# Patient Record
Sex: FEMALE | Race: OTHER | NOT HISPANIC OR LATINO | URBAN - METROPOLITAN AREA
[De-identification: names, ages, dates, MRNs, and addresses within clinical notes are randomized per-mention and may not be internally consistent; named-entity substitution may affect disease eponyms.]

---

## 2017-08-16 ENCOUNTER — EMERGENCY (EMERGENCY)
Facility: HOSPITAL | Age: 47
LOS: 0 days | Discharge: HOME | End: 2017-08-16

## 2017-08-16 DIAGNOSIS — J45.909 UNSPECIFIED ASTHMA, UNCOMPLICATED: ICD-10-CM

## 2017-08-16 DIAGNOSIS — F19.94 OTHER PSYCHOACTIVE SUBSTANCE USE, UNSPECIFIED WITH PSYCHOACTIVE SUBSTANCE-INDUCED MOOD DISORDER: ICD-10-CM

## 2017-08-16 DIAGNOSIS — F41.0 PANIC DISORDER [EPISODIC PAROXYSMAL ANXIETY]: ICD-10-CM

## 2017-08-16 DIAGNOSIS — F19.20 OTHER PSYCHOACTIVE SUBSTANCE DEPENDENCE, UNCOMPLICATED: ICD-10-CM

## 2017-08-16 DIAGNOSIS — Z79.899 OTHER LONG TERM (CURRENT) DRUG THERAPY: ICD-10-CM

## 2017-08-16 DIAGNOSIS — M79.89 OTHER SPECIFIED SOFT TISSUE DISORDERS: ICD-10-CM

## 2017-08-16 DIAGNOSIS — Z88.8 ALLERGY STATUS TO OTHER DRUGS, MEDICAMENTS AND BIOLOGICAL SUBSTANCES: ICD-10-CM

## 2017-08-16 DIAGNOSIS — M25.572 PAIN IN LEFT ANKLE AND JOINTS OF LEFT FOOT: ICD-10-CM

## 2017-08-16 DIAGNOSIS — F33.2 MAJOR DEPRESSIVE DISORDER, RECURRENT SEVERE WITHOUT PSYCHOTIC FEATURES: ICD-10-CM

## 2017-08-17 ENCOUNTER — EMERGENCY (EMERGENCY)
Facility: HOSPITAL | Age: 47
LOS: 1 days | Discharge: HOME | End: 2017-08-17
Attending: EMERGENCY MEDICINE | Admitting: EMERGENCY MEDICINE
Payer: MEDICAID

## 2017-08-17 ENCOUNTER — OUTPATIENT (OUTPATIENT)
Dept: OUTPATIENT SERVICES | Facility: HOSPITAL | Age: 47
LOS: 1 days | Discharge: HOME | End: 2017-08-17

## 2017-08-17 VITALS
TEMPERATURE: 98 F | SYSTOLIC BLOOD PRESSURE: 112 MMHG | OXYGEN SATURATION: 98 % | DIASTOLIC BLOOD PRESSURE: 85 MMHG | RESPIRATION RATE: 18 BRPM | HEART RATE: 89 BPM

## 2017-08-17 PROBLEM — Z00.00 ENCOUNTER FOR PREVENTIVE HEALTH EXAMINATION: Status: ACTIVE | Noted: 2017-08-17

## 2017-08-17 PROCEDURE — 99284 EMERGENCY DEPT VISIT MOD MDM: CPT

## 2017-08-17 RX ORDER — ALPRAZOLAM 0.25 MG
0.5 TABLET ORAL ONCE
Qty: 0 | Refills: 0 | Status: DISCONTINUED | OUTPATIENT
Start: 2017-08-17 | End: 2017-08-17

## 2017-08-17 RX ORDER — ALPRAZOLAM 0.25 MG
1 TABLET ORAL
Qty: 9 | Refills: 0 | OUTPATIENT
Start: 2017-08-17 | End: 2017-08-20

## 2017-08-17 RX ADMIN — Medication 0.5 MILLIGRAM(S): at 15:40

## 2017-08-17 NOTE — ED PROVIDER NOTE - PHYSICAL EXAMINATION
left foot- mild swelling, mild diffuse tender  left lower leg no edema, erythema along strap lines of boot, small clear blister medial lower leg, no surrounding streaking, no dc  distal nvi

## 2017-08-17 NOTE — ED PROVIDER NOTE - OBJECTIVE STATEMENT
47 yo female states fractured  left foot in [place] 6wks ago, states needs ortho referral for surgery.  states doesn't know which bone was fractured.  no change in pain.    pt states walking boot causing irritation to skin.    pt also co THIAGO, requests xanax, no si hi delusion hallucinations.

## 2017-08-17 NOTE — ED PROVIDER NOTE - MEDICAL DECISION MAKING DETAILS
45 yo female w hx of anxiety, foot fx 6wk ago, needs ortho referral provided, instructed to loosen straps as causing irritation to skin, advised bacitracin, return to er if worse  anxiety, xanax x 1 in ed, script x 3d

## 2017-08-17 NOTE — ED PROVIDER NOTE - CARE PLAN
Principal Discharge DX:	Fracture of left foot, closed, initial encounter  Secondary Diagnosis:	Anxiety

## 2017-08-17 NOTE — ED ADULT NURSE NOTE - OBJECTIVE STATEMENT
received pt to intake room 6 for evaluation of orthopedic referral after foot fracture 6 weeks ago in [place]. pt c/o worsening anxiety throughout process. denies si/hi/ah/vh. calm, cooeprative. denies any additional complaints. pt presents awake a&ox4, denies dizziness or  ha. skin warm dry appropriate for race. respirations even unlabored. denies any additional complaints. medicated as ordered. mother at bedside. pending disposition.

## 2017-08-17 NOTE — ED ADULT TRIAGE NOTE - CHIEF COMPLAINT QUOTE
p/t with multiple complaints, c/o of multiple panic attacks for few days p/t ran out of clonopin  last taken 3 days ago, p/t recently broke her lt foot here for ortho eval c/o of pain

## 2017-08-18 DIAGNOSIS — F11.20 OPIOID DEPENDENCE, UNCOMPLICATED: ICD-10-CM

## 2017-08-18 RX ORDER — ALPRAZOLAM 0.25 MG
1 TABLET ORAL
Qty: 9 | Refills: 0 | OUTPATIENT
Start: 2017-08-18 | End: 2017-08-21

## 2017-08-18 NOTE — ED POST DISCHARGE NOTE - REASON FOR FOLLOW-UP
EKG Follow-up/Other Pt called because Xanax Rx was not submitted to [facility]. I spoke with registration and pt's insurance information incorrect. Updated information and was able to successfully Erx Xanax. Other

## 2017-08-24 ENCOUNTER — APPOINTMENT (OUTPATIENT)
Dept: PODIATRY | Facility: CLINIC | Age: 47
End: 2017-08-24

## 2017-08-24 ENCOUNTER — OUTPATIENT (OUTPATIENT)
Dept: OUTPATIENT SERVICES | Facility: HOSPITAL | Age: 47
LOS: 1 days | Discharge: HOME | End: 2017-08-24

## 2017-08-24 VITALS
DIASTOLIC BLOOD PRESSURE: 80 MMHG | SYSTOLIC BLOOD PRESSURE: 115 MMHG | HEIGHT: 64 IN | WEIGHT: 125 LBS | HEART RATE: 81 BPM | BODY MASS INDEX: 21.34 KG/M2

## 2017-08-24 DIAGNOSIS — F19.20 OTHER PSYCHOACTIVE SUBSTANCE DEPENDENCE, UNCOMPLICATED: ICD-10-CM

## 2017-08-24 DIAGNOSIS — S90.32XA CONTUSION OF LEFT FOOT, INITIAL ENCOUNTER: ICD-10-CM

## 2017-08-24 DIAGNOSIS — F33.2 MAJOR DEPRESSIVE DISORDER, RECURRENT SEVERE WITHOUT PSYCHOTIC FEATURES: ICD-10-CM

## 2017-08-24 DIAGNOSIS — F19.94 OTHER PSYCHOACTIVE SUBSTANCE USE, UNSPECIFIED WITH PSYCHOACTIVE SUBSTANCE-INDUCED MOOD DISORDER: ICD-10-CM

## 2017-08-24 DIAGNOSIS — M79.89 OTHER SPECIFIED SOFT TISSUE DISORDERS: ICD-10-CM

## 2017-08-25 DIAGNOSIS — M79.89 OTHER SPECIFIED SOFT TISSUE DISORDERS: ICD-10-CM

## 2017-08-25 DIAGNOSIS — S90.32XA CONTUSION OF LEFT FOOT, INITIAL ENCOUNTER: ICD-10-CM

## 2017-08-25 DIAGNOSIS — M79.672 PAIN IN LEFT FOOT: ICD-10-CM

## 2017-08-28 ENCOUNTER — OUTPATIENT (OUTPATIENT)
Dept: OUTPATIENT SERVICES | Facility: HOSPITAL | Age: 47
LOS: 1 days | Discharge: HOME | End: 2017-08-28

## 2017-08-28 ENCOUNTER — APPOINTMENT (OUTPATIENT)
Dept: PODIATRY | Facility: CLINIC | Age: 47
End: 2017-08-28

## 2017-08-28 VITALS
WEIGHT: 126 LBS | DIASTOLIC BLOOD PRESSURE: 79 MMHG | SYSTOLIC BLOOD PRESSURE: 119 MMHG | HEIGHT: 64 IN | BODY MASS INDEX: 21.51 KG/M2 | HEART RATE: 75 BPM

## 2017-08-28 DIAGNOSIS — F33.2 MAJOR DEPRESSIVE DISORDER, RECURRENT SEVERE WITHOUT PSYCHOTIC FEATURES: ICD-10-CM

## 2017-08-28 DIAGNOSIS — S93.325D DISLOCATION OF TARSOMETATARSAL JOINT OF LEFT FOOT, SUBSEQUENT ENCOUNTER: ICD-10-CM

## 2017-08-28 DIAGNOSIS — F19.20 OTHER PSYCHOACTIVE SUBSTANCE DEPENDENCE, UNCOMPLICATED: ICD-10-CM

## 2017-08-28 DIAGNOSIS — F19.94 OTHER PSYCHOACTIVE SUBSTANCE USE, UNSPECIFIED WITH PSYCHOACTIVE SUBSTANCE-INDUCED MOOD DISORDER: ICD-10-CM

## 2017-08-31 ENCOUNTER — OUTPATIENT (OUTPATIENT)
Dept: OUTPATIENT SERVICES | Facility: HOSPITAL | Age: 47
LOS: 1 days | Discharge: HOME | End: 2017-08-31

## 2017-08-31 DIAGNOSIS — F33.2 MAJOR DEPRESSIVE DISORDER, RECURRENT SEVERE WITHOUT PSYCHOTIC FEATURES: ICD-10-CM

## 2017-08-31 DIAGNOSIS — M79.672 PAIN IN LEFT FOOT: ICD-10-CM

## 2017-08-31 DIAGNOSIS — S93.325D DISLOCATION OF TARSOMETATARSAL JOINT OF LEFT FOOT, SUBSEQUENT ENCOUNTER: ICD-10-CM

## 2017-08-31 DIAGNOSIS — F19.20 OTHER PSYCHOACTIVE SUBSTANCE DEPENDENCE, UNCOMPLICATED: ICD-10-CM

## 2017-08-31 DIAGNOSIS — F19.94 OTHER PSYCHOACTIVE SUBSTANCE USE, UNSPECIFIED WITH PSYCHOACTIVE SUBSTANCE-INDUCED MOOD DISORDER: ICD-10-CM

## 2017-09-06 LAB
AMORPH CRY # UR COMP ASSIST: SIGNIFICANT CHANGE UP (ref 0–0)
APPEARANCE UR: SIGNIFICANT CHANGE UP
BILIRUB UR-MCNC: NEGATIVE — SIGNIFICANT CHANGE UP
BLOOD UR QL VISUAL: NEGATIVE — SIGNIFICANT CHANGE UP
CHOLEST SERPL-MCNC: 188 MG/DL — SIGNIFICANT CHANGE UP (ref 120–199)
COD CRY URNS QL: SIGNIFICANT CHANGE UP (ref 0–0)
COLOR SPEC: YELLOW — SIGNIFICANT CHANGE UP
GLUCOSE UR-MCNC: NEGATIVE — SIGNIFICANT CHANGE UP
HAV IGG+IGM SER QL: NONREACTIVE — SIGNIFICANT CHANGE UP
HBV SURFACE AB SER-ACNC: NONREACTIVE — SIGNIFICANT CHANGE UP
HBV SURFACE AG SER-ACNC: NEGATIVE — SIGNIFICANT CHANGE UP
HCG SERPL-ACNC: < 5 MIU/ML — SIGNIFICANT CHANGE UP
HDLC SERPL-MCNC: 94 MG/DL — HIGH (ref 45–65)
KETONES UR-MCNC: NEGATIVE — SIGNIFICANT CHANGE UP
LEUKOCYTE ESTERASE UR-ACNC: NEGATIVE — SIGNIFICANT CHANGE UP
LIPID PNL WITH DIRECT LDL SERPL: 85 MG/DL — SIGNIFICANT CHANGE UP
MUCOUS THREADS # UR AUTO: SIGNIFICANT CHANGE UP
NITRITE UR-MCNC: NEGATIVE — SIGNIFICANT CHANGE UP
NON-SQ EPI CELLS # UR AUTO: <1 — SIGNIFICANT CHANGE UP
PH UR: 7.5 — SIGNIFICANT CHANGE UP (ref 5–8)
PROT UR-MCNC: SIGNIFICANT CHANGE UP
SP GR SPEC: 1.01 — SIGNIFICANT CHANGE UP (ref 1–1.03)
SQUAMOUS # UR AUTO: SIGNIFICANT CHANGE UP
T PALLIDUM AB TITR SER: NEGATIVE — SIGNIFICANT CHANGE UP
TRIGL SERPL-MCNC: 63 MG/DL — SIGNIFICANT CHANGE UP (ref 10–149)
UROBILINOGEN FLD QL: NORMAL E.U. — SIGNIFICANT CHANGE UP (ref 0.2–1)

## 2017-09-07 LAB
HCV RNA SERPL NAA DL=5-ACNC: NOT DETECTED IU/ML — SIGNIFICANT CHANGE UP
HCV RNA SPEC NAA+PROBE-LOG IU: SIGNIFICANT CHANGE UP LOGIU/ML

## 2017-09-11 ENCOUNTER — OUTPATIENT (OUTPATIENT)
Dept: OUTPATIENT SERVICES | Facility: HOSPITAL | Age: 47
LOS: 1 days | Discharge: HOME | End: 2017-09-11

## 2017-09-11 ENCOUNTER — APPOINTMENT (OUTPATIENT)
Dept: PODIATRY | Facility: CLINIC | Age: 47
End: 2017-09-11

## 2017-09-11 ENCOUNTER — EMERGENCY (EMERGENCY)
Facility: HOSPITAL | Age: 47
LOS: 0 days | Discharge: HOME | End: 2017-09-11

## 2017-09-11 VITALS
BODY MASS INDEX: 21.34 KG/M2 | HEART RATE: 80 BPM | DIASTOLIC BLOOD PRESSURE: 70 MMHG | SYSTOLIC BLOOD PRESSURE: 115 MMHG | HEIGHT: 64 IN | WEIGHT: 125 LBS

## 2017-09-11 DIAGNOSIS — G89.29 PAIN IN LEFT FOOT: ICD-10-CM

## 2017-09-11 DIAGNOSIS — J45.909 UNSPECIFIED ASTHMA, UNCOMPLICATED: ICD-10-CM

## 2017-09-11 DIAGNOSIS — F41.9 ANXIETY DISORDER, UNSPECIFIED: ICD-10-CM

## 2017-09-11 DIAGNOSIS — Z79.899 OTHER LONG TERM (CURRENT) DRUG THERAPY: ICD-10-CM

## 2017-09-11 DIAGNOSIS — S93.326D: ICD-10-CM

## 2017-09-11 DIAGNOSIS — F19.20 OTHER PSYCHOACTIVE SUBSTANCE DEPENDENCE, UNCOMPLICATED: ICD-10-CM

## 2017-09-11 DIAGNOSIS — F33.2 MAJOR DEPRESSIVE DISORDER, RECURRENT SEVERE WITHOUT PSYCHOTIC FEATURES: ICD-10-CM

## 2017-09-11 DIAGNOSIS — Z76.0 ENCOUNTER FOR ISSUE OF REPEAT PRESCRIPTION: ICD-10-CM

## 2017-09-11 DIAGNOSIS — M79.672 PAIN IN LEFT FOOT: ICD-10-CM

## 2017-09-11 DIAGNOSIS — F19.94 OTHER PSYCHOACTIVE SUBSTANCE USE, UNSPECIFIED WITH PSYCHOACTIVE SUBSTANCE-INDUCED MOOD DISORDER: ICD-10-CM

## 2017-09-11 DIAGNOSIS — Z88.8 ALLERGY STATUS TO OTHER DRUGS, MEDICAMENTS AND BIOLOGICAL SUBSTANCES: ICD-10-CM

## 2017-10-03 ENCOUNTER — EMERGENCY (EMERGENCY)
Facility: HOSPITAL | Age: 47
LOS: 1 days | Discharge: HOME | End: 2017-10-03
Admitting: EMERGENCY MEDICINE
Payer: MEDICAID

## 2017-10-03 VITALS
SYSTOLIC BLOOD PRESSURE: 114 MMHG | TEMPERATURE: 97 F | OXYGEN SATURATION: 100 % | HEIGHT: 64 IN | DIASTOLIC BLOOD PRESSURE: 70 MMHG | WEIGHT: 128.09 LBS | RESPIRATION RATE: 18 BRPM | HEART RATE: 72 BPM

## 2017-10-03 PROCEDURE — 99285 EMERGENCY DEPT VISIT HI MDM: CPT

## 2017-10-03 NOTE — ED ADULT TRIAGE NOTE - CHIEF COMPLAINT QUOTE
pt reports she was at the dentist today and developed a panic attack, normally takes klonopin for anxiety, but does not have any on her and has not taken any today. pt denies si/hi, ah/vh, etoh or drug use. pt reports chest tightness. spoke with Carley in , pt to have EKG then be seen in . pt appears anxious in triage. PMHX: anxiety, depression, with prior hospitalizations

## 2017-10-03 NOTE — ED PROVIDER NOTE - OBJECTIVE STATEMENT
This is a 46 year old Female PM<HX substance abuse, Anxiety and Panic attacks came to the ED today for psych evaluation r/t panic attacks. Patient states she had a panic attack tody at the dentist office and came to the hospital for Klonopin 1mg This is a 46 year old Female PM<HX substance abuse, Anxiety and Panic attacks came to the ED today for psych evaluation r/t panic attacks. Patient states she had a panic attack tody at the dentist office and came to the hospital for Klonopin 1mg. Patient states she did not take her Klonopin today and had non on her and believed it was better to come to ED. Patient reported chest tightness in triage This is a 46 year old Female PM<HX substance abuse, Anxiety and Panic attacks came to the ED today for psych evaluation r/t panic attacks. Patient states she had a panic attack tody at the dentist office and came to the hospital for Klonopin 1mg. Patient states she did not take her Klonopin today and had none on her and believed it was better to come to ED. Patient reported chest tightness in triage. Patient is crying, guarded and anxious on arrival

## 2017-10-03 NOTE — ED ADULT NURSE REASSESSMENT NOTE - NS ED NURSE REASSESS COMMENT FT1
Patient cleared by TOMÁS Pritchett for discharge, discharge instructions given, pt verbalized understanding and left ER a&ox3.

## 2017-10-03 NOTE — ED PROVIDER NOTE - MEDICAL DECISION MAKING DETAILS
This is a 46 year old Female PM<HX substance abuse, Anxiety and Panic attacks came to the ED today for psych evaluation r/t panic attacks. Patient states she had a panic attack tody at the dentist office and came to the hospital for Klonopin 1mg. Patient states she did not take her Klonopin today and had none on her and believed it was better to come to ED. Patient reported chest tightness in triage. Patient is crying, guarded and anxious on arrival Medical evaluation performed. There is no clinical evidence of intoxication or any acute medical problem requiring immediate intervention.

## 2017-10-03 NOTE — ED PROVIDER NOTE - PROGRESS NOTE DETAILS
Spoke with Patient's Mother Sara Shi who reports patient is currently in a methadone program and has a history of substance abuse. Reports she has no other medical concerns or complaints safety concerns for herself or yogesh and feels " I don't think Yogesh would ever hurt herself, she does not believe in it"  Recommend [facility] During stay in ED patient observed clam cooperative and interactive with fellow patients. Patient has no other medical concerns or complaints outburst or seem a threat to staff.  Spoke with Liliam and is requesting Klonopin or something for anxiety" When patient was told she would not be given a Benzodiazapine for anxiety she stood up and states "I am out of here" Patient became agitated insisting to leave  States "Last time I came here I did not have a bitch of a nurse and gave me my pill" Patient was offered Benadryl PO and patient declined.  Utilized Reference # [id number] 9/20/2017 Clonazepam 0.5 mg 90 pills  for 30 days supply prescribed. Provider asked patient if she had any medication and she states "I do not have many left" " I only have a few left" Patient refused Benadryl when offered and ran out of  , cursing at staff refusing to sign or take d/c instructions and [facility] crisis center information.

## 2017-10-03 NOTE — ED PROVIDER NOTE - CARE PLAN
Principal Discharge DX:	Panic attack Principal Discharge DX:	Panic attack  Secondary Diagnosis:	Substance abuse  Secondary Diagnosis:	Benzodiazepine abuse

## 2017-11-06 ENCOUNTER — INPATIENT (INPATIENT)
Facility: HOSPITAL | Age: 47
LOS: 10 days | Discharge: HOME | End: 2017-11-17
Attending: PSYCHIATRY & NEUROLOGY | Admitting: PSYCHIATRY & NEUROLOGY
Payer: MEDICAID

## 2017-11-06 VITALS — RESPIRATION RATE: 17 BRPM | OXYGEN SATURATION: 99 % | HEIGHT: 64 IN | TEMPERATURE: 98 F | WEIGHT: 133.16 LBS

## 2017-11-06 DIAGNOSIS — F33.9 MAJOR DEPRESSIVE DISORDER, RECURRENT, UNSPECIFIED: ICD-10-CM

## 2017-11-06 RX ORDER — ESCITALOPRAM OXALATE 10 MG/1
5 TABLET, FILM COATED ORAL ONCE
Qty: 0 | Refills: 0 | Status: COMPLETED | OUTPATIENT
Start: 2017-11-06 | End: 2017-11-06

## 2017-11-06 RX ORDER — DIPHENHYDRAMINE HCL 50 MG
50 CAPSULE ORAL EVERY 6 HOURS
Qty: 0 | Refills: 0 | Status: DISCONTINUED | OUTPATIENT
Start: 2017-11-06 | End: 2017-11-17

## 2017-11-06 RX ORDER — HALOPERIDOL DECANOATE 100 MG/ML
5 INJECTION INTRAMUSCULAR EVERY 6 HOURS
Qty: 0 | Refills: 0 | Status: DISCONTINUED | OUTPATIENT
Start: 2017-11-06 | End: 2017-11-17

## 2017-11-06 RX ORDER — HYDROXYZINE HCL 10 MG
50 TABLET ORAL EVERY 6 HOURS
Qty: 0 | Refills: 0 | Status: DISCONTINUED | OUTPATIENT
Start: 2017-11-06 | End: 2017-11-17

## 2017-11-06 RX ORDER — ESCITALOPRAM OXALATE 10 MG/1
10 TABLET, FILM COATED ORAL DAILY
Qty: 0 | Refills: 0 | Status: DISCONTINUED | OUTPATIENT
Start: 2017-11-06 | End: 2017-11-17

## 2017-11-06 RX ORDER — ALBUTEROL 90 UG/1
2 AEROSOL, METERED ORAL EVERY 6 HOURS
Qty: 0 | Refills: 0 | Status: DISCONTINUED | OUTPATIENT
Start: 2017-11-06 | End: 2017-11-17

## 2017-11-06 RX ORDER — CLONAZEPAM 1 MG
0.5 TABLET ORAL ONCE
Qty: 0 | Refills: 0 | Status: DISCONTINUED | OUTPATIENT
Start: 2017-11-06 | End: 2017-11-06

## 2017-11-06 RX ORDER — CLONAZEPAM 1 MG
0.5 TABLET ORAL THREE TIMES A DAY
Qty: 0 | Refills: 0 | Status: DISCONTINUED | OUTPATIENT
Start: 2017-11-06 | End: 2017-11-10

## 2017-11-06 RX ADMIN — Medication 0.5 MILLIGRAM(S): at 21:41

## 2017-11-06 RX ADMIN — Medication 0.5 MILLIGRAM(S): at 13:36

## 2017-11-06 RX ADMIN — ESCITALOPRAM OXALATE 5 MILLIGRAM(S): 10 TABLET, FILM COATED ORAL at 13:36

## 2017-11-06 RX ADMIN — Medication 50 MILLIGRAM(S): at 15:59

## 2017-11-06 RX ADMIN — Medication 50 MILLIGRAM(S): at 21:43

## 2017-11-06 RX ADMIN — ALBUTEROL 2 PUFF(S): 90 AEROSOL, METERED ORAL at 23:03

## 2017-11-07 PROCEDURE — 99232 SBSQ HOSP IP/OBS MODERATE 35: CPT

## 2017-11-07 RX ORDER — ACETAMINOPHEN 500 MG
325 TABLET ORAL ONCE
Qty: 0 | Refills: 0 | Status: COMPLETED | OUTPATIENT
Start: 2017-11-07 | End: 2017-11-07

## 2017-11-07 RX ORDER — METHADONE HYDROCHLORIDE 40 MG/1
40 TABLET ORAL
Qty: 0 | Refills: 0 | Status: DISCONTINUED | OUTPATIENT
Start: 2017-11-07 | End: 2017-11-13

## 2017-11-07 RX ORDER — METHADONE HYDROCHLORIDE 40 MG/1
30 TABLET ORAL
Qty: 0 | Refills: 0 | Status: DISCONTINUED | OUTPATIENT
Start: 2017-11-07 | End: 2017-11-13

## 2017-11-07 RX ORDER — ACETAMINOPHEN 500 MG
650 TABLET ORAL EVERY 6 HOURS
Qty: 0 | Refills: 0 | Status: DISCONTINUED | OUTPATIENT
Start: 2017-11-07 | End: 2017-11-17

## 2017-11-07 RX ORDER — METHADONE HYDROCHLORIDE 40 MG/1
5 TABLET ORAL
Qty: 0 | Refills: 0 | Status: DISCONTINUED | OUTPATIENT
Start: 2017-11-07 | End: 2017-11-13

## 2017-11-07 RX ADMIN — Medication 325 MILLIGRAM(S): at 15:41

## 2017-11-07 RX ADMIN — ESCITALOPRAM OXALATE 10 MILLIGRAM(S): 10 TABLET, FILM COATED ORAL at 08:06

## 2017-11-07 RX ADMIN — Medication 650 MILLIGRAM(S): at 13:52

## 2017-11-07 RX ADMIN — Medication 650 MILLIGRAM(S): at 12:52

## 2017-11-07 RX ADMIN — Medication 50 MILLIGRAM(S): at 20:59

## 2017-11-07 RX ADMIN — Medication 650 MILLIGRAM(S): at 20:59

## 2017-11-07 RX ADMIN — Medication 0.5 MILLIGRAM(S): at 12:28

## 2017-11-07 RX ADMIN — Medication 325 MILLIGRAM(S): at 16:41

## 2017-11-07 RX ADMIN — Medication 50 MILLIGRAM(S): at 06:21

## 2017-11-07 RX ADMIN — Medication 0.5 MILLIGRAM(S): at 20:59

## 2017-11-07 RX ADMIN — Medication 0.5 MILLIGRAM(S): at 08:05

## 2017-11-07 NOTE — CHART NOTE - NSCHARTNOTEFT_GEN_A_CORE
Patient Admitted from: [facility] admitting diagnosis: Recurrent major depressive disorder    PAST MEDICAL & SURGICAL HISTORY:  Substance abuse  Anxiety  No significant past surgical history        Allergies    No Known Allergies    Intolerances        Social History:     FAMILY HISTORY:  No pertinent family history in first degree relatives      MEDICATIONS  (STANDING):  clonazePAM Tablet 0.5 milliGRAM(s) Oral three times a day  escitalopram 10 milliGRAM(s) Oral daily    MEDICATIONS  (PRN):  ALBUTerol    90 MICROgram(s) HFA Inhaler 2 Puff(s) Inhalation every 6 hours PRN Shortness of Breath and/or Wheezing  diphenhydrAMINE   Capsule 50 milliGRAM(s) Oral every 6 hours PRN Extrapyramidal prophylaxis/agitation  diphenhydrAMINE   Injectable 50 milliGRAM(s) IntraMuscular every 6 hours PRN Agitation  haloperidol     Tablet 5 milliGRAM(s) Oral every 6 hours PRN agitation  haloperidol    Injectable 5 milliGRAM(s) IntraMuscular every 6 hours PRN Agitation  hydrOXYzine hydrochloride 50 milliGRAM(s) Oral every 6 hours PRN anxiety/agitation  LORazepam   Injectable 2 milliGRAM(s) IntraMuscular every 6 hours PRN Agitation        CAPILLARY BLOOD GLUCOSE        I&O's Summary      PHYSICAL EXAM:  GENERAL: NAD, well-developed  HEAD:  Atraumatic, Normocephalic  EYES: EOMI, PERRLA, conjunctiva and sclera clear  NECK: Supple, No JVD  CHEST/LUNG: Clear to auscultation bilaterally; No wheeze  HEART: Regular rate and rhythm; No murmurs, rubs, or gallops  ABDOMEN: Soft, Nontender, Nondistended; Bowel sounds present  EXTREMITIES:  2+ Peripheral Pulses, No clubbing, cyanosis, or edema  PSYCH: AAOx3  NEUROLOGY: non-focal  SKIN: No rashes or lesions    Vital Signs Last 24 Hrs  T(C): 36.8 (06 Nov 2017 10:54), Max: 36.8 (06 Nov 2017 10:54)  T(F): 98.2 (06 Nov 2017 10:54), Max: 98.2 (06 Nov 2017 10:54)  HR: -- 72  BP: -- 93/63  BP(mean): --  RR: 17 (06 Nov 2017 10:54) (17 - 17)  SpO2: 99% (06 Nov 2017 10:54) (99% - 99%)    LABS:                    RADIOLOGY & ADDITIONAL TESTS:    Assessment and Plan: 46 F with a PMH of substance abuse and asthma and a psychiatric admission diagnosis of Recurrent major depressive disorder  1. Recurrent major depressive disorder: Continue treatment as per primary psychiatry team: Clonazepam, escitalopram standing; diphenhydramine, haloperidol, hydroxyzine PRN  2. Asthma: 90mcg albuterol inhaler ordered Q6hrs, PRN  3. Substance abuse: Continue 30mg methadone QD Patient Admitted from: [facility] admitting diagnosis: Recurrent major depressive disorder    PAST MEDICAL & SURGICAL HISTORY:  Substance abuse  Anxiety  No significant past surgical history        Allergies    No Known Allergies    Intolerances        Social History:     FAMILY HISTORY:  No pertinent family history in first degree relatives      MEDICATIONS  (STANDING):  clonazePAM Tablet 0.5 milliGRAM(s) Oral three times a day  escitalopram 10 milliGRAM(s) Oral daily    MEDICATIONS  (PRN):  ALBUTerol    90 MICROgram(s) HFA Inhaler 2 Puff(s) Inhalation every 6 hours PRN Shortness of Breath and/or Wheezing  diphenhydrAMINE   Capsule 50 milliGRAM(s) Oral every 6 hours PRN Extrapyramidal prophylaxis/agitation  diphenhydrAMINE   Injectable 50 milliGRAM(s) IntraMuscular every 6 hours PRN Agitation  haloperidol     Tablet 5 milliGRAM(s) Oral every 6 hours PRN agitation  haloperidol    Injectable 5 milliGRAM(s) IntraMuscular every 6 hours PRN Agitation  hydrOXYzine hydrochloride 50 milliGRAM(s) Oral every 6 hours PRN anxiety/agitation  LORazepam   Injectable 2 milliGRAM(s) IntraMuscular every 6 hours PRN Agitation        CAPILLARY BLOOD GLUCOSE        I&O's Summary      PHYSICAL EXAM:  GENERAL: NAD, well-developed  HEAD:  Atraumatic, Normocephalic  EYES: EOMI, PERRLA, conjunctiva and sclera clear  NECK: Supple, No JVD  CHEST/LUNG: Rhonchi heard at upper R and L lobes, cleared with cough   HEART: Regular rate and rhythm; No murmurs, rubs, or gallops  ABDOMEN: Soft, Nontender, Nondistended; Bowel sounds present  EXTREMITIES:  2+ Peripheral Pulses, No clubbing, cyanosis, or edema  PSYCH: AAOx3  NEUROLOGY: non-focal  SKIN: No rashes or lesions    Vital Signs Last 24 Hrs  T(C): 36.8 (06 Nov 2017 10:54), Max: 36.8 (06 Nov 2017 10:54)  T(F): 98.2 (06 Nov 2017 10:54), Max: 98.2 (06 Nov 2017 10:54)  HR: -- 72  BP: -- 93/63  BP(mean): --  RR: 17 (06 Nov 2017 10:54) (17 - 17)  SpO2: 99% (06 Nov 2017 10:54) (99% - 99%)    LABS:                    RADIOLOGY & ADDITIONAL TESTS:    Assessment and Plan: 46 F with a PMH of substance abuse and asthma and a psychiatric admission diagnosis of Recurrent major depressive disorder. She currently only admits to mild anxiety and mild SOB but denies any other medical complaints or concerns. Her screening was remarkable for rhonchi but was cleared with coughing.   1. Recurrent major depressive disorder: Continue treatment as per primary psychiatry team: Clonazepam, escitalopram standing; diphenhydramine, haloperidol, hydroxyzine PRN  2. Asthma/SOB: Patient was given a dose of her 90mcg albuterol inhaler   3. Substance abuse: Patient is to receive 75mg today 11/07/17

## 2017-11-08 PROCEDURE — 99232 SBSQ HOSP IP/OBS MODERATE 35: CPT

## 2017-11-08 RX ADMIN — METHADONE HYDROCHLORIDE 5 MILLIGRAM(S): 40 TABLET ORAL at 06:27

## 2017-11-08 RX ADMIN — Medication 650 MILLIGRAM(S): at 20:56

## 2017-11-08 RX ADMIN — METHADONE HYDROCHLORIDE 40 MILLIGRAM(S): 40 TABLET ORAL at 06:27

## 2017-11-08 RX ADMIN — ESCITALOPRAM OXALATE 10 MILLIGRAM(S): 10 TABLET, FILM COATED ORAL at 08:15

## 2017-11-08 RX ADMIN — Medication 0.5 MILLIGRAM(S): at 13:07

## 2017-11-08 RX ADMIN — Medication 50 MILLIGRAM(S): at 20:55

## 2017-11-08 RX ADMIN — Medication 650 MILLIGRAM(S): at 21:36

## 2017-11-08 RX ADMIN — Medication 650 MILLIGRAM(S): at 07:23

## 2017-11-08 RX ADMIN — METHADONE HYDROCHLORIDE 30 MILLIGRAM(S): 40 TABLET ORAL at 06:27

## 2017-11-08 RX ADMIN — Medication 0.5 MILLIGRAM(S): at 20:55

## 2017-11-08 RX ADMIN — Medication 0.5 MILLIGRAM(S): at 08:15

## 2017-11-08 RX ADMIN — Medication 650 MILLIGRAM(S): at 06:43

## 2017-11-09 PROCEDURE — 99232 SBSQ HOSP IP/OBS MODERATE 35: CPT

## 2017-11-09 RX ADMIN — Medication 650 MILLIGRAM(S): at 08:09

## 2017-11-09 RX ADMIN — Medication 0.5 MILLIGRAM(S): at 12:05

## 2017-11-09 RX ADMIN — Medication 0.5 MILLIGRAM(S): at 20:46

## 2017-11-09 RX ADMIN — Medication 50 MILLIGRAM(S): at 20:46

## 2017-11-09 RX ADMIN — METHADONE HYDROCHLORIDE 5 MILLIGRAM(S): 40 TABLET ORAL at 06:17

## 2017-11-09 RX ADMIN — METHADONE HYDROCHLORIDE 40 MILLIGRAM(S): 40 TABLET ORAL at 06:17

## 2017-11-09 RX ADMIN — Medication 650 MILLIGRAM(S): at 17:52

## 2017-11-09 RX ADMIN — Medication 0.5 MILLIGRAM(S): at 08:09

## 2017-11-09 RX ADMIN — ESCITALOPRAM OXALATE 10 MILLIGRAM(S): 10 TABLET, FILM COATED ORAL at 08:09

## 2017-11-09 RX ADMIN — METHADONE HYDROCHLORIDE 30 MILLIGRAM(S): 40 TABLET ORAL at 06:17

## 2017-11-10 PROCEDURE — 99232 SBSQ HOSP IP/OBS MODERATE 35: CPT

## 2017-11-10 RX ORDER — CLONAZEPAM 1 MG
0.5 TABLET ORAL THREE TIMES A DAY
Qty: 0 | Refills: 0 | Status: DISCONTINUED | OUTPATIENT
Start: 2017-11-10 | End: 2017-11-16

## 2017-11-10 RX ADMIN — METHADONE HYDROCHLORIDE 30 MILLIGRAM(S): 40 TABLET ORAL at 06:01

## 2017-11-10 RX ADMIN — Medication 50 MILLIGRAM(S): at 20:26

## 2017-11-10 RX ADMIN — METHADONE HYDROCHLORIDE 40 MILLIGRAM(S): 40 TABLET ORAL at 06:01

## 2017-11-10 RX ADMIN — Medication 0.5 MILLIGRAM(S): at 08:11

## 2017-11-10 RX ADMIN — Medication 0.5 MILLIGRAM(S): at 20:25

## 2017-11-10 RX ADMIN — Medication 50 MILLIGRAM(S): at 22:33

## 2017-11-10 RX ADMIN — Medication 650 MILLIGRAM(S): at 09:00

## 2017-11-10 RX ADMIN — Medication 650 MILLIGRAM(S): at 01:18

## 2017-11-10 RX ADMIN — METHADONE HYDROCHLORIDE 5 MILLIGRAM(S): 40 TABLET ORAL at 06:01

## 2017-11-10 RX ADMIN — ESCITALOPRAM OXALATE 10 MILLIGRAM(S): 10 TABLET, FILM COATED ORAL at 08:11

## 2017-11-10 RX ADMIN — Medication 650 MILLIGRAM(S): at 08:00

## 2017-11-10 RX ADMIN — Medication 650 MILLIGRAM(S): at 22:36

## 2017-11-10 RX ADMIN — Medication 0.5 MILLIGRAM(S): at 13:10

## 2017-11-11 PROCEDURE — 99232 SBSQ HOSP IP/OBS MODERATE 35: CPT

## 2017-11-11 RX ADMIN — METHADONE HYDROCHLORIDE 40 MILLIGRAM(S): 40 TABLET ORAL at 06:25

## 2017-11-11 RX ADMIN — Medication 650 MILLIGRAM(S): at 10:09

## 2017-11-11 RX ADMIN — ESCITALOPRAM OXALATE 10 MILLIGRAM(S): 10 TABLET, FILM COATED ORAL at 08:06

## 2017-11-11 RX ADMIN — Medication 0.5 MILLIGRAM(S): at 08:06

## 2017-11-11 RX ADMIN — Medication 0.5 MILLIGRAM(S): at 13:02

## 2017-11-11 RX ADMIN — METHADONE HYDROCHLORIDE 30 MILLIGRAM(S): 40 TABLET ORAL at 06:25

## 2017-11-11 RX ADMIN — Medication 50 MILLIGRAM(S): at 09:13

## 2017-11-11 RX ADMIN — METHADONE HYDROCHLORIDE 5 MILLIGRAM(S): 40 TABLET ORAL at 06:25

## 2017-11-11 RX ADMIN — Medication 0.5 MILLIGRAM(S): at 22:23

## 2017-11-11 RX ADMIN — Medication 650 MILLIGRAM(S): at 09:09

## 2017-11-12 RX ADMIN — METHADONE HYDROCHLORIDE 40 MILLIGRAM(S): 40 TABLET ORAL at 07:03

## 2017-11-12 RX ADMIN — Medication 0.5 MILLIGRAM(S): at 21:54

## 2017-11-12 RX ADMIN — METHADONE HYDROCHLORIDE 5 MILLIGRAM(S): 40 TABLET ORAL at 07:03

## 2017-11-12 RX ADMIN — ESCITALOPRAM OXALATE 10 MILLIGRAM(S): 10 TABLET, FILM COATED ORAL at 09:09

## 2017-11-12 RX ADMIN — Medication 0.5 MILLIGRAM(S): at 09:05

## 2017-11-12 RX ADMIN — Medication 0.5 MILLIGRAM(S): at 12:42

## 2017-11-12 RX ADMIN — METHADONE HYDROCHLORIDE 30 MILLIGRAM(S): 40 TABLET ORAL at 07:03

## 2017-11-13 PROCEDURE — 99232 SBSQ HOSP IP/OBS MODERATE 35: CPT

## 2017-11-13 RX ORDER — METHADONE HYDROCHLORIDE 40 MG/1
30 TABLET ORAL
Qty: 0 | Refills: 0 | Status: DISCONTINUED | OUTPATIENT
Start: 2017-11-13 | End: 2017-11-16

## 2017-11-13 RX ORDER — AER TRAVELER 0.5 G/1
1 SOLUTION RECTAL; TOPICAL
Qty: 0 | Refills: 0 | Status: DISCONTINUED | OUTPATIENT
Start: 2017-11-13 | End: 2017-11-17

## 2017-11-13 RX ORDER — METHADONE HYDROCHLORIDE 40 MG/1
40 TABLET ORAL
Qty: 0 | Refills: 0 | Status: DISCONTINUED | OUTPATIENT
Start: 2017-11-13 | End: 2017-11-16

## 2017-11-13 RX ORDER — METHADONE HYDROCHLORIDE 40 MG/1
5 TABLET ORAL
Qty: 0 | Refills: 0 | Status: DISCONTINUED | OUTPATIENT
Start: 2017-11-13 | End: 2017-11-16

## 2017-11-13 RX ADMIN — Medication 0.5 MILLIGRAM(S): at 21:03

## 2017-11-13 RX ADMIN — Medication 0.5 MILLIGRAM(S): at 12:25

## 2017-11-13 RX ADMIN — Medication 50 MILLIGRAM(S): at 21:04

## 2017-11-13 RX ADMIN — Medication 50 MILLIGRAM(S): at 01:42

## 2017-11-13 RX ADMIN — Medication 0.5 MILLIGRAM(S): at 08:04

## 2017-11-13 RX ADMIN — ESCITALOPRAM OXALATE 10 MILLIGRAM(S): 10 TABLET, FILM COATED ORAL at 08:04

## 2017-11-13 RX ADMIN — HALOPERIDOL DECANOATE 5 MILLIGRAM(S): 100 INJECTION INTRAMUSCULAR at 20:04

## 2017-11-13 RX ADMIN — METHADONE HYDROCHLORIDE 5 MILLIGRAM(S): 40 TABLET ORAL at 06:09

## 2017-11-13 RX ADMIN — METHADONE HYDROCHLORIDE 30 MILLIGRAM(S): 40 TABLET ORAL at 06:09

## 2017-11-13 RX ADMIN — METHADONE HYDROCHLORIDE 40 MILLIGRAM(S): 40 TABLET ORAL at 06:09

## 2017-11-14 PROCEDURE — 99232 SBSQ HOSP IP/OBS MODERATE 35: CPT

## 2017-11-14 RX ADMIN — Medication 0.5 MILLIGRAM(S): at 08:05

## 2017-11-14 RX ADMIN — Medication 50 MILLIGRAM(S): at 18:14

## 2017-11-14 RX ADMIN — Medication 0.5 MILLIGRAM(S): at 19:58

## 2017-11-14 RX ADMIN — METHADONE HYDROCHLORIDE 40 MILLIGRAM(S): 40 TABLET ORAL at 06:35

## 2017-11-14 RX ADMIN — METHADONE HYDROCHLORIDE 5 MILLIGRAM(S): 40 TABLET ORAL at 06:35

## 2017-11-14 RX ADMIN — ESCITALOPRAM OXALATE 10 MILLIGRAM(S): 10 TABLET, FILM COATED ORAL at 08:05

## 2017-11-14 RX ADMIN — Medication 50 MILLIGRAM(S): at 21:15

## 2017-11-14 RX ADMIN — METHADONE HYDROCHLORIDE 30 MILLIGRAM(S): 40 TABLET ORAL at 06:35

## 2017-11-14 RX ADMIN — Medication 0.5 MILLIGRAM(S): at 12:17

## 2017-11-15 PROCEDURE — 99232 SBSQ HOSP IP/OBS MODERATE 35: CPT

## 2017-11-15 RX ADMIN — METHADONE HYDROCHLORIDE 5 MILLIGRAM(S): 40 TABLET ORAL at 06:12

## 2017-11-15 RX ADMIN — Medication 50 MILLIGRAM(S): at 10:52

## 2017-11-15 RX ADMIN — HALOPERIDOL DECANOATE 5 MILLIGRAM(S): 100 INJECTION INTRAMUSCULAR at 11:25

## 2017-11-15 RX ADMIN — METHADONE HYDROCHLORIDE 40 MILLIGRAM(S): 40 TABLET ORAL at 06:12

## 2017-11-15 RX ADMIN — Medication 0.5 MILLIGRAM(S): at 21:00

## 2017-11-15 RX ADMIN — METHADONE HYDROCHLORIDE 30 MILLIGRAM(S): 40 TABLET ORAL at 06:12

## 2017-11-15 RX ADMIN — Medication 0.5 MILLIGRAM(S): at 12:00

## 2017-11-15 RX ADMIN — HALOPERIDOL DECANOATE 5 MILLIGRAM(S): 100 INJECTION INTRAMUSCULAR at 22:21

## 2017-11-15 RX ADMIN — ESCITALOPRAM OXALATE 10 MILLIGRAM(S): 10 TABLET, FILM COATED ORAL at 08:02

## 2017-11-15 RX ADMIN — Medication 50 MILLIGRAM(S): at 22:17

## 2017-11-15 RX ADMIN — Medication 0.5 MILLIGRAM(S): at 08:02

## 2017-11-16 PROCEDURE — 99232 SBSQ HOSP IP/OBS MODERATE 35: CPT

## 2017-11-16 RX ORDER — CLONAZEPAM 1 MG
0.5 TABLET ORAL THREE TIMES A DAY
Qty: 0 | Refills: 0 | Status: DISCONTINUED | OUTPATIENT
Start: 2017-11-16 | End: 2017-11-17

## 2017-11-16 RX ORDER — CLONAZEPAM 1 MG
1 TABLET ORAL
Qty: 21 | Refills: 0 | OUTPATIENT
Start: 2017-11-16 | End: 2017-11-23

## 2017-11-16 RX ORDER — HYDROXYZINE HCL 10 MG
1 TABLET ORAL
Qty: 40 | Refills: 0 | OUTPATIENT
Start: 2017-11-16 | End: 2017-11-26

## 2017-11-16 RX ORDER — ALBUTEROL 90 UG/1
2 AEROSOL, METERED ORAL
Qty: 1 | Refills: 0 | OUTPATIENT
Start: 2017-11-16 | End: 2017-12-16

## 2017-11-16 RX ORDER — ESCITALOPRAM OXALATE 10 MG/1
1 TABLET, FILM COATED ORAL
Qty: 30 | Refills: 0 | OUTPATIENT
Start: 2017-11-16 | End: 2017-12-16

## 2017-11-16 RX ADMIN — AER TRAVELER 1 APPLICATION(S): 0.5 SOLUTION RECTAL; TOPICAL at 20:52

## 2017-11-16 RX ADMIN — METHADONE HYDROCHLORIDE 5 MILLIGRAM(S): 40 TABLET ORAL at 06:22

## 2017-11-16 RX ADMIN — METHADONE HYDROCHLORIDE 30 MILLIGRAM(S): 40 TABLET ORAL at 06:22

## 2017-11-16 RX ADMIN — HALOPERIDOL DECANOATE 5 MILLIGRAM(S): 100 INJECTION INTRAMUSCULAR at 16:31

## 2017-11-16 RX ADMIN — Medication 0.5 MILLIGRAM(S): at 20:03

## 2017-11-16 RX ADMIN — Medication 0.5 MILLIGRAM(S): at 08:06

## 2017-11-16 RX ADMIN — ESCITALOPRAM OXALATE 10 MILLIGRAM(S): 10 TABLET, FILM COATED ORAL at 08:06

## 2017-11-16 RX ADMIN — Medication 50 MILLIGRAM(S): at 16:36

## 2017-11-16 RX ADMIN — METHADONE HYDROCHLORIDE 40 MILLIGRAM(S): 40 TABLET ORAL at 06:22

## 2017-11-16 RX ADMIN — Medication 0.5 MILLIGRAM(S): at 12:52

## 2017-11-17 VITALS — TEMPERATURE: 98 F

## 2017-11-17 PROCEDURE — 99238 HOSP IP/OBS DSCHRG MGMT 30/<: CPT

## 2017-11-17 RX ADMIN — Medication 50 MILLIGRAM(S): at 01:01

## 2017-11-17 RX ADMIN — ESCITALOPRAM OXALATE 10 MILLIGRAM(S): 10 TABLET, FILM COATED ORAL at 08:06

## 2017-11-17 RX ADMIN — Medication 0.5 MILLIGRAM(S): at 08:06

## 2017-11-17 RX ADMIN — Medication 50 MILLIGRAM(S): at 01:00

## 2017-11-17 RX ADMIN — HALOPERIDOL DECANOATE 5 MILLIGRAM(S): 100 INJECTION INTRAMUSCULAR at 01:01

## 2017-11-20 ENCOUNTER — OUTPATIENT (OUTPATIENT)
Dept: OUTPATIENT SERVICES | Facility: HOSPITAL | Age: 47
LOS: 1 days | Discharge: HOME | End: 2017-11-20

## 2017-11-21 DIAGNOSIS — F32.9 MAJOR DEPRESSIVE DISORDER, SINGLE EPISODE, UNSPECIFIED: ICD-10-CM

## 2017-11-22 LAB
ALBUMIN SERPL ELPH-MCNC: 4.3 G/DL — SIGNIFICANT CHANGE UP (ref 3.3–5)
ALP SERPL-CCNC: 50 U/L — SIGNIFICANT CHANGE UP (ref 40–120)
ALT FLD-CCNC: 21 U/L — SIGNIFICANT CHANGE UP (ref 4–33)
AMPHET UR-MCNC: POSITIVE — SIGNIFICANT CHANGE UP
APPEARANCE UR: SIGNIFICANT CHANGE UP
AST SERPL-CCNC: 52 U/L — HIGH (ref 4–32)
BACTERIA # UR AUTO: SIGNIFICANT CHANGE UP
BARBITURATES UR SCN-MCNC: NEGATIVE — SIGNIFICANT CHANGE UP
BASOPHILS # BLD AUTO: 0.03 K/UL — SIGNIFICANT CHANGE UP (ref 0–0.2)
BASOPHILS NFR BLD AUTO: 0.3 % — SIGNIFICANT CHANGE UP (ref 0–2)
BENZODIAZ UR-MCNC: POSITIVE — SIGNIFICANT CHANGE UP
BILIRUB SERPL-MCNC: 0.6 MG/DL — SIGNIFICANT CHANGE UP (ref 0.2–1.2)
BILIRUB UR-MCNC: NEGATIVE — SIGNIFICANT CHANGE UP
BLOOD UR QL VISUAL: HIGH
BUN SERPL-MCNC: 23 MG/DL — SIGNIFICANT CHANGE UP (ref 7–23)
CALCIUM SERPL-MCNC: 8.4 MG/DL — SIGNIFICANT CHANGE UP (ref 8.4–10.5)
CANNABINOIDS UR-MCNC: NEGATIVE — SIGNIFICANT CHANGE UP
CHLORIDE SERPL-SCNC: 99 MMOL/L — SIGNIFICANT CHANGE UP (ref 98–107)
CHOLEST SERPL-MCNC: 179 MG/DL — SIGNIFICANT CHANGE UP (ref 120–199)
CO2 SERPL-SCNC: 24 MMOL/L — SIGNIFICANT CHANGE UP (ref 22–31)
COCAINE METAB.OTHER UR-MCNC: POSITIVE — SIGNIFICANT CHANGE UP
COLOR SPEC: YELLOW — SIGNIFICANT CHANGE UP
CREAT SERPL-MCNC: 0.83 MG/DL — SIGNIFICANT CHANGE UP (ref 0.5–1.3)
EOSINOPHIL # BLD AUTO: 0 K/UL — SIGNIFICANT CHANGE UP (ref 0–0.5)
EOSINOPHIL NFR BLD AUTO: 0 % — SIGNIFICANT CHANGE UP (ref 0–6)
GLUCOSE SERPL-MCNC: 98 MG/DL — SIGNIFICANT CHANGE UP (ref 70–99)
GLUCOSE UR-MCNC: NEGATIVE — SIGNIFICANT CHANGE UP
HBA1C BLD-MCNC: 5.3 % — SIGNIFICANT CHANGE UP (ref 4–5.6)
HCT VFR BLD CALC: 34.4 % — LOW (ref 34.5–45)
HDLC SERPL-MCNC: 91 MG/DL — HIGH (ref 45–65)
HGB BLD-MCNC: 11.3 G/DL — LOW (ref 11.5–15.5)
IMM GRANULOCYTES # BLD AUTO: 0.04 # — SIGNIFICANT CHANGE UP
IMM GRANULOCYTES NFR BLD AUTO: 0.5 % — SIGNIFICANT CHANGE UP (ref 0–1.5)
KETONES UR-MCNC: SIGNIFICANT CHANGE UP
LEUKOCYTE ESTERASE UR-ACNC: HIGH
LIPID PNL WITH DIRECT LDL SERPL: 85 MG/DL — SIGNIFICANT CHANGE UP
LYMPHOCYTES # BLD AUTO: 1.54 K/UL — SIGNIFICANT CHANGE UP (ref 1–3.3)
LYMPHOCYTES # BLD AUTO: 17.7 % — SIGNIFICANT CHANGE UP (ref 13–44)
MCHC RBC-ENTMCNC: 28.8 PG — SIGNIFICANT CHANGE UP (ref 27–34)
MCHC RBC-ENTMCNC: 32.8 % — SIGNIFICANT CHANGE UP (ref 32–36)
MCV RBC AUTO: 87.5 FL — SIGNIFICANT CHANGE UP (ref 80–100)
METHADONE UR-MCNC: POSITIVE — SIGNIFICANT CHANGE UP
MONOCYTES # BLD AUTO: 0.55 K/UL — SIGNIFICANT CHANGE UP (ref 0–0.9)
MONOCYTES NFR BLD AUTO: 6.3 % — SIGNIFICANT CHANGE UP (ref 2–14)
NEUTROPHILS # BLD AUTO: 6.54 K/UL — SIGNIFICANT CHANGE UP (ref 1.8–7.4)
NEUTROPHILS NFR BLD AUTO: 75.2 % — SIGNIFICANT CHANGE UP (ref 43–77)
NITRITE UR-MCNC: NEGATIVE — SIGNIFICANT CHANGE UP
NRBC # FLD: 0 — SIGNIFICANT CHANGE UP
OPIATES UR-MCNC: POSITIVE — SIGNIFICANT CHANGE UP
OXYCODONE UR-MCNC: NEGATIVE — SIGNIFICANT CHANGE UP
PCP UR-MCNC: NEGATIVE — SIGNIFICANT CHANGE UP
PH UR: 6.5 — SIGNIFICANT CHANGE UP (ref 4.6–8)
PLATELET # BLD AUTO: 174 K/UL — SIGNIFICANT CHANGE UP (ref 150–400)
PMV BLD: 10.6 FL — SIGNIFICANT CHANGE UP (ref 7–13)
POTASSIUM SERPL-MCNC: 4 MMOL/L — SIGNIFICANT CHANGE UP (ref 3.5–5.3)
POTASSIUM SERPL-SCNC: 4 MMOL/L — SIGNIFICANT CHANGE UP (ref 3.5–5.3)
PROT SERPL-MCNC: 6.8 G/DL — SIGNIFICANT CHANGE UP (ref 6–8.3)
PROT UR-MCNC: 30 — HIGH
RBC # BLD: 3.93 M/UL — SIGNIFICANT CHANGE UP (ref 3.8–5.2)
RBC # FLD: 14.1 % — SIGNIFICANT CHANGE UP (ref 10.3–14.5)
RBC CASTS # UR COMP ASSIST: SIGNIFICANT CHANGE UP (ref 0–?)
SODIUM SERPL-SCNC: 137 MMOL/L — SIGNIFICANT CHANGE UP (ref 135–145)
SP GR SPEC: 1.02 — SIGNIFICANT CHANGE UP (ref 1–1.03)
SQUAMOUS # UR AUTO: SIGNIFICANT CHANGE UP
TRIGL SERPL-MCNC: 51 MG/DL — SIGNIFICANT CHANGE UP (ref 10–149)
UROBILINOGEN FLD QL: 1 E.U. — SIGNIFICANT CHANGE UP (ref 0.1–0.2)
WBC # BLD: 8.7 K/UL — SIGNIFICANT CHANGE UP (ref 3.8–10.5)
WBC # FLD AUTO: 8.7 K/UL — SIGNIFICANT CHANGE UP (ref 3.8–10.5)
WBC UR QL: SIGNIFICANT CHANGE UP (ref 0–?)

## 2017-11-27 ENCOUNTER — EMERGENCY (EMERGENCY)
Facility: HOSPITAL | Age: 47
LOS: 1 days | Discharge: HOME | End: 2017-11-27
Attending: EMERGENCY MEDICINE | Admitting: EMERGENCY MEDICINE
Payer: MEDICAID

## 2017-11-27 VITALS
TEMPERATURE: 99 F | RESPIRATION RATE: 18 BRPM | SYSTOLIC BLOOD PRESSURE: 103 MMHG | OXYGEN SATURATION: 100 % | DIASTOLIC BLOOD PRESSURE: 67 MMHG | HEART RATE: 80 BPM

## 2017-11-27 PROCEDURE — 99284 EMERGENCY DEPT VISIT MOD MDM: CPT

## 2017-11-27 RX ORDER — IBUPROFEN 200 MG
600 TABLET ORAL ONCE
Qty: 0 | Refills: 0 | Status: COMPLETED | OUTPATIENT
Start: 2017-11-27 | End: 2017-11-27

## 2017-11-27 RX ORDER — ALPRAZOLAM 0.25 MG
0.25 TABLET ORAL ONCE
Qty: 0 | Refills: 0 | Status: DISCONTINUED | OUTPATIENT
Start: 2017-11-27 | End: 2017-11-27

## 2017-11-27 RX ADMIN — Medication 600 MILLIGRAM(S): at 15:39

## 2017-11-27 RX ADMIN — Medication 0.25 MILLIGRAM(S): at 15:39
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
## 2017-11-28 ENCOUNTER — INPATIENT (INPATIENT)
Facility: HOSPITAL | Age: 47
LOS: 6 days | Discharge: HOME | End: 2017-12-05
Attending: STUDENT IN AN ORGANIZED HEALTH CARE EDUCATION/TRAINING PROGRAM | Admitting: PSYCHIATRY & NEUROLOGY
Payer: MEDICAID

## 2017-11-28 VITALS — WEIGHT: 136.03 LBS

## 2017-11-28 DIAGNOSIS — F33.2 MAJOR DEPRESSIVE DISORDER, RECURRENT SEVERE WITHOUT PSYCHOTIC FEATURES: ICD-10-CM

## 2017-11-28 LAB
AMPHET UR-MCNC: NEGATIVE — SIGNIFICANT CHANGE UP
APPEARANCE UR: CLEAR — SIGNIFICANT CHANGE UP
BARBITURATES UR SCN-MCNC: NEGATIVE — SIGNIFICANT CHANGE UP
BENZODIAZ UR-MCNC: POSITIVE — SIGNIFICANT CHANGE UP
BILIRUB UR-MCNC: NEGATIVE — SIGNIFICANT CHANGE UP
BLOOD UR QL VISUAL: HIGH
CANNABINOIDS UR-MCNC: NEGATIVE — SIGNIFICANT CHANGE UP
COCAINE METAB.OTHER UR-MCNC: NEGATIVE — SIGNIFICANT CHANGE UP
COLOR SPEC: YELLOW — SIGNIFICANT CHANGE UP
GLUCOSE UR-MCNC: NEGATIVE — SIGNIFICANT CHANGE UP
HCG UR-SCNC: NEGATIVE — SIGNIFICANT CHANGE UP
KETONES UR-MCNC: NEGATIVE — SIGNIFICANT CHANGE UP
LEUKOCYTE ESTERASE UR-ACNC: NEGATIVE — SIGNIFICANT CHANGE UP
METHADONE UR-MCNC: POSITIVE — SIGNIFICANT CHANGE UP
MUCOUS THREADS # UR AUTO: SIGNIFICANT CHANGE UP
NITRITE UR-MCNC: NEGATIVE — SIGNIFICANT CHANGE UP
OPIATES UR-MCNC: NEGATIVE — SIGNIFICANT CHANGE UP
OXYCODONE UR-MCNC: NEGATIVE — SIGNIFICANT CHANGE UP
PCP UR-MCNC: NEGATIVE — SIGNIFICANT CHANGE UP
PH UR: 6.5 — SIGNIFICANT CHANGE UP (ref 4.6–8)
PROT UR-MCNC: NEGATIVE — SIGNIFICANT CHANGE UP
RBC CASTS # UR COMP ASSIST: >50 — HIGH (ref 0–?)
SP GR SPEC: 1.02 — SIGNIFICANT CHANGE UP (ref 1–1.03)
SQUAMOUS # UR AUTO: SIGNIFICANT CHANGE UP
UROBILINOGEN FLD QL: NORMAL E.U. — SIGNIFICANT CHANGE UP (ref 0.1–0.2)
WBC UR QL: SIGNIFICANT CHANGE UP (ref 0–?)

## 2017-11-28 RX ORDER — METHADONE HYDROCHLORIDE 40 MG/1
35 TABLET ORAL DAILY
Qty: 0 | Refills: 0 | Status: DISCONTINUED | OUTPATIENT
Start: 2017-11-28 | End: 2017-11-28

## 2017-11-28 RX ORDER — CLONAZEPAM 1 MG
0.5 TABLET ORAL ONCE
Qty: 0 | Refills: 0 | Status: DISCONTINUED | OUTPATIENT
Start: 2017-11-28 | End: 2017-11-28

## 2017-11-28 RX ORDER — METHADONE HYDROCHLORIDE 40 MG/1
40 TABLET ORAL DAILY
Qty: 0 | Refills: 0 | Status: DISCONTINUED | OUTPATIENT
Start: 2017-11-28 | End: 2017-12-05

## 2017-11-28 RX ORDER — HALOPERIDOL DECANOATE 100 MG/ML
5 INJECTION INTRAMUSCULAR ONCE
Qty: 0 | Refills: 0 | Status: DISCONTINUED | OUTPATIENT
Start: 2017-11-28 | End: 2017-11-30

## 2017-11-28 RX ORDER — METHADONE HYDROCHLORIDE 40 MG/1
35 TABLET ORAL DAILY
Qty: 0 | Refills: 0 | Status: DISCONTINUED | OUTPATIENT
Start: 2017-11-28 | End: 2017-12-05

## 2017-11-28 RX ORDER — HALOPERIDOL DECANOATE 100 MG/ML
5 INJECTION INTRAMUSCULAR EVERY 4 HOURS
Qty: 0 | Refills: 0 | Status: DISCONTINUED | OUTPATIENT
Start: 2017-11-28 | End: 2017-11-30

## 2017-11-28 RX ORDER — CLONAZEPAM 1 MG
0.5 TABLET ORAL THREE TIMES A DAY
Qty: 0 | Refills: 0 | Status: DISCONTINUED | OUTPATIENT
Start: 2017-11-28 | End: 2017-11-30

## 2017-11-28 RX ORDER — DIPHENHYDRAMINE HCL 50 MG
50 CAPSULE ORAL ONCE
Qty: 0 | Refills: 0 | Status: DISCONTINUED | OUTPATIENT
Start: 2017-11-28 | End: 2017-12-05

## 2017-11-28 RX ORDER — HYDROXYZINE HCL 10 MG
50 TABLET ORAL EVERY 4 HOURS
Qty: 0 | Refills: 0 | Status: DISCONTINUED | OUTPATIENT
Start: 2017-11-28 | End: 2017-12-05

## 2017-11-28 RX ORDER — ACETAMINOPHEN 500 MG
650 TABLET ORAL EVERY 6 HOURS
Qty: 0 | Refills: 0 | Status: DISCONTINUED | OUTPATIENT
Start: 2017-11-28 | End: 2017-12-05

## 2017-11-28 RX ORDER — DIPHENHYDRAMINE HCL 50 MG
50 CAPSULE ORAL EVERY 4 HOURS
Qty: 0 | Refills: 0 | Status: DISCONTINUED | OUTPATIENT
Start: 2017-11-28 | End: 2017-11-30

## 2017-11-28 RX ADMIN — Medication 50 MILLIGRAM(S): at 20:00

## 2017-11-28 RX ADMIN — Medication 50 MILLIGRAM(S): at 16:09

## 2017-11-28 RX ADMIN — Medication 2 MILLIGRAM(S): at 20:00

## 2017-11-28 RX ADMIN — Medication 650 MILLIGRAM(S): at 19:00

## 2017-11-28 RX ADMIN — Medication 0.5 MILLIGRAM(S): at 20:04

## 2017-11-28 RX ADMIN — Medication 0.5 MILLIGRAM(S): at 14:34

## 2017-11-28 RX ADMIN — Medication 650 MILLIGRAM(S): at 16:09

## 2017-11-28 RX ADMIN — HALOPERIDOL DECANOATE 5 MILLIGRAM(S): 100 INJECTION INTRAMUSCULAR at 20:00

## 2017-11-29 LAB
ALBUMIN SERPL ELPH-MCNC: 3.6 G/DL — SIGNIFICANT CHANGE UP (ref 3.3–5)
ALP SERPL-CCNC: 48 U/L — SIGNIFICANT CHANGE UP (ref 40–120)
ALT FLD-CCNC: 20 U/L — SIGNIFICANT CHANGE UP (ref 4–33)
AST SERPL-CCNC: 26 U/L — SIGNIFICANT CHANGE UP (ref 4–32)
BASOPHILS # BLD AUTO: 0.03 K/UL — SIGNIFICANT CHANGE UP (ref 0–0.2)
BASOPHILS NFR BLD AUTO: 0.6 % — SIGNIFICANT CHANGE UP (ref 0–2)
BILIRUB SERPL-MCNC: 0.2 MG/DL — SIGNIFICANT CHANGE UP (ref 0.2–1.2)
BUN SERPL-MCNC: 12 MG/DL — SIGNIFICANT CHANGE UP (ref 7–23)
CALCIUM SERPL-MCNC: 8.2 MG/DL — LOW (ref 8.4–10.5)
CHLORIDE SERPL-SCNC: 107 MMOL/L — SIGNIFICANT CHANGE UP (ref 98–107)
CHOLEST SERPL-MCNC: 161 MG/DL — SIGNIFICANT CHANGE UP (ref 120–199)
CO2 SERPL-SCNC: 20 MMOL/L — LOW (ref 22–31)
CREAT SERPL-MCNC: 0.69 MG/DL — SIGNIFICANT CHANGE UP (ref 0.5–1.3)
EOSINOPHIL # BLD AUTO: 0.25 K/UL — SIGNIFICANT CHANGE UP (ref 0–0.5)
EOSINOPHIL NFR BLD AUTO: 5 % — SIGNIFICANT CHANGE UP (ref 0–6)
GLUCOSE SERPL-MCNC: 67 MG/DL — LOW (ref 70–99)
HBA1C BLD-MCNC: 5.3 % — SIGNIFICANT CHANGE UP (ref 4–5.6)
HCG SERPL-ACNC: < 5 MIU/ML — SIGNIFICANT CHANGE UP
HCT VFR BLD CALC: 37.1 % — SIGNIFICANT CHANGE UP (ref 34.5–45)
HDLC SERPL-MCNC: 59 MG/DL — SIGNIFICANT CHANGE UP (ref 45–65)
HGB BLD-MCNC: 11.7 G/DL — SIGNIFICANT CHANGE UP (ref 11.5–15.5)
IMM GRANULOCYTES # BLD AUTO: 0.01 # — SIGNIFICANT CHANGE UP
IMM GRANULOCYTES NFR BLD AUTO: 0.2 % — SIGNIFICANT CHANGE UP (ref 0–1.5)
LIPID PNL WITH DIRECT LDL SERPL: 67 MG/DL — SIGNIFICANT CHANGE UP
LYMPHOCYTES # BLD AUTO: 1.9 K/UL — SIGNIFICANT CHANGE UP (ref 1–3.3)
LYMPHOCYTES # BLD AUTO: 37.9 % — SIGNIFICANT CHANGE UP (ref 13–44)
MAGNESIUM SERPL-MCNC: 2.2 MG/DL — SIGNIFICANT CHANGE UP (ref 1.6–2.6)
MCHC RBC-ENTMCNC: 29.3 PG — SIGNIFICANT CHANGE UP (ref 27–34)
MCHC RBC-ENTMCNC: 31.5 % — LOW (ref 32–36)
MCV RBC AUTO: 93 FL — SIGNIFICANT CHANGE UP (ref 80–100)
MONOCYTES # BLD AUTO: 0.31 K/UL — SIGNIFICANT CHANGE UP (ref 0–0.9)
MONOCYTES NFR BLD AUTO: 6.2 % — SIGNIFICANT CHANGE UP (ref 2–14)
NEUTROPHILS # BLD AUTO: 2.51 K/UL — SIGNIFICANT CHANGE UP (ref 1.8–7.4)
NEUTROPHILS NFR BLD AUTO: 50.1 % — SIGNIFICANT CHANGE UP (ref 43–77)
NRBC # FLD: 0 — SIGNIFICANT CHANGE UP
PLATELET # BLD AUTO: 180 K/UL — SIGNIFICANT CHANGE UP (ref 150–400)
PMV BLD: 10.3 FL — SIGNIFICANT CHANGE UP (ref 7–13)
POTASSIUM SERPL-MCNC: 4.7 MMOL/L — SIGNIFICANT CHANGE UP (ref 3.5–5.3)
POTASSIUM SERPL-SCNC: 4.7 MMOL/L — SIGNIFICANT CHANGE UP (ref 3.5–5.3)
PROT SERPL-MCNC: 6 G/DL — SIGNIFICANT CHANGE UP (ref 6–8.3)
RBC # BLD: 3.99 M/UL — SIGNIFICANT CHANGE UP (ref 3.8–5.2)
RBC # FLD: 14.7 % — HIGH (ref 10.3–14.5)
SODIUM SERPL-SCNC: 143 MMOL/L — SIGNIFICANT CHANGE UP (ref 135–145)
TRIGL SERPL-MCNC: 207 MG/DL — HIGH (ref 10–149)
TSH SERPL-MCNC: 4.45 UIU/ML — HIGH (ref 0.27–4.2)
VALPROATE SERPL-MCNC: 7.1 UG/ML — LOW (ref 50–100)
WBC # BLD: 5.01 K/UL — SIGNIFICANT CHANGE UP (ref 3.8–10.5)
WBC # FLD AUTO: 5.01 K/UL — SIGNIFICANT CHANGE UP (ref 3.8–10.5)

## 2017-11-29 PROCEDURE — 99232 SBSQ HOSP IP/OBS MODERATE 35: CPT | Mod: GC

## 2017-11-29 RX ORDER — HYDROCORTISONE 1 %
1 OINTMENT (GRAM) TOPICAL
Qty: 0 | Refills: 0 | Status: DISCONTINUED | OUTPATIENT
Start: 2017-11-29 | End: 2017-12-05

## 2017-11-29 RX ADMIN — Medication 650 MILLIGRAM(S): at 07:00

## 2017-11-29 RX ADMIN — METHADONE HYDROCHLORIDE 40 MILLIGRAM(S): 40 TABLET ORAL at 08:40

## 2017-11-29 RX ADMIN — Medication 0.5 MILLIGRAM(S): at 08:40

## 2017-11-29 RX ADMIN — HALOPERIDOL DECANOATE 5 MILLIGRAM(S): 100 INJECTION INTRAMUSCULAR at 07:00

## 2017-11-29 RX ADMIN — Medication 50 MILLIGRAM(S): at 07:00

## 2017-11-29 RX ADMIN — Medication 0.5 MILLIGRAM(S): at 21:52

## 2017-11-29 RX ADMIN — Medication 650 MILLIGRAM(S): at 15:30

## 2017-11-29 RX ADMIN — METHADONE HYDROCHLORIDE 35 MILLIGRAM(S): 40 TABLET ORAL at 08:40

## 2017-11-29 RX ADMIN — Medication 0.5 MILLIGRAM(S): at 12:28

## 2017-11-29 RX ADMIN — Medication 650 MILLIGRAM(S): at 16:30

## 2017-11-30 PROCEDURE — 99232 SBSQ HOSP IP/OBS MODERATE 35: CPT | Mod: GC

## 2017-11-30 RX ORDER — CLONAZEPAM 1 MG
0.5 TABLET ORAL
Qty: 0 | Refills: 0 | Status: DISCONTINUED | OUTPATIENT
Start: 2017-12-01 | End: 2017-12-01

## 2017-11-30 RX ORDER — CLONAZEPAM 1 MG
0.5 TABLET ORAL
Qty: 0 | Refills: 0 | Status: DISCONTINUED | OUTPATIENT
Start: 2017-11-30 | End: 2017-11-30

## 2017-11-30 RX ORDER — DIPHENHYDRAMINE HCL 50 MG
50 CAPSULE ORAL EVERY 4 HOURS
Qty: 0 | Refills: 0 | Status: DISCONTINUED | OUTPATIENT
Start: 2017-11-30 | End: 2017-12-05

## 2017-11-30 RX ORDER — CLONAZEPAM 1 MG
0.5 TABLET ORAL AT BEDTIME
Qty: 0 | Refills: 0 | Status: DISCONTINUED | OUTPATIENT
Start: 2017-11-30 | End: 2017-12-01

## 2017-11-30 RX ORDER — CLONAZEPAM 1 MG
0.5 TABLET ORAL THREE TIMES A DAY
Qty: 0 | Refills: 0 | Status: DISCONTINUED | OUTPATIENT
Start: 2017-11-30 | End: 2017-11-30

## 2017-11-30 RX ADMIN — Medication 50 MILLIGRAM(S): at 20:57

## 2017-11-30 RX ADMIN — Medication 650 MILLIGRAM(S): at 17:34

## 2017-11-30 RX ADMIN — METHADONE HYDROCHLORIDE 40 MILLIGRAM(S): 40 TABLET ORAL at 08:40

## 2017-11-30 RX ADMIN — METHADONE HYDROCHLORIDE 35 MILLIGRAM(S): 40 TABLET ORAL at 08:40

## 2017-11-30 RX ADMIN — Medication 0.5 MILLIGRAM(S): at 21:04

## 2017-11-30 RX ADMIN — Medication 1 APPLICATION(S): at 11:23

## 2017-11-30 RX ADMIN — Medication 1 APPLICATION(S): at 21:04

## 2017-11-30 RX ADMIN — Medication 0.5 MILLIGRAM(S): at 13:01

## 2017-11-30 RX ADMIN — Medication 0.5 MILLIGRAM(S): at 07:57

## 2017-11-30 RX ADMIN — Medication 50 MILLIGRAM(S): at 15:30

## 2017-12-01 PROCEDURE — 99232 SBSQ HOSP IP/OBS MODERATE 35: CPT | Mod: GC

## 2017-12-01 RX ADMIN — METHADONE HYDROCHLORIDE 40 MILLIGRAM(S): 40 TABLET ORAL at 08:42

## 2017-12-01 RX ADMIN — Medication 650 MILLIGRAM(S): at 11:13

## 2017-12-01 RX ADMIN — Medication 650 MILLIGRAM(S): at 12:13

## 2017-12-01 RX ADMIN — Medication 50 MILLIGRAM(S): at 14:16

## 2017-12-01 RX ADMIN — Medication 1 APPLICATION(S): at 08:42

## 2017-12-01 RX ADMIN — Medication 0.5 MILLIGRAM(S): at 21:27

## 2017-12-01 RX ADMIN — Medication 50 MILLIGRAM(S): at 18:32

## 2017-12-01 RX ADMIN — METHADONE HYDROCHLORIDE 35 MILLIGRAM(S): 40 TABLET ORAL at 08:42

## 2017-12-01 RX ADMIN — Medication 1 APPLICATION(S): at 21:27

## 2017-12-01 RX ADMIN — Medication 50 MILLIGRAM(S): at 12:49

## 2017-12-01 RX ADMIN — Medication 650 MILLIGRAM(S): at 18:32

## 2017-12-01 RX ADMIN — Medication 0.5 MILLIGRAM(S): at 08:42

## 2017-12-02 PROCEDURE — 99232 SBSQ HOSP IP/OBS MODERATE 35: CPT

## 2017-12-02 RX ORDER — CLONAZEPAM 1 MG
0.5 TABLET ORAL AT BEDTIME
Qty: 0 | Refills: 0 | Status: DISCONTINUED | OUTPATIENT
Start: 2017-12-02 | End: 2017-12-04

## 2017-12-02 RX ADMIN — Medication 0.5 MILLIGRAM(S): at 22:13

## 2017-12-02 RX ADMIN — Medication 1 APPLICATION(S): at 08:57

## 2017-12-02 RX ADMIN — Medication 650 MILLIGRAM(S): at 08:00

## 2017-12-02 RX ADMIN — METHADONE HYDROCHLORIDE 40 MILLIGRAM(S): 40 TABLET ORAL at 09:01

## 2017-12-02 RX ADMIN — METHADONE HYDROCHLORIDE 35 MILLIGRAM(S): 40 TABLET ORAL at 09:01

## 2017-12-02 RX ADMIN — Medication 650 MILLIGRAM(S): at 22:37

## 2017-12-02 RX ADMIN — Medication 1 APPLICATION(S): at 22:13

## 2017-12-03 RX ADMIN — METHADONE HYDROCHLORIDE 40 MILLIGRAM(S): 40 TABLET ORAL at 08:20

## 2017-12-03 RX ADMIN — Medication 0.5 MILLIGRAM(S): at 21:43

## 2017-12-03 RX ADMIN — Medication 1 APPLICATION(S): at 08:20

## 2017-12-03 RX ADMIN — METHADONE HYDROCHLORIDE 35 MILLIGRAM(S): 40 TABLET ORAL at 08:20

## 2017-12-03 RX ADMIN — Medication 50 MILLIGRAM(S): at 12:18

## 2017-12-03 RX ADMIN — Medication 50 MILLIGRAM(S): at 20:00

## 2017-12-04 PROCEDURE — 99232 SBSQ HOSP IP/OBS MODERATE 35: CPT | Mod: GC

## 2017-12-04 RX ADMIN — Medication 1 APPLICATION(S): at 10:05

## 2017-12-04 RX ADMIN — Medication 50 MILLIGRAM(S): at 10:20

## 2017-12-04 RX ADMIN — METHADONE HYDROCHLORIDE 35 MILLIGRAM(S): 40 TABLET ORAL at 08:21

## 2017-12-04 RX ADMIN — Medication 50 MILLIGRAM(S): at 18:30

## 2017-12-04 RX ADMIN — METHADONE HYDROCHLORIDE 40 MILLIGRAM(S): 40 TABLET ORAL at 08:21

## 2017-12-04 RX ADMIN — Medication 1 APPLICATION(S): at 21:54

## 2017-12-04 RX ADMIN — Medication 50 MILLIGRAM(S): at 22:14

## 2017-12-05 VITALS — TEMPERATURE: 98 F | RESPIRATION RATE: 18 BRPM

## 2017-12-05 PROCEDURE — 99239 HOSP IP/OBS DSCHRG MGMT >30: CPT | Mod: GC

## 2017-12-05 RX ADMIN — Medication 50 MILLIGRAM(S): at 01:48

## 2017-12-05 RX ADMIN — METHADONE HYDROCHLORIDE 40 MILLIGRAM(S): 40 TABLET ORAL at 08:14

## 2017-12-05 RX ADMIN — METHADONE HYDROCHLORIDE 35 MILLIGRAM(S): 40 TABLET ORAL at 08:14

## 2017-12-06 ENCOUNTER — EMERGENCY (EMERGENCY)
Facility: HOSPITAL | Age: 47
LOS: 1 days | Discharge: HOME | End: 2017-12-06
Admitting: EMERGENCY MEDICINE
Payer: MEDICAID

## 2017-12-06 VITALS
HEART RATE: 69 BPM | OXYGEN SATURATION: 100 % | DIASTOLIC BLOOD PRESSURE: 90 MMHG | RESPIRATION RATE: 16 BRPM | SYSTOLIC BLOOD PRESSURE: 109 MMHG

## 2017-12-06 VITALS — RESPIRATION RATE: 20 BRPM | DIASTOLIC BLOOD PRESSURE: 60 MMHG | HEART RATE: 100 BPM | SYSTOLIC BLOOD PRESSURE: 96 MMHG

## 2017-12-06 DIAGNOSIS — F60.3 BORDERLINE PERSONALITY DISORDER: ICD-10-CM

## 2017-12-06 DIAGNOSIS — F19.20 OTHER PSYCHOACTIVE SUBSTANCE DEPENDENCE, UNCOMPLICATED: ICD-10-CM

## 2017-12-06 LAB
ALBUMIN SERPL ELPH-MCNC: 4.3 G/DL — SIGNIFICANT CHANGE UP (ref 3.3–5)
ALP SERPL-CCNC: 62 U/L — SIGNIFICANT CHANGE UP (ref 40–120)
ALT FLD-CCNC: 66 U/L — HIGH (ref 4–33)
AMPHET UR-MCNC: NEGATIVE — SIGNIFICANT CHANGE UP
APAP SERPL-MCNC: < 15 UG/ML — LOW (ref 15–25)
APPEARANCE UR: SIGNIFICANT CHANGE UP
AST SERPL-CCNC: 71 U/L — HIGH (ref 4–32)
BACTERIA # UR AUTO: HIGH
BARBITURATES MEASUREMENT: NEGATIVE — SIGNIFICANT CHANGE UP
BARBITURATES UR SCN-MCNC: NEGATIVE — SIGNIFICANT CHANGE UP
BASOPHILS # BLD AUTO: 0.02 K/UL — SIGNIFICANT CHANGE UP (ref 0–0.2)
BASOPHILS NFR BLD AUTO: 0.4 % — SIGNIFICANT CHANGE UP (ref 0–2)
BENZODIAZ SERPL-MCNC: POSITIVE — SIGNIFICANT CHANGE UP
BENZODIAZ UR-MCNC: POSITIVE — SIGNIFICANT CHANGE UP
BILIRUB SERPL-MCNC: 0.4 MG/DL — SIGNIFICANT CHANGE UP (ref 0.2–1.2)
BILIRUB UR-MCNC: NEGATIVE — SIGNIFICANT CHANGE UP
BLOOD UR QL VISUAL: HIGH
BUN SERPL-MCNC: 18 MG/DL — SIGNIFICANT CHANGE UP (ref 7–23)
CALCIUM SERPL-MCNC: 8.7 MG/DL — SIGNIFICANT CHANGE UP (ref 8.4–10.5)
CANNABINOIDS UR-MCNC: NEGATIVE — SIGNIFICANT CHANGE UP
CHLORIDE SERPL-SCNC: 105 MMOL/L — SIGNIFICANT CHANGE UP (ref 98–107)
CO2 SERPL-SCNC: 23 MMOL/L — SIGNIFICANT CHANGE UP (ref 22–31)
COCAINE METAB.OTHER UR-MCNC: POSITIVE — SIGNIFICANT CHANGE UP
COLOR SPEC: HIGH
CREAT SERPL-MCNC: 0.7 MG/DL — SIGNIFICANT CHANGE UP (ref 0.5–1.3)
EOSINOPHIL # BLD AUTO: 0.11 K/UL — SIGNIFICANT CHANGE UP (ref 0–0.5)
EOSINOPHIL NFR BLD AUTO: 2.1 % — SIGNIFICANT CHANGE UP (ref 0–6)
ETHANOL BLD-MCNC: < 10 MG/DL — SIGNIFICANT CHANGE UP
GLUCOSE SERPL-MCNC: 101 MG/DL — HIGH (ref 70–99)
GLUCOSE UR-MCNC: NEGATIVE — SIGNIFICANT CHANGE UP
HCG SERPL-ACNC: < 5 MIU/ML — SIGNIFICANT CHANGE UP
HCT VFR BLD CALC: 38.3 % — SIGNIFICANT CHANGE UP (ref 34.5–45)
HGB BLD-MCNC: 12.5 G/DL — SIGNIFICANT CHANGE UP (ref 11.5–15.5)
IMM GRANULOCYTES # BLD AUTO: 0.01 # — SIGNIFICANT CHANGE UP
IMM GRANULOCYTES NFR BLD AUTO: 0.2 % — SIGNIFICANT CHANGE UP (ref 0–1.5)
KETONES UR-MCNC: NEGATIVE — SIGNIFICANT CHANGE UP
LEUKOCYTE ESTERASE UR-ACNC: HIGH
LYMPHOCYTES # BLD AUTO: 1.94 K/UL — SIGNIFICANT CHANGE UP (ref 1–3.3)
LYMPHOCYTES # BLD AUTO: 36.7 % — SIGNIFICANT CHANGE UP (ref 13–44)
MCHC RBC-ENTMCNC: 30.2 PG — SIGNIFICANT CHANGE UP (ref 27–34)
MCHC RBC-ENTMCNC: 32.6 % — SIGNIFICANT CHANGE UP (ref 32–36)
MCV RBC AUTO: 92.5 FL — SIGNIFICANT CHANGE UP (ref 80–100)
METHADONE UR-MCNC: POSITIVE — SIGNIFICANT CHANGE UP
MONOCYTES # BLD AUTO: 0.4 K/UL — SIGNIFICANT CHANGE UP (ref 0–0.9)
MONOCYTES NFR BLD AUTO: 7.6 % — SIGNIFICANT CHANGE UP (ref 2–14)
MUCOUS THREADS # UR AUTO: SIGNIFICANT CHANGE UP
NEUTROPHILS # BLD AUTO: 2.81 K/UL — SIGNIFICANT CHANGE UP (ref 1.8–7.4)
NEUTROPHILS NFR BLD AUTO: 53 % — SIGNIFICANT CHANGE UP (ref 43–77)
NITRITE UR-MCNC: NEGATIVE — SIGNIFICANT CHANGE UP
NRBC # FLD: 0 — SIGNIFICANT CHANGE UP
OPIATES UR-MCNC: NEGATIVE — SIGNIFICANT CHANGE UP
OXYCODONE UR-MCNC: NEGATIVE — SIGNIFICANT CHANGE UP
PCP UR-MCNC: NEGATIVE — SIGNIFICANT CHANGE UP
PH UR: 6.5 — SIGNIFICANT CHANGE UP (ref 4.6–8)
PLATELET # BLD AUTO: 199 K/UL — SIGNIFICANT CHANGE UP (ref 150–400)
PMV BLD: 10.4 FL — SIGNIFICANT CHANGE UP (ref 7–13)
POTASSIUM SERPL-MCNC: 4.3 MMOL/L — SIGNIFICANT CHANGE UP (ref 3.5–5.3)
POTASSIUM SERPL-SCNC: 4.3 MMOL/L — SIGNIFICANT CHANGE UP (ref 3.5–5.3)
PROT SERPL-MCNC: 6.7 G/DL — SIGNIFICANT CHANGE UP (ref 6–8.3)
PROT UR-MCNC: 300 MG/DL — HIGH
RBC # BLD: 4.14 M/UL — SIGNIFICANT CHANGE UP (ref 3.8–5.2)
RBC # FLD: 14.7 % — HIGH (ref 10.3–14.5)
RBC CASTS # UR COMP ASSIST: >50 — HIGH (ref 0–?)
SALICYLATES SERPL-MCNC: < 5 MG/DL — LOW (ref 15–30)
SODIUM SERPL-SCNC: 143 MMOL/L — SIGNIFICANT CHANGE UP (ref 135–145)
SP GR SPEC: 1.03 — SIGNIFICANT CHANGE UP (ref 1–1.04)
TSH SERPL-MCNC: 1.99 UIU/ML — SIGNIFICANT CHANGE UP (ref 0.27–4.2)
UROBILINOGEN FLD QL: 1 MG/DL — SIGNIFICANT CHANGE UP
WBC # BLD: 5.29 K/UL — SIGNIFICANT CHANGE UP (ref 3.8–10.5)
WBC # FLD AUTO: 5.29 K/UL — SIGNIFICANT CHANGE UP (ref 3.8–10.5)

## 2017-12-06 PROCEDURE — 90792 PSYCH DIAG EVAL W/MED SRVCS: CPT | Mod: GC

## 2017-12-06 PROCEDURE — 99284 EMERGENCY DEPT VISIT MOD MDM: CPT

## 2017-12-06 RX ORDER — DIPHENHYDRAMINE HCL 50 MG
50 CAPSULE ORAL ONCE
Qty: 0 | Refills: 0 | Status: COMPLETED | OUTPATIENT
Start: 2017-12-06 | End: 2017-12-06

## 2017-12-06 RX ORDER — HALOPERIDOL DECANOATE 100 MG/ML
5 INJECTION INTRAMUSCULAR ONCE
Qty: 0 | Refills: 0 | Status: COMPLETED | OUTPATIENT
Start: 2017-12-06 | End: 2017-12-06

## 2017-12-06 RX ADMIN — HALOPERIDOL DECANOATE 5 MILLIGRAM(S): 100 INJECTION INTRAMUSCULAR at 14:56

## 2017-12-06 RX ADMIN — Medication 50 MILLIGRAM(S): at 14:56

## 2017-12-06 NOTE — ED PROVIDER NOTE - CHPI ED SYMPTOMS NEG
no homicidal/no weight loss/no suicidal/no change in level of consciousness/no confusion/no disorientation/no hallucinations/no paranoia/no weakness

## 2017-12-06 NOTE — ED BEHAVIORAL HEALTH ASSESSMENT NOTE - SUMMARY
Patient is a 46 year old with documented PPhx of polysubstance abuse and borderline PD, 9 prior psychiatric hospitalizations (d/barbie yesterday from  where she was hospitalized for substance induced mariaelena), currently engaged in outpatient care with Dr Mcclendon at [facility]; no known history of SA; history of SIB of punching self when stressed; substance abuse on methadone with recent use of crystal meth and cocaine prior to recent inpt admission. Patient presents brought in by EMS after she presented to [facility] today unscheduled and demanding Methadone and Klonopin. In the ED pt initially continued to demand substances and engage in threatening behaviors as a means of obtaining substances from staff. Patient required restraints and IM medications and was calm and cooperative thereafter. She exhibits a linear and goal directed thought process and consistently requests substances. She denies consistent disturbance of mood. She denies all depressed, manic, and psychotic symptoms. She denies engaging in physical aggression towards people or property. She denies SI/HI/I/P, urges for SIB, aggressive I/I/P, and all other imminent safety concerns. She requests to obtain her substances and be discharged home. Pt's presentation appears to be in the context of drug seeking behaviors, suspected substance intoxication, w/d from methadone, and borderline personality traits. She declines referral to inpt rehab. At this time pt does not meet criteria for acute illness that could be modified in an inpatient psychiatric setting. Patient is stable for d/c home. Patient is a 47 year old with documented PPhx of polysubstance abuse and borderline PD, 9 prior psychiatric hospitalizations (d/barbie yesterday from  where she was hospitalized for substance induced mariaelena), currently engaged in outpatient care with Dr Mcclendon at [facility]; no known history of SA; history of SIB of punching self when stressed; substance abuse on methadone with recent use of crystal meth and cocaine prior to recent inpt admission. Patient presents brought in by EMS after she presented to [facility] today unscheduled and demanding Methadone and Klonopin. In the ED pt initially continued to demand substances and engage in threatening behaviors as a means of obtaining substances from staff. Patient required restraints and IM medications and was calm and cooperative thereafter. She exhibits a linear and goal directed thought process and consistently requests substances. She denies consistent disturbance of mood. She denies all depressed, manic, and psychotic symptoms. She denies engaging in physical aggression towards people or property. She denies SI/HI/I/P, urges for SIB, aggressive I/I/P, and all other imminent safety concerns. She requests to obtain her substances and be discharged home. Pt's presentation appears to be in the context of drug seeking behaviors, suspected substance intoxication, w/d from methadone, and borderline personality traits. She declines referral to inpt rehab. At this time pt does not meet criteria for acute illness that could be modified in an inpatient psychiatric setting. Patient is stable for d/c home. Patient is a 47 year old with documented PPhx of polysubstance abuse and borderline PD, 9 prior psychiatric hospitalizations (d/barbie yesterday from  where she was hospitalized for substance induced mariaelena), currently engaged in outpatient care with Dr Mcclendon at [facility]; no known history of SA; history of SIB of punching self when stressed; substance abuse on methadone with recent use of crystal meth and cocaine prior to recent inpt admission. Patient presents brought in by EMS after she presented to [facility] today unscheduled and demanding Methadone and Klonopin. In the ED pt initially continued to demand substances and engage in threatening behaviors as a means of obtaining substances from staff. Patient required restraints and IM medications and was calm and cooperative thereafter. She exhibits a linear and goal directed thought process and consistently requests substances. She denies consistent disturbance of mood. She denies all depressed, manic, and psychotic symptoms. She denies engaging in physical aggression towards people or property. She denies SI/HI/I/P, urges for SIB, aggressive I/I/P, and all other imminent safety concerns. She requests to obtain her substances and be discharged home. Pt's presentation appears to be in the context of drug seeking behaviors, substance intoxication (Utox positive for cocaine and benzos), w/d from methadone, and borderline personality traits. She declines referral to inpt rehab. At this time pt does not meet criteria for acute illness that could be modified in an inpatient psychiatric setting. Patient is stable for d/c home.

## 2017-12-06 NOTE — ED BEHAVIORAL HEALTH ASSESSMENT NOTE - DETAILS
hospitalized on 2N for substance induced mariaelena, d/barbie yesterday on no medications. Patient was agitated in [facility] clinic today demanding methadone and Klonopin see BH note pt does not wish to discuss abd pain, pt states it is from methadone w/d VPA- sedation Patient stated mother with anxiety, father with depression, cousin with bipolar disorder, another family member with completed suicide. Per CVM - Father alcoholic, has anxiety on xanax currently, brother  2 years ago mysteriously, was on opiates. see above Dr Mcclendon notified

## 2017-12-06 NOTE — ED BEHAVIORAL HEALTH ASSESSMENT NOTE - SUICIDE PROTECTIVE FACTORS
Positive therapeutic relationships/Identifies reasons for living/Responsibility to family and others

## 2017-12-06 NOTE — ED PROVIDER NOTE - MEDICAL DECISION MAKING DETAILS
47y Female hx of anxiety, substance abuse evaluated at her methadone clinic and because agitated and threatening staff once confronted about her drug use.   Pt is agitated at present, asking for methadone and ativan, no visible injuries one exam, alert and oriented, articulate speech, Head NCAT, no spinal tend, ambulating w/ tenderness-  UA contaminated and + leuks (pt initially refused to give urine and provided a ua sample which contained feces per RN)-  Pt w/o UTI symptoms and denies prior hx of UTI- No indication for abx.  Medically cleared for psych disposition-

## 2017-12-06 NOTE — ED BEHAVIORAL HEALTH ASSESSMENT NOTE - HPI (INCLUDE ILLNESS QUALITY, SEVERITY, DURATION, TIMING, CONTEXT, MODIFYING FACTORS, ASSOCIATED SIGNS AND SYMPTOMS)
Patient is a 46 year old, single, unemployed woman, domiciled with mother ([phone]) following move from [place] where she lived with her father; 9 prior psychiatric hospitalizations (d/barbie yesterday from  where she was hospitalized for substance induced mariaelena), currently engaged in outpatient care with Dr Mcclendon at [facility]; no known history of SA; history of SIB of punching self when stressed; substance abuse on methadone with recent use of crystal meth and cocaine prior to recent inpt admission. Patient presents brought in by EMS after she presented to [facility] today unscheduled and demanding Methadone and Klonopin.    Per chart review and collateral from in psychiatrist, pt was discharged yesterday on no medications after she was hospitalized for substance induced mariaelena. Patient reconstituted off medications. Klonopin was tapered and discontinued in the inpatient setting. Patient states she was supposed to go to the methadone clinic yesterday after discharge but it ws closed. She states that afterwards she went to a male friend's home and spent the night there. She denies use of drugs yesterday and is guarded and vague when asked if her friend used substances. She states that this AM she woke up and went to the [facility] methadone clinic for her regular dose and to obtain a new Klonopin prescription. Patient states that she was subsequently transferred to the ED "for no reason". She cannot identify any behaviors that warranted transfer. She denies engaging him assaultive behaviors. She denies SI/HI/I/P. She states that last night she slept from 8pm-7am and feels well rested. She denies consistently depressed, irritable, or elevated mood. She denies all psychotic symptoms. She requests her regular Methadone dose in the ER and would like to go home.    see  note for collateral from pt's inpatient and outpatient psychiatrist. Patient is a 46 year old, single, unemployed woman, domiciled with mother ([phone]) following move from [place] where she lived with her father; with documented PPhx of polysubstance abuse and borderline PD, 9 prior psychiatric hospitalizations (d/barbie yesterday from  where she was hospitalized for substance induced mariaelena), currently engaged in outpatient care with Dr Mcclendon at [facility]; no known history of SA; history of SIB of punching self when stressed; substance abuse on methadone with recent use of crystal meth and cocaine prior to recent inpt admission. Patient presents brought in by EMS after she presented to [facility] today unscheduled and demanding Methadone and Klonopin.    Per chart review and collateral from inpt psychiatrist, pt was discharged yesterday on no medications after she was hospitalized for substance induced mariaelena. Patient reconstituted off medications. Klonopin was tapered and discontinued in the inpatient setting. Patient states she was supposed to go to the methadone clinic yesterday after discharge but it ws closed. She states that afterwards she went to a male friend's home and spent the night there. She denies use of drugs yesterday and is guarded and vague when asked if her friend used substances. She states that this AM she woke up and went to the [facility] methadone clinic for her regular dose and to obtain a new Klonopin prescription. Patient states that she was subsequently transferred to the ED "for no reason". She cannot identify any behaviors that warranted transfer. She denies engaging him assaultive behaviors. She denies SI/HI/I/P. She states that last night she slept from 8pm-7am and feels well rested. She denies consistently depressed, irritable, or elevated mood. She denies all psychotic symptoms. She requests her regular Methadone dose in the ER and would like to go home.    see  note for collateral from pt's inpatient and outpatient psychiatrist. Patient is a 47 year old, single, unemployed woman, domiciled with mother ([phone]) following move from [place] where she lived with her father; with documented PPhx of polysubstance abuse and borderline PD, 9 prior psychiatric hospitalizations (d/barbie yesterday from  where she was hospitalized for substance induced mariaelena), currently engaged in outpatient care with Dr Mcclendon at [facility]; no known history of SA; history of SIB of punching self when stressed; substance abuse on methadone with recent use of crystal meth and cocaine prior to recent inpt admission. Patient presents brought in by EMS after she presented to [facility] today unscheduled and demanding Methadone and Klonopin.    Per chart review and collateral from inpt psychiatrist, pt was discharged yesterday on no medications after she was hospitalized for substance induced mariaelena. Patient reconstituted off medications. Klonopin was tapered and discontinued in the inpatient setting. Patient states she was supposed to go to the methadone clinic yesterday after discharge but it ws closed. She states that afterwards she went to a male friend's home and spent the night there. She denies use of drugs yesterday and is guarded and vague when asked if her friend used substances. She states that this AM she woke up and went to the [facility] methadone clinic for her regular dose and to obtain a new Klonopin prescription. Patient states that she was subsequently transferred to the ED "for no reason". She cannot identify any behaviors that warranted transfer. She denies engaging him assaultive behaviors. She denies SI/HI/I/P. She states that last night she slept from 8pm-7am and feels well rested. She denies consistently depressed, irritable, or elevated mood. She denies all psychotic symptoms. She requests her regular Methadone dose in the ER and would like to go home.    see  note for collateral from pt's inpatient and outpatient psychiatrist.

## 2017-12-06 NOTE — ED BEHAVIORAL HEALTH ASSESSMENT NOTE - RISK ASSESSMENT
Chronic risk factors include recent inpatient hospitalization, hx of noncompliance with treatment recommendations, impulsive behaviors, and extensive long term hx of substance abuse. Protective factors include no hx of suicide attempts, no reported aggression directed towards people, stable living situation, future orientation, in outpatient treatment, and able to engage in safety planning. At this time pt is at low imminent risk of harm but at elevated chronic risk. Attempt made to mitigate risk by offering referrals for acute substance abuse treatment but patient declines.

## 2017-12-06 NOTE — ED BEHAVIORAL HEALTH NOTE - BEHAVIORAL HEALTH NOTE
spoke with Dr. Mcclendon at [facility] [phone] or [phone]. Patient was released from [facility] inpatient yesterday and presented to the methadone clinic asking for her dose and klonopin. She was supposed to go to rehab from the inpatient unit but refused. He reports that she appeared 'manic' and believes she used methamphetamine and cocaine. She became agitated in the clinic and EMS was called, EMS gave the patient versed. While in the  ED, she bit staff and was spitting, required IM meds and 4-point restraints.  Spoke with Dr. Ugarte who discharged patient yesterday, he does not support readmission as he feels her presentation is due to substance abuse and personality pathology and inpatient treatment is not likely to modify this. Patient is in need of drug treatment but is refusing, her mood symptoms resolve in the hospital due to sobriety. Patient is pending a drug screen and psychiatric evaluation. Will monitor.

## 2017-12-06 NOTE — ED BEHAVIORAL HEALTH ASSESSMENT NOTE - PRIMARY DX
Polysubstance (including opioids) dependence with physiological dependence Borderline personality disorder

## 2017-12-06 NOTE — ED ADULT NURSE REASSESSMENT NOTE - NS ED NURSE REASSESS COMMENT FT1
16:25-Patient noted very agitated, yelling, cursing, spitting at staff members, and hitting the fishbowl glass, medication given as ordered, pt placed in 4 point leather restraints for safety, one to one observation maintained, blood work and EKG done, pt taken out of restraints at 14:10 once noted to be calm, needs met, pt currently awaiting further MD evaluation, will continue to monitor pt.

## 2017-12-06 NOTE — ED BEHAVIORAL HEALTH ASSESSMENT NOTE - OTHER
outpatient psychiatrist Dr. Mcclendon and inpatient psychiatrist Dr. Ugarte initially uncooperative, later cooperative initially impaired, improved after IM medication and pt was calm and cooperative chronically limited

## 2017-12-06 NOTE — ED BEHAVIORAL HEALTH ASSESSMENT NOTE - DESCRIPTION
Initially agitated, demanding Methadone and Klonopin, intrusively demanding staff and not redirectable. Patient required 4 point restraints and received Haldol 5mg and Benadryl 50mg IM, after which pt was calm and cooperative. Patient is currently unemployed, living with her mother, has a male friend who she is sexually active with. Noncaregiver Per CVM - fractured left foot on June 22, 2017. Hep C in past s/p Interferon several years ago Initially agitated, demanding Methadone and Klonopin, intrusively demanding staff, threatening, and not redirectable. Patient required 4 point restraints and received Haldol 5mg and Benadryl 50mg IM, after which pt was calm and cooperative. Initially agitated, demanding Methadone and Klonopin, intrusively demanding staff, threatening, and not redirectable. Patient required 4 point restraints and received Haldol 5mg and Benadryl 50mg IM, after which pt was calm and cooperative.  `

## 2017-12-06 NOTE — ED BEHAVIORAL HEALTH ASSESSMENT NOTE - CASE SUMMARY
47 year old, single, unemployed woman, domiciled with mother ([phone]) following move from [place] where she lived with her father; with documented PPhx of polysubstance abuse and borderline PD, 9 prior psychiatric hospitalizations (d/barbie yesterday from  where she was hospitalized for substance induced mariaelena), currently engaged in outpatient care with Dr Mcclendon at [facility] [place]; no known history of SA; history of SIB of punching self when stressed; substance abuse on methadone with recent use of crystal meth and cocaine prior to recent inpt admission. Patient presents brought in by EMS after she presented to [facility] today unscheduled and demanding Methadone and Klonopin.    Patient seen s/p medication with haldol and benadryl. Utox was positive for cocaine, methadone and benzos - she was sent from [facility] for appearing intoxicated and agitated. Once calm, patient continued to deny substance abuse, even when confronted with +tox screen. Denied SI/HI/I/P as well as mariaelena and psychosis. Refusing detox. Per inpatient psychiatrist, Dr. Ugarte, patient would not benefit from readmission at this time. She will be discharged to follow up with [facility] tomorrow.

## 2017-12-06 NOTE — ED BEHAVIORAL HEALTH ASSESSMENT NOTE - OTHER PAST PSYCHIATRIC HISTORY (INCLUDE DETAILS REGARDING ONSET, COURSE OF ILLNESS, INPATIENT/OUTPATIENT TREATMENT)
Per CVM  unsuccessful treatment, no suicidal behaviors, has been on methadone in various clinics as she moved around in [place] and [place]. Patient with nonadherence to mediations. Some self injurious behavior by way of punching self under stress and anxiety, no cutting, no scars, burns etc. Has been on lithium, Seroquel which "did not work". States VPA made her sedated. Pt reported having a learning disability and anxiety in childhood. She reported at age 18 she did not talk for 2 weeks and her school mandated her to see a psychiatrist and therapist. Currently a patient at [facility] with Dr. Mcclendon

## 2017-12-06 NOTE — ED BEHAVIORAL HEALTH ASSESSMENT NOTE - REASON FOR REFERRAL
presented unscheduled to outpatient psych provider demanding Klonopin. Send to ED for agitation and appearing intoxicated.

## 2017-12-06 NOTE — ED BEHAVIORAL HEALTH ASSESSMENT NOTE - DESCRIPTION (FIRST USE, LAST USE, QUANTITY, FREQUENCY, DURATION)
see below Patient was abusing Klonopin prior to inpt hospitalization. patient recently smoked crystal meth for the first time in addition to using cocaine with her friend. Per CVM - heroin beginning age 32 has not used in 2 years, on MMTP in [place] and is relocating to address medical need.  Has used cocaine in her thirties and had a one time relapse one year ago. In her twenties she drank on weekends socailly and reports will drink on occasion. Client reports MJ use in her teens and early twenties which she used several times a week "as much as I could".

## 2017-12-06 NOTE — ED ADULT TRIAGE NOTE - CHIEF COMPLAINT QUOTE
pt coming from Methadone program for very agitation, screaming arrive with [employer] cuff.  SI, verse 10mg IM was given by EMS symptoms not release.

## 2018-07-11 ENCOUNTER — OUTPATIENT (OUTPATIENT)
Dept: OUTPATIENT SERVICES | Facility: HOSPITAL | Age: 48
LOS: 1 days | Discharge: HOME | End: 2018-07-11

## 2018-07-11 DIAGNOSIS — F11.99 OPIOID USE, UNSPECIFIED WITH UNSPECIFIED OPIOID-INDUCED DISORDER: ICD-10-CM

## 2018-07-12 DIAGNOSIS — F11.20 OPIOID DEPENDENCE, UNCOMPLICATED: ICD-10-CM

## 2018-09-20 NOTE — ED ADULT TRIAGE NOTE - PAIN: PRESENCE, MLM
NEUROLOGY FOLLOW-UP    DATE OF SERVICE: 9/20/2018    Chief complaint:   Chief Complaint   Patient presents with   • Balance       Vitals:  Visit Vitals  /72 (BP Location: [facility], Patient Position: Sitting, Cuff Size: Regular)   Pulse 70   Ht 5' 4\" (1.626 m)   Wt 94.8 kg   SpO2 98%   BMI 35.87 kg/m²       HISTORY OF PRESENT ILLNESS     HPI  She is here for neurology follow-up. She was last seen in October 2017 and is scheduled to see me in October 2018. Her headaches and migraines are well controlled. However for last few weeks she has been having recurrent episodes of vertigo. She is averaging at least 6 episodes of vertigo each day each lasting for 10 minutes. She is having some visual issues and the depth perception and photophobia. Her migraines are well controlled on Topamax 25 mg at bedtime. She increased the dose to 25 mg twice a day without benefit. She denies any diplopia, dysarthria, dysphagia, chest pain, shortness of breath, or bowel set bladder symptoms.      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     Priority: Low   • Closed displaced fracture of middle phalanx of right ring finger with routine healing 06/14/2017     Priority: Low   • Acute cholecystitis with chronic cholecystitis 06/29/2016     Priority: Low   • Benign hypertension 10/15/2014     Priority: Low   • Confusion 10/12/2014     Priority: Low   • Migraine without aura, with intractable migraine, so stated, without mention of status migrainosus 07/17/2014     Priority: Low   • Benign paroxysmal positional vertigo 07/17/2014     Priority: Low   • Symptomatic menopausal or female climacteric states 04/10/2013     Priority: Low   • Irritability 04/10/2013     Priority: Low   • Sprain of lumbar region 12/14/2012     Priority: Low   • Sacroiliac joint dysfunction 12/14/2012     Priority: Low   • Myalgia and myositis,  unspecified 12/14/2012     Priority: Low       PAST MEDICAL, FAMILY AND SOCIAL HISTORY     Medications:  Current Outpatient Prescriptions   Medication   • buPROPion (WELLBUTRIN XL) 150 MG 24 hr tablet   • metoPROLOL tartrate (LOPRESSOR) 25 MG tablet   • cyclobenzaprine (FLEXERIL) 5 MG tablet   • levothyroxine (SYNTHROID, LEVOTHROID) 100 MCG tablet   • omeprazole (PRILOSEC) 40 MG capsule   • topiramate (TOPAMAX) 25 MG tablet   • TROKENDI XR 50 MG extended-release capsule   • topiramate (TOPAMAX) 25 MG tablet     No current facility-administered medications for this visit.        Allergies:  ALLERGIES:   Allergen Reactions   • Penicillins HIVES       Past Medical  History/Surgeries:  Past Medical History:   Diagnosis Date   • Abnormal PAP Smear     LGSIL   • Bilateral knee pain    • Colon polyp 10/31/2017    Dr. Benítez, Normal   • Diverticulosis of colon    • Essential (primary) hypertension    • Fracture 05/2017    RIGHT RING FINGER   • GERD (gastroesophageal reflux disease)    • Hypothyroidism    • Migraine with vertigo    • Nausea after anesthesia    • Ocular migraine    • Restless leg syndrome    • Stiffness of finger joint 11/2017    S/P ORIF right ring finger   • Vertigo     Pt. reports episodes of vertigo, most frequently in winter and spring.  Takes her by surprise with little warning-sometimes some visual aura   • Weight gain        Past Surgical History:   Procedure Laterality Date   • Arthroscopic rotator cuff Bilateral 12/2007   • Colonoscopy  10/31/2017    dr benítez   • Colposcopy bx cervix endocerv curr      Colposcopy   • Ercp  07/03/2016    WITH GENERAL ANESTHESIA   • Fracture surgery Right 2017    ORIF of right ring finger   • Hysteroscopy endometrial ablation  4/16/2012    TVT and HTA in OR   • Removal gallbladder  6/25/2016   • Removal of tonsils,14+ y/o     • Surg rx missed abortn,1st tri  8/2009    D&C       Family History:  Family History   Problem Relation Age of Onset   • Cancer Mother          Cervical   • High blood pressure Mother    • Diabetes Father    • Obesity Father    • Cancer, Colon Paternal Grandfather        Social History:  Social History   Substance Use Topics   • Smoking status: Never Smoker   • Smokeless tobacco: Never Used   • Alcohol use 0.0 oz/week      Comment: rarely       REVIEW OF SYSTEMS     Review of Systems  Review of systems    Constitutional:  Patient denies fever, chills, tiredness or malaise.    Eyes:  Denies change in visual acuity, pain, burning or itching.    Immunologic:  Denies hives, seasonal allergies.    HENT:  Denies sinus problems, ear infections, nasal congestion or sore throat.    Respiratory:  Denies cough, shortness of breath.    Cardiovascular:  Denies chest pain, shortness of breath or edema.    GI:  Denies abdominal pain, nausea, vomiting, bloody stools or diarrhea.    :  Denies urine retention, painful urination, urinary frequency, blood in urine or nocturia.    Musculoskeletal:  Denies back pain, neck pain, joint pain or leg swelling.    Integument:  Denies rash, itching.    Neurologic:  Complains of headaches and vertigo.    Endocrine:  Denies polyuria, polydipsia or temperature intolerance.    Lymphatic:  Denies swollen glands, weight loss.    All other systems reviewed and negative      PHYSICAL EXAM     Physical Exam  General appearance: Well-nourished and healthy-appearing adult.  HHENT: Normocephalic and atraumatic.  Neck: Supple, no JVD or bruit.  Heart: S1 and S2 heard.  Lungs: Clear to auscultation.    NEUROLOGICAL EXAM:  Mental Status: AAOx3. Speech was fluent and language was intact. The patient's fund of knowledge was normal. Higher integrated functions were normal. Recent and remote memory were intact. Affect and mood were normal. Attention span and concentration were normal.  Cranial Nerves:  Pupils were round, equal and reactive to light. Extra occular movements were intact. Fundi were normal. Optic disc margins were normal. Visual fields  were full on confrontation. Face was symmetric. Facial sensations were intact. High frequency hearing was normal. Tongue was midline. Palate moved symmetrically. Shoulder shrug was normal on both sides. Sternocleidomastoid strength was normal both sides. Other cranial nerves were intact.  Motor:  There was no drift. Muscle bulk, tone and strength was normal in all 4 extremities involving the proximal and distal muscle groups.  Deep tendon reflexes were symmetric. Plantars were down-going.  Sensory:  Pinprick, temperature, vibration and position sensations were intact.  Coordination: Finger to nose and heel to shin were normal. Rapid alternating movements were normal. Romberg was negative.  Gait: Station and gait were normal.       ASSESSMENT/PLAN     1. Basilar migraine.   2. Vertigo.    Pathophysiology, workup, and treatment options were discussed in detail with the patient. She was counseled about various triggers. She is on Topamax 25 mg twice a day. After discussing these options we'll switch her Topamax to Trokendi XR 50 mg daily. Prescription and voucher her co-pay was provided. Trokendi would be a better medication than immediate release Topamax and I hope that should bring her symptoms under control. Other options including Depakote, calcium channel Blockers, Beta Blockers, and Tricyclics Were Discussed. Depakote is relatively contraindicated because of her age, gender and weight. She was advised to maintain headache diary. I appreciate this opportunity evaluate her and please feel free to contact me if you have any further questions.    Thank you,    Shaik Liam M.D.   complains of pain/discomfort

## 2018-10-24 ENCOUNTER — OUTPATIENT (OUTPATIENT)
Dept: OUTPATIENT SERVICES | Facility: HOSPITAL | Age: 48
LOS: 1 days | Discharge: HOME | End: 2018-10-24

## 2018-10-25 DIAGNOSIS — F11.20 OPIOID DEPENDENCE, UNCOMPLICATED: ICD-10-CM

## 2018-10-30 ENCOUNTER — EMERGENCY (EMERGENCY)
Facility: HOSPITAL | Age: 48
LOS: 1 days | Discharge: HOME | End: 2018-10-30
Attending: EMERGENCY MEDICINE | Admitting: EMERGENCY MEDICINE
Payer: MEDICAID

## 2018-10-30 ENCOUNTER — OUTPATIENT (OUTPATIENT)
Dept: OUTPATIENT SERVICES | Facility: HOSPITAL | Age: 48
LOS: 1 days | Discharge: STILL ADMITTED OR ADMINISTRATIVE | End: 2018-10-30

## 2018-10-30 VITALS
OXYGEN SATURATION: 100 % | DIASTOLIC BLOOD PRESSURE: 79 MMHG | HEART RATE: 81 BPM | SYSTOLIC BLOOD PRESSURE: 131 MMHG | RESPIRATION RATE: 18 BRPM | TEMPERATURE: 98 F

## 2018-10-30 PROCEDURE — 99282 EMERGENCY DEPT VISIT SF MDM: CPT

## 2018-10-30 NOTE — ED PROVIDER NOTE - MEDICAL DECISION MAKING DETAILS
- anxiety, no acute SI/HI/hallucinations  - recommend Crisis Center, security called to escort patient over, patient agreeable with plan

## 2018-10-30 NOTE — ED PROVIDER NOTE - OBJECTIVE STATEMENT
47F h/o anxiety on Xanax and substance abuse on methadone presents with c/o anxiety.  Pt lives in [place], came up to [facility] because of father's passing away, and is out of her Xanax.  Endorses feeling very anxious.  Denies fever/chills, cp, sob, n/v/d, SI/HI, hallucinations.

## 2018-10-30 NOTE — ED ADULT TRIAGE NOTE - NS ED TRIAGE AVPU SCALE
Alert-The patient is alert, awake and responds to voice. The patient is oriented to time, place, and person. The triage nurse is able to obtain subjective information. Non family member

## 2018-10-31 DIAGNOSIS — F41.9 ANXIETY DISORDER, UNSPECIFIED: ICD-10-CM

## 2018-11-08 ENCOUNTER — EMERGENCY (EMERGENCY)
Facility: HOSPITAL | Age: 48
LOS: 1 days | Discharge: HOME | End: 2018-11-08
Attending: EMERGENCY MEDICINE | Admitting: EMERGENCY MEDICINE
Payer: MEDICAID

## 2018-11-08 VITALS
OXYGEN SATURATION: 99 % | RESPIRATION RATE: 17 BRPM | DIASTOLIC BLOOD PRESSURE: 75 MMHG | HEART RATE: 62 BPM | TEMPERATURE: 98 F | SYSTOLIC BLOOD PRESSURE: 106 MMHG

## 2018-11-08 PROCEDURE — 99283 EMERGENCY DEPT VISIT LOW MDM: CPT

## 2018-11-08 RX ORDER — ALPRAZOLAM 0.25 MG
2 TABLET ORAL ONCE
Qty: 0 | Refills: 0 | Status: DISCONTINUED | OUTPATIENT
Start: 2018-11-08 | End: 2018-11-08

## 2018-11-08 RX ORDER — METHADONE HYDROCHLORIDE 40 MG/1
90 TABLET ORAL ONCE
Qty: 0 | Refills: 0 | Status: DISCONTINUED | OUTPATIENT
Start: 2018-11-08 | End: 2018-11-08

## 2018-11-08 RX ADMIN — METHADONE HYDROCHLORIDE 90 MILLIGRAM(S): 40 TABLET ORAL at 14:44

## 2018-11-08 RX ADMIN — Medication 2 MILLIGRAM(S): at 14:12

## 2018-11-08 NOTE — ED PROVIDER NOTE - PHYSICAL EXAMINATION
pt eloped HDS, NAD, MMM, eyes clear, lungs CTAB, heart sounds normal, abd soft, NT, ND, no CVAT, LEs without edema, wwp, skin normal temperature and color, neuro: alert and oriented, no focal deficits

## 2018-11-08 NOTE — ED PROVIDER NOTE - ATTENDING CONTRIBUTION TO CARE
I, Jennifer Cabot, MD, have performed a history and physical exam of the patient and discussed their management with the ACP.  I reviewed the PA's note and agree with the documented findings and plan of care. My medical decision making and observations are found above.    Patient eloped prior to evaluation. I, Jennifer Cabot, MD, have performed a history and physical exam of the patient and discussed their management with the ACP.  I reviewed the PA's note and agree with the documented findings and plan of care. My medical decision making and observations are found above.    Cabot: 47F with PMH of substance abuse and chronic pain on methadone 90mg and xanax 2mg daily in [place] has come in for her dose of methadone. Normal exam.  Will give methadone and xanax dose.

## 2018-11-08 NOTE — ED PROVIDER NOTE - NSFOLLOWUPINSTRUCTIONS_ED_ALL_ED_FT
You have been given your daily dose of methadone, 90mg by mouth, as well as your xanax, 2mg by mouth.  Please follow up with your doctor as needed.

## 2018-11-08 NOTE — ED PROVIDER NOTE - MEDICAL DECISION MAKING DETAILS
pt eloped Cabot: 47F with PMH of substance abuse and chronic pain on methadone 90mg and xanax 2mg daily in [place] has come in for her dose of methadone. Normal exam.  Will give methadone and xanax dose.

## 2018-11-08 NOTE — ED PROVIDER NOTE - OBJECTIVE STATEMENT
pt eloped prior to ED provider evaluation Cabot: 47F with PMH of substance abuse and chronic pain on methadone 90mg and xanax 2mg daily in [place] has come in for her dose of methadone.  She is visiting [place] and has gone to [facility] as a guest.  They confirmed the 90mg dose, last given yesterday.  They told her that she would need to come into the ED for her further doses.

## 2018-11-08 NOTE — ED ADULT TRIAGE NOTE - CHIEF COMPLAINT QUOTE
pt states she missed her dose today and was redirected to ED for daily dose. states she takes methadone for daily pain control.

## 2018-11-09 ENCOUNTER — EMERGENCY (EMERGENCY)
Facility: HOSPITAL | Age: 48
LOS: 1 days | Discharge: HOME | End: 2018-11-09
Attending: EMERGENCY MEDICINE | Admitting: EMERGENCY MEDICINE
Payer: MEDICAID

## 2018-11-09 VITALS
OXYGEN SATURATION: 100 % | DIASTOLIC BLOOD PRESSURE: 71 MMHG | TEMPERATURE: 99 F | SYSTOLIC BLOOD PRESSURE: 102 MMHG | RESPIRATION RATE: 16 BRPM | HEART RATE: 79 BPM

## 2018-11-09 PROCEDURE — 99281 EMR DPT VST MAYX REQ PHY/QHP: CPT

## 2018-11-09 RX ORDER — METHADONE HYDROCHLORIDE 40 MG/1
90 TABLET ORAL ONCE
Qty: 0 | Refills: 0 | Status: DISCONTINUED | OUTPATIENT
Start: 2018-11-09 | End: 2018-11-09

## 2018-11-09 RX ADMIN — METHADONE HYDROCHLORIDE 90 MILLIGRAM(S): 40 TABLET ORAL at 12:16

## 2018-11-09 NOTE — SBIRT NOTE. - NSSBIRTFULLSCREEN_GEN_A_ED_FT
SBIRT consult requested by provider. Contacted [facility] and spoke with Sintia Bray, . Pt was receiving medication on a guest pass which . Pt is not able to return to [facility] due to her behavior and past history with them. Sintia stated that pt's counselor in [place] has been waiting for pt's call. Contacted pt's counselor, Adrianne, [phone] ext. [phone], that stated pt needs to find a new methadone clinic and they will need consent to release pt's information.  Their doctor is not available to approve another clinic/guest pass until Monday.   Contacted several methadone clinics and received the following information (pt left before list could be given to her):  [facility] [phone] [address] [place] [address], [address], Monday 7:30am first come, first serve ,Need proof of address, insurance, and proof of residency ; [facility] [phone] [address] [place] [address]  Monday 7am-12pm walk in- first come, first serve, Need proof of address, insurance, and proof of residency ; [facility] [phone]  [address] [place] [address] Monday - Thursday 8am-10am (closed this Monday) Needs Proof of ID, insurance card, proof of address, social and/or birth certificate (if available), only accepts [place] [email or web address], [facility], [facility], [facility], and [email or web address] first

## 2018-11-09 NOTE — ED PROVIDER NOTE - MEDICAL DECISION MAKING DETAILS
46 y/o F w/ history of methadone maintenance normally out of [place]. No active signs of withdrawal, but pt requesting methadone. Pt is seeing health  to discussing enrolling pt in continued methadone maintenance

## 2018-11-09 NOTE — ED PROVIDER NOTE - PROGRESS NOTE DETAILS
Pt wishes to be discharged. Ynes from [facility] has been trying to find a methadone center for the pt to receive her medications but has been un successful. The pt will try other places or will have to return here tomorrow for her medications. Pt had left prior to getting list.  Had endorsed to pt she needs to all her program in [facility] to get agreement with program at [facility] declining reentry into their program as pt has been non adherent to recommendations and follow-up- had a temporary agreement for 2weeks but that program has .

## 2018-11-09 NOTE — ED ADULT TRIAGE NOTE - CHIEF COMPLAINT QUOTE
pt here for methadone. pt was here yesterday for same thing. pt sts will start going to methadone clinic on monday

## 2018-11-10 ENCOUNTER — EMERGENCY (EMERGENCY)
Facility: HOSPITAL | Age: 48
LOS: 1 days | Discharge: HOME | End: 2018-11-10
Admitting: EMERGENCY MEDICINE
Payer: MEDICAID

## 2018-11-10 VITALS
DIASTOLIC BLOOD PRESSURE: 56 MMHG | RESPIRATION RATE: 18 BRPM | HEART RATE: 85 BPM | OXYGEN SATURATION: 99 % | SYSTOLIC BLOOD PRESSURE: 115 MMHG | TEMPERATURE: 98 F

## 2018-11-10 PROCEDURE — 99283 EMERGENCY DEPT VISIT LOW MDM: CPT

## 2018-11-10 RX ORDER — METHADONE HYDROCHLORIDE 40 MG/1
90 TABLET ORAL ONCE
Qty: 0 | Refills: 0 | Status: DISCONTINUED | OUTPATIENT
Start: 2018-11-10 | End: 2018-11-10

## 2018-11-10 RX ADMIN — METHADONE HYDROCHLORIDE 90 MILLIGRAM(S): 40 TABLET ORAL at 10:34

## 2018-11-10 NOTE — ED PROVIDER NOTE - CHPI ED SYMPTOMS NEG
no vomiting/no decreased eating/drinking/no pain/no chills/no nausea/no numbness/no tingling/no weakness/no fever/no dizziness

## 2018-11-10 NOTE — ED ADULT TRIAGE NOTE - CHIEF COMPLAINT QUOTE
Patient is here and has been seen by ER for Methadone 90mg dose. Pt here to get her medication today. She is visiting and has been verified last time for dosage approval (yesterday)

## 2018-11-10 NOTE — ED PROVIDER NOTE - MEDICAL DECISION MAKING DETAILS
46 y/o F, denies PMHx presents to ED for Methadone dosage which is currently 90 mg each day. Pt has not established care in [place] , but is arranged to participate at [facility] clinic on Monday. 46 y/o F, denies PMHx presents to ED for Methadone dosage which is currently 90 mg each day. Pt has not established care in [place] , but is arranged to participate at [facility] clinic on Monday. Pts dose and care was verified and documented by Dr. iSegel at previous visit. Seen by SBIRT team to arrange care. Again, was reenforced to patient that plan is she can get doses at [facility] up until sunday 11/11 however then needs to follow at clinic starting MONDAY 11/12. pt reports she agrees with this plan and has arranged to get to [facility] on Monday. No s/sx of withdrawral or intoxication. Will give 1 dose and d/c with rtc tomorrow and f/u in methadone clinic monday

## 2018-11-10 NOTE — ED PROVIDER NOTE - OBJECTIVE STATEMENT
46 y/o F, denies PMHx presents to ED for Methadone dosage.  Pt has been seen in ER at [facility] for this same reason. Pt recently moved from [place] and will join Methadone clinic at [facility] on Monday at 12.  Pt denies any complaint at this time. 48 y/o F, denies PMHx aside from substance abuse presents to ED for Methadone dosage.  Pt has been seen in ER at [facility] for this same reason for the past 2 days.  Pt recently moved from [place] and will join Methadone clinic at [facility] on Monday 11/12. Pt reports she has been advised to return to ER for doses until care is established.   Pt denies any complaint at this time. Denies fever chills cp sob weakness numbness tingling tremor vomiting diarrhea

## 2018-11-11 ENCOUNTER — EMERGENCY (EMERGENCY)
Facility: HOSPITAL | Age: 48
LOS: 1 days | Discharge: HOME | End: 2018-11-11
Attending: EMERGENCY MEDICINE | Admitting: EMERGENCY MEDICINE
Payer: MEDICAID

## 2018-11-11 VITALS
SYSTOLIC BLOOD PRESSURE: 147 MMHG | TEMPERATURE: 98 F | RESPIRATION RATE: 16 BRPM | DIASTOLIC BLOOD PRESSURE: 92 MMHG | OXYGEN SATURATION: 100 % | HEART RATE: 76 BPM

## 2018-11-11 PROCEDURE — 99283 EMERGENCY DEPT VISIT LOW MDM: CPT

## 2018-11-11 RX ORDER — METHADONE HYDROCHLORIDE 40 MG/1
90 TABLET ORAL ONCE
Qty: 0 | Refills: 0 | Status: DISCONTINUED | OUTPATIENT
Start: 2018-11-11 | End: 2018-11-11

## 2018-11-11 RX ADMIN — METHADONE HYDROCHLORIDE 90 MILLIGRAM(S): 40 TABLET ORAL at 11:12

## 2018-11-11 NOTE — ED PROVIDER NOTE - CHPI ED SYMPTOMS NEG
no vomiting/no dizziness/no fever/no pain/no nausea/no chills/no decreased eating/drinking/no numbness/no tingling/no weakness

## 2018-11-11 NOTE — ED PROVIDER NOTE - OBJECTIVE STATEMENT
47 y/o F returns to ED to continue Methadone medication that has been given over the course of a week.  No symptoms. 47 y/o F Hx Methadone Dependence, Asthma on Albuterol MDI prn who returns to ED to receive daily Methadone dose. Pt has been seen in ER at [facility] for this same reason for the past 3 days.  Pt recently moved from [place] and will join Methadone clinic at [facility] on Monday 11/12. Pt reports she has been advised to return to ER for doses until care is established.   Pt denies any complaint at this time. Denies fever chills cp sob weakness numbness or other symptoms at this time.

## 2018-11-11 NOTE — ED PROVIDER NOTE - MEDICAL DECISION MAKING DETAILS
47 y/o F returning to ED for Methadone medication.  No symptoms . Plan to DC home after obtaining medication. 47 y/o F returning to ED for Methadone medication.  No symptoms . Mild wheeze on exam, pt decline tx. Plan - outpt Methadone follow up tomorrow as scheduled. Bronchodilators prn. Warning signs for return or ED visit given.

## 2018-11-12 ENCOUNTER — EMERGENCY (EMERGENCY)
Facility: HOSPITAL | Age: 48
LOS: 1 days | Discharge: HOME | End: 2018-11-12
Attending: EMERGENCY MEDICINE | Admitting: EMERGENCY MEDICINE
Payer: MEDICAID

## 2018-11-12 VITALS
TEMPERATURE: 98 F | DIASTOLIC BLOOD PRESSURE: 67 MMHG | OXYGEN SATURATION: 99 % | HEART RATE: 68 BPM | RESPIRATION RATE: 18 BRPM | SYSTOLIC BLOOD PRESSURE: 104 MMHG

## 2018-11-12 PROCEDURE — 99283 EMERGENCY DEPT VISIT LOW MDM: CPT

## 2018-11-12 RX ORDER — METHADONE HYDROCHLORIDE 40 MG/1
90 TABLET ORAL ONCE
Qty: 0 | Refills: 0 | Status: DISCONTINUED | OUTPATIENT
Start: 2018-11-12 | End: 2018-11-12

## 2018-11-12 RX ORDER — ALPRAZOLAM 0.25 MG
2 TABLET ORAL ONCE
Qty: 0 | Refills: 0 | Status: DISCONTINUED | OUTPATIENT
Start: 2018-11-12 | End: 2018-11-12

## 2018-11-12 RX ADMIN — Medication 2 MILLIGRAM(S): at 11:05

## 2018-11-12 RX ADMIN — METHADONE HYDROCHLORIDE 90 MILLIGRAM(S): 40 TABLET ORAL at 11:05

## 2018-11-12 NOTE — ED PROVIDER NOTE - OBJECTIVE STATEMENT
48 yof with hx of methadone use, 90mg daily. Pt is from [place] and unable to get follow up with [place] until today but unaware of time of closure so missed appt and came to ED.   Also c/o feeling anxious and heart racing. Hxof anxiety and states she is upset that she had to come back to ED.

## 2018-11-12 NOTE — ED PROVIDER NOTE - NSFOLLOWUPINSTRUCTIONS_ED_ALL_ED_FT
Follow up with [facility] meth[facility]one clinic as planned on Monday. if you need additional resources return to ER or call the following numbers:    [facility] [phone] [address] [place] [address], [address], Monday 7:30am first come, first serve ,Need proof of address, insurance, and proof of residency ; [facility] [phone] [address] [place] [address]  Monday 7am-12pm walk in- first come, first serve, Need proof of address, insurance, and proof of residency ; [facility] [phone]  [address] [place] [address] Monday - Thursday 8am-10am (closed this Monday)

## 2018-11-12 NOTE — ED PROVIDER NOTE - PROGRESS NOTE DETAILS
awaiting meds, asking for xanax as well as methadone, and appears anxious. SBIRT spoke with patient and explained that pt will only receive 40mg if she comes back and only if in withdrawal. Pt must set up outpatient follow up.

## 2018-11-12 NOTE — SBIRT NOTE. - NSSBIRTFULLSCREEN_GEN_A_ED_FT
Met with pt to discuss plan for tomorrow. Pt requested inpatient detox- called [facility] and [facility]- both programs explained that pt does not meet criteria for inpatient detox on prescribed Methadone and needs to do it at an outpatient clinic. Pt given instructions to go to [facility] [place] [address]; [phone]. Pt aware that she needs to go between 7am-11am. Pt also given bus directions.

## 2018-11-13 ENCOUNTER — EMERGENCY (EMERGENCY)
Facility: HOSPITAL | Age: 48
LOS: 1 days | Discharge: HOME | End: 2018-11-13
Attending: EMERGENCY MEDICINE | Admitting: EMERGENCY MEDICINE
Payer: MEDICAID

## 2018-11-13 VITALS
SYSTOLIC BLOOD PRESSURE: 114 MMHG | HEART RATE: 83 BPM | OXYGEN SATURATION: 97 % | TEMPERATURE: 98 F | DIASTOLIC BLOOD PRESSURE: 76 MMHG | RESPIRATION RATE: 18 BRPM

## 2018-11-13 PROCEDURE — 99283 EMERGENCY DEPT VISIT LOW MDM: CPT

## 2018-11-13 RX ORDER — METHADONE HYDROCHLORIDE 40 MG/1
40 TABLET ORAL ONCE
Qty: 0 | Refills: 0 | Status: DISCONTINUED | OUTPATIENT
Start: 2018-11-13 | End: 2018-11-13

## 2018-11-13 RX ADMIN — METHADONE HYDROCHLORIDE 40 MILLIGRAM(S): 40 TABLET ORAL at 12:15

## 2018-11-13 NOTE — SBIRT NOTE. - NSSBIRTSERVICES_GEN_A_ED_FT
Provided SBIRT services:  Referral to Treatment Performed. Screening results were reviewed with the patient and patient was provided information about healthy guidelines and potential negative consequences associated with level of risk. Motivation and readiness to reduce or stop use was discussed and goals and activities to make changes were suggested/offered.    Referral for complete assessment and level of care determination at a certified treatment facility was completed by contacting the treatment facility via phone, [facility] recommended as her best option as per her specific needs, insurance, availability, and location.

## 2018-11-13 NOTE — ED PROVIDER NOTE - PROGRESS NOTE DETAILS
49 y/o F pt on Methadone maintenance (90mg daily), living in [place], where she receives daily maintenance, but now is recently in [place] and unable to establish care for maintenance. Pt presents to ED multiple days in a row requesting her methadone dose. Today, again, presents for Methadone dosage. No signs of moderate to severe withdrawal. Pt is behaviorally in control. Case discussed with pt's Methadone counselor Adrianne as well as Patient Health Counselor Ynes Wolf, who yesterday gave pt resources to establish care in Methadone clinics. As pt is not currently in acute withdrawal, will give low dosage of Methadone to prevent or reduce withdrawal sx.  Discussed this with pt, Ynes Wolf ([facility] health ), Adrianne and ED phamacist (Tony Ru[facility]).  Pt agreed between myself, her, Adrianne, and Elijah that tomorrow if she presents to the ED again, and is not in acute withdrawal, she will not receive further Methadone. Pt aware of the plan and agrees to the plan.  She has follow up information for other methadone clinics that she can see in person tomorrow. Dr. Alexander: pt received methadone without difficulty; will discharge and pt has instructions for outpatient follow up

## 2018-11-13 NOTE — ED PROVIDER NOTE - MEDICAL DECISION MAKING DETAILS
49 y/o F pt on Methadone maintenance (90mg daily), living in [place], where she receives daily maintenance, but now is recently in [place] and unable to establish care for maintenance. Pt presents to ED multiple days in a row. Today, again, presents for Methadone dosage. No signs of moderate to severe withdrawal. Pt is behaviorally in control. Case discussed with pt's Methadone counselor Adrianne as well as Patient Health Counselor Ynes Wolf, who yesterday gave pt resources to establish care in Methadone clinics. As pt is not currently in withdrawal, will give low dosage of Methadone to prevent or reduce withdrawal from sx. Pt agreed between myself, her, Adrianne, and Yens that tomorrow if she presents to the ED again, and is not in withdrawal, she with no receive further Methadone. Pt aware of the plan and agrees to the plan 49 y/o F pt on Methadone maintenance (90mg daily), living in [place], where she receives daily maintenance, but now is recently in [place] and unable to establish care for maintenance. Pt presents to ED multiple days in a row requesting her methadone dose. Today, again, presents for Methadone dosage. No signs of moderate to severe withdrawal. Pt is behaviorally in control. Case discussed with pt's Methadone counselor Adrianne as well as Patient Health Counselor Ynes Wolf, who yesterday gave pt resources to establish care in Methadone clinics. As pt is not currently in acute withdrawal, will give low dosage of Methadone to prevent or reduce withdrawal sx.  Discussed this with pt, Ynes Wolf ([facility] health ), Adrianne and ED phamacist (Tony Ru[facility]).  Pt agreed between myself, her, Adrianne, and Elijah that tomorrow if she presents to the ED again, and is not in acute withdrawal, she will not receive further Methadone. Pt aware of the plan and agrees to the plan.  She has follow up information for other methadone clinics that she can see in person tomorrow.

## 2018-11-13 NOTE — SBIRT NOTE. - NSSBIRTFULLSCREEN_GEN_A_ED_FT
Meeting with patient attempted however Full Screen Not Performed due to:    Patient left campus, health  called the cell phone given to registration, patient reported leaving "for a cup of coffee" and said she would return for the health  to provide resources for outpatient MAT; health  waited 15 minutes for patient to return to no avail; Potential treatment providers researched by Ynes Wolf MS, [facility] this morning (in addition to previously furnished to patient on 11/12, 11/11, 11/10, 11/9, 11/8, including a scheduled appointment to [facility] for 11/13 at 7:30am): [facility], [phone]; [facility], [phone]; [facility], [phone]; [facility], [phone].

## 2018-11-13 NOTE — ED PROVIDER NOTE - OBJECTIVE STATEMENT
49 y/o F pt with PMHx of Anxiety and Substance Abuse, arrives to the ED for a Methadone dosage today. Pt reports that she has been everyday for the last 5 days for Methadone dosages, which she has received; As per Patient Health , Ynes Wolf, pt was told yesterday that "this will be your last dosage." Pt notes calling multiple facilities (including [facility]), and states that "they would not see me because I was here yesterday." Pt notes that she has a Methadone counselor located in [place] where she receives 90mg daily; pt states that she only came to [place] to care for her father (recently  2 weeks ago). Denies fever, chills, N/V or any other complaints. Daily meds: Xanax. NKDA. 47 y/o F pt with PMHx of Anxiety and Substance Abuse, arrives to the ED for a Methadone dosage today. Pt reports that she has been to the ED everyday for the last 5 days for Methadone dosages, which she has received; As per [facility] , Ynes Wolf, pt was told yesterday that yesterday's dose of methadone would be her last from [facility] ED if not in acute withdrawal. Pt notes calling multiple facilities (including [facility]), and states that "they would not see me because I was here yesterday." Pt notes that she has a Methadone counselor located in [place] where she receives 90mg daily; pt states that she only came to [place] to care for her father (recently  2 weeks ago). Denies fever, chills, N/V or any other complaints. Daily meds: Xanax. NKDA.  Spoke to pt's methadone clinic counselor (Adrianne) who confirmed that she has also attempted to get pt into new methadone program in [place] without success.

## 2018-11-13 NOTE — ED PROVIDER NOTE - PHYSICAL EXAMINATION
mildly anxieous but no tremors. no phyasiculations. phisculations mildly anxious but no tremors. no fasciculations

## 2018-11-14 NOTE — ED ADULT TRIAGE NOTE - NS_BH TRG QUESTION8_ED_ALL_ED
The patient presents to the office today with the chief complaint of insomnia HPI The patient remains on medications for hypertension. he is tolerating the medications well. The patient's renal function rapidly deteriorates if an ACE or ARB  The renal function now is nearly normal.  The patient has complaints of insomnia. He can fall asleep for approximately 30 minutes then he wakes up and cannot fall asleep. Review of Systems Respiratory: Negative for shortness of breath. Cardiovascular: Negative for chest pain and leg swelling. Psychiatric/Behavioral: The patient has insomnia. Allergies Allergen Reactions  Ace Inhibitors Other (comments) Renal Failure  Arb-Angiotensin Receptor Antagonist Other (comments) Renal Failure  Levofloxacin Nausea Only Severe nausea and dizziness  Sulfa (Sulfonamide Antibiotics) Unknown (comments) Nausea, aching all over Current Outpatient Medications Medication Sig Dispense Refill  montelukast (SINGULAIR) 10 mg tablet Take 10 mg by mouth daily.  DULoxetine (CYMBALTA) 60 mg capsule Take 60 mg by mouth daily. Take one capsule by mouth daily along with 30 mg capsule daily for a total daily dose 90 mg.    
 meclizine (ANTIVERT) 25 mg tablet Take 25 mg by mouth three (3) times daily as needed.  calcium citrate 200 mg (950 mg) tablet Take 200 mg by mouth daily.  calcium carbonate (TUMS PO) Take 2 Tabs by mouth daily.  traZODone (DESYREL) 50 mg tablet Take 1 Tab by mouth nightly. 30 Tab 3  
 HYDROcodone-acetaminophen (NORCO) 7.5-325 mg per tablet Take one tablet by mouth every four hours as needed for pain. Max five tablets daily 150 Tab 0  
 testosterone cypionate (DEPOTESTOTERONE CYPIONATE) 200 mg/mL injection 0.5 mL by IntraMUSCular route every thirty (30) days.  Max Daily Amount: 100 mg. 1 Vial 0  
 DULoxetine (CYMBALTA) 30 mg capsule take 1 capsule by mouth once daily WITH 60 MG CAPSULE 30 Cap 0  
  tamsulosin (FLOMAX) 0.4 mg capsule take 1 capsule by mouth once daily 90 Cap 0  
 amLODIPine (NORVASC) 5 mg tablet take 1 tablet by mouth once daily  0  
 dutasteride (AVODART) 0.5 mg capsule Take 1 Cap by mouth daily. 90 Cap 3  
 SUMAtriptan (IMITREX) 100 mg tablet TAKE 1 TABLET BY MOUTH AT ONSET OF HEADACHE, MAY REPEAT 2 HOURS LATER. (MAX 2 PILLS IN 24 HOURS) 12 Tab 1  promethazine (PHENERGAN) 25 mg tablet take 1 tablet by mouth every 6 hours if needed for nausea 30 Tab 0  
 topiramate (TOPAMAX) 50 mg tablet take 1 tablet by mouth twice a day take PREVENT MIGRANE 60 Tab 5  
 fluticasone (FLONASE) 50 mcg/actuation nasal spray 1-2 Sprays by Both Nostrils route daily. 1  
 pantoprazole (PROTONIX) 40 mg tablet take 1 tablet by mouth once daily  0  
 guanFACINE IR (TENEX) 1 mg IR tablet take 1 tablet by mouth once daily 30 Tab 2  
 labetalol (NORMODYNE) 300 mg tablet take 1 tablet by mouth twice a day 60 Tab 2  
 ELIQUIS 5 mg tablet take 1 tablet by mouth twice a day 60 Tab 1  cyanocobalamin (VITAMIN B-12) 1,000 mcg tablet Take 1,000 mcg by mouth daily.  cholecalciferol, vitamin D3, (VITAMIN D3) 2,000 unit tab Take 1 Tab by mouth daily.  multivitamin (ONE A DAY) tablet Take 1 Tab by mouth daily.  vitamin E (AQUA GEMS) 400 unit capsule Take 400 Units by mouth daily.  rOPINIRole (REQUIP) 0.5 mg tablet take 1 tablet by mouth twice a day 60 Tab 0 Past Medical History:  
Diagnosis Date  Abdominal pain, unspecified site  Acute reaction to stress  Allergic rhinitis due to pollen  Arrhythmia   
 afib  Arthritis  Back injury  BPH (benign prostatic hypertrophy)  Calculus of ureter  Cancer [facility])   
 history of Leukemia, in remission  Carotid duplex 06/17/2015 Mild < 50% bilateral ICA stenosis.  Dizziness and giddiness  Esophageal reflux  Essential hypertension  GERD (gastroesophageal reflux disease)  Headache(784.0)  History of echocardiogram 2015 EF 55-60%. No WMA. Mild-mod LVH. Gr 1 DDfx. No evidence of intracardiac shunt. Mild AI.    
 Hx: UTI (urinary tract infection)  Hypertension  Hypogonadism in male  Kidney stones  Migraine  Neck injury  Polycythemia  Polycythemia, secondary  Sciatica  Unspecified retinal detachment  Unspecified sleep apnea   
 resolved since gastric bypass  Vision decreased  Weight loss Past Surgical History:  
Procedure Laterality Date  [address] UN[address]ED   Hernia  HX CATARACT REMOVAL Left  HX CERVICAL FUSION  2016 Cervical Spine Fusion  HX CHOLECYSTECTOMY  HX GASTRIC BYPASS  2006  HX KNEE ARTHROSCOPY    
 both knees (right knee twice, left once) [facility]  
 lumbar laminectomy  HX OTHER SURGICAL Two back surgeries  HX RETINAL DETACHMENT REPAIR Left Social History Socioeconomic History  Marital status:  Spouse name: Not on file  Number of children: Not on file  Years of education: Not on file  Highest education level: Not on file Social Needs  Financial resource strain: Not on file  Food insecurity - worry: Not on file  Food insecurity - inability: Not on file  Transportation needs - medical: Not on file  Transportation needs - non-medical: Not on file Occupational History  Not on file Tobacco Use  Smoking status: Former Smoker Last attempt to quit: 1981 Years since quittin.7  Smokeless tobacco: Never Used Substance and Sexual Activity  Alcohol use: Yes Comment: occasionally  Drug use: No  
 Sexual activity: Yes  
  Partners: Female Other Topics Concern  Not on file Social History Narrative Patient lives with his wife in [place], he had 3 children by unfortunately lost 2 of them. He enjoys taking care of his yard and plays with the grandchildren. Patient does have an advanced directive on file Visit Vitals /80 (BP 1 Location: Left arm, BP Patient Position: Sitting) Pulse 64 Temp 97.3 °F (36.3 °C) Ht 6' 6\" (1.981 m) Wt 234 lb (106.1 kg) SpO2 97% BMI 27.04 kg/m² Physical Exam 
 
BMI:  [facility] Outpatient Visit on 10/29/2018 Component Date Value Ref Range Status  WBC 10/29/2018 10.3  4.6 - 13.2 K/uL Final  
 RBC 10/29/2018 4.38* 4.70 - 5.50 M/uL Final  
 HGB 10/29/2018 11.3* 13.0 - 16.0 g/dL Final  
 HCT 10/29/2018 34.8* 36.0 - 48.0 % Final  
 MCV 10/29/2018 79.5  74.0 - 97.0 FL Final  
 MCH 10/29/2018 25.8  24.0 - 34.0 PG Final  
 MCHC 10/29/2018 32.5  31.0 - 37.0 g/dL Final  
 RDW 10/29/2018 17.0* 11.6 - 14.5 % Final  
 PLATELET 81/92/0855 493  135 - 420 K/uL Final  
 MPV 10/29/2018 12.1* 9.2 - 11.8 FL Final  
 NEUTROPHILS 10/29/2018 40* 42 - 75 % Final  
 BAND NEUTROPHILS 10/29/2018 2  0 - 5 % Final  
 LYMPHOCYTES 10/29/2018 54* 20 - 51 % Final  
 MONOCYTES 10/29/2018 3  2 - 9 % Final  
 EOSINOPHILS 10/29/2018 1  0 - 5 % Final  
 BASOPHILS 10/29/2018 0  0 - 3 % Final  
 ABS. NEUTROPHILS 10/29/2018 4.3  1.8 - 8.0 K/UL Final  
 ABS. LYMPHOCYTES 10/29/2018 5.6* 0.8 - 3.5 K/UL Final  
 ABS. MONOCYTES 10/29/2018 0.3  0 - 1.0 K/UL Final  
 ABS. EOSINOPHILS 10/29/2018 0.1  0.0 - 0.4 K/UL Final  
 ABS. BASOPHILS 10/29/2018 0.0  0.0 - 0.06 K/UL Final  
 DF 10/29/2018 MANUAL    Final  
 PLATELET COMMENTS 43/26/5508 ADEQUATE PLATELETS    Final  
 RBC COMMENTS 10/29/2018     Final  
                 Value:ANISOCYTOSIS 
1+  RBC COMMENTS 10/29/2018     Final  
                 Value:OVALOCYTES 2+  RBC COMMENTS 10/29/2018     Final  
                 Value:STOMATOCYTES 1+  Sodium 10/29/2018 143  136 - 145 mmol/L Final  
 Potassium 10/29/2018 4.4  3.5 - 5.5 mmol/L Final  
 Chloride 10/29/2018 113* 100 - 108 mmol/L Final  
 CO2 10/29/2018 23  21 - 32 mmol/L Final  
  Anion gap 10/29/2018 7  3.0 - 18 mmol/L Final  
 Glucose 10/29/2018 101* 74 - 99 mg/dL Final  
 BUN 10/29/2018 13  7.0 - 18 MG/DL Final  
 Creatinine 10/29/2018 1.05  0.6 - 1.3 MG/DL Final  
 BUN/Creatinine ratio 10/29/2018 12  12 - 20   Final  
 GFR est AA 10/29/2018 >60  >60 ml/min/1.73m2 Final  
 GFR est non-AA 10/29/2018 >60  >60 ml/min/1.73m2 Final  
 Comment: (NOTE) Estimated GFR is calculated using the Modification of Diet in Renal  
Disease (MDRD) Study equation, reported for both  [facility]) and non- Americans (GFRNA), and normalized to 1.73m2  
body surface area. The physician must decide which value applies to  
the patient. The MDRD study equation should only be used in  
individuals age 25 or older. It has not been validated for the  
following: pregnant women, patients with serious comorbid conditions,  
or on certain medications, or persons with extremes of body size,  
muscle mass, or nutritional status.  Calcium 10/29/2018 8.3* 8.5 - 10.1 MG/DL Final  
 Phosphorus 10/29/2018 3.4  2.5 - 4.9 MG/DL Final  
 Albumin 10/29/2018 3.6  3.4 - 5.0 g/dL Final  
 Color 10/29/2018 YELLOW    Final  
 Appearance 10/29/2018 CLEAR    Final  
 Specific gravity 10/29/2018 1.014  1.005 - 1.030   Final  
 pH (UA) 10/29/2018 6.5  5.0 - 8.0   Final  
 Protein 10/29/2018 NEGATIVE   NEG mg/dL Final  
 Glucose 10/29/2018 NEGATIVE   NEG mg/dL Final  
 Ketone 10/29/2018 NEGATIVE   NEG mg/dL Final  
 Bilirubin 10/29/2018 NEGATIVE   NEG   Final  
 Blood 10/29/2018 TRACE* NEG   Final  
 Urobilinogen 10/29/2018 1.0  0.2 - 1.0 EU/dL Final  
 Nitrites 10/29/2018 NEGATIVE   NEG   Final  
 Leukocyte Esterase 10/29/2018 TRACE* NEG   Final  
 Uric acid 10/29/2018 6.7  2.6 - 7.2 MG/DL Final  
 Comment: The drugs N-acetylcysteine (NAC) and 
Metamiszole have been found to cause falsely 
low results in this chemical assay. Please 
be sure to submit blood samples obtained BEFORE administration of either of these 
drugs to assure correct results.  Microalbumin,urine random 10/29/2018 1.49  0 - 3.0 MG/DL Final  
 Creatinine, urine 10/29/2018 68.00  30 - 125 mg/dL Final  
 Microalbumin/Creat ratio (mg/g cre* 10/29/2018 22  0 - 30 mg/g Final  
 Calcium 10/29/2018 8.6  8.5 - 10.1 MG/DL Final  
 PTH, Intact 10/29/2018 78.6  18.4 - 88.0 pg/mL Final  
 Protein, urine random 10/29/2018 12* <11.9 mg/dL Final  
 Creatinine, urine 10/29/2018 70.20  30 - 125 mg/dL Final  
 Vitamin D 25-Hydroxy 10/29/2018 31.9  30 - 100 ng/mL Final  
 Comment: (NOTE) Deficiency               <20 ng/mL Insufficiency          20-30 ng/mL Sufficient             ng/mL Possible toxicity       >100 ng/mL The Method used is [facility] currently standardized to a Center of Disease Control and Prevention (CDC) certified reference  
method. Samples containing fluorescein dye can produce falsely  
elevated values when tested with the [employer] Centaur Vitamin D Assay. It is recommended that results in the toxic range, >100 ng/mL, be  
retested 72 hours post fluorescein exposure.  WBC 10/29/2018 0 to 3  0 - 4 /hpf Final  
 RBC 10/29/2018 0 to 3  0 - 5 /hpf Final  
 Epithelial cells 10/29/2018 NEGATIVE   0 - 5 /lpf Final  
 Bacteria 10/29/2018 NEGATIVE   NEG /hpf Final  
Office Visit on 10/25/2018 Component Date Value Ref Range Status  Color (UA POC) 10/25/2018 Yellow   Final  
 Clarity (UA POC) 10/25/2018 Clear   Final  
 Glucose (UA POC) 10/25/2018 Negative  Negative Final  
 Bilirubin (UA POC) 10/25/2018 Negative  Negative Final  
 Ketones (UA POC) 10/25/2018 Negative  Negative Final  
 Specific gravity (UA POC) 10/25/2018 1.025  1.001 - 1.035 Final  
 Blood (UA POC) 10/25/2018 1+  Negative Final  
 pH (UA POC) 10/25/2018 5.5  4.6 - 8.0 Final  
 Protein (UA POC) 10/25/2018 Negative  Negative Final  
 Urobilinogen (UA POC) 10/25/2018 1 mg/dL  0.2 - 1 Final  
  Nitrites (UA POC) 10/25/2018 Negative  Negative Final  
 Leukocyte esterase (UA POC) 10/25/2018 Trace  Negative Final  
Hospital Outpatient Visit on 08/28/2018 Component Date Value Ref Range Status  Ammonia 08/28/2018 31  11 - 32 UMOL/L Final  
 WBC 08/28/2018 11.5  4.6 - 13.2 K/uL Final  
 RBC 08/28/2018 3.97* 4.70 - 5.50 M/uL Final  
 HGB 08/28/2018 10.7* 13.0 - 16.0 g/dL Final  
 HCT 08/28/2018 32.6* 36.0 - 48.0 % Final  
 MCV 08/28/2018 82.1  74.0 - 97.0 FL Final  
 MCH 08/28/2018 27.0  24.0 - 34.0 PG Final  
 MCHC 08/28/2018 32.8  31.0 - 37.0 g/dL Final  
 RDW 08/28/2018 18.3* 11.6 - 14.5 % Final  
 PLATELET 21/47/5335 699  135 - 420 K/uL Final  
 MPV 08/28/2018 11.4  9.2 - 11.8 FL Final  
 NEUTROPHILS 08/28/2018 52  42 - 75 % Final  
 LYMPHOCYTES 08/28/2018 44  20 - 51 % Final  
 MONOCYTES 08/28/2018 3  2 - 9 % Final  
 EOSINOPHILS 08/28/2018 1  0 - 5 % Final  
 BASOPHILS 08/28/2018 0  0 - 3 % Final  
 ABS. NEUTROPHILS 08/28/2018 6.0  1.8 - 8.0 K/UL Final  
 ABS. LYMPHOCYTES 08/28/2018 5.1* 0.8 - 3.5 K/UL Final  
 ABS. MONOCYTES 08/28/2018 0.3  0 - 1.0 K/UL Final  
 ABS. EOSINOPHILS 08/28/2018 0.1  0.0 - 0.4 K/UL Final  
 ABS. BASOPHILS 08/28/2018 0.0  0.0 - 0.1 K/UL Final  
 PLATELET COMMENTS 97/64/3589 CLUMPED PLATELETS    Final  
 Comment: 1+ LARGE PLATELETS 1+ ADEQUATE PLATELETS  RBC COMMENTS 08/28/2018     Final  
                 Value:ANISOCYTOSIS 
1+  RBC COMMENTS 08/28/2018     Final  
                 Value:POIKILOCYTOSIS 
1+  RBC COMMENTS 08/28/2018     Final  
                 Value:STOMATOCYTES 1+  WBC COMMENTS 08/28/2018 REACTIVE LYMPHS    Final  
 1+  DF 08/28/2018 MANUAL    Final  
 Sodium 08/28/2018 143  136 - 145 mmol/L Final  
 Potassium 08/28/2018 4.5  3.5 - 5.5 mmol/L Final  
 Chloride 08/28/2018 111* 100 - 108 mmol/L Final  
 CO2 08/28/2018 23  21 - 32 mmol/L Final  
 Anion gap 08/28/2018 9  3.0 - 18 mmol/L Final  
  Glucose 08/28/2018 92  74 - 99 mg/dL Final  
 BUN 08/28/2018 21* 7.0 - 18 MG/DL Final  
 Creatinine 08/28/2018 1.25  0.6 - 1.3 MG/DL Final  
 BUN/Creatinine ratio 08/28/2018 17  12 - 20   Final  
 GFR est AA 08/28/2018 >60  >60 ml/min/1.73m2 Final  
 GFR est non-AA 08/28/2018 57* >60 ml/min/1.73m2 Final  
 Comment: (NOTE) Estimated GFR is calculated using the Modification of Diet in Renal  
Disease (MDRD) Study equation, reported for both  [facility]) and non- Americans (GFRNA), and normalized to 1.73m2  
body surface area. The physician must decide which value applies to  
the patient. The MDRD study equation should only be used in  
individuals age 25 or older. It has not been validated for the  
following: pregnant women, patients with serious comorbid conditions,  
or on certain medications, or persons with extremes of body size,  
muscle mass, or nutritional status.  Calcium 08/28/2018 7.9* 8.5 - 10.1 MG/DL Final  
 Bilirubin, total 08/28/2018 0.5  0.2 - 1.0 MG/DL Final  
 ALT (SGPT) 08/28/2018 16  16 - 61 U/L Final  
 AST (SGOT) 08/28/2018 13* 15 - 37 U/L Final  
 Alk. phosphatase 08/28/2018 70  45 - 117 U/L Final  
 Protein, total 08/28/2018 6.9  6.4 - 8.2 g/dL Final  
 Albumin 08/28/2018 3.4  3.4 - 5.0 g/dL Final  
 Globulin 08/28/2018 3.5  2.0 - 4.0 g/dL Final  
 A-G Ratio 08/28/2018 1.0  0.8 - 1.7   Final  
Admission on 08/19/2018, Discharged on 08/23/2018 No results displayed because visit has over 200 results. .No results found for any visits on 11/12/18. Assessment / Plan ICD-10-CM ICD-9-CM 1. Essential hypertension I10 401.9 2. Chronic myeloproliferative disease ([facility] [address] D47.1 238.79   
3. Hx of renal failure Z87.448 V13.09   
4. Primary insomnia F51.01 307.42 Add Trazodone 
he was advised to continue his maintenance medications - remain off ACE Inhibitors and ARBs Follow-up Disposition: Return in about 2 months (around 1/12/2019). I asked Tesfaye Guajardo if he has any questions and I answered the questions. Tesfaye Guajardo states that he understands the treatment plan and agrees with the treatment plan No

## 2019-01-01 ENCOUNTER — OUTPATIENT (OUTPATIENT)
Dept: OUTPATIENT SERVICES | Facility: HOSPITAL | Age: 49
LOS: 1 days | End: 2019-01-01
Payer: MEDICAID

## 2019-01-01 PROCEDURE — G9001: CPT

## 2019-01-14 ENCOUNTER — INPATIENT (INPATIENT)
Facility: HOSPITAL | Age: 49
LOS: 15 days | Discharge: HOME | DRG: 897 | End: 2019-01-30
Attending: PSYCHIATRY & NEUROLOGY | Admitting: PSYCHIATRY & NEUROLOGY
Payer: MEDICAID

## 2019-01-14 DIAGNOSIS — F32.2 MAJOR DEPRESSIVE DISORDER, SINGLE EPISODE, SEVERE WITHOUT PSYCHOTIC FEATURES: ICD-10-CM

## 2019-01-14 DIAGNOSIS — F33.2 MAJOR DEPRESSIVE DISORDER, RECURRENT SEVERE WITHOUT PSYCHOTIC FEATURES: ICD-10-CM

## 2019-01-14 PROCEDURE — 99222 1ST HOSP IP/OBS MODERATE 55: CPT

## 2019-01-14 PROCEDURE — 12345: CPT | Mod: NC

## 2019-01-14 RX ORDER — ACETAMINOPHEN 500 MG
650 TABLET ORAL EVERY 6 HOURS
Qty: 0 | Refills: 0 | Status: DISCONTINUED | OUTPATIENT
Start: 2019-01-14 | End: 2019-01-30

## 2019-01-14 RX ORDER — METHADONE HYDROCHLORIDE 40 MG/1
60 TABLET ORAL
Qty: 0 | Refills: 0 | Status: DISCONTINUED | OUTPATIENT
Start: 2019-01-15 | End: 2019-01-22

## 2019-01-14 RX ORDER — HALOPERIDOL DECANOATE 100 MG/ML
2 INJECTION INTRAMUSCULAR EVERY 6 HOURS
Qty: 0 | Refills: 0 | Status: DISCONTINUED | OUTPATIENT
Start: 2019-01-14 | End: 2019-01-30

## 2019-01-14 RX ORDER — CLONAZEPAM 1 MG
0.5 TABLET ORAL ONCE
Qty: 0 | Refills: 0 | Status: DISCONTINUED | OUTPATIENT
Start: 2019-01-14 | End: 2019-01-14

## 2019-01-14 RX ORDER — CLONAZEPAM 1 MG
0.5 TABLET ORAL
Qty: 0 | Refills: 0 | Status: DISCONTINUED | OUTPATIENT
Start: 2019-01-15 | End: 2019-01-15

## 2019-01-14 RX ORDER — MAGNESIUM HYDROXIDE 400 MG/1
30 TABLET, CHEWABLE ORAL DAILY
Qty: 0 | Refills: 0 | Status: DISCONTINUED | OUTPATIENT
Start: 2019-01-14 | End: 2019-01-30

## 2019-01-14 RX ORDER — DIPHENHYDRAMINE HCL 50 MG
25 CAPSULE ORAL ONCE
Qty: 0 | Refills: 0 | Status: COMPLETED | OUTPATIENT
Start: 2019-01-14 | End: 2019-01-14

## 2019-01-14 RX ORDER — BUPROPION HYDROCHLORIDE 150 MG/1
75 TABLET, EXTENDED RELEASE ORAL DAILY
Qty: 0 | Refills: 0 | Status: DISCONTINUED | OUTPATIENT
Start: 2019-01-15 | End: 2019-01-20

## 2019-01-14 RX ADMIN — Medication 25 MILLIGRAM(S): at 22:47

## 2019-01-14 RX ADMIN — Medication 0.5 MILLIGRAM(S): at 16:16
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
## 2019-01-14 NOTE — BEHAVIORAL HEALTH ASSESSMENT NOTE - NSBHADMITCOUNSEL_PSY_A_CORE
risks and benefits of treatment options/instructions for management, treatment and follow up/diagnostic results/impressions and/or recommended studies/importance of adherence to chosen treatment

## 2019-01-15 LAB
ALBUMIN SERPL ELPH-MCNC: 3.3 G/DL — SIGNIFICANT CHANGE UP (ref 3.3–5)
ALP SERPL-CCNC: 38 U/L — SIGNIFICANT CHANGE UP (ref 30–120)
ALT FLD-CCNC: 15 U/L DA — SIGNIFICANT CHANGE UP (ref 10–60)
ANION GAP SERPL CALC-SCNC: 7 MMOL/L — SIGNIFICANT CHANGE UP (ref 5–17)
APPEARANCE UR: CLEAR — SIGNIFICANT CHANGE UP
AST SERPL-CCNC: 16 U/L — SIGNIFICANT CHANGE UP (ref 10–40)
BILIRUB SERPL-MCNC: 0.5 MG/DL — SIGNIFICANT CHANGE UP (ref 0.2–1.2)
BILIRUB UR-MCNC: NEGATIVE — SIGNIFICANT CHANGE UP
BUN SERPL-MCNC: 10 MG/DL — SIGNIFICANT CHANGE UP (ref 7–23)
CALCIUM SERPL-MCNC: 8.7 MG/DL — SIGNIFICANT CHANGE UP (ref 8.4–10.5)
CHLORIDE SERPL-SCNC: 105 MMOL/L — SIGNIFICANT CHANGE UP (ref 96–108)
CHOLEST SERPL-MCNC: 181 MG/DL — SIGNIFICANT CHANGE UP (ref 10–199)
CO2 SERPL-SCNC: 26 MMOL/L — SIGNIFICANT CHANGE UP (ref 22–31)
COLOR SPEC: YELLOW — SIGNIFICANT CHANGE UP
CREAT SERPL-MCNC: 0.72 MG/DL — SIGNIFICANT CHANGE UP (ref 0.5–1.3)
DIFF PNL FLD: NEGATIVE — SIGNIFICANT CHANGE UP
GLUCOSE SERPL-MCNC: 93 MG/DL — SIGNIFICANT CHANGE UP (ref 70–99)
GLUCOSE UR QL: NEGATIVE MG/DL — SIGNIFICANT CHANGE UP
HAV IGM SER-ACNC: SIGNIFICANT CHANGE UP
HBA1C BLD-MCNC: 5.2 % — SIGNIFICANT CHANGE UP (ref 4–5.6)
HBV CORE IGM SER-ACNC: SIGNIFICANT CHANGE UP
HBV SURFACE AG SER-ACNC: SIGNIFICANT CHANGE UP
HCG UR QL: NEGATIVE — SIGNIFICANT CHANGE UP
HCT VFR BLD CALC: 39.3 % — SIGNIFICANT CHANGE UP (ref 34.5–45)
HCV AB S/CO SERPL IA: 5.37 S/CO — SIGNIFICANT CHANGE UP
HCV AB SERPL-IMP: REACTIVE
HDLC SERPL-MCNC: 88 MG/DL — SIGNIFICANT CHANGE UP
HGB BLD-MCNC: 12.7 G/DL — SIGNIFICANT CHANGE UP (ref 11.5–15.5)
KETONES UR-MCNC: NEGATIVE — SIGNIFICANT CHANGE UP
LEUKOCYTE ESTERASE UR-ACNC: NEGATIVE — SIGNIFICANT CHANGE UP
LIPID PNL WITH DIRECT LDL SERPL: 76 MG/DL — SIGNIFICANT CHANGE UP
MCHC RBC-ENTMCNC: 29.8 PG — SIGNIFICANT CHANGE UP (ref 27–34)
MCHC RBC-ENTMCNC: 32.3 GM/DL — SIGNIFICANT CHANGE UP (ref 32–36)
MCV RBC AUTO: 92.3 FL — SIGNIFICANT CHANGE UP (ref 80–100)
NITRITE UR-MCNC: NEGATIVE — SIGNIFICANT CHANGE UP
NRBC # BLD: 0 /100 WBCS — SIGNIFICANT CHANGE UP (ref 0–0)
PH UR: 7 — SIGNIFICANT CHANGE UP (ref 5–8)
PLATELET # BLD AUTO: 147 K/UL — LOW (ref 150–400)
POTASSIUM SERPL-MCNC: 4.2 MMOL/L — SIGNIFICANT CHANGE UP (ref 3.5–5.3)
POTASSIUM SERPL-SCNC: 4.2 MMOL/L — SIGNIFICANT CHANGE UP (ref 3.5–5.3)
PROT SERPL-MCNC: 6.5 G/DL — SIGNIFICANT CHANGE UP (ref 6–8.3)
PROT UR-MCNC: NEGATIVE MG/DL — SIGNIFICANT CHANGE UP
RBC # BLD: 4.26 M/UL — SIGNIFICANT CHANGE UP (ref 3.8–5.2)
RBC # FLD: 13.6 % — SIGNIFICANT CHANGE UP (ref 10.3–14.5)
SODIUM SERPL-SCNC: 138 MMOL/L — SIGNIFICANT CHANGE UP (ref 135–145)
SP GR SPEC: 1 — LOW (ref 1.01–1.02)
T PALLIDUM AB TITR SER: NEGATIVE — SIGNIFICANT CHANGE UP
TOTAL CHOLESTEROL/HDL RATIO MEASUREMENT: 2.1 RATIO — LOW (ref 3.3–7.1)
TRIGL SERPL-MCNC: 83 MG/DL — SIGNIFICANT CHANGE UP (ref 10–149)
TSH SERPL-MCNC: 3 UIU/ML — SIGNIFICANT CHANGE UP (ref 0.27–4.2)
UROBILINOGEN FLD QL: NEGATIVE MG/DL — SIGNIFICANT CHANGE UP
WBC # BLD: 4.61 K/UL — SIGNIFICANT CHANGE UP (ref 3.8–10.5)
WBC # FLD AUTO: 4.61 K/UL — SIGNIFICANT CHANGE UP (ref 3.8–10.5)

## 2019-01-15 PROCEDURE — 12345: CPT | Mod: NC

## 2019-01-15 PROCEDURE — 99231 SBSQ HOSP IP/OBS SF/LOW 25: CPT

## 2019-01-15 RX ORDER — DIPHENHYDRAMINE HCL 50 MG
25 CAPSULE ORAL AT BEDTIME
Qty: 0 | Refills: 0 | Status: DISCONTINUED | OUTPATIENT
Start: 2019-01-15 | End: 2019-01-19

## 2019-01-15 RX ORDER — NICOTINE POLACRILEX 2 MG
1 GUM BUCCAL
Qty: 0 | Refills: 0 | Status: DISCONTINUED | OUTPATIENT
Start: 2019-01-15 | End: 2019-01-30

## 2019-01-15 RX ORDER — ALBUTEROL 90 UG/1
2 AEROSOL, METERED ORAL EVERY 6 HOURS
Qty: 0 | Refills: 0 | Status: DISCONTINUED | OUTPATIENT
Start: 2019-01-15 | End: 2019-01-30

## 2019-01-15 RX ORDER — HALOPERIDOL DECANOATE 100 MG/ML
5 INJECTION INTRAMUSCULAR EVERY 6 HOURS
Qty: 0 | Refills: 0 | Status: DISCONTINUED | OUTPATIENT
Start: 2019-01-15 | End: 2019-01-30

## 2019-01-15 RX ORDER — CLONAZEPAM 1 MG
0.5 TABLET ORAL THREE TIMES A DAY
Qty: 0 | Refills: 0 | Status: DISCONTINUED | OUTPATIENT
Start: 2019-01-15 | End: 2019-01-22

## 2019-01-15 RX ORDER — DIPHENHYDRAMINE HCL 50 MG
25 CAPSULE ORAL EVERY 6 HOURS
Qty: 0 | Refills: 0 | Status: DISCONTINUED | OUTPATIENT
Start: 2019-01-15 | End: 2019-01-30

## 2019-01-15 RX ADMIN — BUPROPION HYDROCHLORIDE 75 MILLIGRAM(S): 150 TABLET, EXTENDED RELEASE ORAL at 08:47

## 2019-01-15 RX ADMIN — Medication 0.5 MILLIGRAM(S): at 08:46

## 2019-01-15 RX ADMIN — Medication 0.5 MILLIGRAM(S): at 20:57

## 2019-01-15 RX ADMIN — Medication 0.5 MILLIGRAM(S): at 12:02

## 2019-01-15 RX ADMIN — METHADONE HYDROCHLORIDE 60 MILLIGRAM(S): 40 TABLET ORAL at 06:43

## 2019-01-15 RX ADMIN — HALOPERIDOL DECANOATE 2 MILLIGRAM(S): 100 INJECTION INTRAMUSCULAR at 16:42

## 2019-01-15 RX ADMIN — Medication 25 MILLIGRAM(S): at 20:57

## 2019-01-15 NOTE — CHART NOTE - NSCHARTNOTEFT_GEN_A_CORE
Notified about +Hep C reactive lab test.  Other Hep C test still pending.  LFTs wnl.  No acute treatment needed at this moment.  Defer treatment plan to primary team.

## 2019-01-16 LAB
HCV RNA FLD QL NAA+PROBE: SIGNIFICANT CHANGE UP
HCV RNA SPEC QL PROBE+SIG AMP: SIGNIFICANT CHANGE UP
HIV 1+2 AB+HIV1 P24 AG SERPL QL IA: SIGNIFICANT CHANGE UP

## 2019-01-16 PROCEDURE — 99231 SBSQ HOSP IP/OBS SF/LOW 25: CPT

## 2019-01-16 RX ADMIN — Medication 0.5 MILLIGRAM(S): at 13:07

## 2019-01-16 RX ADMIN — BUPROPION HYDROCHLORIDE 75 MILLIGRAM(S): 150 TABLET, EXTENDED RELEASE ORAL at 08:29

## 2019-01-16 RX ADMIN — METHADONE HYDROCHLORIDE 60 MILLIGRAM(S): 40 TABLET ORAL at 06:15

## 2019-01-16 RX ADMIN — Medication 25 MILLIGRAM(S): at 20:23

## 2019-01-16 RX ADMIN — HALOPERIDOL DECANOATE 2 MILLIGRAM(S): 100 INJECTION INTRAMUSCULAR at 21:27

## 2019-01-16 RX ADMIN — Medication 0.5 MILLIGRAM(S): at 20:23

## 2019-01-16 RX ADMIN — Medication 0.5 MILLIGRAM(S): at 08:29

## 2019-01-17 PROCEDURE — 99231 SBSQ HOSP IP/OBS SF/LOW 25: CPT

## 2019-01-17 RX ADMIN — Medication 0.5 MILLIGRAM(S): at 12:22

## 2019-01-17 RX ADMIN — Medication 25 MILLIGRAM(S): at 20:34

## 2019-01-17 RX ADMIN — Medication 0.5 MILLIGRAM(S): at 20:34

## 2019-01-17 RX ADMIN — Medication 0.5 MILLIGRAM(S): at 08:20

## 2019-01-17 RX ADMIN — HALOPERIDOL DECANOATE 2 MILLIGRAM(S): 100 INJECTION INTRAMUSCULAR at 16:40

## 2019-01-17 RX ADMIN — BUPROPION HYDROCHLORIDE 75 MILLIGRAM(S): 150 TABLET, EXTENDED RELEASE ORAL at 08:20

## 2019-01-17 RX ADMIN — METHADONE HYDROCHLORIDE 60 MILLIGRAM(S): 40 TABLET ORAL at 06:11

## 2019-01-18 PROCEDURE — 99231 SBSQ HOSP IP/OBS SF/LOW 25: CPT

## 2019-01-18 RX ORDER — LAMOTRIGINE 25 MG/1
25 TABLET, ORALLY DISINTEGRATING ORAL DAILY
Qty: 0 | Refills: 0 | Status: DISCONTINUED | OUTPATIENT
Start: 2019-01-19 | End: 2019-01-23

## 2019-01-18 RX ORDER — LAMOTRIGINE 25 MG/1
25 TABLET, ORALLY DISINTEGRATING ORAL ONCE
Qty: 0 | Refills: 0 | Status: COMPLETED | OUTPATIENT
Start: 2019-01-18 | End: 2019-01-18

## 2019-01-18 RX ADMIN — METHADONE HYDROCHLORIDE 60 MILLIGRAM(S): 40 TABLET ORAL at 06:22

## 2019-01-18 RX ADMIN — ALBUTEROL 2 PUFF(S): 90 AEROSOL, METERED ORAL at 21:54

## 2019-01-18 RX ADMIN — Medication 25 MILLIGRAM(S): at 20:28

## 2019-01-18 RX ADMIN — BUPROPION HYDROCHLORIDE 75 MILLIGRAM(S): 150 TABLET, EXTENDED RELEASE ORAL at 08:27

## 2019-01-18 RX ADMIN — Medication 0.5 MILLIGRAM(S): at 20:28

## 2019-01-18 RX ADMIN — Medication 0.5 MILLIGRAM(S): at 08:27

## 2019-01-18 RX ADMIN — LAMOTRIGINE 25 MILLIGRAM(S): 25 TABLET, ORALLY DISINTEGRATING ORAL at 17:59

## 2019-01-18 RX ADMIN — Medication 0.5 MILLIGRAM(S): at 12:21

## 2019-01-19 DIAGNOSIS — F19.94 OTHER PSYCHOACTIVE SUBSTANCE USE, UNSPECIFIED WITH PSYCHOACTIVE SUBSTANCE-INDUCED MOOD DISORDER: ICD-10-CM

## 2019-01-19 DIAGNOSIS — F11.20 OPIOID DEPENDENCE, UNCOMPLICATED: ICD-10-CM

## 2019-01-19 DIAGNOSIS — F15.10 OTHER STIMULANT ABUSE, UNCOMPLICATED: ICD-10-CM

## 2019-01-19 DIAGNOSIS — F14.10 COCAINE ABUSE, UNCOMPLICATED: ICD-10-CM

## 2019-01-19 PROCEDURE — 99233 SBSQ HOSP IP/OBS HIGH 50: CPT

## 2019-01-19 RX ORDER — DIPHENHYDRAMINE HCL 50 MG
50 CAPSULE ORAL AT BEDTIME
Qty: 0 | Refills: 0 | Status: DISCONTINUED | OUTPATIENT
Start: 2019-01-19 | End: 2019-01-30

## 2019-01-19 RX ORDER — HYDROXYZINE HCL 10 MG
50 TABLET ORAL EVERY 6 HOURS
Qty: 0 | Refills: 0 | Status: DISCONTINUED | OUTPATIENT
Start: 2019-01-19 | End: 2019-01-30

## 2019-01-19 RX ADMIN — Medication 50 MILLIGRAM(S): at 13:08

## 2019-01-19 RX ADMIN — Medication 0.5 MILLIGRAM(S): at 12:04

## 2019-01-19 RX ADMIN — Medication 0.5 MILLIGRAM(S): at 08:44

## 2019-01-19 RX ADMIN — Medication 0.5 MILLIGRAM(S): at 19:47

## 2019-01-19 RX ADMIN — Medication 50 MILLIGRAM(S): at 19:10

## 2019-01-19 RX ADMIN — BUPROPION HYDROCHLORIDE 75 MILLIGRAM(S): 150 TABLET, EXTENDED RELEASE ORAL at 08:44

## 2019-01-19 RX ADMIN — Medication 50 MILLIGRAM(S): at 19:47

## 2019-01-19 RX ADMIN — LAMOTRIGINE 25 MILLIGRAM(S): 25 TABLET, ORALLY DISINTEGRATING ORAL at 08:44

## 2019-01-19 RX ADMIN — METHADONE HYDROCHLORIDE 60 MILLIGRAM(S): 40 TABLET ORAL at 06:16

## 2019-01-19 NOTE — PROGRESS NOTE BEHAVIORAL HEALTH - OTHER
"ok" slight lability but not tearful or elated at times repetitive maintains with effort limited somewhat "wired"

## 2019-01-20 PROCEDURE — 99233 SBSQ HOSP IP/OBS HIGH 50: CPT

## 2019-01-20 RX ORDER — BUPROPION HYDROCHLORIDE 150 MG/1
150 TABLET, EXTENDED RELEASE ORAL DAILY
Qty: 0 | Refills: 0 | Status: DISCONTINUED | OUTPATIENT
Start: 2019-01-20 | End: 2019-01-25

## 2019-01-20 RX ADMIN — Medication 0.5 MILLIGRAM(S): at 08:42

## 2019-01-20 RX ADMIN — BUPROPION HYDROCHLORIDE 75 MILLIGRAM(S): 150 TABLET, EXTENDED RELEASE ORAL at 08:42

## 2019-01-20 RX ADMIN — Medication 50 MILLIGRAM(S): at 20:04

## 2019-01-20 RX ADMIN — LAMOTRIGINE 25 MILLIGRAM(S): 25 TABLET, ORALLY DISINTEGRATING ORAL at 08:42

## 2019-01-20 RX ADMIN — METHADONE HYDROCHLORIDE 60 MILLIGRAM(S): 40 TABLET ORAL at 06:20

## 2019-01-20 RX ADMIN — Medication 0.5 MILLIGRAM(S): at 20:02

## 2019-01-20 RX ADMIN — Medication 50 MILLIGRAM(S): at 11:21

## 2019-01-20 RX ADMIN — Medication 0.5 MILLIGRAM(S): at 12:09

## 2019-01-20 RX ADMIN — Medication 50 MILLIGRAM(S): at 18:03

## 2019-01-20 NOTE — PROGRESS NOTE BEHAVIORAL HEALTH - OTHER
somewhat "wired" "ok" slight lability but not tearful or elated at times repetitive maintains with effort limited

## 2019-01-21 PROCEDURE — 99232 SBSQ HOSP IP/OBS MODERATE 35: CPT

## 2019-01-21 RX ORDER — CLONAZEPAM 1 MG
0.5 TABLET ORAL THREE TIMES A DAY
Qty: 0 | Refills: 0 | Status: DISCONTINUED | OUTPATIENT
Start: 2019-01-23 | End: 2019-01-27

## 2019-01-21 RX ORDER — METHADONE HYDROCHLORIDE 40 MG/1
60 TABLET ORAL
Qty: 0 | Refills: 0 | Status: DISCONTINUED | OUTPATIENT
Start: 2019-01-23 | End: 2019-01-29

## 2019-01-21 RX ADMIN — Medication 0.5 MILLIGRAM(S): at 12:12

## 2019-01-21 RX ADMIN — LAMOTRIGINE 25 MILLIGRAM(S): 25 TABLET, ORALLY DISINTEGRATING ORAL at 08:05

## 2019-01-21 RX ADMIN — Medication 650 MILLIGRAM(S): at 13:00

## 2019-01-21 RX ADMIN — Medication 50 MILLIGRAM(S): at 10:39

## 2019-01-21 RX ADMIN — Medication 0.5 MILLIGRAM(S): at 20:33

## 2019-01-21 RX ADMIN — BUPROPION HYDROCHLORIDE 150 MILLIGRAM(S): 150 TABLET, EXTENDED RELEASE ORAL at 08:05

## 2019-01-21 RX ADMIN — Medication 50 MILLIGRAM(S): at 17:54

## 2019-01-21 RX ADMIN — METHADONE HYDROCHLORIDE 60 MILLIGRAM(S): 40 TABLET ORAL at 06:00

## 2019-01-21 RX ADMIN — Medication 50 MILLIGRAM(S): at 20:33

## 2019-01-21 RX ADMIN — Medication 0.5 MILLIGRAM(S): at 08:05

## 2019-01-21 RX ADMIN — Medication 650 MILLIGRAM(S): at 12:12

## 2019-01-21 NOTE — PROGRESS NOTE BEHAVIORAL HEALTH - OTHER
somewhat "wired" "fine!" slight lability but not tearful or elated at times repetitive maintains with effort limited

## 2019-01-22 PROCEDURE — 99231 SBSQ HOSP IP/OBS SF/LOW 25: CPT

## 2019-01-22 RX ADMIN — Medication 0.5 MILLIGRAM(S): at 13:27

## 2019-01-22 RX ADMIN — Medication 50 MILLIGRAM(S): at 20:15

## 2019-01-22 RX ADMIN — Medication 0.5 MILLIGRAM(S): at 08:24

## 2019-01-22 RX ADMIN — Medication 0.5 MILLIGRAM(S): at 20:15

## 2019-01-22 RX ADMIN — LAMOTRIGINE 25 MILLIGRAM(S): 25 TABLET, ORALLY DISINTEGRATING ORAL at 08:24

## 2019-01-22 RX ADMIN — Medication 650 MILLIGRAM(S): at 06:58

## 2019-01-22 RX ADMIN — BUPROPION HYDROCHLORIDE 150 MILLIGRAM(S): 150 TABLET, EXTENDED RELEASE ORAL at 08:24

## 2019-01-22 RX ADMIN — Medication 650 MILLIGRAM(S): at 20:15

## 2019-01-22 RX ADMIN — Medication 50 MILLIGRAM(S): at 17:49

## 2019-01-22 RX ADMIN — METHADONE HYDROCHLORIDE 60 MILLIGRAM(S): 40 TABLET ORAL at 05:45

## 2019-01-23 DIAGNOSIS — Z71.89 OTHER SPECIFIED COUNSELING: ICD-10-CM

## 2019-01-23 PROCEDURE — 99231 SBSQ HOSP IP/OBS SF/LOW 25: CPT

## 2019-01-23 RX ORDER — LAMOTRIGINE 25 MG/1
50 TABLET, ORALLY DISINTEGRATING ORAL DAILY
Qty: 0 | Refills: 0 | Status: DISCONTINUED | OUTPATIENT
Start: 2019-01-24 | End: 2019-01-30

## 2019-01-23 RX ADMIN — LAMOTRIGINE 25 MILLIGRAM(S): 25 TABLET, ORALLY DISINTEGRATING ORAL at 08:25

## 2019-01-23 RX ADMIN — Medication 50 MILLIGRAM(S): at 10:39

## 2019-01-23 RX ADMIN — Medication 50 MILLIGRAM(S): at 20:06

## 2019-01-23 RX ADMIN — Medication 0.5 MILLIGRAM(S): at 08:25

## 2019-01-23 RX ADMIN — Medication 50 MILLIGRAM(S): at 04:06

## 2019-01-23 RX ADMIN — BUPROPION HYDROCHLORIDE 150 MILLIGRAM(S): 150 TABLET, EXTENDED RELEASE ORAL at 08:25

## 2019-01-23 RX ADMIN — Medication 0.5 MILLIGRAM(S): at 12:07

## 2019-01-23 RX ADMIN — Medication 50 MILLIGRAM(S): at 22:36

## 2019-01-23 RX ADMIN — METHADONE HYDROCHLORIDE 60 MILLIGRAM(S): 40 TABLET ORAL at 06:01

## 2019-01-23 RX ADMIN — Medication 0.5 MILLIGRAM(S): at 20:06

## 2019-01-23 NOTE — PROGRESS NOTE BEHAVIORAL HEALTH - OTHER
"fine!" slight lability but not tearful or elated at times repetitive maintains with effort somewhat "wired" limited

## 2019-01-24 PROCEDURE — 99231 SBSQ HOSP IP/OBS SF/LOW 25: CPT

## 2019-01-24 RX ADMIN — LAMOTRIGINE 50 MILLIGRAM(S): 25 TABLET, ORALLY DISINTEGRATING ORAL at 08:37

## 2019-01-24 RX ADMIN — Medication 50 MILLIGRAM(S): at 10:01

## 2019-01-24 RX ADMIN — Medication 0.5 MILLIGRAM(S): at 12:17

## 2019-01-24 RX ADMIN — Medication 50 MILLIGRAM(S): at 20:14

## 2019-01-24 RX ADMIN — Medication 0.5 MILLIGRAM(S): at 20:13

## 2019-01-24 RX ADMIN — METHADONE HYDROCHLORIDE 60 MILLIGRAM(S): 40 TABLET ORAL at 06:02

## 2019-01-24 RX ADMIN — Medication 650 MILLIGRAM(S): at 09:13

## 2019-01-24 RX ADMIN — Medication 0.5 MILLIGRAM(S): at 08:37

## 2019-01-24 RX ADMIN — BUPROPION HYDROCHLORIDE 150 MILLIGRAM(S): 150 TABLET, EXTENDED RELEASE ORAL at 08:37

## 2019-01-24 RX ADMIN — Medication 650 MILLIGRAM(S): at 09:58

## 2019-01-24 RX ADMIN — Medication 50 MILLIGRAM(S): at 20:29

## 2019-01-25 PROCEDURE — 99231 SBSQ HOSP IP/OBS SF/LOW 25: CPT

## 2019-01-25 RX ORDER — BUPROPION HYDROCHLORIDE 150 MG/1
100 TABLET, EXTENDED RELEASE ORAL DAILY
Qty: 0 | Refills: 0 | Status: DISCONTINUED | OUTPATIENT
Start: 2019-01-26 | End: 2019-01-30

## 2019-01-25 RX ADMIN — Medication 0.5 MILLIGRAM(S): at 08:55

## 2019-01-25 RX ADMIN — LAMOTRIGINE 50 MILLIGRAM(S): 25 TABLET, ORALLY DISINTEGRATING ORAL at 08:55

## 2019-01-25 RX ADMIN — Medication 0.5 MILLIGRAM(S): at 20:12

## 2019-01-25 RX ADMIN — Medication 0.5 MILLIGRAM(S): at 12:58

## 2019-01-25 RX ADMIN — Medication 50 MILLIGRAM(S): at 10:33

## 2019-01-25 RX ADMIN — BUPROPION HYDROCHLORIDE 150 MILLIGRAM(S): 150 TABLET, EXTENDED RELEASE ORAL at 08:55

## 2019-01-25 RX ADMIN — METHADONE HYDROCHLORIDE 60 MILLIGRAM(S): 40 TABLET ORAL at 05:46

## 2019-01-25 RX ADMIN — Medication 50 MILLIGRAM(S): at 20:13

## 2019-01-25 RX ADMIN — Medication 50 MILLIGRAM(S): at 22:02

## 2019-01-26 RX ADMIN — METHADONE HYDROCHLORIDE 60 MILLIGRAM(S): 40 TABLET ORAL at 06:47

## 2019-01-26 RX ADMIN — Medication 50 MILLIGRAM(S): at 21:19

## 2019-01-26 RX ADMIN — Medication 0.5 MILLIGRAM(S): at 12:18

## 2019-01-26 RX ADMIN — Medication 50 MILLIGRAM(S): at 13:08

## 2019-01-26 RX ADMIN — LAMOTRIGINE 50 MILLIGRAM(S): 25 TABLET, ORALLY DISINTEGRATING ORAL at 08:13

## 2019-01-26 RX ADMIN — Medication 0.5 MILLIGRAM(S): at 20:08

## 2019-01-26 RX ADMIN — Medication 0.5 MILLIGRAM(S): at 08:13

## 2019-01-26 RX ADMIN — BUPROPION HYDROCHLORIDE 100 MILLIGRAM(S): 150 TABLET, EXTENDED RELEASE ORAL at 08:13

## 2019-01-27 PROCEDURE — 99233 SBSQ HOSP IP/OBS HIGH 50: CPT

## 2019-01-27 RX ORDER — CLONAZEPAM 1 MG
1 TABLET ORAL
Qty: 0 | Refills: 0 | Status: DISCONTINUED | OUTPATIENT
Start: 2019-01-27 | End: 2019-01-30

## 2019-01-27 RX ORDER — CLONAZEPAM 1 MG
0.5 TABLET ORAL
Qty: 0 | Refills: 0 | Status: DISCONTINUED | OUTPATIENT
Start: 2019-01-27 | End: 2019-01-30

## 2019-01-27 RX ADMIN — METHADONE HYDROCHLORIDE 60 MILLIGRAM(S): 40 TABLET ORAL at 06:12

## 2019-01-27 RX ADMIN — Medication 0.5 MILLIGRAM(S): at 17:52

## 2019-01-27 RX ADMIN — Medication 50 MILLIGRAM(S): at 00:20

## 2019-01-27 RX ADMIN — Medication 1 MILLIGRAM(S): at 12:39

## 2019-01-27 RX ADMIN — LAMOTRIGINE 50 MILLIGRAM(S): 25 TABLET, ORALLY DISINTEGRATING ORAL at 08:14

## 2019-01-27 RX ADMIN — Medication 50 MILLIGRAM(S): at 20:16

## 2019-01-27 RX ADMIN — BUPROPION HYDROCHLORIDE 100 MILLIGRAM(S): 150 TABLET, EXTENDED RELEASE ORAL at 08:14

## 2019-01-27 RX ADMIN — Medication 0.5 MILLIGRAM(S): at 08:14

## 2019-01-27 RX ADMIN — Medication 50 MILLIGRAM(S): at 22:52

## 2019-01-27 NOTE — PROGRESS NOTE BEHAVIORAL HEALTH - SECONDARY DX2
Opioid dependence on maintenance agonist therapy, no symptoms

## 2019-01-27 NOTE — PROGRESS NOTE BEHAVIORAL HEALTH - SECONDARY DX3
Methamphetamine abuse, episodic

## 2019-01-27 NOTE — PROGRESS NOTE BEHAVIORAL HEALTH - SECONDARY DX4
Cocaine abuse

## 2019-01-27 NOTE — PROGRESS NOTE BEHAVIORAL HEALTH - OTHER
less "wired" as compared to admission "ok" slight lability but not tearful or elated at times repetitive maintains with effort low end of fair - improving

## 2019-01-28 PROCEDURE — 99231 SBSQ HOSP IP/OBS SF/LOW 25: CPT

## 2019-01-28 RX ADMIN — Medication 50 MILLIGRAM(S): at 08:48

## 2019-01-28 RX ADMIN — LAMOTRIGINE 50 MILLIGRAM(S): 25 TABLET, ORALLY DISINTEGRATING ORAL at 08:46

## 2019-01-28 RX ADMIN — Medication 50 MILLIGRAM(S): at 15:35

## 2019-01-28 RX ADMIN — BUPROPION HYDROCHLORIDE 100 MILLIGRAM(S): 150 TABLET, EXTENDED RELEASE ORAL at 08:46

## 2019-01-28 RX ADMIN — Medication 1 MILLIGRAM(S): at 12:15

## 2019-01-28 RX ADMIN — Medication 50 MILLIGRAM(S): at 20:26

## 2019-01-28 RX ADMIN — Medication 0.5 MILLIGRAM(S): at 17:09

## 2019-01-28 RX ADMIN — Medication 0.5 MILLIGRAM(S): at 06:08

## 2019-01-28 RX ADMIN — Medication 50 MILLIGRAM(S): at 21:56

## 2019-01-28 RX ADMIN — METHADONE HYDROCHLORIDE 60 MILLIGRAM(S): 40 TABLET ORAL at 06:08

## 2019-01-29 PROCEDURE — 99231 SBSQ HOSP IP/OBS SF/LOW 25: CPT

## 2019-01-29 RX ORDER — METHADONE HYDROCHLORIDE 40 MG/1
60 TABLET ORAL
Qty: 0 | Refills: 0 | Status: DISCONTINUED | OUTPATIENT
Start: 2019-01-30 | End: 2019-01-30

## 2019-01-29 RX ORDER — HALOPERIDOL DECANOATE 100 MG/ML
5 INJECTION INTRAMUSCULAR ONCE
Qty: 0 | Refills: 0 | Status: COMPLETED | OUTPATIENT
Start: 2019-01-29 | End: 2019-01-29

## 2019-01-29 RX ORDER — DIPHENHYDRAMINE HCL 50 MG
25 CAPSULE ORAL ONCE
Qty: 0 | Refills: 0 | Status: COMPLETED | OUTPATIENT
Start: 2019-01-29 | End: 2019-01-29

## 2019-01-29 RX ADMIN — Medication 50 MILLIGRAM(S): at 20:39

## 2019-01-29 RX ADMIN — Medication 1 MILLIGRAM(S): at 12:11

## 2019-01-29 RX ADMIN — LAMOTRIGINE 50 MILLIGRAM(S): 25 TABLET, ORALLY DISINTEGRATING ORAL at 08:38

## 2019-01-29 RX ADMIN — HALOPERIDOL DECANOATE 5 MILLIGRAM(S): 100 INJECTION INTRAMUSCULAR at 17:57

## 2019-01-29 RX ADMIN — Medication 25 MILLIGRAM(S): at 17:58

## 2019-01-29 RX ADMIN — Medication 0.5 MILLIGRAM(S): at 17:39

## 2019-01-29 RX ADMIN — METHADONE HYDROCHLORIDE 60 MILLIGRAM(S): 40 TABLET ORAL at 05:50

## 2019-01-29 RX ADMIN — BUPROPION HYDROCHLORIDE 100 MILLIGRAM(S): 150 TABLET, EXTENDED RELEASE ORAL at 08:38

## 2019-01-29 RX ADMIN — Medication 0.5 MILLIGRAM(S): at 05:50

## 2019-01-29 NOTE — CHART NOTE - NSCHARTNOTEFT_GEN_A_CORE
Psych NP Note-      Pateint was at nursing station, demanding Rx,  and screaming at staff.  Patient threw a full pitcher of water  and threw water cups all over the floor.  Patient screaming and threatening staff.  Reddy Hyatt called.  Patient was given Haldol 5mg and Benadryl 25mg IM.  Patient was cooperative with IM injection, and is somewhat calmer at this time.  Josephine Gabriel NPP

## 2019-01-30 ENCOUNTER — TRANSCRIPTION ENCOUNTER (OUTPATIENT)
Age: 49
End: 2019-01-30

## 2019-01-30 PROCEDURE — 80074 ACUTE HEPATITIS PANEL: CPT

## 2019-01-30 PROCEDURE — 80061 LIPID PANEL: CPT

## 2019-01-30 PROCEDURE — 36415 COLL VENOUS BLD VENIPUNCTURE: CPT

## 2019-01-30 PROCEDURE — 87389 HIV-1 AG W/HIV-1&-2 AB AG IA: CPT

## 2019-01-30 PROCEDURE — 80053 COMPREHEN METABOLIC PANEL: CPT

## 2019-01-30 PROCEDURE — 87521 HEPATITIS C PROBE&RVRS TRNSC: CPT

## 2019-01-30 PROCEDURE — 85027 COMPLETE CBC AUTOMATED: CPT

## 2019-01-30 PROCEDURE — 83036 HEMOGLOBIN GLYCOSYLATED A1C: CPT

## 2019-01-30 PROCEDURE — 94640 AIRWAY INHALATION TREATMENT: CPT

## 2019-01-30 PROCEDURE — 84443 ASSAY THYROID STIM HORMONE: CPT

## 2019-01-30 PROCEDURE — 86780 TREPONEMA PALLIDUM: CPT

## 2019-01-30 PROCEDURE — 81025 URINE PREGNANCY TEST: CPT

## 2019-01-30 PROCEDURE — 81003 URINALYSIS AUTO W/O SCOPE: CPT

## 2019-01-30 PROCEDURE — 99239 HOSP IP/OBS DSCHRG MGMT >30: CPT

## 2019-01-30 RX ORDER — LAMOTRIGINE 25 MG/1
2 TABLET, ORALLY DISINTEGRATING ORAL
Qty: 60 | Refills: 0
Start: 2019-01-30 | End: 2019-02-28

## 2019-01-30 RX ORDER — CLONAZEPAM 1 MG
1 TABLET ORAL
Qty: 10 | Refills: 0
Start: 2019-01-30 | End: 2019-02-08

## 2019-01-30 RX ORDER — METHADONE HYDROCHLORIDE 40 MG/1
6 TABLET ORAL
Qty: 0 | Refills: 0 | DISCHARGE
Start: 2019-01-30

## 2019-01-30 RX ORDER — BUPROPION HYDROCHLORIDE 150 MG/1
1 TABLET, EXTENDED RELEASE ORAL
Qty: 30 | Refills: 0
Start: 2019-01-30 | End: 2019-02-28

## 2019-01-30 RX ORDER — CLONAZEPAM 1 MG
1 TABLET ORAL
Qty: 20 | Refills: 0
Start: 2019-01-30 | End: 2019-02-08

## 2019-01-30 RX ORDER — METHADONE HYDROCHLORIDE 40 MG/1
75 TABLET ORAL
Qty: 0 | Refills: 0 | COMMUNITY

## 2019-01-30 RX ORDER — ALBUTEROL 90 UG/1
2 AEROSOL, METERED ORAL
Qty: 1 | Refills: 0
Start: 2019-01-30 | End: 2019-02-28

## 2019-01-30 RX ADMIN — LAMOTRIGINE 50 MILLIGRAM(S): 25 TABLET, ORALLY DISINTEGRATING ORAL at 09:00

## 2019-01-30 RX ADMIN — BUPROPION HYDROCHLORIDE 100 MILLIGRAM(S): 150 TABLET, EXTENDED RELEASE ORAL at 09:00

## 2019-01-30 RX ADMIN — METHADONE HYDROCHLORIDE 60 MILLIGRAM(S): 40 TABLET ORAL at 05:47

## 2019-01-30 RX ADMIN — Medication 1 MILLIGRAM(S): at 12:53

## 2019-01-30 RX ADMIN — Medication 0.5 MILLIGRAM(S): at 05:46

## 2019-01-30 NOTE — PROGRESS NOTE BEHAVIORAL HEALTH - GAIT / STATION
Normal gait / station

## 2019-01-30 NOTE — DISCHARGE NOTE ADULT - MEDICATION SUMMARY - MEDICATIONS TO CHANGE
I will SWITCH the dose or number of times a day I take the medications listed below when I get home from the hospital:    albuterol 90 mcg/inh inhalation aerosol  -- 2 puff(s) inhaled every 6 hours, As needed, Shortness of Breath and/or Wheezing    KlonoPIN 0.5 mg oral tablet  -- 1 tab(s) by mouth 2 times a day MDD:1 mg  -- Caution federal law prohibits the transfer of this drug to any person other  than the person for whom it was prescribed.  Do not drink alcoholic beverages when taking this medication.  Do not take this drug if you are pregnant.  It is very important that you take or use this exactly as directed.  Do not skip doses or discontinue unless directed by your doctor.  May cause drowsiness.  Alcohol may intensify this effect.  Use care when operating dangerous machinery.  Obtain medical advice before taking any non-prescription drugs as some may affect the action of this medication.  This drug may impair the ability to drive or operate machinery.  Use care until you become familiar with its effects.    KlonoPIN 1 mg oral tablet  -- 1 tab(s) by mouth once a day at 12 PM MDD:1 mg  -- Caution federal law prohibits the transfer of this drug to any person other  than the person for whom it was prescribed.  Do not drink alcoholic beverages when taking this medication.  Do not take this drug if you are pregnant.  It is very important that you take or use this exactly as directed.  Do not skip doses or discontinue unless directed by your doctor.  May cause drowsiness.  Alcohol may intensify this effect.  Use care when operating dangerous machinery.  Obtain medical advice before taking any non-prescription drugs as some may affect the action of this medication.  This drug may impair the ability to drive or operate machinery.  Use care until you become familiar with its effects.    Wellbutrin  mg/12 hours oral tablet, extended release  -- 1 tab(s) by mouth once a day   -- Check with your doctor before becoming pregnant.  Do not drink alcoholic beverages when taking this medication.  It is very important that you take or use this exactly as directed.  Do not skip doses or discontinue unless directed by your doctor.  Obtain medical advice before taking any non-prescription drugs as some may affect the action of this medication.  Swallow whole.  Do not crush.  This drug may impair the ability to drive or operate machinery.  Use care until you become familiar with its effects.    LaMICtal 25 mg oral tablet  -- 2 tab(s) by mouth once a day   -- Avoid prolonged or excessive exposure to direct and/or artificial sunlight while taking this medication.  It is very important that you take or use this exactly as directed.  Do not skip doses or discontinue unless directed by your doctor.  May cause drowsiness.  Alcohol may intensify this effect.  Use care when operating dangerous machinery.

## 2019-01-30 NOTE — PROGRESS NOTE BEHAVIORAL HEALTH - AXIS III
Asthma

## 2019-01-30 NOTE — DISCHARGE NOTE ADULT - CARE PLAN
Principal Discharge DX:	Substance induced mood disorder  Goal:	Abstinance  Assessment and plan of treatment:	Stable on current meds, to continue with current meds and maintain sobreity  Secondary Diagnosis:	Current severe episode of major depressive disorder without psychotic features without prior episode  Goal:	Remission of depression  Assessment and plan of treatment:	Stable with no SI now, good sleep/appetite. To continue with meds and f/U for stability

## 2019-01-30 NOTE — DISCHARGE NOTE ADULT - CARE PROVIDER_API CALL
[facility],   [address] [place] [address]; [place]; [address]  Phone: [phone]  Fax: (   )    -

## 2019-01-30 NOTE — DISCHARGE NOTE ADULT - PROVIDER TOKENS
FREE:[LAST:[[facility]],PHONE:[phone]],FAX:[phone]   [phone]    -],ADDRESS:[[address] [place] [address]; [id number]]]

## 2019-01-30 NOTE — PROGRESS NOTE BEHAVIORAL HEALTH - PERCEPTIONS
No abnormalities

## 2019-01-30 NOTE — PROGRESS NOTE BEHAVIORAL HEALTH - NSBHFUPTYPE_PSY_A_CORE
Inpatient

## 2019-01-30 NOTE — PROGRESS NOTE BEHAVIORAL HEALTH - NSBHFUPINTERVALHXFT_PSY_A_CORE
Patient is tolerating the Lamictal taper and denies any side effects; education about monitoring for any dermatoligical changes/rash and was educated about the rare side effect of Jm-Johnathon's. Patient benzo seeking and asked for higher Klonopin dose; redirected. Offered PRN Atarax for anxiety which she is willing to try. Fragmented sleep with 2x awakenings; trying higher dose of the Benadryl for sleep. denies she ever misused any benzodiazepines and minimizes any medication use/misuse. No complaints otherwise. Denies any benzo/substance withdrawal symptoms.
Same clinical presentation - asked when the lamictal can be increased and was told after 1 week as per guidelines. Patient disappointed as she wants faster titration so she can be discharged. Several other options offered for her and all declined due to reported side effects such as Seroquel resulting in "my eyes rolling back," Lithium in "leg swelling", depakote in muscle twitching. Tolerating the Wellbutrin increase to theraputic dose and denies cigarette cragings.
patient reports that she feels like "she is flying" at times but feels molre stable since admission. She reports improved mood and reduction in cigarette cravings with the Wellbutrin. She is tolerating the Lamictal taper and denies any side effects; Less benzo seeking today. Sleeping better on the Benadryl 50mg PO qhs.
Patient seen, evaluated today AM and discussed in team today AM. She remains fixated on her benzos, she burnt all bridges, for out-patient appointment. Continues to reinforce whether SW was able to find any psychiatrist for her meds. Not S/H at this time. To F/U with [facility] for her Methadone.
Patient seen, evaluated today AM and discussed in team. No acute issues, wanted to know about her Lamictal, when can be increased, informed that at least a week as per guidelines and Wellbutrin XL was increased to 150 mg over the weekend along with Klonopin as ordered. Prefers to stay indoors, somewhat clumsy as per her room with stuffs scattered in her bed. Has passive SI, compliant with meds
Patient seen, evaluated today AM and discussed in team. She endorsed that she is little hyper, so Wellbutrin was decreased to Wellbutrin  mg /day starting from tomorrow. She feels concerned with her insurance as it will lapse on 1/31 and need to renew it or else she wont be able to stay home without benzos. Not S/H, still needs res-assurance for stability.
Patient was seen today AM, chart reviewed and discussed in team. As per nursing she was seen advising patients about issues which seem non-existent and patient believing her was asking about those issues. she was advised to stay away from advising patient about any insurance.  To continue current meds for stability, she seems relaxed, calmer and was seen sleeping. No meds changes were made for now.
Patient was seen today AM, chart reviewed and discussed in team. No acute issues reported,. endorsed feels anxious inside and thus wants to have Klonopin or Ativan, explained that she has to take one not two as both are of the same meds class, and as she takes more she will need more in future due to dependence and was told that we cannot increase any more dosages of Klonopin. Fair to good sleep/appetite. Continue with current meds as ordered
Patient was seen today AM, chart reviewed and discussed in team. She was in her bed, she is complaint with her meds, mood is in better control. Overall OK, has meds seeking behavior especially the Benzos, advised that no more Benzos increase for now and to continue with Klonopin 0.5 mg TID. Lamictal 25 mg PO added fro further mood control. Discussed option of meds s/e and benefits.
Patient was seen today AM, chart reviewed and discussed in team. She was in her bed, she is complaint with her meds, mood is in better control. Overall seems OK, anxious at times, not hyperactive or talkative as reported by nursing, subjectively endorses feeling anxious and endorsing to increase the Klonopin and also wanted to have Adderall, as she has ADHD. Patient was advised that she does not need stimulants, as she is not in school. To continue current meds for stability.
Patient was seen today AM, no acute overnight issues after evening incident yesterday. Mood is OK, eager to leave the hospital, awaiting for response from [facility], as per previous conversation, patient to f/u at [facility] and later seek transfer to a Methadone Clinic of choice. Not S/h at this time. She received 30 days supply of meds before discharge, except the Klonopin which was given for 10 days.
Patient seen, evaluated today AM and discussed in team. She was OK with Lamictal 50 mg received today, and now concerned when will be the next dosage increase of Lamictal. Discussed the option of next Lamictal dosage increase and then to be discharged soon after the next dosage increase. She accepted the idea to go home soon. Prefers to remain indoors, most of the time, and avoid groups.
Interval events: nursing staff caught Patient pocketing her clonazepam in her pants pocket - patient said she wanted to hold it and take it together with the HS dose of the Klonopin 0.5mg as the lower dose is "not helping." Patient reported this incident to Writer on her own this morning, apologized, said it will not happened again and said that she has "cold sweats" and cravings and said she used more benzos outpatient and the 0.5mg PO tid dose is not holding her. Risks/benefits/alternatives discussed and she asked for a 0.5mg increase to her afternoon dose; she did not ask for more.
Patient seen, evaluated today AM and discussed in team. No acute issues, wanted to know about her Lamictal, when can be increased, informed that at least a week as per guidelines and Wellbutrin XL was increased to 150 mg over the weekend along with Klonopin as ordered. Patient is seen in the unit, limited group participation, mostly concerned of her meds dosages and enquires of increased dosage date. Lamictal to be increased to 50 mg/day on 01/24/2019. She is compliant with her meds, seems to be doing OK at this time, with no meds induced s/e.
Patient seen, evaluated today AM and discussed in team today AM. She is fixated on her Benzos, trying hard to have increased dosage of Benzos. later today she filled the paper work for ongoing Insurance issues which will lapse in January 31st. Over the weekend her Benzos were increased in the afternoon dosages as she was caught sneaking a dose of Klonopin. To continue with current dosages of Lamictal/Benzos/Wellbutrin

## 2019-01-30 NOTE — PROGRESS NOTE BEHAVIORAL HEALTH - NS ED BHA AXIS I SECONDARY1 CODE FT
Statement Selected
F32.2
Statement Selected
F32.2
Statement Selected

## 2019-01-30 NOTE — PROGRESS NOTE BEHAVIORAL HEALTH - NSBHPTASSESSDT_PSY_A_CORE
15-Terrence-2019 11:53
16-Jan-2019 15:48
17-Jan-2019 14:29
18-Jan-2019
19-Jan-2019
20-Jan-2019
21-Jan-2019
22-Jan-2019 14:09
25-Jan-2019
29-Jan-2019 15:07
30-Jan-2019 14:03
24-Jan-2019 14:44
27-Jan-2019
23-Jan-2019 13:54
28-Jan-2019 14:42

## 2019-01-30 NOTE — PROGRESS NOTE BEHAVIORAL HEALTH - AFFECT QUALITY
Other
Euthymic
Other
Euthymic
Euthymic
Other
Euthymic
Other

## 2019-01-30 NOTE — PROGRESS NOTE BEHAVIORAL HEALTH - NSBHADMITDANGERSELF_PSY_A_CORE
unable to care for self
suicidal ideation with plan and means
unable to care for self
suicidal behavior
suicidal ideation with plan and means
suicidal ideation with plan and means

## 2019-01-30 NOTE — PROGRESS NOTE BEHAVIORAL HEALTH - NSBHADMITIPREASON_PSY_A_CORE
Danger to self; mental illness expected to respond to inpatient care
other reason
Danger to self; mental illness expected to respond to inpatient care
SI/Danger to self; mental illness expected to respond to inpatient care
other reason

## 2019-01-30 NOTE — PROGRESS NOTE BEHAVIORAL HEALTH - NSBHFUPMEDSE_PSY_A_CORE
None known

## 2019-01-30 NOTE — PROGRESS NOTE BEHAVIORAL HEALTH - THOUGHT CONTENT
Unremarkable

## 2019-01-30 NOTE — PROGRESS NOTE BEHAVIORAL HEALTH - RISK ASSESSMENT
High-Drug abuse, recent losses with SIB and hx of prior SI/SA
Low
High-Drug abuse, recent losses with SIB and hx of prior SI/SA
Low
Low
High-Drug abuse, recent losses with SIB and hx of prior SI/SA

## 2019-01-30 NOTE — PROGRESS NOTE BEHAVIORAL HEALTH - RELATEDNESS
Fair/Other
Other/Fair
Fair/Other
Fair
Other/Fair
Fair/Other
Other/Fair
Fair
Other/Fair
Fair/Other

## 2019-01-30 NOTE — PROGRESS NOTE BEHAVIORAL HEALTH - SECONDARY DX1
Substance induced mood disorder
Current severe episode of major depressive disorder without psychotic features without prior episode
Substance induced mood disorder
Current severe episode of major depressive disorder without psychotic features without prior episode
Substance induced mood disorder

## 2019-01-30 NOTE — DISCHARGE NOTE ADULT - MEDICATION SUMMARY - MEDICATIONS TO TAKE
I will START or STAY ON the medications listed below when I get home from the hospital:    methadone 10 mg oral tablet  -- 6 tab(s) by mouth   -- Indication: For Opioid dependence on maintenance agonist therapy, no symptoms    KlonoPIN 0.5 mg oral tablet  -- 1 tab(s) by mouth 2 times a day MDD:1 mg  -- Caution federal law prohibits the transfer of this drug to any person other  than the person for whom it was prescribed.  Do not drink alcoholic beverages when taking this medication.  Do not take this drug if you are pregnant.  It is very important that you take or use this exactly as directed.  Do not skip doses or discontinue unless directed by your doctor.  May cause drowsiness.  Alcohol may intensify this effect.  Use care when operating dangerous machinery.  Obtain medical advice before taking any non-prescription drugs as some may affect the action of this medication.  This drug may impair the ability to drive or operate machinery.  Use care until you become familiar with its effects.    -- Indication: For Anxiety    KlonoPIN 1 mg oral tablet  -- 1 tab(s) by mouth once a day at 12 PM MDD:1 mg  -- Caution federal law prohibits the transfer of this drug to any person other  than the person for whom it was prescribed.  Do not drink alcoholic beverages when taking this medication.  Do not take this drug if you are pregnant.  It is very important that you take or use this exactly as directed.  Do not skip doses or discontinue unless directed by your doctor.  May cause drowsiness.  Alcohol may intensify this effect.  Use care when operating dangerous machinery.  Obtain medical advice before taking any non-prescription drugs as some may affect the action of this medication.  This drug may impair the ability to drive or operate machinery.  Use care until you become familiar with its effects.    -- Indication: For Anxiety    LaMICtal 25 mg oral tablet  -- 2 tab(s) by mouth once a day   -- Avoid prolonged or excessive exposure to direct and/or artificial sunlight while taking this medication.  It is very important that you take or use this exactly as directed.  Do not skip doses or discontinue unless directed by your doctor.  May cause drowsiness.  Alcohol may intensify this effect.  Use care when operating dangerous machinery.    -- Indication: For Mood    albuterol 90 mcg/inh inhalation aerosol  -- 2 puff(s) inhaled every 6 hours, As needed, Shortness of Breath and/or Wheezing  -- Indication: For SOB/COPD    Wellbutrin  mg/12 hours oral tablet, extended release  -- 1 tab(s) by mouth once a day   -- Check with your doctor before becoming pregnant.  Do not drink alcoholic beverages when taking this medication.  It is very important that you take or use this exactly as directed.  Do not skip doses or discontinue unless directed by your doctor.  Obtain medical advice before taking any non-prescription drugs as some may affect the action of this medication.  Swallow whole.  Do not crush.  This drug may impair the ability to drive or operate machinery.  Use care until you become familiar with its effects.    -- Indication: For Mood

## 2019-01-30 NOTE — PROGRESS NOTE BEHAVIORAL HEALTH - PRN MEDS
PO Atarax x 2 doses
Benadryl, inhaler
PO Atarax x 1 dose
PO Atarax x 1 dose
Haldol 5 mg with Benadryl 50 mg IM
PO Atarax x 1 dose
Benadryl, inhaler

## 2019-01-30 NOTE — PROGRESS NOTE BEHAVIORAL HEALTH - NSBHFUPINTERVALCCFT_PSY_A_CORE
" Am I am leaving tomorrow or later, later mentioned that can she leave at 7 AM "
" I feel myself calmer now "
" I'm anxious and need Klonopin/Ativan "
" I'm not ready to leave "
" I'm not ready to leave "
" Patient feels hyper today "
" Patient was eager to leave early "
" when is my Lamictal be increased "
" Patient received Lamictal 50 mg today "
" Lamictal 50 mg daily on 01/24/2019 "
" I am leaving Tuesday/Wednesday "

## 2019-01-30 NOTE — PROGRESS NOTE BEHAVIORAL HEALTH - NS ED BHA MED ROS RESPIRATORY
No complaints
Yes
No complaints
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
## 2019-01-30 NOTE — DISCHARGE NOTE ADULT - HOSPITAL COURSE
Patient was admitted involuntarily from [facility], as she expressed SI and wanted to start on her meds for stability. Since her admission she was focussed on benzos, was started her on Klonopin 0.5 mg TID and continued in this way for 7-10 days, later she was seen holding Klonopin to have it in the afternoon, as she seems more anxious during that time, so her Klonopin was increased to Klonopin 1 mg orally in the afternoon. She was also started on Wellbutrin 75 mg with titration to Wellbutrin  mg with eventual titration of Wellbutrin Xl 150 mg, she was on Wellbutrin Xl 150 mg for few days, but it seemed that she was more talkative, and thus Wellbutrin was decreased to Wellbutrin  mg daily, and Lamictal 25 mg was added for effective Mood control, later Lamictal was further increased to Lamictal 50 mg daily. Lamictal could be increased further, but was advised to have it increased as out-patient. She remained on the above combination till her discharge and seems to progress well with no overt issues in the unit. She was seldom seen in groups as she preferred rti stay in her room during group time. She did say that she is OK and is not S/H at the time of discharge.    Medically has COPD and was on PRN albuterol inhaler. She received 30 days supply of meds before discharge except the Klonopin which was given for 7-10 days at the maximum.  Her labs are WNL.. CBC/Chemistry/TSH/Lipid Profile/Syphilis Test/HbA1-C/U-A etc. Patient was admitted involuntarily from [facility], as she expressed SI and wanted to start on her meds for stability. Since her admission she was focussed on benzos, was started her on Klonopin 0.5 mg TID and continued in this way for 7-10 days, later she was seen holding Klonopin to have it in the afternoon, as she seems more anxious during that time, so her Klonopin was increased to Klonopin 1 mg orally in the afternoon. She was also started on Wellbutrin 75 mg with titration to Wellbutrin  mg with eventual titration of Wellbutrin Xl 150 mg, she was on Wellbutrin Xl 150 mg for few days, but it seemed that she was more talkative, and thus Wellbutrin was decreased to Wellbutrin  mg daily, and Lamictal 25 mg was added for effective Mood control, later Lamictal was further increased to Lamictal 50 mg daily. Lamictal could be increased further, but was advised to have it increased as out-patient. She remained on the above combination till her discharge and seems to progress well with no overt issues in the unit. She was seldom seen in groups as she preferred to stay in her room during group time. She did say that she is OK and is not S/H at the time of discharge.    Medically has COPD and was on PRN albuterol inhaler. She received 30 days supply of meds before discharge except the Klonopin which was given for 7-10 days at the maximum.  Her labs are WNL.. CBC/Chemistry/TSH/Lipid Profile/Syphilis Test/HbA1-C/U-A etc.

## 2019-01-30 NOTE — PROGRESS NOTE BEHAVIORAL HEALTH - PRIMARY DX
Current severe episode of major depressive disorder without psychotic features without prior episode
Substance induced mood disorder
Current severe episode of major depressive disorder without psychotic features without prior episode
Substance induced mood disorder
Current severe episode of major depressive disorder without psychotic features without prior episode

## 2019-01-30 NOTE — DISCHARGE NOTE ADULT - MEDICATION SUMMARY - MEDICATIONS TO STOP TAKING
I will STOP taking the medications listed below when I get home from the hospital:    methadone  -- 75 milligram(s) by mouth once a day

## 2019-01-30 NOTE — DISCHARGE NOTE ADULT - PATIENT PORTAL LINK FT
You can access the [facility] Patient Portal, offered by [facility], by registering with the following website: [email or web address]

## 2019-01-30 NOTE — DISCHARGE NOTE ADULT - PLAN OF CARE
Abstinance Stable on current meds, to continue with current meds and maintain sobreity Remission of depression Stable with no SI now, good sleep/appetite. To continue with meds and f/U for stability

## 2019-01-30 NOTE — PROGRESS NOTE BEHAVIORAL HEALTH - NSBHADMITIPOBSFT_PSY_A_CORE
As Per Routine
Routine Observation
As Per routine
As per routine Observation
As per routine
As per routine
Routine Observation
As Per routine
As Per Routine
Routine Observation

## 2019-01-30 NOTE — PROGRESS NOTE BEHAVIORAL HEALTH - NS ED BHA REVIEW OF ED CHART AVAILABLE IMAGING REVIEWED
None available
Yes
Yes
None available
Yes

## 2019-01-30 NOTE — PROGRESS NOTE BEHAVIORAL HEALTH - NSBHLEGALSTATUS_PSY_A_CORE
9.27 (2PC)

## 2019-01-30 NOTE — PROGRESS NOTE BEHAVIORAL HEALTH - NSBHADMITMEDEDUDETAILS_A_CORE FT
see incident above - increase Klonopin0.5mg PO tid to 0.5mg PO in am, 1mg PO in afternoon and 0.5mg in evening
Discussed meds s/e and benefits/risks
Discussed risks/benefits
Discusses Risks/Benefits
Discussed risks/benefits
Discussed risks/benefits
Discussed risks/benefits/aletrnatives
Discussed s/e and benefits
Discussed risks /benefits
continue current regimen
increase Wellbutrin 75mg PO qd; change to Wellbutrin XL 150mg PO qd
increase benadryl 25mg PO qhs ot 40mg PO qhs prn for sleep, Atarax 50mg PO prn q6hrs for anxiety, continue to taper up lamictal
Discussed Risks/Benefits
Discussed benefits/Risks/Alternatives
Discussed risks/benefits/alternatives

## 2020-01-14 NOTE — ED BEHAVIORAL HEALTH ASSESSMENT NOTE - NS ED BHA MED ROS ENT MOUTH
[facility] pharmacy called stating that the bustrans patch was sent without a CHARLA # and needs to be resent No complaints

## 2021-02-19 ENCOUNTER — EMERGENCY (EMERGENCY)
Facility: HOSPITAL | Age: 51
LOS: 1 days | Discharge: HOME | End: 2021-02-19
Attending: EMERGENCY MEDICINE
Payer: MEDICAID

## 2021-02-19 VITALS
SYSTOLIC BLOOD PRESSURE: 102 MMHG | HEART RATE: 74 BPM | HEIGHT: 64 IN | OXYGEN SATURATION: 98 % | DIASTOLIC BLOOD PRESSURE: 74 MMHG | RESPIRATION RATE: 18 BRPM | WEIGHT: 130.07 LBS | TEMPERATURE: 98 F

## 2021-02-19 PROCEDURE — 99283 EMERGENCY DEPT VISIT LOW MDM: CPT

## 2021-02-19 PROCEDURE — 99284 EMERGENCY DEPT VISIT MOD MDM: CPT

## 2021-02-19 RX ORDER — ARIPIPRAZOLE 15 MG/1
5 TABLET ORAL
Qty: 35 | Refills: 0
Start: 2021-02-19 | End: 2021-02-25

## 2021-02-19 RX ORDER — ALPRAZOLAM 0.25 MG
1 TABLET ORAL
Qty: 14 | Refills: 0
Start: 2021-02-19 | End: 2021-02-25

## 2021-02-19 RX ORDER — ESCITALOPRAM OXALATE 10 MG/1
1 TABLET, FILM COATED ORAL
Qty: 7 | Refills: 0
Start: 2021-02-19 | End: 2021-02-25

## 2021-02-19 RX ORDER — GABAPENTIN 400 MG/1
1 CAPSULE ORAL
Qty: 7 | Refills: 0
Start: 2021-02-19 | End: 2021-02-25

## 2021-02-19 RX ORDER — ARIPIPRAZOLE 15 MG/1
1 TABLET ORAL
Qty: 7 | Refills: 0
Start: 2021-02-19 | End: 2021-02-25

## 2021-02-19 NOTE — ED PROVIDER NOTE - PATIENT PORTAL LINK FT
You can access the FollowMyHealth Patient Portal offered by [facility] by registering at the following website: [email or web address]. By joining [facility]’s FollowMyHealth portal, you will also be able to view your health information using other applications (apps) compatible with our system.

## 2021-02-19 NOTE — ED PROVIDER NOTE - OBJECTIVE STATEMENT
51 y/o F w/ PMH of bipolar, depression, prior substance abuse, self harm presenting w/ request for medication refill. Green room 29. Pt reports recent hospitalization with [facility] followed up by stay at inpatient psych facility and rehab. Pt reports has been doing well since discharge, but has not been able to see a therapist or psychiatrist. She was prescribed medications, but reports 51 y/o F w/ PMH of bipolar, depression, prior substance abuse, self harm presenting w/ request for medication refill. Green room 29. Pt reports recent hospitalization with [facility] followed up by stay at inpatient psych facility and rehab. Pt reports has been doing well since discharge, but has not been able to see a therapist or psychiatrist. She was prescribed medications, but reports she is running out of them. Requesting prescriptions be refilled at this time. Also requesting information for outpatient psychiatry. Denies SI, HI, AH, VH. Denies fevers, chills, headache, dizziness, blurred vision, chest pain, cough, shortness of breath, abdominal pain, n/v/d/c, urinary symptoms, MSK pain, rash.

## 2021-02-19 NOTE — ED PROVIDER NOTE - PHYSICAL EXAMINATION
Gen: NAD, AOx3, able to make needs known, non-toxic  Head: NCAT  HEENT: EOMI, oral mucosa moist, normal conjunctiva  Lung: CTAB, no respiratory distress, no wheezes/rhonchi/rales B/L, speaking in full sentences  CV: RRR, no murmurs  Abd: soft, NTND, no guarding  MSK: no visible deformities  Neuro: Appears non focal  Skin: Warm, well perfused  Psych: mildly anxious, cooperative w/ exam. No flight of ideas. No tangential speech

## 2021-02-19 NOTE — ED PROVIDER NOTE - CLINICAL SUMMARY MEDICAL DECISION MAKING FREE TEXT BOX
49 y/o F w/ PMH of bipolar, depression, prior substance abuse, self harm presenting w/ request for medication refill. Pt anxious appearing, but overall in no acute distress. Does not appear to be in decompensated psychiatric state at this time. No SI, HI, AH, VH. Will send prescription for medications to pharmacy and provide pt w/ outpatient psych f/u information. Pt agreeable w/ plan. Will DC w/o additional ED work up.

## 2021-02-19 NOTE — ED PROVIDER NOTE - CARE PLAN
Principal Discharge DX:	Medication refill   Principal Discharge DX:	Medication refill  Secondary Diagnosis:	Bipolar disorder

## 2021-02-19 NOTE — ED ADULT NURSE NOTE - OBJECTIVE STATEMENT
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 This is a cooperative but somewhat loud and childlike pt, states "I need a refill on my Lexapro, xanax and Abilify. I was in [facility] from Dec 5th to New Years and they only gave me a two week supply. They didn't give me an appointment they told me I could go back to them but I don't want to. They told me I could come here and you have a [facility] program"; states she has Bipolar one "for a long time but [facility] was the first time I have been hospitalized in a long time I tried to cut my wrist then but I am not suicidal now"; states she lives with her mother and is only moderately depressed at present "I am still getting over my father's death he  a year ago and I was taking care of him"; continue to monitor.

## 2021-02-19 NOTE — ED PROVIDER NOTE - ATTENDING CONTRIBUTION TO CARE
Pt here for med refill, has outpatient follow up but unable to get sooner appointment.  Has anxiety, no SI, HI, AH, VH, contracts for safety.  Appears well, slightly anxious but appropriate, neuro intact.

## 2021-02-19 NOTE — ED ADULT TRIAGE NOTE - BP NONINVASIVE DIASTOLIC (MM HG)
74
Additional Notes: She will D/C Benzaclin and add Adapalene qhs as tolerated.  WE DISCUSSED IF SHE FLARES MAY CONSIDER STAYING ON THE ADAPALENE AT BED AND ADD CLEOCIN LOTION IN THE AM
Detail Level: Simple

## 2021-02-19 NOTE — ED PROVIDER NOTE - NSFOLLOWUPINSTRUCTIONS_ED_ALL_ED_FT
You were seen in the ED for medication refills.    Your prescriptions were sent to your pharmacy. Please pick them up and take as directed.    You were provided with information for the [facility]. Please call to make an appointment.     Return to the Emergency Dept for any new or worsening symptoms.    Please follow up with your doctor next week.

## 2021-02-19 NOTE — ED PROVIDER NOTE - NSFOLLOWUPCLINICS_GEN_ALL_ED_FT
[facility] General Internal Medicine  General Internal Medicine  [address] [place] [address]  Phone: [phone]  Fax:   Follow Up Time:     [facility] Psych Dept of Substance Abuse  Psychiatry Substance Abuse  [address] [place] [address]  Phone: [phone]  Fax:   Follow Up Time:     [facility] Psychiatry  Psychiatry  [address] [place] [address]  Phone: [phone]  Fax:   Follow Up Time:

## 2021-06-01 ENCOUNTER — OUTPATIENT (OUTPATIENT)
Dept: OUTPATIENT SERVICES | Facility: HOSPITAL | Age: 51
LOS: 1 days | End: 2021-06-01
Payer: MEDICAID

## 2021-06-15 ENCOUNTER — EMERGENCY (EMERGENCY)
Facility: HOSPITAL | Age: 51
LOS: 1 days | Discharge: PSYCHIATRIC OR SUBSTANCE USE FACILITY | End: 2021-06-15
Attending: EMERGENCY MEDICINE
Payer: MEDICAID

## 2021-06-15 VITALS
SYSTOLIC BLOOD PRESSURE: 113 MMHG | TEMPERATURE: 98 F | OXYGEN SATURATION: 94 % | HEART RATE: 76 BPM | HEIGHT: 64 IN | DIASTOLIC BLOOD PRESSURE: 67 MMHG | WEIGHT: 125 LBS | RESPIRATION RATE: 16 BRPM

## 2021-06-15 DIAGNOSIS — F41.9 ANXIETY DISORDER, UNSPECIFIED: ICD-10-CM

## 2021-06-15 DIAGNOSIS — F32.9 MAJOR DEPRESSIVE DISORDER, SINGLE EPISODE, UNSPECIFIED: ICD-10-CM

## 2021-06-15 DIAGNOSIS — F19.921 OTHER PSYCHOACTIVE SUBSTANCE USE, UNSPECIFIED WITH INTOXICATION WITH DELIRIUM: ICD-10-CM

## 2021-06-15 LAB
ALBUMIN SERPL ELPH-MCNC: 3.7 G/DL — SIGNIFICANT CHANGE UP (ref 3.3–5)
ALP SERPL-CCNC: 59 U/L — SIGNIFICANT CHANGE UP (ref 40–120)
ALT FLD-CCNC: 11 U/L — SIGNIFICANT CHANGE UP (ref 10–45)
AMPHET UR-MCNC: NEGATIVE — SIGNIFICANT CHANGE UP
ANION GAP SERPL CALC-SCNC: 10 MMOL/L — SIGNIFICANT CHANGE UP (ref 5–17)
APAP SERPL-MCNC: <15 UG/ML — SIGNIFICANT CHANGE UP (ref 10–30)
AST SERPL-CCNC: 16 U/L — SIGNIFICANT CHANGE UP (ref 10–40)
BARBITURATES UR SCN-MCNC: NEGATIVE — SIGNIFICANT CHANGE UP
BASOPHILS # BLD AUTO: 0.02 K/UL — SIGNIFICANT CHANGE UP (ref 0–0.2)
BASOPHILS NFR BLD AUTO: 0.5 % — SIGNIFICANT CHANGE UP (ref 0–2)
BENZODIAZ UR-MCNC: POSITIVE
BILIRUB SERPL-MCNC: 0.4 MG/DL — SIGNIFICANT CHANGE UP (ref 0.2–1.2)
BUN SERPL-MCNC: 13 MG/DL — SIGNIFICANT CHANGE UP (ref 7–23)
CALCIUM SERPL-MCNC: 9.3 MG/DL — SIGNIFICANT CHANGE UP (ref 8.4–10.5)
CHLORIDE SERPL-SCNC: 106 MMOL/L — SIGNIFICANT CHANGE UP (ref 96–108)
CO2 SERPL-SCNC: 25 MMOL/L — SIGNIFICANT CHANGE UP (ref 22–31)
COCAINE METAB.OTHER UR-MCNC: POSITIVE
CREAT SERPL-MCNC: 0.66 MG/DL — SIGNIFICANT CHANGE UP (ref 0.5–1.3)
EOSINOPHIL # BLD AUTO: 0.12 K/UL — SIGNIFICANT CHANGE UP (ref 0–0.5)
EOSINOPHIL NFR BLD AUTO: 2.8 % — SIGNIFICANT CHANGE UP (ref 0–6)
ETHANOL SERPL-MCNC: SIGNIFICANT CHANGE UP MG/DL (ref 0–10)
GLUCOSE SERPL-MCNC: 93 MG/DL — SIGNIFICANT CHANGE UP (ref 70–99)
HCT VFR BLD CALC: 35.8 % — SIGNIFICANT CHANGE UP (ref 34.5–45)
HGB BLD-MCNC: 11.4 G/DL — LOW (ref 11.5–15.5)
LYMPHOCYTES # BLD AUTO: 1.79 K/UL — SIGNIFICANT CHANGE UP (ref 1–3.3)
LYMPHOCYTES # BLD AUTO: 41.5 % — SIGNIFICANT CHANGE UP (ref 13–44)
MCHC RBC-ENTMCNC: 29.1 PG — SIGNIFICANT CHANGE UP (ref 27–34)
MCHC RBC-ENTMCNC: 31.8 GM/DL — LOW (ref 32–36)
MCV RBC AUTO: 91.3 FL — SIGNIFICANT CHANGE UP (ref 80–100)
METHADONE UR-MCNC: POSITIVE
MONOCYTES # BLD AUTO: 0.26 K/UL — SIGNIFICANT CHANGE UP (ref 0–0.9)
MONOCYTES NFR BLD AUTO: 6 % — SIGNIFICANT CHANGE UP (ref 2–14)
NEUTROPHILS # BLD AUTO: 2.12 K/UL — SIGNIFICANT CHANGE UP (ref 1.8–7.4)
NEUTROPHILS NFR BLD AUTO: 49.2 % — SIGNIFICANT CHANGE UP (ref 43–77)
NRBC # BLD: 0 /100 WBCS — SIGNIFICANT CHANGE UP (ref 0–0)
OPIATES UR-MCNC: NEGATIVE — SIGNIFICANT CHANGE UP
OXYCODONE UR-MCNC: NEGATIVE — SIGNIFICANT CHANGE UP
PCP SPEC-MCNC: SIGNIFICANT CHANGE UP
PCP UR-MCNC: NEGATIVE — SIGNIFICANT CHANGE UP
PLATELET # BLD AUTO: 169 K/UL — SIGNIFICANT CHANGE UP (ref 150–400)
POTASSIUM SERPL-MCNC: 4 MMOL/L — SIGNIFICANT CHANGE UP (ref 3.5–5.3)
POTASSIUM SERPL-SCNC: 4 MMOL/L — SIGNIFICANT CHANGE UP (ref 3.5–5.3)
PROT SERPL-MCNC: 6.3 G/DL — SIGNIFICANT CHANGE UP (ref 6–8.3)
RBC # BLD: 3.92 M/UL — SIGNIFICANT CHANGE UP (ref 3.8–5.2)
RBC # FLD: 13.5 % — SIGNIFICANT CHANGE UP (ref 10.3–14.5)
SALICYLATES SERPL-MCNC: <2 MG/DL — LOW (ref 15–30)
SARS-COV-2 RNA SPEC QL NAA+PROBE: SIGNIFICANT CHANGE UP
SODIUM SERPL-SCNC: 141 MMOL/L — SIGNIFICANT CHANGE UP (ref 135–145)
THC UR QL: NEGATIVE — SIGNIFICANT CHANGE UP
TSH SERPL-MCNC: 2.3 UIU/ML — SIGNIFICANT CHANGE UP (ref 0.27–4.2)
WBC # BLD: 4.31 K/UL — SIGNIFICANT CHANGE UP (ref 3.8–10.5)
WBC # FLD AUTO: 4.31 K/UL — SIGNIFICANT CHANGE UP (ref 3.8–10.5)

## 2021-06-15 PROCEDURE — 99284 EMERGENCY DEPT VISIT MOD MDM: CPT

## 2021-06-15 PROCEDURE — 93010 ELECTROCARDIOGRAM REPORT: CPT

## 2021-06-15 RX ORDER — ALPRAZOLAM 0.25 MG
1 TABLET ORAL ONCE
Refills: 0 | Status: DISCONTINUED | OUTPATIENT
Start: 2021-06-15 | End: 2021-06-15

## 2021-06-15 NOTE — ED BEHAVIORAL HEALTH ASSESSMENT NOTE - CURRENT MEDICATION
Home Medications:  methadone 10 mg oral tablet: 6 tab(s) orally  (30 Jan 2019 13:27) methadone, klonopin

## 2021-06-15 NOTE — ED PROVIDER NOTE - OBJECTIVE STATEMENT
50yr F substance use, anxiety depression on methadone 65mg and clonazepam w SI. reports feeling stressed and had thoughts of hurting herself. no specific plan. reports hx of self cutting. no HI. reports living with mother. unemployed. active smoker. denies inf sx. denies toxic ingestions. reports having prescription for methadone and has consistent neg UDS.

## 2021-06-15 NOTE — ED BEHAVIORAL HEALTH ASSESSMENT NOTE - REASON
Patient was seen in ED on 12/28/20 - 12/29/20 for chest pain and shortness of breath. EKG normal sinus rhythm with a ventricular rate of 90 bpm.  OH interval within normal limits. QRS is narrow. No QT or QTc prolongation. Normal axis. No ST or T wave abnormalities. Overall, unremarkable EKG. No STEMI.     RADIOLOGY:  XR CHEST (2 VW)   Final Result     Normal chest x-rays. Result Value Ref Range     Rapid Influenza A Ag Negative Negative     Rapid Influenza B Ag Negative Negative     SARS-CoV-2, PCR Not Detected  Not Detected Final 12/29/2020 12:31 AM [facility]   N  Clinical Impression      1. Chest pain, unspecified type      Phoned Parent for ED follow up/COVID precautions. Message left on voice mail requesting return call. Contact information provided. pt lethargic, AMS, need re eval for suicidality, pending collateral
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
## 2021-06-15 NOTE — ED ADULT NURSE NOTE - NSIMPLEMENTINTERV_GEN_ALL_ED
Implemented All Fall Risk Interventions:  [place] to call system. Call bell, personal items and telephone within reach. Instruct patient to call for assistance. Room bathroom lighting operational. Non-slip footwear when patient is off stretcher. Physically safe environment: no spills, clutter or unnecessary equipment. Stretcher in lowest position, wheels locked, appropriate side rails in place. Provide visual cue, wrist band, yellow gown, etc. Monitor gait and stability. Monitor for mental status changes and reorient to person, place, and time. Review medications for side effects contributing to fall risk. Reinforce activity limits and safety measures with patient and family.

## 2021-06-15 NOTE — ED PROVIDER NOTE - SHIFT CHANGE DETAILS
***ATTENDING ADDENDUM (Dr. Charanjit Henderson): I have received handoff from [facility]. Awaiting completion of psychiatric consultation. 50F with history of self-mutilation behavior, substance use disorder (takes methadone and benzodiazepines) and depression, with suicidal ideation. Some sedation noted in ED. ED diagnostics up to this time acknowledged, reviewed and noted. Likely admission - psychiatry v. medicine - pending evaluation and serial reassessments. Will continue to observe and monitor closely.

## 2021-06-15 NOTE — ED BEHAVIORAL HEALTH ASSESSMENT NOTE - ADDITIONAL DETAILS ALL
Patient is ambivalent about her SI, patient was confused; poor historian; multiple Suicidal attempts and gestures; last one being today - was self aborted

## 2021-06-15 NOTE — ED BEHAVIORAL HEALTH ASSESSMENT NOTE - RISK ASSESSMENT
High Acute Suicide Risk Patient is in overall high risk of suicide and cannot be discharged; patient will be admitted medically for altered mental status and will be f/u with psychiatry Patient is in overall high risk of suicide attempt, pt had recent attempt, multiple attempts in the past, psych admissions,hx depression, anxiety, noncompliant with meds

## 2021-06-15 NOTE — ED PROVIDER NOTE - PROGRESS NOTE DETAILS
Benjamín Dotson, PGY 3: psych is notified. Sign out follow-up: Pt awake, alert, cooperative. Telepsych called for reassessment. LAVERNE Patient is medically clear and pending voluntary psychiatric admission. Currently no psych beds available in system. No acute overnight events. LAVERNE Patient requesting for Methadone 60 mg.   Verified by JOSE Ho. ATTG: : patient endorsed to me by Dr. Saldivar, awaiting transfer to psychiatry inpatient - voluntary admission.

## 2021-06-15 NOTE — ED BEHAVIORAL HEALTH ASSESSMENT NOTE - DESCRIPTION
calm, cooperative, lethargic, confused at times n/a Domiciled; umeployed; ; no children calm, cooperative, lethargic, confused at times    This report was requested by: Marquita Coleman | Reference #: [id number]    You have not added a LIZZY number. Keeping your LIZZY number(s) up to date on the My LIZZY # page will enable the separation of your prescriptions from others in the search results.    Others' Prescriptions  Patient Name: Liliam Shanks Date: 1970  Address: [address] [place] [address] [place], [place] [address]Deo: Female  Rx Written	Rx Dispensed	Drug	Quantity	Days Supply	Prescriber Name	Prescriber Lizzy #	Payment Method	Dispenser  01/13/2021	01/14/2021	clonazepam 1 mg tablet	60	30	Saw Scruggs MD	[id number]	Medicaid	[facility]    Patient Name: Liliam Shanks Date: 1970  Address: [address] [place], [place] [address]Whm: Female  Rx Written	Rx Dispensed	Drug	Quantity	Days Supply	Prescriber Name	Prescriber Lizzy #	Payment Method	Dispenser  06/10/2021	06/10/2021	clonazepam 1 mg tablet	60	30	Saw Scruggs MD	[id number]	[facility] #[id number]  05/05/2021	05/05/2021	clonazepam 1 mg tablet	60	30	Saw Scruggs MD	[id number]	[facility] #[id number]  03/22/2021	04/05/2021	clonazepam 1 mg tablet	60	30	Saw Scruggs MD	[id number]	[facility] #[id number]  02/19/2021	02/19/2021	alprazolam 2 mg tablet	14	7	[facility]	[id number]	[facility] #[id number]  02/10/2021	02/10/2021	clonazepam 1 mg tablet	60	30	Saw Scruggs MD	[id number]	[facility] #[id number]  01/08/2021	01/11/2021	clonazepam 1 mg tablet	14	7	Chanelle Ayon	[id number]	[facility]  12/02/2020	12/03/2020	alprazolam 2 mg tablet	60	30	Dahlia Tejada	[id number]	[facility] #[id number]  11/05/2020	11/05/2020	alprazolam 2 mg tablet	60	30	Saw Scruggs MD	[id number]	Insurance	[facility] #[id number]  09/22/2020	09/22/2020	alprazolam 2 mg tablet	60	30	Saw Scruggs MD	[id number]	Insurance	[facility] #[id number]  09/14/2020	09/15/2020	alprazolam 1 mg tablet	12	4	Olivia Gee DO	[id number]	Insurance	[facility] #[id number]
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
## 2021-06-15 NOTE — ED PROVIDER NOTE - CLINICAL SUMMARY MEDICAL DECISION MAKING FREE TEXT BOX
50yr F w substance use and psych hx p/w SI. exam notable for slightly slow motor function but orientable and responsive otherwise neuro intact.  plan for medical clearance and psych eval

## 2021-06-15 NOTE — ED BEHAVIORAL HEALTH ASSESSMENT NOTE - CASE SUMMARY
51 y/o Female; domiciled with mother in [place]; ; no children; unemployed. PPH of anxiety and bipolar disorder, Multiple prior Psychiatric admissions, last psychiatric admission in 2019 [facility] for depression after pt lost her father. History of polysubstance abuse. No pertinent past medical history. Patient was brought into the ED for evaluation of Suicidal Ideation, plan to jump in front of train.   On interview patient is lethargic and confused. Patient has speech latency and difficulty maintaining conversation because patient keeps dosing off. Patient states she has hx of anxiety, depression. Patient states that she has had multiple psychiatric admissions; last visit with a psychiatrist was March 2020 for 4 months, patient does not recall name of doctor; no current psychiatrist. Admits to multiple suicidal ideations and attempts(jumping in front of a train, cutting, OD) which were; last suicidal gesture today morning patient tried to jump in front of the train by was stopped by bystander. Pt states she has been feeling depressed, and had SI the past few weeks. Patient was ambivalent and difficult to asses for current SI; denies access to firearms. Denies psychosis, mariaelena. pending collateral. pt needs re eval by telepsych team. pt will most likely need psych admission

## 2021-06-15 NOTE — ED ADULT NURSE NOTE - OBJECTIVE STATEMENT
50 yr old female BIB self with SI; pt with slurred speech, eyes closing throughout interview, states she lives in [place] with her mother, has not slept in 3 days, today had SI to jump in front of train, is on a methadone maintenance program at [facility] and this morning took 65mg of methadone and also Klonopin 2mg that she says is prescribed; has been depressed 2/2 multiple losses including her father and her brother, also he BF has mental illness; also states that she has not been able to find a psychiatrist or get care for six months and this is a major contributing factor to her decline; pt was compliant with protocols and wanding done, belongings and valuables taken, CO initiated; continue to monitor.

## 2021-06-15 NOTE — ED PROVIDER NOTE - PHYSICAL EXAMINATION
appears in no distress, however, has slow gait and intermittently dozes off. pupils 3-4mm reactive  clear lungs  S1-2  no abd distention  nl DTR  no peripheral edema

## 2021-06-15 NOTE — ED ADULT TRIAGE NOTE - CHIEF COMPLAINT QUOTE
suicidal ideation x3 weeks. Pt has a plan, "to jump in front of the train." Denies hallucinations or recent suicide attempt. suicidal ideation x3 weeks. Pt has a plan, "to jump in front of the train." Denies hallucinations or recent suicide attempt. Pt brought directly to [facility].

## 2021-06-15 NOTE — ED BEHAVIORAL HEALTH ASSESSMENT NOTE - PAST PSYCHOTROPIC MEDICATION
Lexapro 20mg PO, Alprazolam 2mg PO, Abilify 30mg, Clonazepam 1mg BID, Lamictal 25mg, Wellbutrin 10mg and Methadone 5mg.

## 2021-06-15 NOTE — ED ADULT NURSE NOTE - CHIEF COMPLAINT QUOTE
suicidal ideation x3 weeks. Pt has a plan, "to jump in front of the train." Denies hallucinations or recent suicide attempt. Pt brought directly to [facility].

## 2021-06-16 ENCOUNTER — INPATIENT (INPATIENT)
Facility: HOSPITAL | Age: 51
LOS: 12 days | Discharge: HOME | End: 2021-06-29
Attending: PSYCHIATRY & NEUROLOGY | Admitting: PSYCHIATRY & NEUROLOGY
Payer: MEDICAID

## 2021-06-16 VITALS
TEMPERATURE: 98 F | SYSTOLIC BLOOD PRESSURE: 110 MMHG | HEART RATE: 68 BPM | OXYGEN SATURATION: 100 % | RESPIRATION RATE: 17 BRPM | DIASTOLIC BLOOD PRESSURE: 71 MMHG

## 2021-06-16 VITALS — TEMPERATURE: 98 F | HEIGHT: 63 IN

## 2021-06-16 DIAGNOSIS — F33.2 MAJOR DEPRESSIVE DISORDER, RECURRENT SEVERE WITHOUT PSYCHOTIC FEATURES: ICD-10-CM

## 2021-06-16 PROCEDURE — 80053 COMPREHEN METABOLIC PANEL: CPT

## 2021-06-16 PROCEDURE — 99222 1ST HOSP IP/OBS MODERATE 55: CPT

## 2021-06-16 PROCEDURE — U0005: CPT

## 2021-06-16 PROCEDURE — U0003: CPT

## 2021-06-16 PROCEDURE — 84443 ASSAY THYROID STIM HORMONE: CPT

## 2021-06-16 PROCEDURE — 80307 DRUG TEST PRSMV CHEM ANLYZR: CPT

## 2021-06-16 PROCEDURE — 99285 EMERGENCY DEPT VISIT HI MDM: CPT

## 2021-06-16 PROCEDURE — 93005 ELECTROCARDIOGRAM TRACING: CPT

## 2021-06-16 PROCEDURE — 85025 COMPLETE CBC W/AUTO DIFF WBC: CPT

## 2021-06-16 RX ORDER — HALOPERIDOL DECANOATE 100 MG/ML
5 INJECTION INTRAMUSCULAR EVERY 6 HOURS
Refills: 0 | Status: DISCONTINUED | OUTPATIENT
Start: 2021-06-16 | End: 2021-06-21

## 2021-06-16 RX ORDER — METHADONE HYDROCHLORIDE 40 MG/1
60 TABLET ORAL ONCE
Refills: 0 | Status: DISCONTINUED | OUTPATIENT
Start: 2021-06-16 | End: 2021-06-16

## 2021-06-16 RX ORDER — DIPHENHYDRAMINE HCL 50 MG
50 CAPSULE ORAL EVERY 6 HOURS
Refills: 0 | Status: DISCONTINUED | OUTPATIENT
Start: 2021-06-16 | End: 2021-06-29

## 2021-06-16 RX ORDER — METHADONE HYDROCHLORIDE 40 MG/1
20 TABLET ORAL DAILY
Refills: 0 | Status: DISCONTINUED | OUTPATIENT
Start: 2021-06-16 | End: 2021-06-21

## 2021-06-16 RX ORDER — CLONAZEPAM 1 MG
1 TABLET ORAL THREE TIMES A DAY
Refills: 0 | Status: DISCONTINUED | OUTPATIENT
Start: 2021-06-16 | End: 2021-06-21

## 2021-06-16 RX ORDER — NICOTINE POLACRILEX 2 MG
1 GUM BUCCAL DAILY
Refills: 0 | Status: DISCONTINUED | OUTPATIENT
Start: 2021-06-16 | End: 2021-06-29

## 2021-06-16 RX ORDER — CLONAZEPAM 1 MG
1 TABLET ORAL ONCE
Refills: 0 | Status: DISCONTINUED | OUTPATIENT
Start: 2021-06-16 | End: 2021-06-16

## 2021-06-16 RX ORDER — HALOPERIDOL DECANOATE 100 MG/ML
5 INJECTION INTRAMUSCULAR EVERY 6 HOURS
Refills: 0 | Status: DISCONTINUED | OUTPATIENT
Start: 2021-06-16 | End: 2021-06-16

## 2021-06-16 RX ORDER — METHADONE HYDROCHLORIDE 40 MG/1
40 TABLET ORAL DAILY
Refills: 0 | Status: DISCONTINUED | OUTPATIENT
Start: 2021-06-16 | End: 2021-06-21

## 2021-06-16 RX ORDER — NICOTINE POLACRILEX 2 MG
2 GUM BUCCAL
Refills: 0 | Status: DISCONTINUED | OUTPATIENT
Start: 2021-06-16 | End: 2021-06-29

## 2021-06-16 RX ADMIN — Medication 2 MILLIGRAM(S): at 18:00

## 2021-06-16 RX ADMIN — METHADONE HYDROCHLORIDE 60 MILLIGRAM(S): 40 TABLET ORAL at 09:08

## 2021-06-16 RX ADMIN — Medication 50 MILLIGRAM(S): at 22:44

## 2021-06-16 RX ADMIN — Medication 1 MILLIGRAM(S): at 09:05

## 2021-06-16 RX ADMIN — Medication 1 MILLIGRAM(S): at 20:41

## 2021-06-16 RX ADMIN — Medication 0.5 MILLIGRAM(S): at 11:56

## 2021-06-16 RX ADMIN — Medication 1 MILLIGRAM(S): at 14:10

## 2021-06-16 NOTE — BH INPATIENT PSYCHIATRY ASSESSMENT NOTE - MODIFICATIONS
Patient reports she has been depressed, confirms she cam eto the hospital for medications, reports having had suicidal ideation to jump in front of a train though denies at present. She reports having started using heroin when she was 31 though emphasizes that she is currently on methadone maintenance for chronic pain. She acknowledges history of bipolar disorder. She requests being given alprazolam 2mg TID instead of BID though accepts BID when writer indicates that is what was in the [facility] , acknowledging that is correct. Discussed potential changes in psychotropic medications.

## 2021-06-16 NOTE — ED ADULT NURSE REASSESSMENT NOTE - NS ED NURSE REASSESS COMMENT FT1
Earlier pt placed on end tidal 2/2 to sedation, VSS; pt now awake at intervals, ate and drank, still sleeping at intervals but less sedated, CO in place for safety; continue to monitor.
as per Telepsych psychiatric reassessment, pt is to remain in ED for reassessment by day team. 1:1 CO for s/i maintained.
pt. was interviewed in a private room via Telepsych device w/ 1:1 staff in the room.
pt. to be reassess via Telepsych as per hands off communication since she was sleepy and was unable to participate in the interview earlier during the day. 1:1 CO for s/i maintained.

## 2021-06-16 NOTE — BH INPATIENT PSYCHIATRY ASSESSMENT NOTE - NSBHASSESSSUMMFT_PSY_ALL_CORE
Patient reported came to the hospital because "I want an antidepressant that wouldn't make me gain weight". Stated she would like to be transferred to low 3 because her close friend was admitted on Low 3. Patient currently denies SI/ HI, intent and plan. Denies perceptual disturbances such as AH, VH, TH.

## 2021-06-16 NOTE — ED BEHAVIORAL HEALTH NOTE - BEHAVIORAL HEALTH NOTE
at 1410, ot received ativan 2 mg po for anxiety, pt ids currently waiting to be transferred to [facility] at 1410, ot received ativan 2 mg po for anxiety, pt is currently waiting to be transferred to [facility] and is anxiously asking everyone regarding her transportation. Pt needs constant redirection regarding intrusive behavior and poor boundaries. Pt was given updates regardless of this behavior even when she continues to ask repeated questions. Pt received Standing dose of methadone 60 and Klonopin 1 mg this am, ativan 0.5 at 1156H at 1 mg at 1410H. Pt was counseled regarding compliance with treatment and therapy upon discharge. Pt was maintained on constant observation for safety. (note updated by Davin)

## 2021-06-16 NOTE — BH PATIENT PROFILE - STATED REASON FOR ADMISSION
Pt refusing to cooperate in admission interview at this time.  Pt admitted for SI to jump in front of a car

## 2021-06-16 NOTE — BH PATIENT PROFILE - NSSBIRTDRGUSEDOTHTHAN_GEN_A_CORE
Per [facility] report pt has hx Cocaine/Crack, Heroin/Opiate use, pt denied use of substance but urine tox + for benzo, methadone and cocaine

## 2021-06-16 NOTE — ED BEHAVIORAL HEALTH NOTE - BEHAVIORAL HEALTH NOTE
Pt methadone dose verified with [facility] R LPN Payton Ho LPN and dose was verified as 60 mg once a day, pt last martines dispensed on June 14 Pt methadone dose verified with [facility] with  Payton Ho LPN and dose was verified as 60 mg once a day, pt last martines dispensed on June 14 Pt methadone dose verified with [facility] with  Payton Ho LPN and dose was verified as 60 mg once a day, pt last martines dispensed on Britni 15
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
## 2021-06-16 NOTE — ED BEHAVIORAL HEALTH NOTE - BEHAVIORAL HEALTH NOTE
Telepsychiatry Reassessment Note:    MD received handoff on patient.     Patient seen via [employer] cart. She is awake and alert at this time. She complains of depression and suicidal ideation with plan, notes that she has been thinking of jumping in front of a train and that yesterday she went to a train station thinking about jumping but a bystander encouraged her to seek help. She feels that she is a danger to herself at this time. she also c/o hopelessness, poor sleep, crying all the time. she denies specific stressors.     MSE: somewhat disheveled, mood depressed, affect congruent, thought process linear, tc: suicidal ideation, perceptual: no changes, Insight/judgement: fair.     A/P from prior eval: "· Summary (brief):	51 y/o Female; domiciled with mother in [place]; ; no children; unemployed. PPH of anxiety and bipolar disorder, Multiple prior Psychiatric admissions, last psychiatric admission in 2019 [facility] for depression after pt lost her father. History of polysubstance abuse. No pertinent past medical history. Patient was brought into the ED for evaluation of Suicidal Ideation, plan to jump in front of train.     On interview patient is lethargic and confused. Patient has speech latency and difficulty maintaining conversation because patient keeps dosing off. Patient states she has hx of anxiety, depression. Patient states that she has had multiple psychiatric admissions; last visit with a psychiatrist was March 2020 for 4 months, patient does not recall name of doctor; no current psychiatrist. Admits to multiple suicidal ideations and attempts(jumping in front of a train, cutting, OD) which were; last suicidal gesture today morning patient tried to jump in front of the train by was stopped by bystander. Pt states she has been feeling depressed, and had SI the past few weeks. Patient was ambivalent and difficult to asses for current SI; denies access to firearms. pending collateral. pt will need re evaluation by telepsychiatry team when pt is more alert and coherent. pt will most likely need psych admission."  patient is more alert at this time, remains depressed and suicidal and requests voluntary admission, deemed suitable for same.     Patient reports she received second dose of covid vaccine in May 2021.     - hold for voluntary bed

## 2021-06-16 NOTE — ED BEHAVIORAL HEALTH NOTE - BEHAVIORAL HEALTH NOTE
51 y/o Female; domiciled with mother in [place]; ; no children; unemployed. PPH of anxiety and bipolar disorder, Multiple prior Psychiatric admissions, last psychiatric admission in 2019 [facility] for depression after pt lost her father. History of polysubstance abuse. No pertinent past medical history. Patient was brought into the ED for evaluation of Suicidal Ideation, plan to jump in front of train.     On reassessment 6/16/2021 patient is awake and alert. Patient does not appear as lethargic compared to yesterday. Patient is cooperative and able to answer all the questions. Patient was seen by Telepsychiatry last night; states that patient had current SI, depression and was sedated; states patient wants to be transferred and admitted voluntarily to inpatient psychiatry. Patient still complains of depression and suicidal ideation with plan. Patient denies any Homicidal ideations/Intent/Plan. Patient denies any hallucinations, delusions and paranoia. Patient states that she wants to be admitted to [facility] because thats is where her "friend" Everett is(according to PCA patient was referring to the patient next door). Patient keeps repeatedly asking for information about "Everett". Patient states that she is waiting to be given Klonopin and methadone. There were no acute overnight events, patient states that her sleep was "bits and pieces" for 2-3 hours.  Patient states that she is waiting for bed to be transferred to an [facility] hospital.     According staff patient is going to be transferred to hospital in [place]. At this time, patient is being held in the ED till bed becomes available to transfer. Patient voluntarily wants to be transferred. Psychiatry will continue to f/u till patient is transferred. 51 y/o Female; domiciled with mother in [place]; ; no children; unemployed. PPH of anxiety and bipolar disorder, Multiple prior Psychiatric admissions, last psychiatric admission in 2019 [facility] for depression after pt lost her father. History of polysubstance abuse. No pertinent past medical history. Patient was brought into the ED for evaluation of Suicidal Ideation, plan to jump in front of train.     On reassessment 6/16/2021 9:30 AM : patient is awake and alert. Patient does not appear as lethargic compared to yesterday. Patient is cooperative and able to answer all the questions. Patient was seen by Telepsychiatry last night, Patient complained of hopelessness, depression and admits to current SI and plan, denies specific stressors. Patient also states to the telepsychiatry team that she feels that she is at harm for herself. On interview today, Patient still complains of depression and suicidal ideation with plan. Patient denies any Homicidal ideations/Intent/Plan. Patient denies any hallucinations, delusions and paranoia. Patient states that she wants to be admitted to [facility] because thats is where her "friend" Everett is(according to PCA patient was referring to the patient next door). Patient keeps repeatedly asking for information about "Everett". Patient states that she is waiting to be given Klonopin and methadone. There were no acute overnight events, patient states that her sleep was "bits and pieces" for 2-3 hours.  Pt methadone dose verified with [facility] with  Payton Ho LPN and dose was verified as 60 mg once a day, pt last martines dispensed on Britni 15. Patient states that she is waiting for bed to be transferred to an [facility] hospital. Attempted to obtain collateral information from Mother ([phone])- did not ; Friend Nella ([phone]) was also reached- left voicemail with call back number.    According staff patient is going to be transferred to hospital in [place]. At this time, patient is being held in the ED till bed becomes available to transfer. Patient voluntarily wants to be transferred. Psychiatry will continue to f/u till patient is transferred. 51 y/o Female; domiciled with mother in [place]; ; no children; unemployed. PPH of anxiety and bipolar disorder, Multiple prior Psychiatric admissions, last psychiatric admission in 2019 [facility] for depression after pt lost her father. History of polysubstance abuse. No pertinent past medical history. Patient was brought into the ED for evaluation of Suicidal Ideation, plan to jump in front of train.     On reassessment  today 6/16/2021 9:30 AM : patient is awake and alert. Patient does not appear as lethargic compared to yesterday. Patient is cooperative and able to answer all the questions. Patient was seen by Telepsychiatry last night, Patient complained of hopelessness, depression and admits to current SI and plan, denies specific stressors. Patient also states to the telepsychiatry team that she feels that she is at harm for herself. On interview today, Patient still complains of depression and suicidal ideation with plan. Patient denies any Homicidal ideations/Intent/Plan. Patient denies any hallucinations, delusions and paranoia. Patient states that she wants to be admitted to [facility] because thats is where her "friend" Everett is(according to PCA patient was referring to the patient next door). Patient keeps repeatedly asking for information about "Everett". Patient states that she is waiting to be given Klonopin and methadone. There were no acute overnight events, patient states that her sleep was "bits and pieces" for 2-3 hours.  Pt methadone dose verified with [facility] with  Payton Ho LPN and dose was verified as 60 mg once a day, pt last dose dispensed on Britni 15. Patient states that she is waiting for a bed to be transferred to an [facility] hospital. Attempted to obtain collateral information from Mother ([phone])- did not ; Friend Nella ([phone]) was also reached- left voicemail with call back number.    According to staff, patient is going to be transferred to a hospital in [place]. At this time, patient is being held in the ED till bed becomes available for transfer. Patient voluntarily wants to be transferred. Psychiatry will continue to f/u till patient is transferred and waiting for a bed. 49 y/o Female; domiciled with mother in [place]; ; no children; unemployed. PPH of anxiety and bipolar disorder, Multiple prior Psychiatric admissions, last psychiatric admission in 2019 [facility] for depression after pt lost her father. History of polysubstance abuse. No pertinent past medical history. Patient was brought into the ED for evaluation of Suicidal Ideation, plan to jump in front of train.     On reassessment  today 6/16/2021 9:30 AM : patient is awake and alert. Patient does not appear as lethargic compared to yesterday. Patient is cooperative and able to answer all the questions. Patient was seen by Telepsychiatry last night, Patient complained of hopelessness, depression and admits to current SI and plan, denies specific stressors. Patient also states to the telepsychiatry team that she feels that she is at harm for herself. On interview today, Patient still complains of depression and suicidal ideation with plan. Patient denies any Homicidal ideations/Intent/Plan. Patient denies any hallucinations, delusions and paranoia. Patient states that she wants to be admitted to [facility] because thats is where her "friend" Everett is(according to PCA patient was referring to the patient next door). Patient keeps repeatedly asking for information about "Everett". Patient states that she is waiting to be given Klonopin and methadone. There were no acute overnight events, patient states that her sleep was "bits and pieces" for 2-3 hours.  Pt methadone dose verified with [facility] with  Payton Ho LPN and dose was verified as 60 mg once a day, pt last dose dispensed on Britni 15. Patient states that she is waiting for a bed to be transferred to an [facility] hospital. Attempted to obtain collateral information by psych team and medical team from Mother ([phone])- did not ; Friend Nella ([phone]) was also reached- left voicemail with call back number.    According to staff, patient is going to be transferred to a hospital in [place]. At this time, patient is being held in the ED till bed becomes available for transfer. Patient voluntarily wants to be transferred. Psychiatry will continue to f/u till patient is transferred and waiting for a bed. 49 y/o Female; domiciled with mother in [place]; ; no children; unemployed. PPH of anxiety and bipolar disorder, Multiple prior Psychiatric admissions, last psychiatric admission in 2019 [facility] for depression after pt lost her father. History of polysubstance abuse. No pertinent past medical history. Patient was brought into the ED for evaluation of Suicidal Ideation, plan to jump in front of train.     On reassessment  today 6/16/2021 9:30 AM : patient is awake and alert. Patient does not appear as lethargic compared to yesterday. Patient is cooperative and able to answer all the questions. Patient was seen by Telepsychiatry last night, Patient complained of hopelessness, depression and admits to current SI and plan, denies specific stressors. Patient also states to the telepsychiatry team that she feels that she is at harm for herself. On interview today, Patient still complains of depression and suicidal ideation with plan. Patient denies any Homicidal ideations/Intent/Plan. Patient denies any hallucinations, delusions and paranoia. Patient states that she wants to be admitted to [facility] because thats is where her "friend" Everett is(according to PCA patient was referring to the patient next door). Patient keeps repeatedly asking for information about "Everett". Patient states that she is waiting to be given Klonopin and methadone. There were no acute overnight events, patient states that her sleep was "bits and pieces" for 2-3 hours.  Pt methadone dose verified with [facility] with  Payton Ho LPN and dose was verified as 60 mg once a day, pt last dose dispensed on Britni 15. Patient states that she is waiting for a bed to be transferred to an [facility] hospital. Attempted to obtain collateral information by psych team and medical team from Mother ([phone])- did not ; Friend Nella ([phone]) was also reached- left voicemail with call back number.    According to staff, patient is going to be transferred to a hospital in [place]. At this time, patient is being held in the ED till bed becomes available for transfer. Patient voluntarily wants to be transferred. Psychiatry will continue to f/u for safety assessment; patient cannot be discharged at this time. 49 y/o Female; domiciled with mother in [place]; ; no children; unemployed. PPH of anxiety and bipolar disorder, Multiple prior Psychiatric admissions, last psychiatric admission in 2019 [facility] for depression after pt lost her father. History of polysubstance abuse. No pertinent past medical history. Patient was brought into the ED for evaluation of Suicidal Ideation, plan to jump in front of train.     On reassessment  today 6/16/2021 9:30 AM : patient is awake and alert. Patient does not appear as lethargic compared to yesterday. Patient is cooperative and able to answer all the questions. Patient was seen by Telepsychiatry last night, Patient complained of hopelessness, depression and admits to current SI and plan, denies specific stressors. Patient also states to the telepsychiatry team that she feels that she is at harm for herself. On interview today, Patient still complains of depression and suicidal ideation with plan. Patient denies any Homicidal ideations/Intent/Plan. Patient denies any hallucinations, delusions and paranoia.  Patient states that she is waiting to be given Klonopin and methadone. There were no acute overnight events, patient states that her sleep was "bits and pieces" for 2-3 hours.  Pt methadone dose verified with [facility] with  Payton Ho LPN and dose was verified as 60 mg once a day, pt last dose dispensed on Britni 15. Patient states that she is waiting for a bed to be transferred to an [facility] hospital. Attempted to obtain collateral information by psych team and medical team from Mother ([phone])- did not ; Friend Nella ([phone]) was also reached- left voicemail with call back number.    Patient was able to sign voluntary admission papers and says she would like to go to [facility] where she was admitted in the past.  She says that she continues to have suicidal thoughts and continues to feel hopeless.  She is able to contract for safety now but remains a chronic risk to herself because of her suicidality.

## 2021-06-16 NOTE — BH INPATIENT PSYCHIATRY ASSESSMENT NOTE - CURRENT MEDICATION
MEDICATIONS  (STANDING):  clonazePAM  Tablet 1 milliGRAM(s) Oral three times a day  nicotine - 21 mG/24Hr(s) Patch 1 Patch Transdermal daily    MEDICATIONS  (PRN):  diphenhydrAMINE 50 milliGRAM(s) Oral every 6 hours PRN agitation  diphenhydrAMINE   Injectable 50 milliGRAM(s) IntraMuscular every 6 hours PRN Agitation  haloperidol     Tablet 5 milliGRAM(s) Oral every 6 hours PRN agitation  haloperidol    Injectable 5 milliGRAM(s) IntraMuscular every 6 hours PRN Agitation  LORazepam     Tablet 2 milliGRAM(s) Oral every 6 hours PRN agitation  LORazepam   Injectable 2 milliGRAM(s) IntraMuscular every 6 hours PRN Agitation  nicotine  Polacrilex Gum 2 milliGRAM(s) Oral every 2 hours PRN breakthrough cravings

## 2021-06-16 NOTE — BH PATIENT PROFILE - HOME MEDICATIONS
gabapentin 100 mg oral capsule , 1 cap(s) orally once a day   Lexapro 20 mg oral tablet , 1 tab(s) orally once a day   ALPRAZolam 2 mg oral tablet , 1 tab(s) orally 2 times a day MDD:2  Abilify 30 mg oral tablet , 1 tab(s) orally once a day   methadone 10 mg oral tablet , 6 tab(s) orally   LaMICtal 25 mg oral tablet , 2 tab(s) orally once a day   Wellbutrin  mg/12 hours oral tablet, extended release , 1 tab(s) orally once a day   KlonoPIN 1 mg oral tablet , 1 tab(s) orally once a day at 12 PM MDD:1 mg  KlonoPIN 0.5 mg oral tablet , 1 tab(s) orally 2 times a day MDD:1 mg  albuterol 90 mcg/inh inhalation aerosol , 2 puff(s) inhaled every 6 hours, As needed, Shortness of Breath and/or Wheezing

## 2021-06-16 NOTE — BH INPATIENT PSYCHIATRY ASSESSMENT NOTE - NSBHCHARTREVIEWVS_PSY_A_CORE FT
Vital Signs Last 24 Hrs  T(C): 36.4 (16 Jun 2021 16:47), Max: 36.8 (15 Aydin 2021 20:18)  T(F): 97.6 (16 Jun 2021 16:47), Max: 98.2 (15 Aydin 2021 20:18)  HR: 68 (16 Jun 2021 11:20) (68 - 74)  BP: 110/71 (16 Jun 2021 11:20) (97/64 - 110/71)  BP(mean): --  RR: 17 (16 Jun 2021 11:20) (17 - 18)  SpO2: 100% (16 Jun 2021 11:20) (97% - 100%)

## 2021-06-16 NOTE — ED BEHAVIORAL HEALTH NOTE - BEHAVIORAL HEALTH NOTE
pt requested and received ativan 0.5 mg for anxiety. Pt remains anxious and intrusive regarding her admission process. Pt remains on 1:1 for safety

## 2021-06-17 LAB
A1C WITH ESTIMATED AVERAGE GLUCOSE RESULT: 5.5 % — SIGNIFICANT CHANGE UP (ref 4–5.6)
CHOLEST SERPL-MCNC: 176 MG/DL — SIGNIFICANT CHANGE UP
COVID-19 SPIKE DOMAIN AB INTERP: POSITIVE
COVID-19 SPIKE DOMAIN ANTIBODY RESULT: >250 U/ML — HIGH
ESTIMATED AVERAGE GLUCOSE: 111 MG/DL — SIGNIFICANT CHANGE UP (ref 68–114)
HDLC SERPL-MCNC: 76 MG/DL — SIGNIFICANT CHANGE UP
LIPID PNL WITH DIRECT LDL SERPL: 73 MG/DL — SIGNIFICANT CHANGE UP
NON HDL CHOLESTEROL: 100 MG/DL — SIGNIFICANT CHANGE UP
SARS-COV-2 IGG+IGM SERPL QL IA: >250 U/ML — HIGH
SARS-COV-2 IGG+IGM SERPL QL IA: POSITIVE
TRIGL SERPL-MCNC: 137 MG/DL — SIGNIFICANT CHANGE UP

## 2021-06-17 PROCEDURE — 99232 SBSQ HOSP IP/OBS MODERATE 35: CPT

## 2021-06-17 RX ORDER — HYDROXYZINE HCL 10 MG
50 TABLET ORAL EVERY 6 HOURS
Refills: 0 | Status: DISCONTINUED | OUTPATIENT
Start: 2021-06-17 | End: 2021-06-17

## 2021-06-17 RX ORDER — ARIPIPRAZOLE 15 MG/1
5 TABLET ORAL DAILY
Refills: 0 | Status: DISCONTINUED | OUTPATIENT
Start: 2021-06-18 | End: 2021-06-18

## 2021-06-17 RX ORDER — BENZTROPINE MESYLATE 1 MG
0.5 TABLET ORAL ONCE
Refills: 0 | Status: DISCONTINUED | OUTPATIENT
Start: 2021-06-18 | End: 2021-06-21

## 2021-06-17 RX ORDER — HYDROXYZINE HCL 10 MG
50 TABLET ORAL EVERY 6 HOURS
Refills: 0 | Status: DISCONTINUED | OUTPATIENT
Start: 2021-06-17 | End: 2021-06-29

## 2021-06-17 RX ADMIN — Medication 2 MILLIGRAM(S): at 15:36

## 2021-06-17 RX ADMIN — Medication 1 MILLIGRAM(S): at 12:42

## 2021-06-17 RX ADMIN — Medication 1 MILLIGRAM(S): at 20:20

## 2021-06-17 RX ADMIN — Medication 1 MILLIGRAM(S): at 08:40

## 2021-06-17 RX ADMIN — Medication 2 MILLIGRAM(S): at 00:05

## 2021-06-17 RX ADMIN — METHADONE HYDROCHLORIDE 40 MILLIGRAM(S): 40 TABLET ORAL at 08:40

## 2021-06-17 RX ADMIN — METHADONE HYDROCHLORIDE 20 MILLIGRAM(S): 40 TABLET ORAL at 08:40

## 2021-06-17 RX ADMIN — Medication 2 MILLIGRAM(S): at 06:34
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
## 2021-06-17 NOTE — BH INPATIENT PSYCHIATRY PROGRESS NOTE - NSBHCHARTREVIEWVS_PSY_A_CORE FT
Vital Signs Last 24 Hrs  T(C): 36.3 (17 Jun 2021 14:21), Max: 36.6 (16 Jun 2021 18:35)  T(F): 97.4 (17 Jun 2021 14:21), Max: 97.9 (17 Jun 2021 06:28)  HR: --  BP: --  BP(mean): --  RR: --  SpO2: -- Vital Signs Last 24 Hrs  T(C): 36.3 (17 Jun 2021 14:21), Max: 36.6 (16 Jun 2021 18:35)  T(F): 97.4 (17 Jun 2021 14:21), Max: 97.9 (17 Jun 2021 06:28)  HR: --70  BP: --101/63  BP(mean): --  RR: --  SpO2: --

## 2021-06-17 NOTE — PSYCHIATRIC REHAB INITIAL EVALUATION - NSBHALCSUBTREAT_PSY_ALL_CORE
Pt reports not having a psychiatrist or receiving psychiatric treatment prior to current hospitalization. Patient only reports being in contact with PHP who prescribed medication.

## 2021-06-17 NOTE — BH INPATIENT PSYCHIATRY PROGRESS NOTE - NSBHASSESSSUMMFT_PSY_ALL_CORE
49 y/o Female; domiciled with mother in [place]; ; no children; unemployed. PPH of anxiety and bipolar disorder, Multiple prior Psychiatric admissions, last psychiatric admission in 2019 [facility] for depression after pt lost her father. History of polysubstance abuse. No pertinent past medical history. Patient was brought into the ED for evaluation of Suicidal Ideation, plan to jump in front of train.     >Obs: Routine, no current SI. no need for CO, patient not expected to pose risk to self or others in controlled inpatient setting  >Psychiatric Meds: Abilify 5mg po daily, Cogentin 0.5mg po daily, Atarax 50mg Q6H PRN. Home med Clonazepam 1mg po three times.    >Medical:  No acute concerns  >Social: milieu therapy  >Treatment Interventions: Groups and Individual Therapy  >Dispo: Collateral and dispo planning pending further symptom and medication optimization.

## 2021-06-17 NOTE — BH INPATIENT PSYCHIATRY PROGRESS NOTE - OTHER
Intense Intense. At times eyes darted around the room but did not make contact with interviewer for prolonged periods of time.

## 2021-06-17 NOTE — PSYCHIATRIC REHAB INITIAL EVALUATION - NSBHALCSUBCHOICE_PSY_ALL_CORE
Pt denies history of recent substance use, however chart reports that patient recently consumed 1-2 alcoholic drinks daily prior to hospitalization. Patient also has a history of crystal meth use.

## 2021-06-17 NOTE — CHART NOTE - NSCHARTNOTEFT_GEN_A_CORE
Screening Medical Evaluation  Patient Admitted from: [facility] ED    ZH admitting diagnosis: Severe episode of recurrent major depressive disorder, without psychotic features    PAST MEDICAL & SURGICAL HISTORY:  Anxiety    Substance abuse    Bipolar disorder    No significant past surgical history          Allergies    No Known Allergies    Intolerances        Social History:     FAMILY HISTORY:  No pertinent family history in first degree relatives        MEDICATIONS  (STANDING):  clonazePAM  Tablet 1 milliGRAM(s) Oral three times a day  methadone    Tablet 20 milliGRAM(s) Oral daily  methadone   Dispersible Tablet 40 milliGRAM(s) Oral daily  nicotine - 21 mG/24Hr(s) Patch 1 Patch Transdermal daily    MEDICATIONS  (PRN):  diphenhydrAMINE 50 milliGRAM(s) Oral every 6 hours PRN agitation  diphenhydrAMINE   Injectable 50 milliGRAM(s) IntraMuscular every 6 hours PRN Agitation  haloperidol     Tablet 5 milliGRAM(s) Oral every 6 hours PRN agitation  haloperidol    Injectable 5 milliGRAM(s) IntraMuscular every 6 hours PRN Agitation  LORazepam     Tablet 2 milliGRAM(s) Oral every 6 hours PRN agitation  LORazepam   Injectable 2 milliGRAM(s) IntraMuscular every 6 hours PRN Agitation  nicotine  Polacrilex Gum 2 milliGRAM(s) Oral every 2 hours PRN breakthrough cravings      Vital Signs Last 24 Hrs  T(C): 36.6 (16 Jun 2021 18:35), Max: 36.7 (16 Jun 2021 11:20)  T(F): 97.8 (16 Jun 2021 18:35), Max: 98 (16 Jun 2021 11:20)  HR: 68 (16 Jun 2021 11:20) (68 - 74)  BP: 110/71 (16 Jun 2021 11:20) (97/64 - 110/71)  BP(mean): --  RR: 17 (16 Jun 2021 11:20) (17 - 17)  SpO2: 100% (16 Jun 2021 11:20) (98% - 100%)  CAPILLARY BLOOD GLUCOSE            PHYSICAL EXAM:  GENERAL: NAD, well-developed  HEAD:  Atraumatic, Normocephalic  EYES: EOMI, PERRLA, conjunctiva and sclera clear  NECK: Supple.  CHEST/LUNG: Clear to auscultation bilaterally; No wheeze  HEART: Regular rate and rhythm; No murmurs, rubs, or gallops  ABDOMEN: Soft, Nontender, Nondistended; Bowel sounds present  EXTREMITIES:  2+ Peripheral Pulses, No clubbing, cyanosis, or edema  PSYCH: AAOx3  NEUROLOGY: non-focal  SKIN: No rashes or lesions    LABS:                        11.4   4.31  )-----------( 169      ( 15 Aydin 2021 13:07 )             35.8     06-15    141  |  106  |  13  ----------------------------<  93  4.0   |  25  |  0.66    Ca    9.3      15 Aydin 2021 13:07    TPro  6.3  /  Alb  3.7  /  TBili  0.4  /  DBili  x   /  AST  16  /  ALT  11  /  AlkPhos  59  06-15              RADIOLOGY & ADDITIONAL TESTS:    Assessment and Plan:  50 year old female presenting today with admitting diagnosis of Severe episode of recurrent major depressive disorder, without psychotic features with PMH of opiate abuse (currently on methadone treatment). Denies any fever, chills, headache, chest pain, SOB, abdominal pain, N/V/D/C, dysuria. Physical exam unremarkable.  1) Severe episode of recurrent major depressive disorder, without psychotic features: Follow care plan as per primary team.  2) Opiate abuse: Continue methadone.

## 2021-06-17 NOTE — PSYCHIATRIC REHAB INITIAL EVALUATION - NSBHSUPNAMESPOUSE_PSY_ALL_CORE
Patient states she has a boyfriend, who she was admitted with to this hospital. The boyfriend was admitted to same facility, however different unit.

## 2021-06-17 NOTE — PSYCHIATRIC REHAB INITIAL EVALUATION - NSBHPRRECOMMEND_PSY_ALL_CORE
Writer met with patient in order to orient patient to unit and briefly introduce patient to psychiatric rehabilitation staff and department function. Patient was provided with a copy of unit schedule. Patient is not a reliable historian, as patient minimizes events leading up to current hospitalization. Chart reports that patient is currently admitted due to increased SI and SA in the context of wanting to jump in front of a train. Patient denies history of SA, however endorses history of VIA scratching.  Patient denies current SI and is able to contract for safety. Patient and writer established a collaborative psychiatric rehabilitation goal. Writer encouraged patient to attend psychiatric rehabilitation groups and engage in treatment. Psychiatric rehabilitation staff will engage patient daily.

## 2021-06-17 NOTE — BH INPATIENT PSYCHIATRY PROGRESS NOTE - NSBHFUPINTERVALHXFT_PSY_A_CORE
Patient seen for follow up for suicidal ideation, bipolar I, and substance use disorder (heroine, smoking, alcohol, cocaine). No overnight events or PRN medication required for agitation. Patient describes her mood as "sad" but does not want to elaborate to interviewer. Patient also notes "anxiety" and "worry" and is preoccupied with seeking benzodiazepines. Patient notes sleep disturbances but attributes this to inpatient roommate being loud at night, which she notes is making her "scared". Patient denies symptoms of mariaelena including episodes of low sleep or feeling hyped up, despite her prior diagnosis. Patient denies changes in appetite. Patient denies SI/HI intent, or plan despite her admission yesterday being for suicidal behavior. Patient denies AH/VH/TH. Patient expresses interest in Wellbutrin for smoking cessation, for which she notes a 17 year history of a half pack per day. Patient also expresses interest in Methadone program for opioid treatment, from which she was previously banned.  Patient seen for follow up for suicidal ideation, bipolar I, and substance use disorder (heroine, smoking, alcohol, cocaine). No overnight events or PRN medication required for agitation. Patient describes her mood as "sad" but does not want to elaborate to interviewer. Patient also notes "anxiety" and "worry" and is preoccupied with seeking benzodiazepines. Patient notes sleep disturbances but attributes this to inpatient roommate being loud at night, which she notes is making her "scared". Patient denies symptoms of mariaelena including episodes of poor sleep or feeling "hyped up", despite her prior diagnosis. Patient denies changes in appetite. Patient denies SI/HI intent, or plan despite her admission yesterday being admitted for suicidal ideation. Patient denies AH/VH/TH. Patient expresses interest in Wellbutrin for smoking cessation, for which she notes a 17 year history of a half pack per day. Patient also expresses interest in Methadone program for opioid treatment, from which she was previously banned.     Currently refused to give consent for collateral from family.

## 2021-06-18 RX ORDER — OXCARBAZEPINE 300 MG/1
300 TABLET, FILM COATED ORAL
Refills: 0 | Status: DISCONTINUED | OUTPATIENT
Start: 2021-06-18 | End: 2021-06-21

## 2021-06-18 RX ORDER — ARIPIPRAZOLE 15 MG/1
10 TABLET ORAL DAILY
Refills: 0 | Status: DISCONTINUED | OUTPATIENT
Start: 2021-06-18 | End: 2021-06-21

## 2021-06-18 RX ADMIN — METHADONE HYDROCHLORIDE 40 MILLIGRAM(S): 40 TABLET ORAL at 08:13

## 2021-06-18 RX ADMIN — ARIPIPRAZOLE 5 MILLIGRAM(S): 15 TABLET ORAL at 08:14

## 2021-06-18 RX ADMIN — Medication 1 MILLIGRAM(S): at 20:10

## 2021-06-18 RX ADMIN — METHADONE HYDROCHLORIDE 20 MILLIGRAM(S): 40 TABLET ORAL at 08:13

## 2021-06-18 RX ADMIN — Medication 1 MILLIGRAM(S): at 12:30

## 2021-06-18 RX ADMIN — Medication 1 MILLIGRAM(S): at 08:13

## 2021-06-18 RX ADMIN — Medication 50 MILLIGRAM(S): at 20:10

## 2021-06-18 RX ADMIN — OXCARBAZEPINE 300 MILLIGRAM(S): 300 TABLET, FILM COATED ORAL at 22:00

## 2021-06-18 NOTE — BH INPATIENT PSYCHIATRY PROGRESS NOTE - NSBHASSESSSUMMFT_PSY_ALL_CORE
51 y/o Female; domiciled with mother in [place]; ; no children; unemployed. PPH of anxiety and bipolar disorder, Multiple prior Psychiatric admissions, last psychiatric admission in 2019 [facility] for depression after pt lost her father. History of polysubstance abuse. No pertinent past medical history. Patient was brought into the ED for evaluation of Suicidal Ideation, plan to jump in front of train.     >Obs: Routine, no current SI. no need for CO, patient not expected to pose risk to self or others in controlled inpatient setting  >Psychiatric Meds: Abilify increased to 10mg po daily, Cogentin 0.5mg po daily, Trileptal 300mg po twice a day, Atarax 50mg Q6H PRN. Home med Clonazepam 1mg po three times.    >Medical:  No acute concerns  >Social: milieu therapy  >Treatment Interventions: Groups and Individual Therapy  >Dispo: Collateral and dispo planning pending further symptom and medication optimization.

## 2021-06-18 NOTE — BH INPATIENT PSYCHIATRY PROGRESS NOTE - NSBHFUPINTERVALHXFT_PSY_A_CORE
Patient seen for follow up for suicidal ideation, bipolar I, and substance use disorder (heroine, smoking, alcohol, cocaine). No overnight events or PRN medication required for agitation. During the f/u, the patient continues to be superficially engaged. She reported her mood is "less sick", "less anxious". The patient requested Wellbutrin. The writer explained the risk/ side effects of Wellbutrin and noted it might exacerbate her symptoms. She continues to be medication seeking for a higher dose of clonazepam and ativan. She is adherent to medication and denies side effects. The patient slept well last night compared to the prior night. She reported an adequate appetite. Patient denies SI/HI intent, or plan. Patient denies perceptual disturbances such as AH, VH, TH. She offers no physical complaints. Continues to refuse consent for collateral.

## 2021-06-18 NOTE — BH INPATIENT PSYCHIATRY PROGRESS NOTE - NSBHCHARTREVIEWVS_PSY_A_CORE FT
Vital Signs Last 24 Hrs  T(C): 36.9 (18 Jun 2021 14:32), Max: 36.9 (17 Jun 2021 18:42)  T(F): 98.5 (18 Jun 2021 14:32), Max: 98.5 (18 Jun 2021 14:32)  HR: 75 (18 Jun 2021 08:20) (75 - 75)  BP: 100/65 (18 Jun 2021 08:20) (100/65 - 100/65)  BP(mean): --  RR: 18 (18 Jun 2021 08:20) (18 - 18)  SpO2: 98% (17 Jun 2021 19:31) (98% - 98%)

## 2021-06-19 PROCEDURE — 99232 SBSQ HOSP IP/OBS MODERATE 35: CPT

## 2021-06-19 RX ORDER — SENNA PLUS 8.6 MG/1
2 TABLET ORAL AT BEDTIME
Refills: 0 | Status: DISCONTINUED | OUTPATIENT
Start: 2021-06-19 | End: 2021-06-29

## 2021-06-19 RX ORDER — POLYETHYLENE GLYCOL 3350 17 G/17G
17 POWDER, FOR SOLUTION ORAL DAILY
Refills: 0 | Status: DISCONTINUED | OUTPATIENT
Start: 2021-06-19 | End: 2021-06-29

## 2021-06-19 RX ADMIN — METHADONE HYDROCHLORIDE 40 MILLIGRAM(S): 40 TABLET ORAL at 08:19

## 2021-06-19 RX ADMIN — Medication 1 MILLIGRAM(S): at 20:22

## 2021-06-19 RX ADMIN — METHADONE HYDROCHLORIDE 20 MILLIGRAM(S): 40 TABLET ORAL at 08:19

## 2021-06-19 RX ADMIN — SENNA PLUS 2 TABLET(S): 8.6 TABLET ORAL at 20:21

## 2021-06-19 RX ADMIN — OXCARBAZEPINE 300 MILLIGRAM(S): 300 TABLET, FILM COATED ORAL at 20:20

## 2021-06-19 RX ADMIN — ARIPIPRAZOLE 10 MILLIGRAM(S): 15 TABLET ORAL at 08:19

## 2021-06-19 RX ADMIN — Medication 1 MILLIGRAM(S): at 13:13

## 2021-06-19 RX ADMIN — OXCARBAZEPINE 300 MILLIGRAM(S): 300 TABLET, FILM COATED ORAL at 08:18

## 2021-06-19 RX ADMIN — Medication 1 MILLIGRAM(S): at 08:19

## 2021-06-19 NOTE — BH INPATIENT PSYCHIATRY PROGRESS NOTE - NSBHCHARTREVIEWVS_PSY_A_CORE FT
Vital Signs Last 24 Hrs  T(C): 37.1 (19 Jun 2021 07:44), Max: 37.1 (19 Jun 2021 07:44)  T(F): 98.8 (19 Jun 2021 07:44), Max: 98.8 (19 Jun 2021 07:44)  HR: --  BP: --  BP(mean): --  RR: 18 (18 Jun 2021 14:32) (18 - 18)  SpO2: --

## 2021-06-19 NOTE — BH INPATIENT PSYCHIATRY PROGRESS NOTE - NSBHFUPINTERVALHXFT_PSY_A_CORE
Patient is followed up for Bipolar Disorder, admitted for depression/SI plan to jump in front of train, and substance abuse/opiate abuse (currently on methadone treatment).   Chart, medications and labs reviewed.  Patient is discussed with nursing staff. No significant overnight issues.  Patient is observed in her room.  She is approachable and engaging during interview.  Patient describes mood as “I’m a little depressed but ok.”  Reports still feeling depressed, downcast, reports sadness.   She reports that her depressive symptoms began several weeks ago, reports daily anxiety. At time of interview patient denies SI/HI, engages in safety planning, denies negative/violent thoughts.  Denies hallucinations, delusions and paranoia.   Compliance with medication is good, no reported or observed adverse effects.  Patient is encouraged to attend groups. No mention of sleep disturbances or appetite concerns. No behavioral issues on unit, no prn’s for aggression. Self-care remains fair/adequate. Medical concerns c/o constipation X 2 days, VSS.  Patient offers no complaints. Continue to monitor and provide therapeutic support.

## 2021-06-19 NOTE — BH INPATIENT PSYCHIATRY PROGRESS NOTE - NSBHASSESSSUMMFT_PSY_ALL_CORE
49 y/o Female; domiciled with mother in [place]; ; no children; unemployed. PPH of anxiety and bipolar disorder, Multiple prior Psychiatric admissions, last psychiatric admission in 2019 [facility] for depression after pt lost her father. History of polysubstance abuse. No pertinent past medical history. Patient was brought into the ED for evaluation of Suicidal Ideation, plan to jump in front of train.     >Obs: Routine, no current SI. no need for CO, patient not expected to pose risk to self or others in controlled inpatient setting  >Psychiatric Meds: Abilify increased to 10mg po daily, Cogentin 0.5mg po daily, Trileptal 300mg po twice a day, Atarax 50mg Q6H PRN. Home med Clonazepam 1mg po three times.  Senna 2mg qhs and Miralaax prn  >Medical:  No acute concerns  >Social: milieu therapy  >Treatment Interventions: Groups and Individual Therapy  >Dispo: Collateral and dispo planning pending further symptom and medication optimization.

## 2021-06-20 PROCEDURE — 99232 SBSQ HOSP IP/OBS MODERATE 35: CPT

## 2021-06-20 RX ADMIN — ARIPIPRAZOLE 10 MILLIGRAM(S): 15 TABLET ORAL at 08:27

## 2021-06-20 RX ADMIN — SENNA PLUS 2 TABLET(S): 8.6 TABLET ORAL at 20:05

## 2021-06-20 RX ADMIN — OXCARBAZEPINE 300 MILLIGRAM(S): 300 TABLET, FILM COATED ORAL at 08:27

## 2021-06-20 RX ADMIN — Medication 1 MILLIGRAM(S): at 20:05

## 2021-06-20 RX ADMIN — OXCARBAZEPINE 300 MILLIGRAM(S): 300 TABLET, FILM COATED ORAL at 20:06

## 2021-06-20 RX ADMIN — Medication 1 MILLIGRAM(S): at 13:10

## 2021-06-20 RX ADMIN — METHADONE HYDROCHLORIDE 40 MILLIGRAM(S): 40 TABLET ORAL at 08:28

## 2021-06-20 RX ADMIN — METHADONE HYDROCHLORIDE 20 MILLIGRAM(S): 40 TABLET ORAL at 08:28

## 2021-06-20 RX ADMIN — Medication 1 MILLIGRAM(S): at 08:28

## 2021-06-20 NOTE — BH INPATIENT PSYCHIATRY PROGRESS NOTE - NSBHCHARTREVIEWVS_PSY_A_CORE FT
Vital Signs Last 24 Hrs  T(C): 37.1 (20 Jun 2021 06:24), Max: 37.1 (20 Jun 2021 06:24)  T(F): 98.7 (20 Jun 2021 06:24), Max: 98.7 (20 Jun 2021 06:24)  HR: 71 (20 Jun 2021 08:08) (71 - 71)  BP: 104/67 (20 Jun 2021 08:08) (104/67 - 104/67)  BP(mean): --  RR: --  SpO2: --

## 2021-06-20 NOTE — BH INPATIENT PSYCHIATRY PROGRESS NOTE - NSBHFUPINTERVALHXFT_PSY_A_CORE
Patient is followed up for Bipolar Disorder, admitted for depression/SI plan to jump in front of train, and substance abuse/opiate abuse (currently on methadone treatment).   Chart, medications and labs reviewed.  Patient is discussed with nursing staff. No significant overnight issues.  Patient describes mood as “I’m ok.” Spent most of interview complaining about her roommate.  At time of interview patient denies SI/HI, engages in safety planning, denies negative/violent thoughts.  Denies hallucinations, delusions and paranoia.   Compliance with medication is good, no reported or observed adverse effects.  No mention of sleep disturbances or appetite concerns, eats all meals. No behavioral issues on unit, no prn’s for aggression. Self-care remains fair/adequate. No acute medical concerns.  VSS.  Patient offers no complaints. Continue to monitor and provide therapeutic support.

## 2021-06-21 PROCEDURE — 99232 SBSQ HOSP IP/OBS MODERATE 35: CPT

## 2021-06-21 RX ORDER — OXCARBAZEPINE 300 MG/1
600 TABLET, FILM COATED ORAL
Refills: 0 | Status: DISCONTINUED | OUTPATIENT
Start: 2021-06-21 | End: 2021-06-29

## 2021-06-21 RX ORDER — CHLORPROMAZINE HCL 10 MG
50 TABLET ORAL ONCE
Refills: 0 | Status: DISCONTINUED | OUTPATIENT
Start: 2021-06-21 | End: 2021-06-29

## 2021-06-21 RX ORDER — METHADONE HYDROCHLORIDE 40 MG/1
40 TABLET ORAL DAILY
Refills: 0 | Status: DISCONTINUED | OUTPATIENT
Start: 2021-06-21 | End: 2021-06-28

## 2021-06-21 RX ORDER — ARIPIPRAZOLE 15 MG/1
15 TABLET ORAL DAILY
Refills: 0 | Status: DISCONTINUED | OUTPATIENT
Start: 2021-06-21 | End: 2021-06-22

## 2021-06-21 RX ORDER — METHADONE HYDROCHLORIDE 40 MG/1
20 TABLET ORAL DAILY
Refills: 0 | Status: DISCONTINUED | OUTPATIENT
Start: 2021-06-21 | End: 2021-06-28

## 2021-06-21 RX ORDER — BENZTROPINE MESYLATE 1 MG
0.5 TABLET ORAL DAILY
Refills: 0 | Status: DISCONTINUED | OUTPATIENT
Start: 2021-06-21 | End: 2021-06-22

## 2021-06-21 RX ORDER — CLONAZEPAM 1 MG
1 TABLET ORAL THREE TIMES A DAY
Refills: 0 | Status: DISCONTINUED | OUTPATIENT
Start: 2021-06-21 | End: 2021-06-28

## 2021-06-21 RX ORDER — CHLORPROMAZINE HCL 10 MG
50 TABLET ORAL EVERY 6 HOURS
Refills: 0 | Status: DISCONTINUED | OUTPATIENT
Start: 2021-06-21 | End: 2021-06-29

## 2021-06-21 RX ADMIN — SENNA PLUS 2 TABLET(S): 8.6 TABLET ORAL at 20:02

## 2021-06-21 RX ADMIN — METHADONE HYDROCHLORIDE 40 MILLIGRAM(S): 40 TABLET ORAL at 08:07

## 2021-06-21 RX ADMIN — OXCARBAZEPINE 300 MILLIGRAM(S): 300 TABLET, FILM COATED ORAL at 08:07

## 2021-06-21 RX ADMIN — ARIPIPRAZOLE 10 MILLIGRAM(S): 15 TABLET ORAL at 08:07

## 2021-06-21 RX ADMIN — OXCARBAZEPINE 600 MILLIGRAM(S): 300 TABLET, FILM COATED ORAL at 20:02

## 2021-06-21 RX ADMIN — Medication 1 MILLIGRAM(S): at 12:26

## 2021-06-21 RX ADMIN — Medication 50 MILLIGRAM(S): at 18:15

## 2021-06-21 RX ADMIN — METHADONE HYDROCHLORIDE 20 MILLIGRAM(S): 40 TABLET ORAL at 08:07

## 2021-06-21 RX ADMIN — Medication 1 MILLIGRAM(S): at 08:07

## 2021-06-21 RX ADMIN — Medication 1 MILLIGRAM(S): at 20:02

## 2021-06-21 NOTE — BH INPATIENT PSYCHIATRY PROGRESS NOTE - NSBHCHARTREVIEWVS_PSY_A_CORE FT
Vital Signs Last 24 Hrs  T(C): 36.4 (21 Jun 2021 14:49), Max: 36.4 (21 Jun 2021 14:49)  T(F): 97.6 (21 Jun 2021 14:49), Max: 97.6 (21 Jun 2021 14:49)  HR: --72  BP: --100/70  BP(mean): --  RR: 18 (20 Jun 2021 19:53) (18 - 18)  SpO2: 98% (20 Jun 2021 19:53) (98% - 98%)

## 2021-06-21 NOTE — BH INPATIENT PSYCHIATRY PROGRESS NOTE - NSBHFUPINTERVALHXFT_PSY_A_CORE
Patient is followed up for Bipolar Disorder, admitted for depression/SI plan to jump in front of train, and substance abuse/opiate abuse (currently on methadone treatment).   Chart, medications and labs reviewed.  Patient is discussed with nursing staff. No significant overnight issues and no PRN medication required for agitation.  Patient describes mood as "good" but appears elevated and anxious during the f/u. Patient continues to complain about her roommate and stating "my roommate is schizophrenic, I need a new room".  She currently denies SI/HI, intent and plan. She engages in safety planning, denies negative/violent thoughts.  Denies hallucinations, delusions and paranoia.  Adherent to medication, no reported or observed adverse effects.  sleep and appetite are adequate. The patient is concerned about gaining weight and requested a low fat diet. She also requested to speak to a dietician. Self-care remains fair/adequate. No acute medical concerns.  VSS.  Patient offers no complaints. Continue to monitor and provide therapeutic support.

## 2021-06-21 NOTE — BH INPATIENT PSYCHIATRY PROGRESS NOTE - NSBHCHARTREVIEWVS_PSY_A_CORE FT
Vital Signs Last 24 Hrs  T(C): 36.1 (21 Jun 2021 06:14), Max: 36.3 (20 Jun 2021 19:53)  T(F): 96.9 (21 Jun 2021 06:14), Max: 97.3 (20 Jun 2021 19:53)  HR: --  BP: --  BP(mean): --  RR: 18 (20 Jun 2021 19:53) (18 - 18)  SpO2: 98% (20 Jun 2021 19:53) (98% - 98%)

## 2021-06-21 NOTE — BH INPATIENT PSYCHIATRY PROGRESS NOTE - NSBHASSESSSUMMFT_PSY_ALL_CORE
51 y/o Female; domiciled with mother in [place]; ; no children; unemployed. PPH of anxiety and bipolar disorder, Multiple prior Psychiatric admissions, last psychiatric admission in 2019 [facility] for depression after pt lost her father. History of polysubstance abuse. No pertinent past medical history. Patient was brought into the ED for evaluation of Suicidal Ideation, plan to jump in front of train.     >Obs: Routine, no current SI. no need for CO, patient not expected to pose risk to self or others in controlled inpatient setting  >Psychiatric Meds: Abilify increased to 15mg po daily, Cogentin 0.5mg po daily, Trileptal increased to 600mg po twice a day, Atarax 50mg Q6H PRN. Home med Clonazepam 1mg po three times.  Senna 2mg qhs and Miralaax prn  >Medical:  No acute concerns  >Social: milieu therapy  >Treatment Interventions: Groups and Individual Therapy  >Dispo: Collateral and dispo planning pending further symptom and medication optimization.

## 2021-06-22 PROCEDURE — ZZZZZ: CPT

## 2021-06-22 RX ORDER — BENZTROPINE MESYLATE 1 MG
1 TABLET ORAL DAILY
Refills: 0 | Status: DISCONTINUED | OUTPATIENT
Start: 2021-06-23 | End: 2021-06-29

## 2021-06-22 RX ORDER — ARIPIPRAZOLE 15 MG/1
20 TABLET ORAL DAILY
Refills: 0 | Status: DISCONTINUED | OUTPATIENT
Start: 2021-06-23 | End: 2021-06-29

## 2021-06-22 RX ADMIN — OXCARBAZEPINE 600 MILLIGRAM(S): 300 TABLET, FILM COATED ORAL at 08:20

## 2021-06-22 RX ADMIN — Medication 1 MILLIGRAM(S): at 12:27

## 2021-06-22 RX ADMIN — Medication 1 MILLIGRAM(S): at 20:23

## 2021-06-22 RX ADMIN — Medication 1 MILLIGRAM(S): at 08:20

## 2021-06-22 RX ADMIN — OXCARBAZEPINE 600 MILLIGRAM(S): 300 TABLET, FILM COATED ORAL at 20:23

## 2021-06-22 RX ADMIN — METHADONE HYDROCHLORIDE 20 MILLIGRAM(S): 40 TABLET ORAL at 08:20

## 2021-06-22 RX ADMIN — SENNA PLUS 2 TABLET(S): 8.6 TABLET ORAL at 20:23

## 2021-06-22 RX ADMIN — ARIPIPRAZOLE 15 MILLIGRAM(S): 15 TABLET ORAL at 08:20

## 2021-06-22 RX ADMIN — Medication 50 MILLIGRAM(S): at 14:38

## 2021-06-22 RX ADMIN — METHADONE HYDROCHLORIDE 40 MILLIGRAM(S): 40 TABLET ORAL at 08:19

## 2021-06-22 RX ADMIN — Medication 0.5 MILLIGRAM(S): at 08:20

## 2021-06-22 NOTE — BH INPATIENT PSYCHIATRY PROGRESS NOTE - NSBHCHARTREVIEWVS_PSY_A_CORE FT
Vital Signs Last 24 Hrs  T(C): 36.9 (22 Jun 2021 08:16), Max: 36.9 (22 Jun 2021 08:16)  T(F): 98.4 (22 Jun 2021 08:16), Max: 98.4 (22 Jun 2021 08:16)  HR: 84 (22 Jun 2021 08:16) (84 - 84)  BP: 90/62 (22 Jun 2021 08:16) (90/62 - 90/62)  BP(mean): --  RR: 16 (22 Jun 2021 08:16) (16 - 18)  SpO2: 99% (21 Jun 2021 22:40) (99% - 99%)

## 2021-06-22 NOTE — BH INPATIENT PSYCHIATRY PROGRESS NOTE - NSBHASSESSSUMMFT_PSY_ALL_CORE
51 y/o Female; domiciled with mother in [place]; ; no children; unemployed. PPH of anxiety and bipolar disorder, Multiple prior Psychiatric admissions, last psychiatric admission in 2019 [facility] for depression after pt lost her father. History of polysubstance abuse. No pertinent past medical history. Patient was brought into the ED for evaluation of Suicidal Ideation, plan to jump in front of train.     >Obs: Routine, no current SI. no need for CO, patient not expected to pose risk to self or others in controlled inpatient setting  >Psychiatric Meds: Abilify increased to 15mg po daily, Cogentin 0.5mg po daily, Trileptal increased to 600mg po twice a day, Atarax 50mg Q6H PRN. Home med Clonazepam 1mg po three times.  Senna 2mg qhs and Miralaax prn  >Medical:  No acute concerns  >Social: milieu therapy  >Treatment Interventions: Groups and Individual Therapy  >Dispo: Collateral and dispo planning pending further symptom and medication optimization.   51 y/o Female; domiciled with mother in [place]; ; no children; unemployed. PPH of anxiety and bipolar disorder, Multiple prior Psychiatric admissions, last psychiatric admission in 2019 [facility] for depression after pt lost her father. History of polysubstance abuse. No pertinent past medical history. Patient was brought into the ED for evaluation of Suicidal Ideation, plan to jump in front of train.     Currently denies SI/ HI, intent and plan since inpatient. Denies perceptual disturbances such as AH, VH, TH. Adherent to medication and denies side effects. Appears less anxious compared to the time of admission. Patient continues to refuse consent to speak with her family.    >Obs: Routine, no current SI. no need for CO, patient not expected to pose risk to self or others in controlled inpatient setting  >Psychiatric Meds: Abilify increased to 20mg po daily, Cogentin 1mg po daily, Trileptal increased to 600mg po twice a day, Atarax 50mg Q6H PRN. Home med Clonazepam 1mg po three times.  Senna 2mg qhs and Miralaax prn  >Medical:  No acute concerns  >Social: milieu therapy  >Treatment Interventions: Groups and Individual Therapy  >Dispo: Collateral and dispo planning pending further symptom and medication optimization.

## 2021-06-22 NOTE — BH INPATIENT PSYCHIATRY PROGRESS NOTE - NSBHFUPINTERVALHXFT_PSY_A_CORE
Patient is followed up for Bipolar Disorder, admitted for depression/SI plan to jump in front of train, and substance abuse/opiate abuse (currently on methadone treatment).   Chart, medications and labs reviewed.  Patient is discussed with nursing staff. No significant overnight issues and no PRN medication required for agitation.  Patient describes mood as "good." Patient states that her mood is "better" since she was able to change rooms but is "still depressed." She currently denies SI/HI, intent and plan. She engages in safety planning, denies negative/violent thoughts.  Denies hallucinations, delusions and paranoia.  Adherent to medication, no reported or observed adverse effects.  sleep and appetite are adequate. Patient continues to ruminate on desire for low fat diet and is looking forward to future meeting with dietician. Self-care remains fair/adequate. No acute medical concerns.  VSS.  Patient offers no complaints. Continue to monitor and provide therapeutic support.  Patient is followed up for Bipolar Disorder, admitted for depression/SI plan to jump in front of train, and substance abuse/opiate abuse (currently on methadone treatment).   Chart, medications and labs reviewed.  Patient is discussed with nursing staff. No significant overnight issues and no PRN medication required for agitation.  Patient describes mood as "good". Patient states that her mood is "better" since she was able to change rooms but is "still depressed." She currently denies SI/HI, intent and plan. She engages in safety planning, denies negative/violent thoughts.  Denies hallucinations, delusions and paranoia.  Adherent to medication, no reported or observed adverse effects.  Her sleep and appetite are adequate. Patient continues to ruminate on desire for low fat diet and is looking forward to future meeting with dietician. Self-care remains fair/adequate. No acute medical concerns.  VSS.  Patient offers no complaints. Continue to monitor and provide therapeutic support.

## 2021-06-23 PROCEDURE — 99232 SBSQ HOSP IP/OBS MODERATE 35: CPT

## 2021-06-23 RX ADMIN — Medication 1 MILLIGRAM(S): at 20:00

## 2021-06-23 RX ADMIN — Medication 1 MILLIGRAM(S): at 08:10

## 2021-06-23 RX ADMIN — ARIPIPRAZOLE 20 MILLIGRAM(S): 15 TABLET ORAL at 08:10

## 2021-06-23 RX ADMIN — METHADONE HYDROCHLORIDE 20 MILLIGRAM(S): 40 TABLET ORAL at 08:10

## 2021-06-23 RX ADMIN — Medication 1 MILLIGRAM(S): at 12:54

## 2021-06-23 RX ADMIN — SENNA PLUS 2 TABLET(S): 8.6 TABLET ORAL at 20:00

## 2021-06-23 RX ADMIN — OXCARBAZEPINE 600 MILLIGRAM(S): 300 TABLET, FILM COATED ORAL at 08:11

## 2021-06-23 RX ADMIN — OXCARBAZEPINE 600 MILLIGRAM(S): 300 TABLET, FILM COATED ORAL at 20:00

## 2021-06-23 RX ADMIN — METHADONE HYDROCHLORIDE 40 MILLIGRAM(S): 40 TABLET ORAL at 08:10

## 2021-06-23 NOTE — DIETITIAN INITIAL EVALUATION ADULT. - OTHER INFO
Met Pt in her room. Pt admitted to [facility] for treatment of depression. No pertinent medical history. Pt reports good appetite/po intake at present. No GI distress noted. Denies food allergies. Pt states has gained 6 lbs since admission and requesting healthier meal options. Food preferences taken and implemented. Declines diet education at this time.

## 2021-06-23 NOTE — BH INPATIENT PSYCHIATRY PROGRESS NOTE - NSBHASSESSSUMMFT_PSY_ALL_CORE
51 y/o Female; domiciled with mother in [place]; ; no children; unemployed. PPH of anxiety and bipolar disorder, Multiple prior Psychiatric admissions, last psychiatric admission in 2019 [facility] for depression after pt lost her father. History of polysubstance abuse. No pertinent past medical history. Patient was brought into the ED for evaluation of Suicidal Ideation, plan to jump in front of train.     Currently denies SI/ HI, intent and plan since inpatient. Denies perceptual disturbances such as AH, VH, TH. Adherent to medication and denies side effects. Appears less anxious compared to the time of admission. Patient continues to refuse consent to speak with her family.    >Obs: Routine, no current SI. no need for CO, patient not expected to pose risk to self or others in controlled inpatient setting  >Psychiatric Meds: Abilify increased to 20mg po daily, Cogentin 1mg po daily, Trileptal increased to 600mg po twice a day, Atarax 50mg Q6H PRN. Home med Clonazepam 1mg po three times.  Senna 2mg qhs and Miralaax prn  >Medical:  No acute concerns  >Social: milieu therapy  >Treatment Interventions: Groups and Individual Therapy  >Dispo: Collateral and dispo planning pending further symptom and medication optimization.

## 2021-06-23 NOTE — BH INPATIENT PSYCHIATRY PROGRESS NOTE - NSBHFUPINTERVALHXFT_PSY_A_CORE
Patient is followed up for Bipolar Disorder, admitted for depression/SI plan to jump in front of train, and substance abuse/opiate abuse (currently on methadone treatment).   Chart, medications and labs reviewed.  Patient is discussed with nursing staff. No significant overnight issues and no PRN medication required for agitation. Patient notes feelings of dizziness and nausea but denies vomiting. Patient describes mood as "good" and notes feeling less anxious and more calm, however says she is getting "nervous" when discharge of early next week is discussed. She currently denies SI/HI, intent and plan. She engages in safety planning, denies negative/violent thoughts.  Denies hallucinations, delusions and paranoia.  Adherent to medication. Her sleep and appetite are adequate and patient engaged in education with dietician. Patient reports going to group therapy. Self-care remains fair/adequate.  VSS. Continue to monitor and provide therapeutic support.  Patient is followed up for Bipolar Disorder, admitted for depression/SI plan to jump in front of train, and substance abuse/opiate abuse (currently on methadone treatment).   Chart, medications and labs reviewed.  Patient is discussed with nursing staff. No significant overnight issues and no PRN medication required for agitation. Patient notes feelings of dizziness and nausea earlier in the morning that resolved on it's own but denies vomiting. Patient describes mood as "good" and notes feeling less anxious and more calm, however says she is getting "nervous" when discharge of early next week is discussed. She currently denies SI/HI, intent and plan. She engages in safety planning, denies negative/violent thoughts.  Denies hallucinations, delusions and paranoia.  Adherent to medication. Her sleep and appetite are adequate and patient engaged in education with dietician. Patient reports going to group therapy. Self-care remains fair/adequate.  VSS. Continue to monitor and provide therapeutic support.

## 2021-06-23 NOTE — BH TREATMENT PLAN - NSTXSUBMISGOAL_PSY_ALL_CORE
Accept referral for substance abuse treatment on discharge
Accept referral for substance abuse treatment on discharge

## 2021-06-23 NOTE — BH INPATIENT PSYCHIATRY PROGRESS NOTE - NSBHCHARTREVIEWVS_PSY_A_CORE FT
Vital Signs Last 24 Hrs  T(C): 36.7 (23 Jun 2021 06:22), Max: 36.8 (22 Jun 2021 14:41)  T(F): 98.1 (23 Jun 2021 06:22), Max: 98.3 (22 Jun 2021 14:41)  HR: 80 (23 Jun 2021 08:41) (80 - 80)  BP: 104/67 (23 Jun 2021 08:41) (104/67 - 104/67)  BP(mean): --  RR: 16 (22 Jun 2021 14:41) (16 - 16)  SpO2: --

## 2021-06-24 PROCEDURE — 99232 SBSQ HOSP IP/OBS MODERATE 35: CPT

## 2021-06-24 RX ADMIN — Medication 1 MILLIGRAM(S): at 12:45

## 2021-06-24 RX ADMIN — METHADONE HYDROCHLORIDE 20 MILLIGRAM(S): 40 TABLET ORAL at 08:20

## 2021-06-24 RX ADMIN — ARIPIPRAZOLE 20 MILLIGRAM(S): 15 TABLET ORAL at 08:21

## 2021-06-24 RX ADMIN — Medication 1 MILLIGRAM(S): at 20:36

## 2021-06-24 RX ADMIN — OXCARBAZEPINE 600 MILLIGRAM(S): 300 TABLET, FILM COATED ORAL at 08:21

## 2021-06-24 RX ADMIN — SENNA PLUS 2 TABLET(S): 8.6 TABLET ORAL at 20:36

## 2021-06-24 RX ADMIN — Medication 1 MILLIGRAM(S): at 08:21

## 2021-06-24 RX ADMIN — OXCARBAZEPINE 600 MILLIGRAM(S): 300 TABLET, FILM COATED ORAL at 20:36

## 2021-06-24 RX ADMIN — METHADONE HYDROCHLORIDE 40 MILLIGRAM(S): 40 TABLET ORAL at 08:20

## 2021-06-24 NOTE — BH INPATIENT PSYCHIATRY PROGRESS NOTE - NSBHASSESSSUMMFT_PSY_ALL_CORE
51 y/o Female; domiciled with mother in [place]; ; no children; unemployed. PPH of anxiety and bipolar disorder, Multiple prior Psychiatric admissions, last psychiatric admission in 2019 [facility] for depression after pt lost her father. History of polysubstance abuse. No pertinent past medical history. Patient was brought into the ED for evaluation of Suicidal Ideation, plan to jump in front of train.     Currently denies SI/ HI, intent and plan since inpatient. Denies perceptual disturbances such as AH, VH, TH. Adherent to medication and denies side effects. Appears less anxious compared to the time of admission. Patient continues to refuse consent to speak with her family.    >Obs: Routine, no current SI. no need for CO, patient not expected to pose risk to self or others in controlled inpatient setting  >Psychiatric Meds: Abilify increased to 20mg po daily, Cogentin 1mg po daily, Trileptal increased to 600mg po twice a day, Atarax 50mg Q6H PRN. Home med Clonazepam 1mg po three times.  Senna 2mg qhs and Miralaax prn  >Medical:  No acute concerns  >Social: milieu therapy  >Treatment Interventions: Groups and Individual Therapy  >Dispo: Collateral and dispo planning pending further symptom and medication optimization.   49 y/o Female; domiciled with mother in [place]; ; no children; unemployed. PPH of anxiety and bipolar disorder, Multiple prior Psychiatric admissions, last psychiatric admission in 2019 [facility] for depression after pt lost her father. History of polysubstance abuse. No pertinent past medical history. Patient was brought into the ED for evaluation of Suicidal Ideation, plan to jump in front of train.     Currently denies SI/ HI, intent and plan since inpatient. Denies perceptual disturbances such as AH, VH, TH. Adherent to medication and denies side effects. Appears less anxious compared to the time of admission. Patient continues to refuse consent to speak with her family. Concerned about referral to a methadone clinic.     >Obs: Routine, no current SI. no need for CO, patient not expected to pose risk to self or others in controlled inpatient setting  >Psychiatric Meds: Abilify increased to 20mg po daily (will encourage ASHLEY), Cogentin 1mg po daily, Trileptal increased to 600mg po twice a day, Atarax 50mg Q6H PRN. Home med Clonazepam 1mg po three times.  Senna 2mg qhs and Miralaax prn.   >Medical:  No acute concerns  >Social: milieu therapy  >Treatment Interventions: Groups and Individual Therapy  >Dispo: Collateral and dispo planning pending further symptom and medication optimization.

## 2021-06-24 NOTE — BH INPATIENT PSYCHIATRY PROGRESS NOTE - NSBHFUPINTERVALHXFT_PSY_A_CORE
Patient is followed up for Bipolar Disorder, admitted for depression/SI plan to jump in front of train, and substance abuse/opiate abuse (currently on methadone treatment).   Chart, medications and labs reviewed.  Patient is discussed with nursing staff. No significant overnight issues and no PRN medication required for agitation. Patient notes feelings of nausea earlier in the morning that resolved on it's own but denies vomiting. Patient describes mood as "okay" and expresses interest in later discharge to make sure outpatient resources are coordinated (psychiatrist). She currently denies SI/HI, intent and plan. She engages in safety planning, denies negative/violent thoughts.  Denies hallucinations, delusions and paranoia.  Adherent to medication. Her sleep and appetite are adequate. Patient reports going to group therapy. Self-care remains fair/adequate.  VSS. Continue to monitor and provide therapeutic support.  Patient is followed up for Bipolar Disorder, admitted for depression/SI plan to jump in front of train, and substance abuse/opiate abuse (currently on methadone treatment). Chart, medications and labs reviewed.  Patient is discussed with nursing staff. No significant overnight issues and no PRN medication required for agitation. Patient describes mood as "okay" and expresses interest in later discharge to make sure outpatient resources are coordinated (psychiatrist). She currently denies SI/HI, intent and plan. She engages in safety planning, denies negative/violent thoughts.  Denies hallucinations, delusions and paranoia.  Adherent to medication. Her sleep and appetite are adequate. Patient reports going to group therapy. Self-care remains fair/adequate.  VSS. Continue to monitor and provide therapeutic support.

## 2021-06-24 NOTE — BH INPATIENT PSYCHIATRY PROGRESS NOTE - NSBHMSESPABN_PSY_A_CORE
Pressured rate

## 2021-06-24 NOTE — BH INPATIENT PSYCHIATRY PROGRESS NOTE - NSBHCHARTREVIEWVS_PSY_A_CORE FT
Vital Signs Last 24 Hrs  T(C): 36.7 (24 Jun 2021 06:16), Max: 36.7 (24 Jun 2021 06:16)  T(F): 98 (24 Jun 2021 06:16), Max: 98 (24 Jun 2021 06:16)  HR: --  BP: --  BP(mean): --  RR: --  SpO2: -- Vital Signs Last 24 Hrs  T(C): 36.7 (24 Jun 2021 06:16), Max: 36.7 (24 Jun 2021 06:16)  T(F): 98 (24 Jun 2021 06:16), Max: 98 (24 Jun 2021 06:16)  HR: --62  BP: --100/67  BP(mean): --  RR: --  SpO2: --

## 2021-06-25 DIAGNOSIS — Z71.89 OTHER SPECIFIED COUNSELING: ICD-10-CM

## 2021-06-25 PROCEDURE — 99232 SBSQ HOSP IP/OBS MODERATE 35: CPT

## 2021-06-25 RX ADMIN — Medication 1 MILLIGRAM(S): at 20:13

## 2021-06-25 RX ADMIN — OXCARBAZEPINE 600 MILLIGRAM(S): 300 TABLET, FILM COATED ORAL at 20:11

## 2021-06-25 RX ADMIN — OXCARBAZEPINE 600 MILLIGRAM(S): 300 TABLET, FILM COATED ORAL at 08:25

## 2021-06-25 RX ADMIN — METHADONE HYDROCHLORIDE 20 MILLIGRAM(S): 40 TABLET ORAL at 08:25

## 2021-06-25 RX ADMIN — Medication 1 MILLIGRAM(S): at 08:25

## 2021-06-25 RX ADMIN — SENNA PLUS 2 TABLET(S): 8.6 TABLET ORAL at 20:11

## 2021-06-25 RX ADMIN — Medication 1 MILLIGRAM(S): at 13:01

## 2021-06-25 RX ADMIN — METHADONE HYDROCHLORIDE 40 MILLIGRAM(S): 40 TABLET ORAL at 08:24

## 2021-06-25 RX ADMIN — ARIPIPRAZOLE 20 MILLIGRAM(S): 15 TABLET ORAL at 08:24

## 2021-06-25 RX ADMIN — Medication 50 MILLIGRAM(S): at 16:18

## 2021-06-25 RX ADMIN — Medication 1 MILLIGRAM(S): at 08:26

## 2021-06-25 NOTE — BH INPATIENT PSYCHIATRY PROGRESS NOTE - NSBHCHARTREVIEWVS_PSY_A_CORE FT
Vital Signs Last 24 Hrs  T(C): 35.9 (25 Jun 2021 06:26), Max: 36.4 (24 Jun 2021 14:40)  T(F): 96.7 (25 Jun 2021 06:26), Max: 97.6 (24 Jun 2021 14:40)  HR: 65 (25 Jun 2021 07:50) (65 - 65)  BP: 90/60 (25 Jun 2021 07:50) (90/60 - 90/60)  BP(mean): --  RR: 16 (24 Jun 2021 22:15) (16 - 16)  SpO2: 99% (24 Jun 2021 22:15) (99% - 99%)

## 2021-06-25 NOTE — BH INPATIENT PSYCHIATRY PROGRESS NOTE - NSBHASSESSSUMMFT_PSY_ALL_CORE
51 y/o Female; domiciled with mother in [place]; ; no children; unemployed. PPH of anxiety and bipolar disorder, Multiple prior Psychiatric admissions, last psychiatric admission in 2019 [facility] for depression after pt lost her father. History of polysubstance abuse. No pertinent past medical history. Patient was brought into the ED for evaluation of Suicidal Ideation, plan to jump in front of train.     Currently denies SI/ HI, intent and plan since inpatient. Denies perceptual disturbances such as AH, VH, TH. Adherent to medication and denies side effects. Appears less anxious compared to the time of admission. Patient continues to refuse consent to speak with her family. Concerned about referral to a methadone clinic.     >Obs: Routine, no current SI. no need for CO, patient not expected to pose risk to self or others in controlled inpatient setting  >Psychiatric Meds: Abilify increased to 20mg po daily (will encourage ASHLEY), Cogentin 1mg po daily, Trileptal increased to 600mg po twice a day, Atarax 50mg Q6H PRN. Home med Clonazepam 1mg po three times.  Senna 2mg qhs and Miralaax prn.   >Medical:  No acute concerns  >Social: milieu therapy  >Treatment Interventions: Groups and Individual Therapy  >Dispo: Collateral and dispo planning pending further symptom and medication optimization.

## 2021-06-25 NOTE — BH INPATIENT PSYCHIATRY PROGRESS NOTE - NSBHFUPINTERVALHXFT_PSY_A_CORE
Patient is followed up for Bipolar Disorder, admitted for depression/SI plan to jump in front of train, and substance abuse/opiate abuse (currently on methadone treatment). Chart, medications and labs reviewed.  Patient is discussed with nursing staff. No significant overnight issues and no PRN medication required for agitation. Patient describes mood as "okay" and expresses interest in later discharge to make sure outpatient resources are coordinated (psychiatrist). She also  Patient also notes displeasure that she possibly gained weight while on the inpatient unit. She currently denies SI/HI, intent and plan. She engages in safety planning, denies negative/violent thoughts.  Denies hallucinations, delusions and paranoia.  Adherent to medication. Her sleep and appetite are adequate. Patient reports going to group therapy. Self-care remains fair/adequate.  VSS. Continue to monitor and provide therapeutic support.  Patient is followed up for Bipolar Disorder, admitted for depression/SI plan to jump in front of train, and substance abuse/opiate abuse (currently on methadone treatment). Chart, medications and labs reviewed.  Patient is discussed with nursing staff. No significant overnight issues and no PRN medication required for agitation. Patient describes mood as "okay" and expresses interest in later discharge to make sure outpatient resources are coordinated (psychiatrist). She also  Patient also notes displeasure that she possibly gained weight while on the inpatient unit. She currently denies SI/HI, intent and plan. She engages in safety planning, denies negative/violent thoughts.  Denies hallucinations, delusions and paranoia.  Adherent to medication. Her sleep and appetite are adequate. Per staff, patient is not going to group therapy. Self-care remains fair/adequate.  VSS. Continue to monitor and provide therapeutic support.  Patient is followed up for Bipolar Disorder, admitted for depression/SI plan to jump in front of train, and substance abuse/opiate abuse (currently on methadone treatment). Chart, medications and labs reviewed.  Patient is discussed with nursing staff. No significant overnight issues and no PRN medication required for agitation. Patient describes mood as "okay" and expresses interest in later discharge to make sure outpatient resources are coordinated (psychiatrist). She also   noted displeasure that she possibly gained weight while on the inpatient unit. She currently denies SI/HI, intent and plan. She engages in safety planning, denies negative/violent thoughts.  Denies hallucinations, delusions and paranoia.  Adherent to medication. Her sleep and appetite are adequate. Per staff, patient is not going to group therapy. Psychotherapy encouraged at this time. Self-care remains fair/adequate.  VSS. Continue to monitor and provide therapeutic support.

## 2021-06-26 RX ADMIN — METHADONE HYDROCHLORIDE 20 MILLIGRAM(S): 40 TABLET ORAL at 08:03

## 2021-06-26 RX ADMIN — Medication 1 MILLIGRAM(S): at 08:03

## 2021-06-26 RX ADMIN — OXCARBAZEPINE 600 MILLIGRAM(S): 300 TABLET, FILM COATED ORAL at 08:02

## 2021-06-26 RX ADMIN — ARIPIPRAZOLE 20 MILLIGRAM(S): 15 TABLET ORAL at 08:03

## 2021-06-26 RX ADMIN — Medication 1 MILLIGRAM(S): at 19:48

## 2021-06-26 RX ADMIN — METHADONE HYDROCHLORIDE 40 MILLIGRAM(S): 40 TABLET ORAL at 08:02

## 2021-06-26 RX ADMIN — SENNA PLUS 2 TABLET(S): 8.6 TABLET ORAL at 19:48

## 2021-06-26 RX ADMIN — Medication 1 MILLIGRAM(S): at 13:44

## 2021-06-26 RX ADMIN — OXCARBAZEPINE 600 MILLIGRAM(S): 300 TABLET, FILM COATED ORAL at 19:48

## 2021-06-27 RX ORDER — IBUPROFEN 200 MG
600 TABLET ORAL ONCE
Refills: 0 | Status: DISCONTINUED | OUTPATIENT
Start: 2021-06-27 | End: 2021-06-29

## 2021-06-27 RX ADMIN — Medication 1 MILLIGRAM(S): at 08:09

## 2021-06-27 RX ADMIN — OXCARBAZEPINE 600 MILLIGRAM(S): 300 TABLET, FILM COATED ORAL at 08:08

## 2021-06-27 RX ADMIN — SENNA PLUS 2 TABLET(S): 8.6 TABLET ORAL at 20:20

## 2021-06-27 RX ADMIN — OXCARBAZEPINE 600 MILLIGRAM(S): 300 TABLET, FILM COATED ORAL at 20:20

## 2021-06-27 RX ADMIN — Medication 1 MILLIGRAM(S): at 12:20

## 2021-06-27 RX ADMIN — Medication 50 MILLIGRAM(S): at 15:51

## 2021-06-27 RX ADMIN — ARIPIPRAZOLE 20 MILLIGRAM(S): 15 TABLET ORAL at 08:08

## 2021-06-27 RX ADMIN — METHADONE HYDROCHLORIDE 20 MILLIGRAM(S): 40 TABLET ORAL at 08:08

## 2021-06-27 RX ADMIN — Medication 1 MILLIGRAM(S): at 08:08

## 2021-06-27 RX ADMIN — Medication 1 MILLIGRAM(S): at 20:20

## 2021-06-27 RX ADMIN — METHADONE HYDROCHLORIDE 40 MILLIGRAM(S): 40 TABLET ORAL at 08:08

## 2021-06-28 PROCEDURE — 99232 SBSQ HOSP IP/OBS MODERATE 35: CPT

## 2021-06-28 RX ORDER — OXCARBAZEPINE 300 MG/1
1 TABLET, FILM COATED ORAL
Qty: 28 | Refills: 0
Start: 2021-06-28 | End: 2021-07-11

## 2021-06-28 RX ORDER — CLONAZEPAM 1 MG
1 TABLET ORAL THREE TIMES A DAY
Refills: 0 | Status: DISCONTINUED | OUTPATIENT
Start: 2021-06-28 | End: 2021-06-29

## 2021-06-28 RX ORDER — ARIPIPRAZOLE 15 MG/1
1 TABLET ORAL
Qty: 14 | Refills: 0
Start: 2021-06-28 | End: 2021-07-11

## 2021-06-28 RX ORDER — BENZTROPINE MESYLATE 1 MG
1 TABLET ORAL
Qty: 14 | Refills: 0
Start: 2021-06-28 | End: 2021-07-11

## 2021-06-28 RX ORDER — CLONAZEPAM 1 MG
1 TABLET ORAL
Qty: 21 | Refills: 0
Start: 2021-06-28 | End: 2021-07-04

## 2021-06-28 RX ORDER — LANOLIN ALCOHOL/MO/W.PET/CERES
5 CREAM (GRAM) TOPICAL AT BEDTIME
Refills: 0 | Status: DISCONTINUED | OUTPATIENT
Start: 2021-06-28 | End: 2021-06-29

## 2021-06-28 RX ADMIN — Medication 1 MILLIGRAM(S): at 08:05

## 2021-06-28 RX ADMIN — Medication 1 PATCH: at 08:06

## 2021-06-28 RX ADMIN — OXCARBAZEPINE 600 MILLIGRAM(S): 300 TABLET, FILM COATED ORAL at 20:08

## 2021-06-28 RX ADMIN — Medication 1 MILLIGRAM(S): at 12:48

## 2021-06-28 RX ADMIN — Medication 1 MILLIGRAM(S): at 20:08

## 2021-06-28 RX ADMIN — ARIPIPRAZOLE 20 MILLIGRAM(S): 15 TABLET ORAL at 08:06

## 2021-06-28 RX ADMIN — METHADONE HYDROCHLORIDE 20 MILLIGRAM(S): 40 TABLET ORAL at 08:06

## 2021-06-28 RX ADMIN — METHADONE HYDROCHLORIDE 40 MILLIGRAM(S): 40 TABLET ORAL at 08:06

## 2021-06-28 RX ADMIN — SENNA PLUS 2 TABLET(S): 8.6 TABLET ORAL at 20:08

## 2021-06-28 RX ADMIN — OXCARBAZEPINE 600 MILLIGRAM(S): 300 TABLET, FILM COATED ORAL at 08:06

## 2021-06-28 NOTE — BH INPATIENT PSYCHIATRY PROGRESS NOTE - NSBHFUPINTERVALHXFT_PSY_A_CORE
Patient is followed up for Bipolar Disorder, admitted for depression/SI plan to jump in front of train, and substance abuse/opiate abuse (currently on methadone treatment). Chart, medications and labs reviewed.  Patient is discussed with nursing staff. No significant overnight issues and no PRN medication required for agitation. Patient describes mood as "okay." She notes sleep disturbances (and open to Melatonin) but patient slept through the night, per nursing team. She currently denies SI/HI, intent and plan. She engages in safety planning, denies negative/violent thoughts.  Denies hallucinations, delusions and paranoia.  Adherent to medication. Appetite is adequate. Per staff, patient is not going to group therapy but is social with other patients on floor. Psychotherapy encouraged at this time. Self-care remains fair/adequate.  VSS. Continue to monitor and provide therapeutic support. Education provided about ASHLEY for Abilify but patient refused citing insurance reasons. Patient eager for outpatient psychiatrist when discharged.  Patient is followed up for Bipolar Disorder, admitted for depression/SI plan to jump in front of train, and substance abuse/opiate abuse (currently on methadone treatment). Chart, medications and labs reviewed.  Patient is discussed with nursing staff. No significant overnight issues reported but noted patient taking Thorazine 50mg. Patient describes mood as "okay." She notes sleep disturbances (and open to Melatonin) but patient slept through the night, per sleep log. She currently denies SI/HI, intent and plan. She engages in safety planning, denies negative/violent thoughts.  Denies hallucinations, no delusions or paranoia elicited.  Adherent to medication. Appetite is adequate. Per staff, patient is not going to group therapy but is social with other patients on floor. Psychotherapy encouraged at this time. Self-care remains fair/adequate.  VSS. Continue to monitor and provide therapeutic support. Education provided about ASHLEY for Abilify but patient refused citing insurance reasons. Patient eager for outpatient psychiatrist when discharged.

## 2021-06-28 NOTE — BH INPATIENT PSYCHIATRY PROGRESS NOTE - NSBHASSESSSUMMFT_PSY_ALL_CORE
51 y/o Female; domiciled with mother in [place]; ; no children; unemployed. PPH of anxiety and bipolar disorder, Multiple prior Psychiatric admissions, last psychiatric admission in 2019 [facility] for depression after pt lost her father. History of polysubstance abuse. No pertinent past medical history. Patient was brought into the ED for evaluation of Suicidal Ideation, plan to jump in front of train.     Currently denies SI/ HI, intent and plan since inpatient. Denies perceptual disturbances such as AH, VH, TH. Adherent to medication and denies side effects. Appears less anxious compared to the time of admission. Patient continues to refuse consent to speak with her family. Concerned about referral to a methadone clinic.     >Obs: Routine, no current SI. no need for CO, patient not expected to pose risk to self or others in controlled inpatient setting  >Psychiatric Meds: Abilify increased to 20mg po daily (will encourage ASHLEY), Cogentin 1mg po daily, Trileptal increased to 600mg po twice a day, Atarax 50mg Q6H PRN. Home med Clonazepam 1mg po three times.  Senna 2mg qhs and Miralaax prn.   >Medical:  No acute concerns  >Social: milieu therapy  >Treatment Interventions: Groups and Individual Therapy  >Dispo: Collateral and dispo planning pending further symptom and medication optimization.   51 y/o Female; domiciled with mother in [place]; ; no children; unemployed. PPH of anxiety and bipolar disorder, Multiple prior Psychiatric admissions, last psychiatric admission in 2019 [facility] for depression after pt lost her father. History of polysubstance abuse. No pertinent past medical history. Patient was brought into the ED for evaluation of Suicidal Ideation, plan to jump in front of train.     Currently denies SI/ HI, intent and plan since inpatient. Denies perceptual disturbances such as AH, VH, TH. Adherent to medication and denies side effects. Appears less anxious compared to the time of admission. Patient continues to refuse consent to speak with her family. Concerned about referral to a methadone clinic. Requesting outpatient psychiatrist and therapist.     >Obs: Routine, no current SI. no need for CO, patient not expected to pose risk to self or others in controlled inpatient setting  >Psychiatric Meds: Abilify increased to 20mg po daily (will encourage ASHLEY), Cogentin 1mg po daily, Trileptal increased to 600mg po twice a day, Atarax 50mg Q6H PRN. Home med Clonazepam 1mg po three times.  Senna 2mg qhs and Miralaax prn.   >Medical:  No acute concerns  >Social: milieu therapy  >Treatment Interventions: Groups and Individual Therapy  >Dispo: Collateral and dispo planning pending further symptom and medication optimization.

## 2021-06-28 NOTE — BH INPATIENT PSYCHIATRY PROGRESS NOTE - NSBHCHARTREVIEWVS_PSY_A_CORE FT
Vital Signs Last 24 Hrs  T(C): 36.2 (28 Jun 2021 06:39), Max: 36.6 (27 Jun 2021 14:19)  T(F): 97.2 (28 Jun 2021 06:39), Max: 97.9 (27 Jun 2021 14:19)  HR: --  BP: --  BP(mean): --  RR: 20 (27 Jun 2021 22:14) (20 - 20)  SpO2: 100% (27 Jun 2021 22:14) (100% - 100%) Vital Signs Last 24 Hrs  T(C): 36.2 (28 Jun 2021 06:39), Max: 36.6 (27 Jun 2021 14:19)  T(F): 97.2 (28 Jun 2021 06:39), Max: 97.9 (27 Jun 2021 14:19)  HR: 78 (28 Jun 2021)  BP: 98/78 (28 Jun 20201)  BP(mean): --  RR: 20 (27 Jun 2021 22:14) (20 - 20)  SpO2: 100% (27 Jun 2021 22:14) (100% - 100%) Vital Signs Last 24 Hrs  T(C): 36.2 (28 Jun 2021 06:39), Max: 36.6 (27 Jun 2021 14:19)  T(F): 97.2 (28 Jun 2021 06:39), Max: 97.9 (27 Jun 2021 14:19)  HR: 78 (28 Jun 2021)  BP: 98/78 (28 Jun 2021)  BP(mean): --  RR: 20 (27 Jun 2021 22:14) (20 - 20)  SpO2: 100% (27 Jun 2021 22:14) (100% - 100%)

## 2021-06-28 NOTE — BH INPATIENT PSYCHIATRY PROGRESS NOTE - NSBHMSETHTPROC_PSY_A_CORE
Circumstantial/Thought blocking/Impaired reasoning patient does not want to be discharged unless has an outpatient psychiatrist coordinated/Perseverative/Thought blocking/Impaired reasoning patient does not want to be discharged unless has an outpatient psychiatrist coordinated/Perseverative/Impaired reasoning

## 2021-06-29 VITALS — TEMPERATURE: 97 F

## 2021-06-29 PROCEDURE — 99238 HOSP IP/OBS DSCHRG MGMT 30/<: CPT

## 2021-06-29 RX ORDER — METHADONE HYDROCHLORIDE 40 MG/1
20 TABLET ORAL DAILY
Refills: 0 | Status: DISCONTINUED | OUTPATIENT
Start: 2021-06-29 | End: 2021-06-29

## 2021-06-29 RX ORDER — METHADONE HYDROCHLORIDE 40 MG/1
40 TABLET ORAL DAILY
Refills: 0 | Status: DISCONTINUED | OUTPATIENT
Start: 2021-06-29 | End: 2021-06-29

## 2021-06-29 RX ADMIN — Medication 1 MILLIGRAM(S): at 08:09

## 2021-06-29 RX ADMIN — METHADONE HYDROCHLORIDE 40 MILLIGRAM(S): 40 TABLET ORAL at 08:53

## 2021-06-29 RX ADMIN — Medication 1 MILLIGRAM(S): at 12:54

## 2021-06-29 RX ADMIN — ARIPIPRAZOLE 20 MILLIGRAM(S): 15 TABLET ORAL at 08:06

## 2021-06-29 RX ADMIN — Medication 1 MILLIGRAM(S): at 08:08

## 2021-06-29 RX ADMIN — METHADONE HYDROCHLORIDE 20 MILLIGRAM(S): 40 TABLET ORAL at 08:53

## 2021-06-29 RX ADMIN — OXCARBAZEPINE 600 MILLIGRAM(S): 300 TABLET, FILM COATED ORAL at 08:07

## 2021-06-29 NOTE — BH DISCHARGE NOTE NURSING/SOCIAL WORK/PSYCH REHAB - DISCHARGE INSTRUCTIONS AFTERCARE APPOINTMENTS
In order to check the location, date, or time of your aftercare appointment, please refer to your Discharge Instructions Document given to you upon leaving the hospital.  If you have lost the instructions please call [phone]

## 2021-06-29 NOTE — BH INPATIENT PSYCHIATRY DISCHARGE NOTE - NSBHDCHANDOFFNOFT_PSY_A_CORE
Attempted to give Verbal handoff to [facility] at [phone] on 6/30, 7/2. Discharge summary will be faxed.

## 2021-06-29 NOTE — BH INPATIENT PSYCHIATRY PROGRESS NOTE - NSBHASSESSSUMMFT_PSY_ALL_CORE
49 y/o Female; domiciled with mother in [place]; ; no children; unemployed. PPH of anxiety and bipolar disorder, Multiple prior Psychiatric admissions, last psychiatric admission in 2019 [facility] for depression after pt lost her father. History of polysubstance abuse. No pertinent past medical history. Patient was brought into the ED for evaluation of Suicidal Ideation, plan to jump in front of train.     Currently denies SI/ HI, intent and plan since inpatient. Denies perceptual disturbances such as AH, VH, TH. Adherent to medication and denies side effects. Appears less anxious compared to the time of admission. Patient continues to refuse consent to speak with her family. Concerned about referral to a methadone clinic. Requesting outpatient psychiatrist and therapist.     >Obs: Routine, no current SI. no need for CO, patient not expected to pose risk to self or others in controlled inpatient setting  >Psychiatric Meds: Abilify increased to 20mg po daily (will encourage ASHLEY), Cogentin 1mg po daily, Trileptal increased to 600mg po twice a day, Atarax 50mg Q6H PRN. Home med Clonazepam 1mg po three times.  Senna 2mg qhs and Miralaax prn.   >Medical:  No acute concerns  >Social: milieu therapy  >Treatment Interventions: Groups and Individual Therapy  >Dispo: Collateral and dispo planning pending further symptom and medication optimization.   51 y/o Female; domiciled with mother in [place]; ; no children; unemployed. PPH of anxiety and bipolar disorder, Multiple prior Psychiatric admissions, last psychiatric admission in 2019 [facility] for depression after pt lost her father. History of polysubstance abuse. No pertinent past medical history. Patient was brought into the ED for evaluation of Suicidal Ideation, plan to jump in front of train.     Currently denies SI/ HI, intent and plan since inpatient. Denies perceptual disturbances such as AH, VH, TH. Adherent to medication and denies side effects. Appears less anxious compared to the time of admission. Patient continues to refuse consent to speak with her family. Concerned about referral to a methadone clinic. Requesting outpatient psychiatrist and therapist.     >Obs: Routine, no current SI. no need for CO, patient not expected to pose risk to self or others in controlled inpatient setting  >Psychiatric Meds: Abilify increased to 20mg po daily (refused ASHLEY), Cogentin 1mg po daily, Trileptal increased to 600mg po twice a day, Atarax 50mg Q6H PRN. Home med Clonazepam 1mg po three times.  Senna 2mg qhs and Miralaax prn.   >Medical:  No acute concerns  >Social: milieu therapy  >Treatment Interventions: Groups and Individual Therapy  >Dispo: Collateral and dispo planning pending further symptom and medication optimization.

## 2021-06-29 NOTE — BH INPATIENT PSYCHIATRY DISCHARGE NOTE - NSBHMETABOLIC_PSY_ALL_CORE_FT
BMI: BMI (kg/m2): 22.1 (06-16-21 @ 16:47)  HbA1c: A1C with Estimated Average Glucose Result: 5.5 % (06-17-21 @ 09:53)    Glucose:   BP: 97/60 (06-27-21 @ 08:03) (97/60 - 97/60)  Lipid Panel: Date/Time: 06-17-21 @ 09:53  Cholesterol, Serum: 176  Direct LDL: --  HDL Cholesterol, Serum: 76  Total Cholesterol/HDL Ration Measurement: --  Triglycerides, Serum: 137

## 2021-06-29 NOTE — BH DISCHARGE NOTE NURSING/SOCIAL WORK/PSYCH REHAB - NSDCPRGOAL_PSY_ALL_CORE
Writer met with patient in order to discuss progress towards psychiatric rehabilitation goal upon discharge. Patient has made fair progress, as patient has identified going for walks and reading as positive coping skills. Writer provided support and encouraged patient to continue utilizing coping skills post discharge. Patinet was receptive.     On the unit, patient was visible, interacting with selected peers. Patient states that she is excited about discharge because she is looking forward to spending time with boyfriend. Writer provided support and encouraged patient to remain focused on treatment. patient was receptive. Patient and writer completed safety plan. Patient denies SI and is able to contract for safety upon discharge.     Patient attended approximately 10% of psych rehab groups during current hospitalization. Patient was minimally verbal and unable to tolerate session structure. patient was provided with press ganey survey.

## 2021-06-29 NOTE — BH INPATIENT PSYCHIATRY DISCHARGE NOTE - NSBHDCRISKMITIGATE_PSY_ALL_CORE
Safety planning/Assisted outpatient treatment Safety planning/Medications targeting suicidality/non-suicidal self injurious behavior/Assisted outpatient treatment

## 2021-06-29 NOTE — BH INPATIENT PSYCHIATRY PROGRESS NOTE - NSTXDCOPNODATEEST_PSY_ALL_CORE
17-Jun-2021
24-Jun-2021
17-Jun-2021
24-Jun-2021
17-Jun-2021
24-Jun-2021
17-Jun-2021
17-Jun-2021

## 2021-06-29 NOTE — BH INPATIENT PSYCHIATRY PROGRESS NOTE - NSTXPROBTOBACO_PSY_ALL_CORE
TOBACCO/NICOTINE USE

## 2021-06-29 NOTE — BH INPATIENT PSYCHIATRY DISCHARGE NOTE - NSICDXPASTMEDICALHX_GEN_ALL_CORE_FT
PAST MEDICAL HISTORY:  Anxiety     Bipolar disorder     Substance abuse      No pertinent past medical history

## 2021-06-29 NOTE — BH INPATIENT PSYCHIATRY PROGRESS NOTE - NSTXDCOPNOINTERMD_PSY_ALL_CORE
Outpatient referral in progress as per social work.

## 2021-06-29 NOTE — BH INPATIENT PSYCHIATRY PROGRESS NOTE - CURRENT MEDICATION
MEDICATIONS  (STANDING):  ARIPiprazole 20 milliGRAM(s) Oral daily  benztropine 1 milliGRAM(s) Oral daily  clonazePAM  Tablet 1 milliGRAM(s) Oral three times a day  ibuprofen  Tablet. 600 milliGRAM(s) Oral once  nicotine - 21 mG/24Hr(s) Patch 1 Patch Transdermal daily  OXcarbazepine 600 milliGRAM(s) Oral two times a day  senna 2 Tablet(s) Oral at bedtime    MEDICATIONS  (PRN):  chlorproMAZINE    Injectable 50 milliGRAM(s) IntraMuscular once PRN agitation  chlorproMAZINE    Tablet 50 milliGRAM(s) Oral every 6 hours PRN agitation  diphenhydrAMINE 50 milliGRAM(s) Oral every 6 hours PRN agitation  diphenhydrAMINE   Injectable 50 milliGRAM(s) IntraMuscular every 6 hours PRN Agitation  hydrOXYzine hydrochloride 50 milliGRAM(s) Oral every 6 hours PRN anxiety  nicotine  Polacrilex Gum 2 milliGRAM(s) Oral every 2 hours PRN breakthrough cravings  polyethylene glycol 3350 17 Gram(s) Oral daily PRN constipation  
MEDICATIONS  (STANDING):  ARIPiprazole 15 milliGRAM(s) Oral daily  benztropine 0.5 milliGRAM(s) Oral daily  clonazePAM  Tablet 1 milliGRAM(s) Oral three times a day  methadone    Tablet 20 milliGRAM(s) Oral daily  methadone   Dispersible Tablet 40 milliGRAM(s) Oral daily  nicotine - 21 mG/24Hr(s) Patch 1 Patch Transdermal daily  OXcarbazepine 600 milliGRAM(s) Oral two times a day  senna 2 Tablet(s) Oral at bedtime    MEDICATIONS  (PRN):  chlorproMAZINE    Injectable 50 milliGRAM(s) IntraMuscular once PRN agitation  chlorproMAZINE    Tablet 50 milliGRAM(s) Oral every 6 hours PRN agitation  diphenhydrAMINE 50 milliGRAM(s) Oral every 6 hours PRN agitation  diphenhydrAMINE   Injectable 50 milliGRAM(s) IntraMuscular every 6 hours PRN Agitation  hydrOXYzine hydrochloride 50 milliGRAM(s) Oral every 6 hours PRN anxiety  nicotine  Polacrilex Gum 2 milliGRAM(s) Oral every 2 hours PRN breakthrough cravings  polyethylene glycol 3350 17 Gram(s) Oral daily PRN constipation  
MEDICATIONS  (STANDING):  ARIPiprazole 20 milliGRAM(s) Oral daily  benztropine 1 milliGRAM(s) Oral daily  clonazePAM  Tablet 1 milliGRAM(s) Oral three times a day  methadone    Tablet 20 milliGRAM(s) Oral daily  methadone   Dispersible Tablet 40 milliGRAM(s) Oral daily  nicotine - 21 mG/24Hr(s) Patch 1 Patch Transdermal daily  OXcarbazepine 600 milliGRAM(s) Oral two times a day  senna 2 Tablet(s) Oral at bedtime    MEDICATIONS  (PRN):  chlorproMAZINE    Injectable 50 milliGRAM(s) IntraMuscular once PRN agitation  chlorproMAZINE    Tablet 50 milliGRAM(s) Oral every 6 hours PRN agitation  diphenhydrAMINE 50 milliGRAM(s) Oral every 6 hours PRN agitation  diphenhydrAMINE   Injectable 50 milliGRAM(s) IntraMuscular every 6 hours PRN Agitation  hydrOXYzine hydrochloride 50 milliGRAM(s) Oral every 6 hours PRN anxiety  nicotine  Polacrilex Gum 2 milliGRAM(s) Oral every 2 hours PRN breakthrough cravings  polyethylene glycol 3350 17 Gram(s) Oral daily PRN constipation  
MEDICATIONS  (STANDING):  ARIPiprazole 20 milliGRAM(s) Oral daily  benztropine 1 milliGRAM(s) Oral daily  clonazePAM  Tablet 1 milliGRAM(s) Oral three times a day  methadone    Tablet 20 milliGRAM(s) Oral daily  methadone   Dispersible Tablet 40 milliGRAM(s) Oral daily  nicotine - 21 mG/24Hr(s) Patch 1 Patch Transdermal daily  OXcarbazepine 600 milliGRAM(s) Oral two times a day  senna 2 Tablet(s) Oral at bedtime    MEDICATIONS  (PRN):  chlorproMAZINE    Injectable 50 milliGRAM(s) IntraMuscular once PRN agitation  chlorproMAZINE    Tablet 50 milliGRAM(s) Oral every 6 hours PRN agitation  diphenhydrAMINE 50 milliGRAM(s) Oral every 6 hours PRN agitation  diphenhydrAMINE   Injectable 50 milliGRAM(s) IntraMuscular every 6 hours PRN Agitation  hydrOXYzine hydrochloride 50 milliGRAM(s) Oral every 6 hours PRN anxiety  nicotine  Polacrilex Gum 2 milliGRAM(s) Oral every 2 hours PRN breakthrough cravings  polyethylene glycol 3350 17 Gram(s) Oral daily PRN constipation  
MEDICATIONS  (STANDING):  ARIPiprazole 20 milliGRAM(s) Oral daily  benztropine 1 milliGRAM(s) Oral daily  clonazePAM  Tablet 1 milliGRAM(s) Oral three times a day  ibuprofen  Tablet. 600 milliGRAM(s) Oral once  methadone    Tablet 20 milliGRAM(s) Oral daily  methadone   Dispersible Tablet 40 milliGRAM(s) Oral daily  nicotine - 21 mG/24Hr(s) Patch 1 Patch Transdermal daily  OXcarbazepine 600 milliGRAM(s) Oral two times a day  senna 2 Tablet(s) Oral at bedtime    MEDICATIONS  (PRN):  chlorproMAZINE    Injectable 50 milliGRAM(s) IntraMuscular once PRN agitation  chlorproMAZINE    Tablet 50 milliGRAM(s) Oral every 6 hours PRN agitation  diphenhydrAMINE 50 milliGRAM(s) Oral every 6 hours PRN agitation  diphenhydrAMINE   Injectable 50 milliGRAM(s) IntraMuscular every 6 hours PRN Agitation  hydrOXYzine hydrochloride 50 milliGRAM(s) Oral every 6 hours PRN anxiety  melatonin. 5 milliGRAM(s) Oral at bedtime PRN Insomnia  nicotine  Polacrilex Gum 2 milliGRAM(s) Oral every 2 hours PRN breakthrough cravings  polyethylene glycol 3350 17 Gram(s) Oral daily PRN constipation  
MEDICATIONS  (STANDING):  ARIPiprazole 20 milliGRAM(s) Oral daily  benztropine 1 milliGRAM(s) Oral daily  clonazePAM  Tablet 1 milliGRAM(s) Oral three times a day  methadone    Tablet 20 milliGRAM(s) Oral daily  methadone   Dispersible Tablet 40 milliGRAM(s) Oral daily  nicotine - 21 mG/24Hr(s) Patch 1 Patch Transdermal daily  OXcarbazepine 600 milliGRAM(s) Oral two times a day  senna 2 Tablet(s) Oral at bedtime    MEDICATIONS  (PRN):  chlorproMAZINE    Injectable 50 milliGRAM(s) IntraMuscular once PRN agitation  chlorproMAZINE    Tablet 50 milliGRAM(s) Oral every 6 hours PRN agitation  diphenhydrAMINE 50 milliGRAM(s) Oral every 6 hours PRN agitation  diphenhydrAMINE   Injectable 50 milliGRAM(s) IntraMuscular every 6 hours PRN Agitation  hydrOXYzine hydrochloride 50 milliGRAM(s) Oral every 6 hours PRN anxiety  nicotine  Polacrilex Gum 2 milliGRAM(s) Oral every 2 hours PRN breakthrough cravings  polyethylene glycol 3350 17 Gram(s) Oral daily PRN constipation  
MEDICATIONS  (STANDING):  clonazePAM  Tablet 1 milliGRAM(s) Oral three times a day  methadone    Tablet 20 milliGRAM(s) Oral daily  methadone   Dispersible Tablet 40 milliGRAM(s) Oral daily  nicotine - 21 mG/24Hr(s) Patch 1 Patch Transdermal daily    MEDICATIONS  (PRN):  diphenhydrAMINE 50 milliGRAM(s) Oral every 6 hours PRN agitation  diphenhydrAMINE   Injectable 50 milliGRAM(s) IntraMuscular every 6 hours PRN Agitation  haloperidol     Tablet 5 milliGRAM(s) Oral every 6 hours PRN agitation  haloperidol    Injectable 5 milliGRAM(s) IntraMuscular every 6 hours PRN Agitation  LORazepam     Tablet 2 milliGRAM(s) Oral every 6 hours PRN agitation  LORazepam   Injectable 2 milliGRAM(s) IntraMuscular every 6 hours PRN Agitation  nicotine  Polacrilex Gum 2 milliGRAM(s) Oral every 2 hours PRN breakthrough cravings  
MEDICATIONS  (STANDING):  ARIPiprazole 10 milliGRAM(s) Oral daily  benztropine 0.5 milliGRAM(s) Oral once  clonazePAM  Tablet 1 milliGRAM(s) Oral three times a day  methadone    Tablet 20 milliGRAM(s) Oral daily  methadone   Dispersible Tablet 40 milliGRAM(s) Oral daily  nicotine - 21 mG/24Hr(s) Patch 1 Patch Transdermal daily  OXcarbazepine 300 milliGRAM(s) Oral two times a day    MEDICATIONS  (PRN):  diphenhydrAMINE 50 milliGRAM(s) Oral every 6 hours PRN agitation  diphenhydrAMINE   Injectable 50 milliGRAM(s) IntraMuscular every 6 hours PRN Agitation  haloperidol     Tablet 5 milliGRAM(s) Oral every 6 hours PRN agitation  haloperidol    Injectable 5 milliGRAM(s) IntraMuscular every 6 hours PRN Agitation  hydrOXYzine hydrochloride 50 milliGRAM(s) Oral every 6 hours PRN anxiety  LORazepam   Injectable 2 milliGRAM(s) IntraMuscular every 6 hours PRN Agitation  nicotine  Polacrilex Gum 2 milliGRAM(s) Oral every 2 hours PRN breakthrough cravings  
MEDICATIONS  (STANDING):  ARIPiprazole 15 milliGRAM(s) Oral daily  benztropine 0.5 milliGRAM(s) Oral daily  clonazePAM  Tablet 1 milliGRAM(s) Oral three times a day  methadone    Tablet 20 milliGRAM(s) Oral daily  methadone   Dispersible Tablet 40 milliGRAM(s) Oral daily  nicotine - 21 mG/24Hr(s) Patch 1 Patch Transdermal daily  OXcarbazepine 600 milliGRAM(s) Oral two times a day  senna 2 Tablet(s) Oral at bedtime    MEDICATIONS  (PRN):  chlorproMAZINE    Injectable 50 milliGRAM(s) IntraMuscular once PRN agitation  chlorproMAZINE    Tablet 50 milliGRAM(s) Oral every 6 hours PRN agitation  diphenhydrAMINE 50 milliGRAM(s) Oral every 6 hours PRN agitation  diphenhydrAMINE   Injectable 50 milliGRAM(s) IntraMuscular every 6 hours PRN Agitation  hydrOXYzine hydrochloride 50 milliGRAM(s) Oral every 6 hours PRN anxiety  nicotine  Polacrilex Gum 2 milliGRAM(s) Oral every 2 hours PRN breakthrough cravings  polyethylene glycol 3350 17 Gram(s) Oral daily PRN constipation  
MEDICATIONS  (STANDING):  ARIPiprazole 10 milliGRAM(s) Oral daily  benztropine 0.5 milliGRAM(s) Oral once  clonazePAM  Tablet 1 milliGRAM(s) Oral three times a day  methadone    Tablet 20 milliGRAM(s) Oral daily  methadone   Dispersible Tablet 40 milliGRAM(s) Oral daily  nicotine - 21 mG/24Hr(s) Patch 1 Patch Transdermal daily  OXcarbazepine 300 milliGRAM(s) Oral two times a day  senna 2 Tablet(s) Oral at bedtime    MEDICATIONS  (PRN):  diphenhydrAMINE 50 milliGRAM(s) Oral every 6 hours PRN agitation  diphenhydrAMINE   Injectable 50 milliGRAM(s) IntraMuscular every 6 hours PRN Agitation  haloperidol     Tablet 5 milliGRAM(s) Oral every 6 hours PRN agitation  haloperidol    Injectable 5 milliGRAM(s) IntraMuscular every 6 hours PRN Agitation  hydrOXYzine hydrochloride 50 milliGRAM(s) Oral every 6 hours PRN anxiety  LORazepam   Injectable 2 milliGRAM(s) IntraMuscular every 6 hours PRN Agitation  nicotine  Polacrilex Gum 2 milliGRAM(s) Oral every 2 hours PRN breakthrough cravings  polyethylene glycol 3350 17 Gram(s) Oral daily PRN constipation  
MEDICATIONS  (STANDING):  ARIPiprazole 10 milliGRAM(s) Oral daily  benztropine 0.5 milliGRAM(s) Oral once  clonazePAM  Tablet 1 milliGRAM(s) Oral three times a day  methadone    Tablet 20 milliGRAM(s) Oral daily  methadone   Dispersible Tablet 40 milliGRAM(s) Oral daily  nicotine - 21 mG/24Hr(s) Patch 1 Patch Transdermal daily  OXcarbazepine 300 milliGRAM(s) Oral two times a day    MEDICATIONS  (PRN):  diphenhydrAMINE 50 milliGRAM(s) Oral every 6 hours PRN agitation  diphenhydrAMINE   Injectable 50 milliGRAM(s) IntraMuscular every 6 hours PRN Agitation  haloperidol     Tablet 5 milliGRAM(s) Oral every 6 hours PRN agitation  haloperidol    Injectable 5 milliGRAM(s) IntraMuscular every 6 hours PRN Agitation  hydrOXYzine hydrochloride 50 milliGRAM(s) Oral every 6 hours PRN anxiety  LORazepam   Injectable 2 milliGRAM(s) IntraMuscular every 6 hours PRN Agitation  nicotine  Polacrilex Gum 2 milliGRAM(s) Oral every 2 hours PRN breakthrough cravings  
MEDICATIONS  (STANDING):  ARIPiprazole 10 milliGRAM(s) Oral daily  benztropine 0.5 milliGRAM(s) Oral once  clonazePAM  Tablet 1 milliGRAM(s) Oral three times a day  methadone    Tablet 20 milliGRAM(s) Oral daily  methadone   Dispersible Tablet 40 milliGRAM(s) Oral daily  nicotine - 21 mG/24Hr(s) Patch 1 Patch Transdermal daily  OXcarbazepine 300 milliGRAM(s) Oral two times a day  senna 2 Tablet(s) Oral at bedtime    MEDICATIONS  (PRN):  diphenhydrAMINE 50 milliGRAM(s) Oral every 6 hours PRN agitation  diphenhydrAMINE   Injectable 50 milliGRAM(s) IntraMuscular every 6 hours PRN Agitation  haloperidol     Tablet 5 milliGRAM(s) Oral every 6 hours PRN agitation  haloperidol    Injectable 5 milliGRAM(s) IntraMuscular every 6 hours PRN Agitation  hydrOXYzine hydrochloride 50 milliGRAM(s) Oral every 6 hours PRN anxiety  LORazepam   Injectable 2 milliGRAM(s) IntraMuscular every 6 hours PRN Agitation  nicotine  Polacrilex Gum 2 milliGRAM(s) Oral every 2 hours PRN breakthrough cravings  polyethylene glycol 3350 17 Gram(s) Oral daily PRN constipation

## 2021-06-29 NOTE — BH INPATIENT PSYCHIATRY PROGRESS NOTE - NSTXSUICIDDATEEST_PSY_ALL_CORE
17-Jun-2021
16-Jun-2021
17-Jun-2021
16-Jun-2021
17-Jun-2021

## 2021-06-29 NOTE — BH INPATIENT PSYCHIATRY PROGRESS NOTE - NSTXDCOPNOGOAL_PSY_ALL_CORE
Will agree to participate in appropriate outpatient care

## 2021-06-29 NOTE — BH INPATIENT PSYCHIATRY PROGRESS NOTE - NSBHCONSULTIPREASON_PSY_A_CORE
danger to self; mental illness expected to respond to inpatient care

## 2021-06-29 NOTE — BH INPATIENT PSYCHIATRY PROGRESS NOTE - NSBHPSYCHOLCOGORIENT_PSY_A_CORE
Oriented to time, place, person, situation

## 2021-06-29 NOTE — BH INPATIENT PSYCHIATRY PROGRESS NOTE - NSTXSUICIDINTERMD_PSY_ALL_CORE
Medication/ Psychotherapy utilized.

## 2021-06-29 NOTE — BH INPATIENT PSYCHIATRY PROGRESS NOTE - NSBHMSETHTCONTENT_PSY_A_CORE
Unremarkable

## 2021-06-29 NOTE — BH INPATIENT PSYCHIATRY PROGRESS NOTE - NSBHMSEBEHAV_PSY_A_CORE
Cooperative/Other
Cooperative
Cooperative/Other

## 2021-06-29 NOTE — BH INPATIENT PSYCHIATRY PROGRESS NOTE - NSICDXBHPRIMARYDX_PSY_ALL_CORE
Bipolar disorder   F31.9  

## 2021-06-29 NOTE — BH INPATIENT PSYCHIATRY PROGRESS NOTE - NSBHCONSDANGERSELF_PSY_A_CORE
suicidal ideation with plan and means

## 2021-06-29 NOTE — BH INPATIENT PSYCHIATRY PROGRESS NOTE - PRN MEDS
MEDICATIONS  (PRN):  chlorproMAZINE    Injectable 50 milliGRAM(s) IntraMuscular once PRN agitation  chlorproMAZINE    Tablet 50 milliGRAM(s) Oral every 6 hours PRN agitation  diphenhydrAMINE 50 milliGRAM(s) Oral every 6 hours PRN agitation  diphenhydrAMINE   Injectable 50 milliGRAM(s) IntraMuscular every 6 hours PRN Agitation  hydrOXYzine hydrochloride 50 milliGRAM(s) Oral every 6 hours PRN anxiety  nicotine  Polacrilex Gum 2 milliGRAM(s) Oral every 2 hours PRN breakthrough cravings  polyethylene glycol 3350 17 Gram(s) Oral daily PRN constipation  
MEDICATIONS  (PRN):  diphenhydrAMINE 50 milliGRAM(s) Oral every 6 hours PRN agitation  diphenhydrAMINE   Injectable 50 milliGRAM(s) IntraMuscular every 6 hours PRN Agitation  haloperidol     Tablet 5 milliGRAM(s) Oral every 6 hours PRN agitation  haloperidol    Injectable 5 milliGRAM(s) IntraMuscular every 6 hours PRN Agitation  LORazepam     Tablet 2 milliGRAM(s) Oral every 6 hours PRN agitation  LORazepam   Injectable 2 milliGRAM(s) IntraMuscular every 6 hours PRN Agitation  nicotine  Polacrilex Gum 2 milliGRAM(s) Oral every 2 hours PRN breakthrough cravings  
MEDICATIONS  (PRN):  chlorproMAZINE    Injectable 50 milliGRAM(s) IntraMuscular once PRN agitation  chlorproMAZINE    Tablet 50 milliGRAM(s) Oral every 6 hours PRN agitation  diphenhydrAMINE 50 milliGRAM(s) Oral every 6 hours PRN agitation  diphenhydrAMINE   Injectable 50 milliGRAM(s) IntraMuscular every 6 hours PRN Agitation  hydrOXYzine hydrochloride 50 milliGRAM(s) Oral every 6 hours PRN anxiety  melatonin. 5 milliGRAM(s) Oral at bedtime PRN Insomnia  nicotine  Polacrilex Gum 2 milliGRAM(s) Oral every 2 hours PRN breakthrough cravings  polyethylene glycol 3350 17 Gram(s) Oral daily PRN constipation  
MEDICATIONS  (PRN):  chlorproMAZINE    Injectable 50 milliGRAM(s) IntraMuscular once PRN agitation  chlorproMAZINE    Tablet 50 milliGRAM(s) Oral every 6 hours PRN agitation  diphenhydrAMINE 50 milliGRAM(s) Oral every 6 hours PRN agitation  diphenhydrAMINE   Injectable 50 milliGRAM(s) IntraMuscular every 6 hours PRN Agitation  hydrOXYzine hydrochloride 50 milliGRAM(s) Oral every 6 hours PRN anxiety  nicotine  Polacrilex Gum 2 milliGRAM(s) Oral every 2 hours PRN breakthrough cravings  polyethylene glycol 3350 17 Gram(s) Oral daily PRN constipation  
MEDICATIONS  (PRN):  diphenhydrAMINE 50 milliGRAM(s) Oral every 6 hours PRN agitation  diphenhydrAMINE   Injectable 50 milliGRAM(s) IntraMuscular every 6 hours PRN Agitation  haloperidol     Tablet 5 milliGRAM(s) Oral every 6 hours PRN agitation  haloperidol    Injectable 5 milliGRAM(s) IntraMuscular every 6 hours PRN Agitation  hydrOXYzine hydrochloride 50 milliGRAM(s) Oral every 6 hours PRN anxiety  LORazepam   Injectable 2 milliGRAM(s) IntraMuscular every 6 hours PRN Agitation  nicotine  Polacrilex Gum 2 milliGRAM(s) Oral every 2 hours PRN breakthrough cravings  
MEDICATIONS  (PRN):  chlorproMAZINE    Injectable 50 milliGRAM(s) IntraMuscular once PRN agitation  chlorproMAZINE    Tablet 50 milliGRAM(s) Oral every 6 hours PRN agitation  diphenhydrAMINE 50 milliGRAM(s) Oral every 6 hours PRN agitation  diphenhydrAMINE   Injectable 50 milliGRAM(s) IntraMuscular every 6 hours PRN Agitation  hydrOXYzine hydrochloride 50 milliGRAM(s) Oral every 6 hours PRN anxiety  nicotine  Polacrilex Gum 2 milliGRAM(s) Oral every 2 hours PRN breakthrough cravings  polyethylene glycol 3350 17 Gram(s) Oral daily PRN constipation  
MEDICATIONS  (PRN):  diphenhydrAMINE 50 milliGRAM(s) Oral every 6 hours PRN agitation  diphenhydrAMINE   Injectable 50 milliGRAM(s) IntraMuscular every 6 hours PRN Agitation  haloperidol     Tablet 5 milliGRAM(s) Oral every 6 hours PRN agitation  haloperidol    Injectable 5 milliGRAM(s) IntraMuscular every 6 hours PRN Agitation  hydrOXYzine hydrochloride 50 milliGRAM(s) Oral every 6 hours PRN anxiety  LORazepam   Injectable 2 milliGRAM(s) IntraMuscular every 6 hours PRN Agitation  nicotine  Polacrilex Gum 2 milliGRAM(s) Oral every 2 hours PRN breakthrough cravings  
MEDICATIONS  (PRN):  diphenhydrAMINE 50 milliGRAM(s) Oral every 6 hours PRN agitation  diphenhydrAMINE   Injectable 50 milliGRAM(s) IntraMuscular every 6 hours PRN Agitation  haloperidol     Tablet 5 milliGRAM(s) Oral every 6 hours PRN agitation  haloperidol    Injectable 5 milliGRAM(s) IntraMuscular every 6 hours PRN Agitation  hydrOXYzine hydrochloride 50 milliGRAM(s) Oral every 6 hours PRN anxiety  LORazepam   Injectable 2 milliGRAM(s) IntraMuscular every 6 hours PRN Agitation  nicotine  Polacrilex Gum 2 milliGRAM(s) Oral every 2 hours PRN breakthrough cravings  polyethylene glycol 3350 17 Gram(s) Oral daily PRN constipation  
MEDICATIONS  (PRN):  chlorproMAZINE    Injectable 50 milliGRAM(s) IntraMuscular once PRN agitation  chlorproMAZINE    Tablet 50 milliGRAM(s) Oral every 6 hours PRN agitation  diphenhydrAMINE 50 milliGRAM(s) Oral every 6 hours PRN agitation  diphenhydrAMINE   Injectable 50 milliGRAM(s) IntraMuscular every 6 hours PRN Agitation  hydrOXYzine hydrochloride 50 milliGRAM(s) Oral every 6 hours PRN anxiety  nicotine  Polacrilex Gum 2 milliGRAM(s) Oral every 2 hours PRN breakthrough cravings  polyethylene glycol 3350 17 Gram(s) Oral daily PRN constipation  
MEDICATIONS  (PRN):  diphenhydrAMINE 50 milliGRAM(s) Oral every 6 hours PRN agitation  diphenhydrAMINE   Injectable 50 milliGRAM(s) IntraMuscular every 6 hours PRN Agitation  haloperidol     Tablet 5 milliGRAM(s) Oral every 6 hours PRN agitation  haloperidol    Injectable 5 milliGRAM(s) IntraMuscular every 6 hours PRN Agitation  hydrOXYzine hydrochloride 50 milliGRAM(s) Oral every 6 hours PRN anxiety  LORazepam   Injectable 2 milliGRAM(s) IntraMuscular every 6 hours PRN Agitation  nicotine  Polacrilex Gum 2 milliGRAM(s) Oral every 2 hours PRN breakthrough cravings  polyethylene glycol 3350 17 Gram(s) Oral daily PRN constipation

## 2021-06-29 NOTE — BH INPATIENT PSYCHIATRY PROGRESS NOTE - NSDCCRITERIA_PSY_ALL_CORE
Sx reduction, medication management, safety planning, milieu therapy, outpatient psychiatric care.

## 2021-06-29 NOTE — BH INPATIENT PSYCHIATRY PROGRESS NOTE - NSBHMETABOLIC_PSY_ALL_CORE_FT
BMI: BMI (kg/m2): 22.1 (06-16-21 @ 16:47)  HbA1c: A1C with Estimated Average Glucose Result: 5.5 % (06-17-21 @ 09:53)    Glucose:   BP: --  Lipid Panel: Date/Time: 06-17-21 @ 09:53  Cholesterol, Serum: 176  Direct LDL: --  HDL Cholesterol, Serum: 76  Total Cholesterol/HDL Ration Measurement: --  Triglycerides, Serum: 137  
BMI: BMI (kg/m2): 22.1 (06-16-21 @ 16:47)  HbA1c: A1C with Estimated Average Glucose Result: 5.5 % (06-17-21 @ 09:53)    Glucose:   BP: 100/65 (06-18-21 @ 08:20) (100/65 - 100/65)  Lipid Panel: Date/Time: 06-17-21 @ 09:53  Cholesterol, Serum: 176  Direct LDL: --  HDL Cholesterol, Serum: 76  Total Cholesterol/HDL Ration Measurement: --  Triglycerides, Serum: 137  
BMI: BMI (kg/m2): 22.1 (06-16-21 @ 16:47)  HbA1c: A1C with Estimated Average Glucose Result: 5.5 % (06-17-21 @ 09:53)    Glucose:   BP: 104/67 (06-23-21 @ 08:41) (90/62 - 104/67)  Lipid Panel: Date/Time: 06-17-21 @ 09:53  Cholesterol, Serum: 176  Direct LDL: --  HDL Cholesterol, Serum: 76  Total Cholesterol/HDL Ration Measurement: --  Triglycerides, Serum: 137  
BMI: BMI (kg/m2): 22.1 (06-16-21 @ 16:47)  HbA1c: A1C with Estimated Average Glucose Result: 5.5 % (06-17-21 @ 09:53)    Glucose:   BP: 104/67 (06-20-21 @ 08:08) (100/65 - 104/67)  Lipid Panel: Date/Time: 06-17-21 @ 09:53  Cholesterol, Serum: 176  Direct LDL: --  HDL Cholesterol, Serum: 76  Total Cholesterol/HDL Ration Measurement: --  Triglycerides, Serum: 137  
BMI: BMI (kg/m2): 22.1 (06-16-21 @ 16:47)  HbA1c: A1C with Estimated Average Glucose Result: 5.5 % (06-17-21 @ 09:53)    Glucose:   BP: 100/65 (06-18-21 @ 08:20) (100/65 - 100/65)  Lipid Panel: Date/Time: 06-17-21 @ 09:53  Cholesterol, Serum: 176  Direct LDL: --  HDL Cholesterol, Serum: 76  Total Cholesterol/HDL Ration Measurement: --  Triglycerides, Serum: 137  
BMI: BMI (kg/m2): 22.1 (06-16-21 @ 16:47)  HbA1c: A1C with Estimated Average Glucose Result: 5.5 % (06-17-21 @ 09:53)    Glucose:   BP: 97/60 (06-27-21 @ 08:03) (97/60 - 100/64)  Lipid Panel: Date/Time: 06-17-21 @ 09:53  Cholesterol, Serum: 176  Direct LDL: --  HDL Cholesterol, Serum: 76  Total Cholesterol/HDL Ration Measurement: --  Triglycerides, Serum: 137  
BMI: BMI (kg/m2): 22.1 (06-16-21 @ 16:47)  HbA1c: A1C with Estimated Average Glucose Result: 5.5 % (06-17-21 @ 09:53)    Glucose:   BP: 104/67 (06-20-21 @ 08:08) (104/67 - 104/67)  Lipid Panel: Date/Time: 06-17-21 @ 09:53  Cholesterol, Serum: 176  Direct LDL: --  HDL Cholesterol, Serum: 76  Total Cholesterol/HDL Ration Measurement: --  Triglycerides, Serum: 137  
BMI: BMI (kg/m2): 22.1 (06-16-21 @ 16:47)  HbA1c: A1C with Estimated Average Glucose Result: 5.5 % (06-17-21 @ 09:53)    Glucose:   BP: 90/62 (06-22-21 @ 08:16) (90/62 - 104/67)  Lipid Panel: Date/Time: 06-17-21 @ 09:53  Cholesterol, Serum: 176  Direct LDL: --  HDL Cholesterol, Serum: 76  Total Cholesterol/HDL Ration Measurement: --  Triglycerides, Serum: 137  
BMI: BMI (kg/m2): 22.1 (06-16-21 @ 16:47)  HbA1c: A1C with Estimated Average Glucose Result: 5.5 % (06-17-21 @ 09:53)    Glucose:   BP: 104/67 (06-23-21 @ 08:41) (90/62 - 104/67)  Lipid Panel: Date/Time: 06-17-21 @ 09:53  Cholesterol, Serum: 176  Direct LDL: --  HDL Cholesterol, Serum: 76  Total Cholesterol/HDL Ration Measurement: --  Triglycerides, Serum: 137  
BMI: BMI (kg/m2): 22.1 (06-16-21 @ 16:47)  HbA1c: A1C with Estimated Average Glucose Result: 5.5 % (06-17-21 @ 09:53)    Glucose:   BP: 104/67 (06-20-21 @ 08:08) (104/67 - 104/67)  Lipid Panel: Date/Time: 06-17-21 @ 09:53  Cholesterol, Serum: 176  Direct LDL: --  HDL Cholesterol, Serum: 76  Total Cholesterol/HDL Ration Measurement: --  Triglycerides, Serum: 137  
BMI: BMI (kg/m2): 22.1 (06-16-21 @ 16:47)  HbA1c: A1C with Estimated Average Glucose Result: 5.5 % (06-17-21 @ 09:53)    Glucose:   BP: 90/60 (06-25-21 @ 07:50) (90/60 - 104/67)  Lipid Panel: Date/Time: 06-17-21 @ 09:53  Cholesterol, Serum: 176  Direct LDL: --  HDL Cholesterol, Serum: 76  Total Cholesterol/HDL Ration Measurement: --  Triglycerides, Serum: 137  
BMI: BMI (kg/m2): 22.1 (06-16-21 @ 16:47)  HbA1c: A1C with Estimated Average Glucose Result: 5.5 % (06-17-21 @ 09:53)    Glucose:   BP: 97/60 (06-27-21 @ 08:03) (97/60 - 97/60)  Lipid Panel: Date/Time: 06-17-21 @ 09:53  Cholesterol, Serum: 176  Direct LDL: --  HDL Cholesterol, Serum: 76  Total Cholesterol/HDL Ration Measurement: --  Triglycerides, Serum: 137

## 2021-06-29 NOTE — BH INPATIENT PSYCHIATRY PROGRESS NOTE - GENERAL APPEARANCE
No deformities present

## 2021-06-29 NOTE — BH INPATIENT PSYCHIATRY PROGRESS NOTE - NSTXSUBMISINTERMD_PSY_ALL_CORE
Patient to follow-up with her outpatient methadone clinic

## 2021-06-29 NOTE — BH INPATIENT PSYCHIATRY DISCHARGE NOTE - NSDCCPCAREPLAN_GEN_ALL_CORE_FT
PRINCIPAL DISCHARGE DIAGNOSIS  Diagnosis: Bipolar disorder  Assessment and Plan of Treatment: Outpatient services.

## 2021-06-29 NOTE — BH INPATIENT PSYCHIATRY PROGRESS NOTE - NSTXTOBACOGOAL_PSY_ALL_CORE
Will accept nicotine replacement therapy

## 2021-06-29 NOTE — BH INPATIENT PSYCHIATRY PROGRESS NOTE - NSTXPROBDCOPNO_PSY_ALL_CORE
DISCHARGE ISSUE - NON-ADHERENT WITH OUTPATIENT SERVICES

## 2021-06-29 NOTE — BH INPATIENT PSYCHIATRY PROGRESS NOTE - NSTXSUICIDGOAL_PSY_ALL_CORE
Will identify and utilize 2 coping skills

## 2021-06-29 NOTE — BH INPATIENT PSYCHIATRY PROGRESS NOTE - NSBHATTESTSEENBY_PSY_A_CORE
NP without Attending Psychiatrist

## 2021-06-29 NOTE — BH INPATIENT PSYCHIATRY DISCHARGE NOTE - HOSPITAL COURSE
To be completed. Hospitalization dates 6/16/21- 6/29/21  ED note: “51 y/o Female; domiciled with mother in [place]; ; no children; unemployed. PPH of anxiety and bipolar disorder, multiple prior Psychiatric admissions, last psychiatric admission in 2019 [facility] for depression after patient lost her father. History of polysubstance abuse. No pertinent past medical history. Patient was brought into the ED for evaluation of Suicidal Ideation, plan to jump in front of train”.   Immediate risk was minimized by inpatient admission to a safe environment with appropriate supervision. The patient was at risk for harm to self, due to her presentation in the emergency room as she was ambivalent about her SI, confused, poor historian; multiple Suicidal attempts and gestures which were self-aborted.  Behavior: Initially on the unit, the patient described her mood as "sad" but did not want to elaborate to interviewer. She also noted "anxiety" and "worry" and is preoccupied with seeking benzodiazepines. Patient denied symptoms of mariaelena including episodes of poor sleep or feeling "hyped up". No symptoms of mariaelena elicited at that time. Patient denied changes in appetite. Patient denied SI/HI intent, or plan despite being admitted for suicidal ideation. Patient denied AH/VH/TH. During her inpatient stay, the patient was in fair to good behavioral control. She was noted anxious which subsided with medication. The patient was noted to be medication seeking for additional benzodiazepines and PRN medication for agitation, though no agitation reported by nursing staff. She was engaged in outpatient treatment planning and was focused on receiving outpatient referral for a psychiatrist and therapist. While inpatient, she denied SI, intent and plan. No suicidal behaviors noted and the patient was involved with safety planning.   Tx: Home medications continued: Clonazepam 1 mg oral 3 times a day, methadone 60 mg oral daily. The patient was prescribed Abilify 5mg po daily and was titrated to 20mg po daily for bipolar disorder. Benztropine 1mg po daily ordered for akathisia ppx. Oxcabazepine 300mg po twice a day was ordered as a mood stabilizer and was increased to 600mg po twice a day. The patient tolerated this medication regimen well and denied side effects. ASHLEY Aristada offered to the patient and she refused. The patient stated she will consider a ASHLEY if needed in the future.   Discharge medication: 2 weeks supply was prescribed.  -Aripiprazole 20 mg oral tablet 1 tab(s) orally once a day.    -Benztropine 1 mg oral tablet 1 tab(s) orally once a day.    -Clonazepam 1 mg oral tablet 1 tab(s) orally 3 times a day MDD:3mg.    -methadone 60 mg oral daily.     -Oxcarbazepine 600 mg oral tablet 1 tab(s) orally 2 times a day    Aftercare: The patient will follow-up with [facility] [phone] for outpatient services.   On discharge: Patient was followed up for Bipolar Disorder, admitted for depression/SI plan to jump in front of train, and substance abuse/opiate abuse (currently on methadone treatment). Chart, medications and labs reviewed.  Patient was discussed with nursing staff. No significant overnight issues reported. Patient described her mood as "okay." She reported less anxiety despite being "nervous about leaving". She denied SI/HI, intent and plan. She engaged in safety planning, denied negative/violent thoughts.  Denied hallucinations, no delusions or paranoia elicited.  Adherent to medication and reported no side effects. Sleep and appetite were adequate. Per staff, patient was not going to group therapy but was social with other patients on the unit. Psychotherapy encouraged at this time. Self-care remained fair/adequate. Education provided with discharge information and patient stated future plans. Patient eager for outpatient psychiatrist when discharged and receptive to Klonopin taper as part of outpatient substance abuse program. Patient will continue with previous methadone program. No acute medical concerns. Discharge planning, safety discussed at that time and the patient verbalized understanding.  MSE: Level of Consciousness-Alert, General Appearance-No deformities present, Body Habitus-Average build, Hygiene-Good, Grooming-Good, Behavior-Cooperative, Odd, Eye Contact-Fair, Relatedness-Fair, Impulse Control	-Normal, Muscle Tone/Strength-Normal muscle tone/strength, Abnormal Movements-No abnormal movements, Gait/Station-Normal gait / station, Speech-Normal volume, rate, productivity, spontaneity and articulation, Mood- “less anxious”, Affect Quality-appears less anxious, Affect Range-Blunted, Affect Congruence-Congruent, Thought Process-Perseverative, Thought Associations-Normal, Thought Content-Unremarkable, Perceptions-No abnormalities, Orientation-Oriented to time, place, person, situation, Attention/Concentration-Normal, Estimated Intelligence-Unable to assess, Recent Memory-Normal, Remote Memory-Normal, Fund of Knowledge-Normal, How Fund of Knowledge Assessed-Current Events  Vocabulary, Language-No abnormalities noted, Judgment-Fair, Insight-Fair.  Patient was provided with extensive psychoeducation on treatment options and motivational counseling targeting healthy lifestyle. Patient was instructed on actions for crisis situations, understood and agreed to follow instructions for handling crisis, including coming to ER or calling 911 should the patient or their family feel that they are in danger of hurting self or others. Patient also was given Suicide Prevention Lifeline number 1-655.748.4340 and provided with instructions on its use.   For further collateral information, please contact MELIA SaldañaBC at [phone] or [phone], email: [email or web address].

## 2021-06-29 NOTE — BH INPATIENT PSYCHIATRY PROGRESS NOTE - MSE OPTIONS
Structured MSE

## 2021-06-29 NOTE — BH INPATIENT PSYCHIATRY PROGRESS NOTE - NSTXDCOPNODATETRGT_PSY_ALL_CORE
24-Jun-2021
01-Jul-2021
24-Jun-2021
01-Jul-2021
01-Jul-2021
24-Jun-2021

## 2021-06-29 NOTE — BH INPATIENT PSYCHIATRY PROGRESS NOTE - NSTXPROBSUBMIS_PSY_ALL_CORE
SUBSTANCE MISUSE

## 2021-06-29 NOTE — BH INPATIENT PSYCHIATRY DISCHARGE NOTE - NSDCMRMEDTOKEN_GEN_ALL_CORE_FT
ARIPiprazole 20 mg oral tablet: 1 tab(s) orally once a day  benztropine 1 mg oral tablet: 1 tab(s) orally once a day  clonazePAM 1 mg oral tablet: 1 tab(s) orally 3 times a day MDD:2mg  methadone 10 mg oral tablet: 6 tab(s) orally   OXcarbazepine 600 mg oral tablet: 1 tab(s) orally 2 times a day

## 2021-06-29 NOTE — BH INPATIENT PSYCHIATRY PROGRESS NOTE - NSTXSUBMISGOAL_PSY_ALL_CORE
Accept referral for substance abuse treatment on discharge

## 2021-06-29 NOTE — BH INPATIENT PSYCHIATRY PROGRESS NOTE - NSBHFUPINTERVALHXFT_PSY_A_CORE
Patient is followed up for Bipolar Disorder, admitted for depression/SI plan to jump in front of train, and substance abuse/opiate abuse (currently on methadone treatment). Chart, medications and labs reviewed.  Patient is discussed with nursing staff. No significant overnight issues reported but noted patient taking Thorazine 50mg. Patient describes mood as "okay." She reports less anxiety despite being "nervous about leaving". She currently denies SI/HI, intent and plan. She engages in safety planning, denies negative/violent thoughts.  Denies hallucinations, no delusions or paranoia elicited.  Adherent to medication and reports no side effects. Sleep and appetite is adequate. Per staff, patient is not going to group therapy but is social with other patients on floor. Psychotherapy encouraged at this time. Self-care remains fair/adequate.  VSS. Continue to monitor and provide therapeutic support. Education provided discharge information and patient cites future plans. Patient eager for outpatient psychiatrist when discharged and receptive to Klonapin taper as part of outpatient substance abuse program that is bundled with outpatient methadone program. No acute medical concerns.  Patient is followed up for Bipolar Disorder, admitted for depression/SI plan to jump in front of train, and substance abuse/opiate abuse (currently on methadone treatment). Chart, medications and labs reviewed.  Patient is discussed with nursing staff. No significant overnight issues reported. Patient describes mood as "okay." She reports less anxiety despite being "nervous about leaving". She currently denies SI/HI, intent and plan. She engages in safety planning, denies negative/violent thoughts.  Denies hallucinations, no delusions or paranoia elicited.  Adherent to medication and reports no side effects. Sleep and appetite is adequate. Per staff, patient is not going to group therapy but is social with other patients on floor. Psychotherapy encouraged at this time. Self-care remains fair/adequate.  VSS. Continue to monitor and provide therapeutic support. Education provided discharge information and patient cites future plans. Patient eager for outpatient psychiatrist when discharged and receptive to Klonopin taper as part of outpatient substance abuse program. Patient will continue with previous methadone program. No acute medical concerns. Discharge planning, safety discussed at this time and the patient verbalized understanding.

## 2021-06-29 NOTE — BH INPATIENT PSYCHIATRY PROGRESS NOTE - NSTXSUICIDDATETRGT_PSY_ALL_CORE
01-Jul-2021
24-Jun-2021
30-Jun-2021
24-Jun-2021
30-Jun-2021
24-Jun-2021
01-Jul-2021
01-Jul-2021
24-Jun-2021

## 2021-06-29 NOTE — BH INPATIENT PSYCHIATRY PROGRESS NOTE - NSBHMSEKNOWHOW_PSY_ALL_CORE
Current Events/Vocabulary
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
## 2021-06-29 NOTE — BH INPATIENT PSYCHIATRY PROGRESS NOTE - NSTXDCOPNOPROGRES_PSY_ALL_CORE
No Change
Improving
No Change
Improving
No Change
Improving
No Change

## 2021-06-29 NOTE — BH INPATIENT PSYCHIATRY PROGRESS NOTE - NSBHCONTPROVIDER_PSY_ALL_CORE
Patient stated she does not have a current psychiatrist. Her PCP prescribes Clonazepam./Not applicable

## 2021-06-29 NOTE — BH DISCHARGE NOTE NURSING/SOCIAL WORK/PSYCH REHAB - PATIENT PORTAL LINK FT
You can access the FollowMyHealth Patient Portal offered by [facility] by registering at the following website: [email or web address]. By joining [employer]’s FollowMyHealth portal, you will also be able to view your health information using other applications (apps) compatible with our system.

## 2021-06-29 NOTE — BH INPATIENT PSYCHIATRY PROGRESS NOTE - NSTREATMENTCERTY_PSY_ALL_CORE
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
## 2021-06-29 NOTE — BH INPATIENT PSYCHIATRY PROGRESS NOTE - NSBHCHARTREVIEWVS_PSY_A_CORE FT
Vital Signs Last 24 Hrs  T(C): 36.3 (29 Jun 2021 06:14), Max: 36.3 (29 Jun 2021 06:14)  T(F): 97.3 (29 Jun 2021 06:14), Max: 97.3 (29 Jun 2021 06:14)  HR: --  BP: --  BP(mean): --  RR: --  SpO2: -- Vital Signs Last 24 Hrs  T(C): 36.3 (29 Jun 2021 06:14), Max: 36.3 (29 Jun 2021 06:14)  T(F): 97.3 (29 Jun 2021 06:14), Max: 97.3 (29 Jun 2021 06:14)  HR: --72  BP: --91/65  BP(mean): --  RR: --  SpO2: --

## 2021-06-29 NOTE — BH INPATIENT PSYCHIATRY PROGRESS NOTE - NSBHMSETHTPROC_PSY_A_CORE
patient does not want to be discharged unless has an outpatient psychiatrist coordinated/Perseverative/Impaired reasoning

## 2021-07-02 NOTE — SOCIAL WORK POST DISCHARGE FOLLOW UP NOTE - NSBHSWFOLLOWUP_PSY_ALL_CORE_FT
SW received call from [facility] stating they were unable to reach patient for today's follow up appointment. SW attempted to contact pt on 3 different numbers: [phone] was out of service, [phone] had a voicemail box that was not set up, and [phone] stated "all circuits are busy". At the time of dc the treatment team determined the patient was not a risk to themselves or others. This case is closed.

## 2021-09-21 ENCOUNTER — INPATIENT (INPATIENT)
Facility: HOSPITAL | Age: 51
LOS: 1 days | Discharge: HOME | DRG: 754 | End: 2021-09-23
Attending: PSYCHIATRY & NEUROLOGY | Admitting: PSYCHIATRY & NEUROLOGY
Payer: MEDICAID

## 2021-09-21 VITALS
TEMPERATURE: 98 F | HEART RATE: 81 BPM | RESPIRATION RATE: 18 BRPM | OXYGEN SATURATION: 98 % | DIASTOLIC BLOOD PRESSURE: 79 MMHG | SYSTOLIC BLOOD PRESSURE: 111 MMHG

## 2021-09-21 DIAGNOSIS — F32.89 OTHER SPECIFIED DEPRESSIVE EPISODES: ICD-10-CM

## 2021-09-21 DIAGNOSIS — F19.10 OTHER PSYCHOACTIVE SUBSTANCE ABUSE, UNCOMPLICATED: ICD-10-CM

## 2021-09-21 DIAGNOSIS — F41.9 ANXIETY DISORDER, UNSPECIFIED: ICD-10-CM

## 2021-09-21 LAB
ALBUMIN SERPL ELPH-MCNC: 3.5 G/DL — SIGNIFICANT CHANGE UP (ref 3.3–5)
ALP SERPL-CCNC: 56 U/L — SIGNIFICANT CHANGE UP (ref 40–120)
ALT FLD-CCNC: 22 U/L — SIGNIFICANT CHANGE UP (ref 12–78)
ANION GAP SERPL CALC-SCNC: 8 MMOL/L — SIGNIFICANT CHANGE UP (ref 5–17)
APAP SERPL-MCNC: <2 UG/ML — LOW (ref 10–30)
APPEARANCE UR: CLEAR — SIGNIFICANT CHANGE UP
AST SERPL-CCNC: 26 U/L — SIGNIFICANT CHANGE UP (ref 15–37)
BASOPHILS # BLD AUTO: 0.03 K/UL — SIGNIFICANT CHANGE UP (ref 0–0.2)
BASOPHILS NFR BLD AUTO: 0.6 % — SIGNIFICANT CHANGE UP (ref 0–2)
BILIRUB SERPL-MCNC: 0.7 MG/DL — SIGNIFICANT CHANGE UP (ref 0.2–1.2)
BILIRUB UR-MCNC: NEGATIVE — SIGNIFICANT CHANGE UP
BUN SERPL-MCNC: 9 MG/DL — SIGNIFICANT CHANGE UP (ref 7–23)
CALCIUM SERPL-MCNC: 8.7 MG/DL — SIGNIFICANT CHANGE UP (ref 8.5–10.1)
CHLORIDE SERPL-SCNC: 105 MMOL/L — SIGNIFICANT CHANGE UP (ref 96–108)
CO2 SERPL-SCNC: 26 MMOL/L — SIGNIFICANT CHANGE UP (ref 22–31)
COLOR SPEC: YELLOW — SIGNIFICANT CHANGE UP
CREAT SERPL-MCNC: 0.6 MG/DL — SIGNIFICANT CHANGE UP (ref 0.5–1.3)
DIFF PNL FLD: NEGATIVE — SIGNIFICANT CHANGE UP
EOSINOPHIL # BLD AUTO: 0.1 K/UL — SIGNIFICANT CHANGE UP (ref 0–0.5)
EOSINOPHIL NFR BLD AUTO: 2 % — SIGNIFICANT CHANGE UP (ref 0–6)
ETHANOL SERPL-MCNC: <10 MG/DL — SIGNIFICANT CHANGE UP (ref 0–10)
GLUCOSE SERPL-MCNC: 66 MG/DL — LOW (ref 70–99)
GLUCOSE UR QL: NEGATIVE MG/DL — SIGNIFICANT CHANGE UP
HCG SERPL-ACNC: <1 MIU/ML — SIGNIFICANT CHANGE UP
HCT VFR BLD CALC: 35.8 % — SIGNIFICANT CHANGE UP (ref 34.5–45)
HGB BLD-MCNC: 11.8 G/DL — SIGNIFICANT CHANGE UP (ref 11.5–15.5)
IMM GRANULOCYTES NFR BLD AUTO: 0.2 % — SIGNIFICANT CHANGE UP (ref 0–1.5)
KETONES UR-MCNC: ABNORMAL
LEUKOCYTE ESTERASE UR-ACNC: ABNORMAL
LYMPHOCYTES # BLD AUTO: 1.85 K/UL — SIGNIFICANT CHANGE UP (ref 1–3.3)
LYMPHOCYTES # BLD AUTO: 37.1 % — SIGNIFICANT CHANGE UP (ref 13–44)
MCHC RBC-ENTMCNC: 30.2 PG — SIGNIFICANT CHANGE UP (ref 27–34)
MCHC RBC-ENTMCNC: 33 GM/DL — SIGNIFICANT CHANGE UP (ref 32–36)
MCV RBC AUTO: 91.6 FL — SIGNIFICANT CHANGE UP (ref 80–100)
MONOCYTES # BLD AUTO: 0.3 K/UL — SIGNIFICANT CHANGE UP (ref 0–0.9)
MONOCYTES NFR BLD AUTO: 6 % — SIGNIFICANT CHANGE UP (ref 2–14)
NEUTROPHILS # BLD AUTO: 2.7 K/UL — SIGNIFICANT CHANGE UP (ref 1.8–7.4)
NEUTROPHILS NFR BLD AUTO: 54.1 % — SIGNIFICANT CHANGE UP (ref 43–77)
NITRITE UR-MCNC: NEGATIVE — SIGNIFICANT CHANGE UP
PCP SPEC-MCNC: SIGNIFICANT CHANGE UP
PH UR: 6 — SIGNIFICANT CHANGE UP (ref 5–8)
PLATELET # BLD AUTO: 185 K/UL — SIGNIFICANT CHANGE UP (ref 150–400)
POTASSIUM SERPL-MCNC: 3.7 MMOL/L — SIGNIFICANT CHANGE UP (ref 3.5–5.3)
POTASSIUM SERPL-SCNC: 3.7 MMOL/L — SIGNIFICANT CHANGE UP (ref 3.5–5.3)
PROT SERPL-MCNC: 6.9 GM/DL — SIGNIFICANT CHANGE UP (ref 6–8.3)
PROT UR-MCNC: NEGATIVE MG/DL — SIGNIFICANT CHANGE UP
RBC # BLD: 3.91 M/UL — SIGNIFICANT CHANGE UP (ref 3.8–5.2)
RBC # FLD: 13.6 % — SIGNIFICANT CHANGE UP (ref 10.3–14.5)
SALICYLATES SERPL-MCNC: <1.7 MG/DL — LOW (ref 2.8–20)
SARS-COV-2 RNA SPEC QL NAA+PROBE: SIGNIFICANT CHANGE UP
SODIUM SERPL-SCNC: 139 MMOL/L — SIGNIFICANT CHANGE UP (ref 135–145)
SP GR SPEC: 1.02 — SIGNIFICANT CHANGE UP (ref 1.01–1.02)
UROBILINOGEN FLD QL: NEGATIVE MG/DL — SIGNIFICANT CHANGE UP
WBC # BLD: 4.99 K/UL — SIGNIFICANT CHANGE UP (ref 3.8–10.5)
WBC # FLD AUTO: 4.99 K/UL — SIGNIFICANT CHANGE UP (ref 3.8–10.5)

## 2021-09-21 RX ORDER — HYDROXYZINE HCL 10 MG
25 TABLET ORAL ONCE
Refills: 0 | Status: COMPLETED | OUTPATIENT
Start: 2021-09-21 | End: 2021-09-21

## 2021-09-21 RX ORDER — HYDROXYZINE HCL 10 MG
50 TABLET ORAL EVERY 6 HOURS
Refills: 0 | Status: DISCONTINUED | OUTPATIENT
Start: 2021-09-21 | End: 2021-09-23

## 2021-09-21 RX ORDER — QUETIAPINE FUMARATE 200 MG/1
50 TABLET, FILM COATED ORAL ONCE
Refills: 0 | Status: DISCONTINUED | OUTPATIENT
Start: 2021-09-21 | End: 2021-09-23

## 2021-09-21 RX ORDER — TRAZODONE HCL 50 MG
50 TABLET ORAL AT BEDTIME
Refills: 0 | Status: DISCONTINUED | OUTPATIENT
Start: 2021-09-21 | End: 2021-09-23

## 2021-09-21 RX ORDER — HALOPERIDOL DECANOATE 100 MG/ML
5 INJECTION INTRAMUSCULAR ONCE
Refills: 0 | Status: DISCONTINUED | OUTPATIENT
Start: 2021-09-21 | End: 2021-09-23

## 2021-09-21 RX ORDER — IBUPROFEN 200 MG
600 TABLET ORAL ONCE
Refills: 0 | Status: COMPLETED | OUTPATIENT
Start: 2021-09-21 | End: 2021-09-21

## 2021-09-21 RX ORDER — CLONAZEPAM 1 MG
1 TABLET ORAL ONCE
Refills: 0 | Status: DISCONTINUED | OUTPATIENT
Start: 2021-09-21 | End: 2021-09-21

## 2021-09-21 RX ADMIN — Medication 25 MILLIGRAM(S): at 21:02

## 2021-09-21 RX ADMIN — Medication 1 MILLIGRAM(S): at 15:16

## 2021-09-21 RX ADMIN — Medication 600 MILLIGRAM(S): at 15:50

## 2021-09-21 NOTE — ED BEHAVIORAL HEALTH ASSESSMENT NOTE - RISK ASSESSMENT
MODERATE RISK     ACUTE RISK FACTORS: Depressed mood, hopeless, suicidal thoughts, substance abuse, not engaged in safety planning     CHRONIC RISK FACTORS: Mood disorder, prior suicidal behaviors, in-patient hospitalization, substance abuse     PROTECTIVE FACTORS: Mother High Acute Suicide Risk

## 2021-09-21 NOTE — ED BEHAVIORAL HEALTH ASSESSMENT NOTE - HPI (INCLUDE ILLNESS QUALITY, SEVERITY, DURATION, TIMING, CONTEXT, MODIFYING FACTORS, ASSOCIATED SIGNS AND SYMPTOMS)
51 y/o Female; domiciled with mother in [place], no children; unemployed. PPH of anxiety and bipolar disorder, Multiple prior Psychiatric admissions, last psychiatric admission in 2020 [facility] for depression. History of polysubstance abuse including cocaine and opiates. No pertinent past medical history. Patient walked into the ED for evaluation of Suicidal Ideation, plan to "cut wrist with a blade".     Patient presenting with organized and linear thoughts.   Reporting history of depression, anxiety, and suicide attempts via cutting and swallowing pills. As per prior records, patient has history of interrupted suicidal attempts by jumping in front of train but was stopped by bystander; However at this time, patient denying history. Hence, patient is an inconsistent and unreliable historian. At this time patient reports worsening depression and anxiety over the last 2 weeks and has been feeling "isolated". Patient denies any triggers worsening depression and anxiety. Patient reporting depressive symptoms of persistent sad mood, hopelessness, low energy, and anhedonia. Patient complaining of severe anxiety including somatic symptoms such as elevated heart rate. Denies manic / psychotic symptoms. No delusions elicited. Patient endorsing suicidal ideation and not engaged in safety planning. Reports not having any outpatient psychiatric treatment. Patient reports she is on a 3 month waiting list for a Psychiatric appointment and has been following up with her Primary MD who is prescribing her current Klonopin. positive urine toxicology for benzodiazepine, methadone, and amphetamines. patient refusing collateral source.

## 2021-09-21 NOTE — ED BEHAVIORAL HEALTH ASSESSMENT NOTE - ADDITIONAL DETAILS ALL
Patient multiple suicidal attempts, however discrepancy in patient reports. Currently endorsing suicidal ideation with plan to cut herself.

## 2021-09-21 NOTE — ED ADULT NURSE NOTE - OBJECTIVE STATEMENT
pt presents to ED s/p suicidal thoughts, benzo withdrawal and decreased PO intake. Reports last took prescribed klonipin 2 days ago because she was not feeling good, wasn't eating. pt in no acute distress. will ctm

## 2021-09-21 NOTE — ED BEHAVIORAL HEALTH ASSESSMENT NOTE - SUMMARY
51 y/o Female; domiciled with mother in [place], no children; unemployed. PPH of anxiety and bipolar disorder, Multiple prior Psychiatric admissions, last psychiatric admission in 2020 [facility] for depression. History of polysubstance abuse including cocaine and opiates. No pertinent past medical history. Patient walked into the ED for evaluation of Suicidal Ideation, plan to "cut wrist with a blade".    Patient presenting with depression and suicidal thoughts. Malingering is suspected. Patient not engaged in safety planning. Patient has a history of suicidal behaviors. At this time, patient requires inpatient admission for safety and stabilization.

## 2021-09-21 NOTE — ED PROVIDER NOTE - PHYSICAL EXAMINATION
VITALS: Initial triage and subsequent vitals have been reviewed by me.  GEN APPEARANCE: WDWN, alert and cooperative, non-toxic appearing and in NAD  HEAD: Atraumatic, normocephalic   EYES: PERRLa, EOMI, vision grossly intact.   EARS: Gross hearing intact.   NOSE: No nasal discharge, no external evidence of epistaxis.   NECK: Supple  CV: RRR, S1S2, no c/r/m/g. No cyanosis or pallor. Extremities warm, well perfused. Cap refill <2 seconds. No bruits.   LUNGS: CTAB. No wheezing. No rales. No rhonchi. No diminished breath sounds.   ABDOMEN: Soft, NTND. No guarding or rebound. No masses.   MSK/EXT: Spine appears normal, no spine point tenderness. No CVA ttp. Normal muscular development. Pelvis stable. No obvious joint or bony deformity, no peripheral edema.   NEURO: Alert, follows commands. Weight bearing normal. Speech normal. Sensation and motor normal x4 extremities.   SKIN: Normal color for race, warm, dry and intact. No evidence of rash. +Dirt to bilateral knees  PSYCH: Normal mood and affect.

## 2021-09-21 NOTE — ED ADULT NURSE REASSESSMENT NOTE - NS ED NURSE REASSESS COMMENT FT1
Assumed care from George FOX. Pt sleeping comfortable in bed. No obvious signs of distress. No agitation or anxiety noted at this time. 1:1 In place.

## 2021-09-21 NOTE — ED ADULT TRIAGE NOTE - CHIEF COMPLAINT QUOTE
Pt reports she had thoughts of using a knife to cut her wrists and came to ED instead.  Reports that she has taken her methadone, but not her klonopin.

## 2021-09-21 NOTE — ED PROVIDER NOTE - OBJECTIVE STATEMENT
51 y/o female with PMHx of anxiety on Klonopin and Methadone 60mg daily presents to the ED for evaluation of SI. Pt reports she has been having a panic attack and has been feeling more depressed over the past x2 weeks. States she had imminent thoughts of self-harm today. States she had thought of using a knife to cut her bilateral wrists, but came to ED to get evaluated instead. States that she took her Methadone today, but not her Klonopin. Lives at home with mother, no fire-arms in home. Denies HI, AH and VH. Otherwise, has no physical complaints today. Denies n/v/f/c/cp/sob. Denies headache, syncope, lightheadedness, dizziness. Denies chest palpitations, abdominal pain. Denies edema. Denies dysuria, hematuria, BRBPR, tarry stools, diarrhea, constipation. Denies  discharge. NKDA.

## 2021-09-21 NOTE — ED BEHAVIORAL HEALTH ASSESSMENT NOTE - DETAILS
er yes Patient multiple suicidal attempts, however discrepancy in patient reports. Currently endorsing suicidal ideation with plan to cut herself.

## 2021-09-21 NOTE — ED PROVIDER NOTE - PROGRESS NOTE DETAILS
Adelaide ZAMORANO: discussed with  NP will hold for inpt admission until available bed. Adelaide ZAMORANO: Pt signed out to my colleague Dr. JC pending clinical reassessment,  hold till a.m. for dispo planning likely inpt. pt slept, no issues, awaiting am re evaluation, so to am doctor pending re evaluation and disposition CISCO Collazo DO john; telepsych said to adm to dr nunez at [facility]: pt endorsed to me from prior physician PENDING: psych bed for voluntary admission

## 2021-09-21 NOTE — ED PROVIDER NOTE - CLINICAL SUMMARY MEDICAL DECISION MAKING FREE TEXT BOX
Adelaide ZAMORANO: 49 y/o female with PMHx of anxiety on Klonopin and Methadone 60mg daily presents to the ED for evaluation of SI. Pt reports she has been having a panic attack and has been feeling more depressed over the past x2 weeks. States she had imminent thoughts of self-harm today. States she had thought of using a knife to cut her bilateral wrists, but came to ED to get evaluated instead. States that she took her Methadone today, but not her Klonopin. Lives at home with mother, no fire-arms in home. Denies HI, AH and VH. exam vss non toxic PE as above c/f SI in setting of anxiety, will clear medically, discuss with , reassess thereafter.

## 2021-09-22 DIAGNOSIS — R45.851 SUICIDAL IDEATIONS: ICD-10-CM

## 2021-09-22 LAB
A1C WITH ESTIMATED AVERAGE GLUCOSE RESULT: 5.3 % — SIGNIFICANT CHANGE UP (ref 4–5.6)
COVID-19 SPIKE DOMAIN AB INTERP: POSITIVE
COVID-19 SPIKE DOMAIN ANTIBODY RESULT: >250 U/ML — HIGH
ESTIMATED AVERAGE GLUCOSE: 105 MG/DL — SIGNIFICANT CHANGE UP (ref 68–114)
HCG SERPL-ACNC: <1 MIU/ML — SIGNIFICANT CHANGE UP
SARS-COV-2 IGG+IGM SERPL QL IA: >250 U/ML — HIGH
SARS-COV-2 IGG+IGM SERPL QL IA: POSITIVE
TSH SERPL-MCNC: 1.08 UU/ML — SIGNIFICANT CHANGE UP (ref 0.34–4.82)

## 2021-09-22 PROCEDURE — 86769 SARS-COV-2 COVID-19 ANTIBODY: CPT

## 2021-09-22 PROCEDURE — 83036 HEMOGLOBIN GLYCOSYLATED A1C: CPT

## 2021-09-22 PROCEDURE — 84443 ASSAY THYROID STIM HORMONE: CPT

## 2021-09-22 PROCEDURE — 80061 LIPID PANEL: CPT

## 2021-09-22 PROCEDURE — 84702 CHORIONIC GONADOTROPIN TEST: CPT

## 2021-09-22 PROCEDURE — 36415 COLL VENOUS BLD VENIPUNCTURE: CPT

## 2021-09-22 PROCEDURE — 99223 1ST HOSP IP/OBS HIGH 75: CPT

## 2021-09-22 RX ORDER — METHADONE HYDROCHLORIDE 40 MG/1
60 TABLET ORAL DAILY
Refills: 0 | Status: DISCONTINUED | OUTPATIENT
Start: 2021-09-22 | End: 2021-09-23

## 2021-09-22 RX ORDER — ESCITALOPRAM OXALATE 10 MG/1
10 TABLET, FILM COATED ORAL DAILY
Refills: 0 | Status: DISCONTINUED | OUTPATIENT
Start: 2021-09-22 | End: 2021-09-23

## 2021-09-22 RX ORDER — INFLUENZA VIRUS VACCINE 15; 15; 15; 15 UG/.5ML; UG/.5ML; UG/.5ML; UG/.5ML
0.5 SUSPENSION INTRAMUSCULAR ONCE
Refills: 0 | Status: DISCONTINUED | OUTPATIENT
Start: 2021-09-22 | End: 2021-09-23

## 2021-09-22 RX ORDER — CLONAZEPAM 1 MG
1 TABLET ORAL
Refills: 0 | Status: DISCONTINUED | OUTPATIENT
Start: 2021-09-22 | End: 2021-09-23

## 2021-09-22 RX ORDER — METHADONE HYDROCHLORIDE 40 MG/1
60 TABLET ORAL DAILY
Refills: 0 | Status: DISCONTINUED | OUTPATIENT
Start: 2021-09-22 | End: 2021-09-22

## 2021-09-22 RX ADMIN — Medication 1 MILLIGRAM(S): at 08:44

## 2021-09-22 RX ADMIN — METHADONE HYDROCHLORIDE 60 MILLIGRAM(S): 40 TABLET ORAL at 10:33

## 2021-09-22 RX ADMIN — Medication 1 MILLIGRAM(S): at 17:39

## 2021-09-22 NOTE — PATIENT PROFILE BEHAVIORAL HEALTH - TOBACCO USE
"Chief Complaint   Patient presents with     Physical       Initial BP (!) 162/72   Pulse 118   Temp 97.6  F (36.4  C)   Ht 1.778 m (5' 10\")   Wt 86.2 kg (190 lb)   SpO2 98%   BMI 27.26 kg/m   Estimated body mass index is 27.26 kg/m  as calculated from the following:    Height as of this encounter: 1.778 m (5' 10\").    Weight as of this encounter: 86.2 kg (190 lb).  Medication Reconciliation: complete  Red Law LPN  "
Current some day smoker

## 2021-09-22 NOTE — PROGRESS NOTE BEHAVIORAL HEALTH - NSBHADDHXPSYCHFT_PSY_A_CORE
Past psychiatric admission in 2020 [facility] for depression. Past psychiatric admission in 2020 [facility] for depression  .inpatient psychiatry at [facility] 4 6/16/2021  prior medications: Past Psychotropic Medication Lexapro 20mg PO, Alprazolam 2mg PO, Abilify  30mg, Clonazepam 1mg BID, Lamictal 25mg, Wellbutrin 10mg and Methadone 5mg

## 2021-09-22 NOTE — PROGRESS NOTE BEHAVIORAL HEALTH - NSBHADDHXSUBSTFT_PSY_A_CORE
cocaine and opiates   Methadone dose to be verified by HH (reports that she goes to [facility] a cocaine and opiates   Methadone dose to be verified by  (reports that she goes to [facility] aAmphetamines...; Cocaine/Crack; Heroin/Opiates  positive urine toxicology  Patient denies use of substance but urine tox + for benzo, methadone and cocaine

## 2021-09-22 NOTE — PROGRESS NOTE BEHAVIORAL HEALTH - RISK ASSESSMENT
low risk  acute : anxiety, substance seeking for more xanax and klonopin  claiming suicidal ideation   mitigating : pt  denies any intent or plan  protective: family support   chronic :

## 2021-09-22 NOTE — PROGRESS NOTE BEHAVIORAL HEALTH - NSBHFUPIPCHARTREVFT_PSY_A_CORE
50 yr old   female self referred to ED with reported  suicidal ideation of "cut wrist with a blade". 50 yr old   female self referred to ED with reported  suicidal ideation of "cut wrist with a blade". She claimed for the past 2 weeks of increased depression but was unable to explain what was making her depressed .. Pt denies any hallucination no delusions elicited.

## 2021-09-22 NOTE — ED ADULT TRIAGE NOTE - SOURCE OF INFORMATION
Pt at the desk asking for vistaril and nicotine gum. Stated she feels anxiety of 7. This nurse explained that it is too early for the vistaril and pt is agreeable to wait. Patient

## 2021-09-22 NOTE — ED ADULT NURSE REASSESSMENT NOTE - NS ED NURSE REASSESS COMMENT FT1
received report from [facility], pt sleeping comfortably, 1-1 in place, vitals stable, awaiting to go to 5n

## 2021-09-22 NOTE — ED BEHAVIORAL HEALTH NOTE - BEHAVIORAL HEALTH NOTE
telepsychiatry reassessment Note:    MD received handoff on patient.     MD spoke to RN for updated ED course: patient has been calm and cooperative.       "51 y/o Female; domiciled with mother in [place], no children; unemployed. PPH of anxiety and bipolar disorder, Multiple prior Psychiatric admissions, last psychiatric admission in 2020 [facility] for depression. History of polysubstance abuse including cocaine and opiates. No pertinent past medical history. Patient walked into the ED for evaluation of Suicidal Ideation, plan to "cut wrist with a blade".    Patient presenting with depression and suicidal thoughts. Malingering is suspected. Patient not engaged in safety planning. Patient has a history of suicidal behaviors. At this time, patient requires inpatient admission for safety and stabilization."    - admit to [facility] on 9.13 legal status  -continue clonazepam  - Methadone dose to be verified by  (reports that she goes to [facility] and last received her dose yesterday.

## 2021-09-22 NOTE — PROGRESS NOTE BEHAVIORAL HEALTH - VIOLENCE PROTECTIVE FACTORS:
Residential stability/Engagement in treatment Residential stability/Relationship stability/Engagement in treatment

## 2021-09-23 VITALS — TEMPERATURE: 98 F

## 2021-09-23 LAB
CHOLEST SERPL-MCNC: 177 MG/DL — SIGNIFICANT CHANGE UP
HDLC SERPL-MCNC: 101 MG/DL — SIGNIFICANT CHANGE UP
LIPID PNL WITH DIRECT LDL SERPL: 66 MG/DL — SIGNIFICANT CHANGE UP
NON HDL CHOLESTEROL: 76 MG/DL — SIGNIFICANT CHANGE UP
TRIGL SERPL-MCNC: 52 MG/DL — SIGNIFICANT CHANGE UP

## 2021-09-23 PROCEDURE — 99239 HOSP IP/OBS DSCHRG MGMT >30: CPT

## 2021-09-23 RX ORDER — CLONAZEPAM 1 MG
1 TABLET ORAL THREE TIMES A DAY
Refills: 0 | Status: DISCONTINUED | OUTPATIENT
Start: 2021-09-23 | End: 2021-09-23

## 2021-09-23 RX ORDER — METHADONE HYDROCHLORIDE 40 MG/1
1.5 TABLET ORAL
Qty: 0 | Refills: 0 | DISCHARGE
Start: 2021-09-23

## 2021-09-23 RX ADMIN — ESCITALOPRAM OXALATE 10 MILLIGRAM(S): 10 TABLET, FILM COATED ORAL at 09:14

## 2021-09-23 RX ADMIN — METHADONE HYDROCHLORIDE 60 MILLIGRAM(S): 40 TABLET ORAL at 09:14

## 2021-09-23 RX ADMIN — Medication 1 MILLIGRAM(S): at 09:14

## 2021-09-23 NOTE — DISCHARGE NOTE BEHAVIORAL HEALTH - MEDICATION SUMMARY - MEDICATIONS TO STOP TAKING
I will STOP taking the medications listed below when I get home from the hospital:    OXcarbazepine 600 mg oral tablet  -- 1 tab(s) by mouth 2 times a day    benztropine 1 mg oral tablet  -- 1 tab(s) by mouth once a day    ARIPiprazole 20 mg oral tablet  -- 1 tab(s) by mouth once a day

## 2021-09-23 NOTE — DISCHARGE NOTE BEHAVIORAL HEALTH - MEDICATION SUMMARY - MEDICATIONS TO CHANGE
I will SWITCH the dose or number of times a day I take the medications listed below when I get home from the hospital:    clonazePAM 1 mg oral tablet  -- 1 tab(s) by mouth 3 times a day MDD:2mg

## 2021-09-23 NOTE — DISCHARGE NOTE BEHAVIORAL HEALTH - HPI (INCLUDE ILLNESS QUALITY, SEVERITY, DURATION, TIMING, CONTEXT, MODIFYING FACTORS, ASSOCIATED SIGNS AND SYMPTOMS)
51 y/o Female; domiciled with mother in [place], no children; unemployed. PPH of anxiety and bipolar disorder, Multiple prior Psychiatric admissions, last psychiatric admission in 2020 [facility] for depression. History of polysubstance abuse including cocaine and opiates. No pertinent past medical history. Patient walked into the ED for evaluation of Suicidal Ideation, plan to "cut wrist with a blade".     Patient presenting with organized and linear thoughts.   Reporting history of depression, anxiety, and suicide attempts via cutting and swallowing pills. As per prior records, patient has history of interrupted suicidal attempts by jumping in front of train but was stopped by bystander; However at this time, patient denying history. Hence, patient is an inconsistent and unreliable historian. At this time patient reports worsening depression and anxiety over the last 2 weeks and has been feeling "isolated". Patient denies any triggers worsening depression and anxiety. Patient reporting depressive symptoms of persistent sad mood, hopelessness, low energy, and anhedonia. Patient complaining of severe anxiety including somatic symptoms such as elevated heart rate. Denies manic / psychotic symptoms. No delusions elicited. Patient endorsing suicidal ideation and not engaged in safety planning. Reports not having any outpatient psychiatric treatment. Patient reports she is on a 3 month waiting list for a Psychiatric appointment and has been following up with her Primary MD who is prescribing her current Klonopin. positive urine toxicology for benzodiazepine, methadone, and amphetamines. patient refusing collateral source.

## 2021-09-23 NOTE — DISCHARGE NOTE BEHAVIORAL HEALTH - NSBHDCSUICFCTRSFT_PSY_A_CORE
1. depression  2. suicidal ideation - pt denies any suicidal ideation intent or plan , pt has a place of residence but refused contact with family that she lives with , pt also refused contact with providers aside from methadone maintenance
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
## 2021-09-23 NOTE — DISCHARGE NOTE BEHAVIORAL HEALTH - MEDICATION SUMMARY - MEDICATIONS TO TAKE
I will START or STAY ON the medications listed below when I get home from the hospital:    methadone 40 mg oral tablet, dispersible  -- 1.5 tab(s) by mouth once a day  -- Indication: For Opiate dependance

## 2021-09-23 NOTE — PROGRESS NOTE BEHAVIORAL HEALTH - PROBLEM SELECTOR PLAN 2
continue with  the methadone maintenance   encourage to attend groups for
continue with  the methadone maintenance   encourage to attend groups for

## 2021-09-23 NOTE — DISCHARGE NOTE BEHAVIORAL HEALTH - NSBHDCTHERAPYFT_PSY_A_CORE
milieu therapy , supportive group therapy Pt provided with individual and group therapies including safety planning and coping skills, along with psychoeducation related to diagnosis.

## 2021-09-23 NOTE — DISCHARGE NOTE BEHAVIORAL HEALTH - NSBHDCCRISISPLAN1FT_PSY_A_CORE
Tell a trusted friend/family member, tell your outpatient provider, call a crisis line ( [facility] ph. [phone], [facility] [phone], [facility] (peer support) ph. [phone]), return to the nearest emergency room. You may call 9-1-1 for assistance.

## 2021-09-23 NOTE — DISCHARGE NOTE BEHAVIORAL HEALTH - NSBHDCSUBSTHXFT_PSY_A_CORE
· Addtional Substance Use History	cocaine and opiates   Methadone dose to be verified by HH (reports that she [facility] depressionAmphetamines...; Cocaine/Crack; Heroin/Opiates  positive urine toxicology  Patient denies use of substance but urine tox + for benzo, methadone and cocaine

## 2021-09-23 NOTE — DISCHARGE NOTE BEHAVIORAL HEALTH - PAST PSYCHIATRIC HISTORY
Pt was caught by nursing to be using other pt 's medication , pt ingested an ativan that a peer gave her from his medications.     Pt refused to given consent to contact her providers ( aside from methadone clinic) pt also refused to allow contact with any family.  Pt refused any assist for aftercare treatment appts. Pt was caught by nursing to be using other pt 's medication , pt ingested an ativan that a peer gave her from his medications.     Pt refused to given consent to contact her providers ( aside from methadone clinic) pt also refused to allow contact with any family.  Pt refused any assist for aftercare treatment appts.    Per documentation, multiple prior psych admissions, last [facility] 2020 for depression.

## 2021-09-23 NOTE — PROGRESS NOTE BEHAVIORAL HEALTH - NSBHCHARTREVIEWVS_PSY_A_CORE FT
Vital Signs Last 24 Hrs  T(C): 36.9 (23 Sep 2021 08:53), Max: 36.9 (23 Sep 2021 08:53)  T(F): 98.5 (23 Sep 2021 08:53), Max: 98.5 (23 Sep 2021 08:53)  HR: --  BP: --  BP(mean): --  RR: --  SpO2: --
Vital Signs Last 24 Hrs  T(C): 36.6 (22 Sep 2021 09:15), Max: 36.7 (21 Sep 2021 18:45)  T(F): 97.8 (22 Sep 2021 09:15), Max: 98.1 (21 Sep 2021 18:45)  HR: 76 (22 Sep 2021 09:15) (72 - 79)  BP: 100/65 (22 Sep 2021 09:15) (100/65 - 121/78)  BP(mean): --  RR: 16 (22 Sep 2021 09:15) (16 - 18)  SpO2: 99% (22 Sep 2021 05:27) (99% - 100%)

## 2021-09-23 NOTE — DISCHARGE NOTE BEHAVIORAL HEALTH - NSBHDCCONDITIONFT_PSY_A_CORE
improved as pt denies any suicidal ideation intent or plan , pt refused assist for aftercare treatment but continue with the methadone clinic

## 2021-09-23 NOTE — PROGRESS NOTE BEHAVIORAL HEALTH - NSBHFUPINTERVALCCFT_PSY_A_CORE
"I have a lot of stressors but I really don't want to talk about them now"
"I have a lot of stressors but I really don't want to talk about them now"

## 2021-09-23 NOTE — DISCHARGE NOTE BEHAVIORAL HEALTH - NSBHDCADDFT_PSY_A_CORE
HgA1c A1C with Estimated Average Glucose Result: 5.3: Method: Immunoassay       Reference Range                4.0-5.6%       High risk (prediabetic)        5.7-6.4%       Diabetic, diagnostic             >=6.5%       ADA diabetic treatment goal       <7.0%  The Hemoglobin A1c testing is NGSP-certified.Reference ranges are based  upon the 2010 recommendations of  the American Diabetes Association.  Interpretation may vary for children  and adolescents. % (09.22.21 @ 11:56)  lipid results pending ordered for 9/23/2021

## 2021-09-23 NOTE — DISCHARGE NOTE BEHAVIORAL HEALTH - NSBHDCRESOURCESOTHERFT_PSY_A_CORE
[facility]- for psychiatric crises (available AFTER HOURS)  24/7 hotline: [phone]  Address: [address] [place] [address] [place] [address]    Mood Disorder Support Group [facility]:   Free Zoom Support Groups:   https://www.Factual/zoommeetings    8-663-006-EFREN (6264) or info@efren.org    7-040-CVP-[facility] (1-550.939.2471)  National Suicide Prevention Lifeline 1-356.732.7708    Substance Use Resources    SMART Recovery Groups  *Can meet online   https://www.smartrecovery.org     Find a local AA group:  [facility]  https://[facility]-aa.org/  [phone]

## 2021-09-23 NOTE — PROGRESS NOTE BEHAVIORAL HEALTH - SUMMARY
49 y/o Female; domiciled with mother in [place], no children; unemployed. PPH of anxiety and bipolar disorder, Multiple prior Psychiatric admissions, last psychiatric admission in 2020 [facility] for depression. History of polysubstance abuse including cocaine and opiates. No pertinent past medical history. Patient walked into the ED for evaluation of Suicidal Ideation, plan to "cut wrist with a blade".    Patient presenting with depression and suicidal thoughts. Malingering is suspected. Patient not engaged in safety planning. Patient has a history of suicidal behaviors. At this time, patient requires inpatient admission for safety and stabilization.
51 y/o Female; domiciled with mother in [place], no children; unemployed. PPH of anxiety and bipolar disorder, Multiple prior Psychiatric admissions, last psychiatric admission in 2020 [facility] for depression. History of polysubstance abuse including cocaine and opiates. No pertinent past medical history. Patient walked into the ED for evaluation of Suicidal Ideation, plan to "cut wrist with a blade".    Patient presenting with depression and suicidal thoughts. Malingering is suspected. Patient not engaged in safety planning. Patient has a history of suicidal behaviors. At this time, patient requires inpatient admission for safety and stabilization.

## 2021-09-23 NOTE — DISCHARGE NOTE BEHAVIORAL HEALTH - NSBHDCREFEROTHERFT_PSY_A_CORE
done
If you change your mind and would like aftercare please see resources in your area below:     [facility]: [phone]  *[facility] Crisis Center for psychiatric needs. Open 9am-5pm, M-F.    For duel diagnosis treatment:     [facility]  [address] [place] [address]  [phone]    [facility]  [address] [place] [phone]    For medication management:   Behavioral Health at [address], [address] [place] [address] [phone]

## 2021-09-23 NOTE — DISCHARGE NOTE BEHAVIORAL HEALTH - CONDITIONS AT DISCHARGE
discharged with all belongings and verbalized understanding orf all d/c plans. Patient alert, oriented x3. Therapist and nurse safety plan with patient. SW and nurse reviewed discharge plan with patient who refused any aftercare referrals. Patient received a copy of her discharge instructions. All belongings returned to patient.

## 2021-09-23 NOTE — PROGRESS NOTE BEHAVIORAL HEALTH - NSBHCHARTREVIEWINVESTIGATE_PSY_A_CORE FT
Ventricular Rate 71 BPM    Atrial Rate 71 BPM    P-R Interval 130 ms    QRS Duration 90 ms    Q-T Interval 428 ms    QTC Calculation(Bazett) 465 ms    P Axis 22 degrees    R Axis 54 degrees    T Axis 55 degrees    Diagnosis Line Normal sinus rhythm  Normal ECG  No previous ECGs available  Confirmed by BERNA ZAMORANO, PABLO ERVIN ([id number]) on 9/21/2021 4:08:42 PM
Ventricular Rate 71 BPM    Atrial Rate 71 BPM    P-R Interval 130 ms    QRS Duration 90 ms    Q-T Interval 428 ms    QTC Calculation(Bazett) 465 ms    P Axis 22 degrees    R Axis 54 degrees    T Axis 55 degrees    Diagnosis Line Normal sinus rhythm  Normal ECG  No previous ECGs available  Confirmed by BERNA ZAMORANO, PABLO ERVIN ([id number]) on 9/21/2021 4:08:42 PM

## 2021-09-23 NOTE — PROGRESS NOTE BEHAVIORAL HEALTH - NSBHFUPINTERVALHXFT_PSY_A_CORE
Pt was  seen resting in bed, not in distress.  Pt then asked for xanax and was complaining "when I was in the rehab program they took me off of the xanax but I would want it back and can the Klonopin be increased to 2mg atleast twice a day "
9/23/2021 Pt was involved with a male peer that was later identified as her boyfriend who shared his ativan with the pt.   Pt denies any suicidal intent or plan . Pt refuses to allow contact with providers except for methadone maintenance.  Did I stop and pt with  9/22/2021Pt was  seen resting in bed, not in distress.  Pt then asked for xanax and was complaining "when I was in the rehab program they took me off of the xanax but I would want it back and can the Klonopin be increased to 2mg atleast twice a day "

## 2021-09-23 NOTE — DISCHARGE NOTE BEHAVIORAL HEALTH - NSBHDCSUICSAFETYFT_PSY_A_CORE
1. checked [facility] and pt had 09/13/2021	09/13/2021	clonazepam 1 mg tablet	60	30	Saw Scruggs MD	[id number]	Insurance	[facility] #[id number]    Advised to return to hospital or go to nearest ED or call 911 or (534) [employer] or (991) [employer] TALK hotlines for any severe, worsening or persistent symptoms including suicidal/homicidal ideations, intent or plans. Patient verbalized understanding of instructions.

## 2021-09-23 NOTE — DISCHARGE NOTE BEHAVIORAL HEALTH - NSBHDCSUICPROTECTFT_PSY_A_CORE
1. support of family  2. methadone maintenance program   3. pt with helps seeking behavior and is resourceful

## 2021-09-23 NOTE — PROGRESS NOTE BEHAVIORAL HEALTH - NSBHCHARTREVIEWLAB_PSY_A_CORE FT
11.8   4.99  )-----------( 185      ( 21 Sep 2021 15:01 )             35.8     CBC Full  -  ( 21 Sep 2021 15:01 )  WBC Count : 4.99 K/uL  RBC Count : 3.91 M/uL  Hemoglobin : 11.8 g/dL  Hematocrit : 35.8 %  Platelet Count - Automated : 185 K/uL  Mean Cell Volume : 91.6 fl  Mean Cell Hemoglobin : 30.2 pg  Mean Cell Hemoglobin Concentration : 33.0 gm/dL  Auto Neutrophil # : 2.70 K/uL  Auto Lymphocyte # : 1.85 K/uL  Auto Monocyte # : 0.30 K/uL  Auto Eosinophil # : 0.10 K/uL  Auto Basophil # : 0.03 K/uL  Auto Neutrophil % : 54.1 %  Auto Lymphocyte % : 37.1 %  Auto Monocyte % : 6.0 %  Auto Eosinophil % : 2.0 %  Auto Basophil % : 0.6 %
11.8   4.99  )-----------( 185      ( 21 Sep 2021 15:01 )             35.8     CBC Full  -  ( 21 Sep 2021 15:01 )  WBC Count : 4.99 K/uL  RBC Count : 3.91 M/uL  Hemoglobin : 11.8 g/dL  Hematocrit : 35.8 %  Platelet Count - Automated : 185 K/uL  Mean Cell Volume : 91.6 fl  Mean Cell Hemoglobin : 30.2 pg  Mean Cell Hemoglobin Concentration : 33.0 gm/dL  Auto Neutrophil # : 2.70 K/uL  Auto Lymphocyte # : 1.85 K/uL  Auto Monocyte # : 0.30 K/uL  Auto Eosinophil # : 0.10 K/uL  Auto Basophil # : 0.03 K/uL  Auto Neutrophil % : 54.1 %  Auto Lymphocyte % : 37.1 %  Auto Monocyte % : 6.0 %  Auto Eosinophil % : 2.0 %  Auto Basophil % : 0.6 %

## 2021-09-23 NOTE — DISCHARGE NOTE BEHAVIORAL HEALTH - NSBHDCMEDSFT_PSY_A_CORE
MEDICATIONS  (STANDING):  clonazePAM  Tablet 1 milliGRAM(s) Oral two times a day  influenza   Vaccine 0.5 milliLiter(s) IntraMuscular once  methadone   Dispersible Tablet 60 milliGRAM(s) Oral daily  pt came in already taking the klonopin and methadone. pt later refused the lexapro and claimed she spat it out . MEDICATIONS  (STANDING):  clonazePAM  Tablet 1 milliGRAM(s) Oral two times a day  influenza   Vaccine 0.5 milliLiter(s) IntraMuscular once  methadone   Dispersible Tablet 60 milliGRAM(s) Oral daily  pt came in already taking the klonopin and methadone. pt later refused the lexapro and claimed she spat it out .    Pt educated not to stop taking medications unless told to do so by her doctor.

## 2021-09-23 NOTE — DISCHARGE NOTE BEHAVIORAL HEALTH - NSBHDCCRISISPLAN3FT_PSY_A_CORE
Discuss in treatment with counselor, attended a local/online self-help group, call a hotline ([facility] (Substance Abuse and Mental Health Services [facility])[phone])

## 2021-09-24 ENCOUNTER — EMERGENCY (EMERGENCY)
Facility: HOSPITAL | Age: 51
LOS: 0 days | Discharge: HOME | End: 2021-09-24
Attending: EMERGENCY MEDICINE
Payer: MEDICAID

## 2021-09-24 VITALS
OXYGEN SATURATION: 100 % | HEART RATE: 70 BPM | SYSTOLIC BLOOD PRESSURE: 106 MMHG | DIASTOLIC BLOOD PRESSURE: 77 MMHG | RESPIRATION RATE: 18 BRPM | TEMPERATURE: 98 F

## 2021-09-24 VITALS — WEIGHT: 119.93 LBS | HEIGHT: 63 IN

## 2021-09-24 DIAGNOSIS — Z76.0 ENCOUNTER FOR ISSUE OF REPEAT PRESCRIPTION: ICD-10-CM

## 2021-09-24 DIAGNOSIS — F41.9 ANXIETY DISORDER, UNSPECIFIED: ICD-10-CM

## 2021-09-24 PROCEDURE — 99281 EMR DPT VST MAYX REQ PHY/QHP: CPT

## 2021-09-24 PROCEDURE — 99283 EMERGENCY DEPT VISIT LOW MDM: CPT

## 2021-09-24 RX ORDER — METHADONE HYDROCHLORIDE 40 MG/1
20 TABLET ORAL ONCE
Refills: 0 | Status: DISCONTINUED | OUTPATIENT
Start: 2021-09-24 | End: 2021-09-24

## 2021-09-24 RX ADMIN — METHADONE HYDROCHLORIDE 20 MILLIGRAM(S): 40 TABLET ORAL at 11:12

## 2021-09-24 NOTE — ED STATDOCS - PATIENT PORTAL LINK FT
You can access the FollowMyHealth Patient Portal offered by [facility] by registering at the following website: [email or web address]. By joining [facility]’s FollowMyHealth portal, you will also be able to view your health information using other applications (apps) compatible with our system.

## 2021-09-24 NOTE — ED ADULT TRIAGE NOTE - CHIEF COMPLAINT QUOTE
Pt comes to the ED stating that she is beginning to go through withdrawal. Pt states that she is "stuck" here and unable to get to her program. Pt states that she needs methodone and 1mg klonopin.

## 2021-09-24 NOTE — CHART NOTE - NSCHARTNOTEFT_GEN_A_CORE
Chart reviewed. Asked by Dr. Head to see patient. She was DC from [address] [place] [address] yesterday after taking a fellow patient's medication. (It was her boyfriend.) She returns today asking for Klonopin and Methadone. Kailee Hernandez () met with patient. She stated that she wanted a ride to [place]. We said we would get her a medicaid cab. She stated that her boyfriend needs a ride too. We informed her that he can't get in the medicaid cab with her. We then offered her bus tickets. She yelled at us and called the  stupid 3 times because she asked why she didn't go to her methadone clinic this a.m. We told her she was cleared for discharge. She came out to the nurses station and asked for more methadone. The MD said no. She threw the hospital phone on the floor. As I was escorting her from the room, she knocked everything off the shelf and called me a vulgar name. She walked outside, where she was immediately joined by her "boyfriend". I informed the  at the desk of the situation.

## 2021-09-24 NOTE — ED STATDOCS - OBJECTIVE STATEMENT
51 y/o female with PMHx of anxiety on klonopin and methadone 60mg daily presents to ED for withdrawal sx . Pt requests medications, states she gets klonopin and methadone daily. States she got dc'd last night, last medicated upstairs yesterday morning and was not able to get to methadone clinic. States she would like to go to detox to get off of methadone. 51 y/o female with PMHx of anxiety on klonopin and methadone 60mg daily presents to ED for withdrawal sx . Pt requests medications, states she gets klonopin and methadone daily. States she got dc'd last night, slept on the street, last medicated in [facility] yesterday morning and was not able to get to methadone clinic. States she would like to go to detox to get off of methadone.

## 2021-09-24 NOTE — ED STATDOCS - PROGRESS NOTE DETAILS
[facility] - [facility] shows 1mg Klonopin (30 count) filled on 9/13 by Dr. Lizarraga. Patient states it is at her home in [facility] and she has none on her. Patient is exhibiting no signs of withdrawal at this time. (no tremors or tongue fasciculations and vital signs are normal.) SW contacted to help with transportation to [place] so she can take her prescribed Klonopin. Pt given methadone dose 20 mg to help with any possible withdrawal.  Pt recently filled klonopin on 9/13 and was given a cab ride home last night that she did not take for unclear reasons.  Offered SW again to take patient home so that she might get her scheduled medications.  Also offered ride to go to methadone clinic, but instead opted to get low dose of prescribed methadone here.  Pt has no visible e/o withdrawal at this time.  No tremulousness, no tongue fasciculations, and avss.  I am concerned that this patient is misusing drugs of abuse.  Amphetamine positive urine here recently and without script for this.  Also reportedly was given ativan by boyfriend while up on behavioral health floor despite it being prescribed and dispensed to him.  No report of si/hi.  Pt insulted  stating, "are you stupid" when discussing plan for transportation.  Pt threw our portable phone on the floor, threw her glass of water on the floor and threw jaron cracker crumbs and wrapper all over the floor and eloped from ED.

## 2021-09-24 NOTE — ED ADULT NURSE NOTE - OBJECTIVE STATEMENT
Pt is a 51 y/o female with PmHx  of anxiety presents to the Ed with c/o withdrawal.  Reported that she is on Klonopin and methadone 60mg daily.

## 2021-09-24 NOTE — ED STATDOCS - CLINICAL SUMMARY MEDICAL DECISION MAKING FREE TEXT BOX
Philip - adult female presents requesting Klonopin or ativan. she is prescribed Klonopin and has her medication at home. vss. physical exam no signs of withdrawal. will contact  for transportation help. will dose methadone x1.

## 2021-09-24 NOTE — CHART NOTE - NSCHARTNOTEFT_GEN_A_CORE
Spoke to patient who arrived home safely, has medication and denies wanting to harm herself. Both phone numbers left by patient are not in service.  Letter to be sent to address on file.

## 2021-09-28 DIAGNOSIS — F11.20 OPIOID DEPENDENCE, UNCOMPLICATED: ICD-10-CM

## 2021-09-28 DIAGNOSIS — F13.10 SEDATIVE, HYPNOTIC OR ANXIOLYTIC ABUSE, UNCOMPLICATED: ICD-10-CM

## 2021-09-28 DIAGNOSIS — R45.851 SUICIDAL IDEATIONS: ICD-10-CM

## 2021-09-28 DIAGNOSIS — F14.10 COCAINE ABUSE, UNCOMPLICATED: ICD-10-CM

## 2021-09-28 DIAGNOSIS — F32.9 MAJOR DEPRESSIVE DISORDER, SINGLE EPISODE, UNSPECIFIED: ICD-10-CM

## 2021-09-28 DIAGNOSIS — Z76.5 MALINGERER [CONSCIOUS SIMULATION]: ICD-10-CM

## 2021-09-28 DIAGNOSIS — F41.9 ANXIETY DISORDER, UNSPECIFIED: ICD-10-CM

## 2021-10-10 ENCOUNTER — EMERGENCY (EMERGENCY)
Facility: HOSPITAL | Age: 51
LOS: 1 days | Discharge: HOME | End: 2021-10-10
Attending: EMERGENCY MEDICINE | Admitting: EMERGENCY MEDICINE
Payer: MEDICAID

## 2021-10-10 VITALS
HEART RATE: 75 BPM | HEIGHT: 64 IN | TEMPERATURE: 98 F | DIASTOLIC BLOOD PRESSURE: 80 MMHG | WEIGHT: 119.93 LBS | OXYGEN SATURATION: 99 % | SYSTOLIC BLOOD PRESSURE: 118 MMHG | RESPIRATION RATE: 16 BRPM

## 2021-10-10 VITALS
SYSTOLIC BLOOD PRESSURE: 115 MMHG | OXYGEN SATURATION: 99 % | DIASTOLIC BLOOD PRESSURE: 60 MMHG | RESPIRATION RATE: 18 BRPM | HEART RATE: 79 BPM | TEMPERATURE: 99 F

## 2021-10-10 DIAGNOSIS — Z76.5 MALINGERER [CONSCIOUS SIMULATION]: ICD-10-CM

## 2021-10-10 DIAGNOSIS — F13.10 SEDATIVE, HYPNOTIC OR ANXIOLYTIC ABUSE, UNCOMPLICATED: ICD-10-CM

## 2021-10-10 DIAGNOSIS — F41.8 OTHER SPECIFIED ANXIETY DISORDERS: ICD-10-CM

## 2021-10-10 DIAGNOSIS — F11.90 OPIOID USE, UNSPECIFIED, UNCOMPLICATED: ICD-10-CM

## 2021-10-10 DIAGNOSIS — Z20.822 CONTACT WITH AND (SUSPECTED) EXPOSURE TO COVID-19: ICD-10-CM

## 2021-10-10 LAB
ALBUMIN SERPL ELPH-MCNC: 4.1 G/DL — SIGNIFICANT CHANGE UP (ref 3.3–5)
ALP SERPL-CCNC: 58 U/L — SIGNIFICANT CHANGE UP (ref 40–120)
ALT FLD-CCNC: 28 U/L — SIGNIFICANT CHANGE UP (ref 10–45)
AMPHET UR-MCNC: NEGATIVE — SIGNIFICANT CHANGE UP
ANION GAP SERPL CALC-SCNC: 8 MMOL/L — SIGNIFICANT CHANGE UP (ref 5–17)
APPEARANCE UR: CLEAR — SIGNIFICANT CHANGE UP
AST SERPL-CCNC: 25 U/L — SIGNIFICANT CHANGE UP (ref 10–40)
BACTERIA # UR AUTO: PRESENT /HPF
BARBITURATES UR SCN-MCNC: NEGATIVE — SIGNIFICANT CHANGE UP
BASOPHILS # BLD AUTO: 0.03 K/UL — SIGNIFICANT CHANGE UP (ref 0–0.2)
BASOPHILS NFR BLD AUTO: 0.6 % — SIGNIFICANT CHANGE UP (ref 0–2)
BENZODIAZ UR-MCNC: POSITIVE
BILIRUB SERPL-MCNC: 0.4 MG/DL — SIGNIFICANT CHANGE UP (ref 0.2–1.2)
BILIRUB UR-MCNC: NEGATIVE — SIGNIFICANT CHANGE UP
BUN SERPL-MCNC: 8 MG/DL — SIGNIFICANT CHANGE UP (ref 7–23)
CALCIUM SERPL-MCNC: 9.2 MG/DL — SIGNIFICANT CHANGE UP (ref 8.4–10.5)
CHLORIDE SERPL-SCNC: 104 MMOL/L — SIGNIFICANT CHANGE UP (ref 96–108)
CO2 SERPL-SCNC: 30 MMOL/L — SIGNIFICANT CHANGE UP (ref 22–31)
COCAINE METAB.OTHER UR-MCNC: NEGATIVE — SIGNIFICANT CHANGE UP
COLOR SPEC: YELLOW — SIGNIFICANT CHANGE UP
COMMENT - URINE: SIGNIFICANT CHANGE UP
CREAT SERPL-MCNC: 0.6 MG/DL — SIGNIFICANT CHANGE UP (ref 0.5–1.3)
DIFF PNL FLD: NEGATIVE — SIGNIFICANT CHANGE UP
EOSINOPHIL # BLD AUTO: 0.03 K/UL — SIGNIFICANT CHANGE UP (ref 0–0.5)
EOSINOPHIL NFR BLD AUTO: 0.6 % — SIGNIFICANT CHANGE UP (ref 0–6)
EPI CELLS # UR: SIGNIFICANT CHANGE UP /HPF (ref 0–5)
GLUCOSE SERPL-MCNC: 91 MG/DL — SIGNIFICANT CHANGE UP (ref 70–99)
GLUCOSE UR QL: NEGATIVE — SIGNIFICANT CHANGE UP
HCG UR QL: NEGATIVE — SIGNIFICANT CHANGE UP
HCT VFR BLD CALC: 39.3 % — SIGNIFICANT CHANGE UP (ref 34.5–45)
HGB BLD-MCNC: 12.5 G/DL — SIGNIFICANT CHANGE UP (ref 11.5–15.5)
IMM GRANULOCYTES NFR BLD AUTO: 0.4 % — SIGNIFICANT CHANGE UP (ref 0–1.5)
KETONES UR-MCNC: NEGATIVE — SIGNIFICANT CHANGE UP
LEUKOCYTE ESTERASE UR-ACNC: ABNORMAL
LYMPHOCYTES # BLD AUTO: 1.03 K/UL — SIGNIFICANT CHANGE UP (ref 1–3.3)
LYMPHOCYTES # BLD AUTO: 19 % — SIGNIFICANT CHANGE UP (ref 13–44)
MCHC RBC-ENTMCNC: 29.7 PG — SIGNIFICANT CHANGE UP (ref 27–34)
MCHC RBC-ENTMCNC: 31.8 GM/DL — LOW (ref 32–36)
MCV RBC AUTO: 93.3 FL — SIGNIFICANT CHANGE UP (ref 80–100)
METHADONE UR-MCNC: POSITIVE
MONOCYTES # BLD AUTO: 0.26 K/UL — SIGNIFICANT CHANGE UP (ref 0–0.9)
MONOCYTES NFR BLD AUTO: 4.8 % — SIGNIFICANT CHANGE UP (ref 2–14)
NEUTROPHILS # BLD AUTO: 4.06 K/UL — SIGNIFICANT CHANGE UP (ref 1.8–7.4)
NEUTROPHILS NFR BLD AUTO: 74.6 % — SIGNIFICANT CHANGE UP (ref 43–77)
NITRITE UR-MCNC: NEGATIVE — SIGNIFICANT CHANGE UP
NRBC # BLD: 0 /100 WBCS — SIGNIFICANT CHANGE UP (ref 0–0)
OPIATES UR-MCNC: NEGATIVE — SIGNIFICANT CHANGE UP
PCP SPEC-MCNC: SIGNIFICANT CHANGE UP
PCP UR-MCNC: NEGATIVE — SIGNIFICANT CHANGE UP
PH UR: 6.5 — SIGNIFICANT CHANGE UP (ref 5–8)
PLATELET # BLD AUTO: 232 K/UL — SIGNIFICANT CHANGE UP (ref 150–400)
POTASSIUM SERPL-MCNC: 3.8 MMOL/L — SIGNIFICANT CHANGE UP (ref 3.5–5.3)
POTASSIUM SERPL-SCNC: 3.8 MMOL/L — SIGNIFICANT CHANGE UP (ref 3.5–5.3)
PROT SERPL-MCNC: 6.9 G/DL — SIGNIFICANT CHANGE UP (ref 6–8.3)
PROT UR-MCNC: ABNORMAL MG/DL
RBC # BLD: 4.21 M/UL — SIGNIFICANT CHANGE UP (ref 3.8–5.2)
RBC # FLD: 13.3 % — SIGNIFICANT CHANGE UP (ref 10.3–14.5)
RBC CASTS # UR COMP ASSIST: < 5 /HPF — SIGNIFICANT CHANGE UP
SARS-COV-2 RNA SPEC QL NAA+PROBE: SIGNIFICANT CHANGE UP
SODIUM SERPL-SCNC: 142 MMOL/L — SIGNIFICANT CHANGE UP (ref 135–145)
SP GR SPEC: 1.02 — SIGNIFICANT CHANGE UP (ref 1–1.03)
THC UR QL: NEGATIVE — SIGNIFICANT CHANGE UP
URATE CRY FLD QL MICRO: ABNORMAL /HPF
UROBILINOGEN FLD QL: 0.2 E.U./DL — SIGNIFICANT CHANGE UP
WBC # BLD: 5.43 K/UL — SIGNIFICANT CHANGE UP (ref 3.8–10.5)
WBC # FLD AUTO: 5.43 K/UL — SIGNIFICANT CHANGE UP (ref 3.8–10.5)
WBC UR QL: < 5 /HPF — SIGNIFICANT CHANGE UP

## 2021-10-10 PROCEDURE — 81025 URINE PREGNANCY TEST: CPT

## 2021-10-10 PROCEDURE — 81001 URINALYSIS AUTO W/SCOPE: CPT

## 2021-10-10 PROCEDURE — 36415 COLL VENOUS BLD VENIPUNCTURE: CPT

## 2021-10-10 PROCEDURE — 99284 EMERGENCY DEPT VISIT MOD MDM: CPT | Mod: 25

## 2021-10-10 PROCEDURE — 80053 COMPREHEN METABOLIC PANEL: CPT

## 2021-10-10 PROCEDURE — 93005 ELECTROCARDIOGRAM TRACING: CPT

## 2021-10-10 PROCEDURE — 99284 EMERGENCY DEPT VISIT MOD MDM: CPT

## 2021-10-10 PROCEDURE — 80307 DRUG TEST PRSMV CHEM ANLYZR: CPT

## 2021-10-10 PROCEDURE — U0005: CPT

## 2021-10-10 PROCEDURE — 85025 COMPLETE CBC W/AUTO DIFF WBC: CPT

## 2021-10-10 PROCEDURE — U0003: CPT

## 2021-10-10 NOTE — ED BEHAVIORAL HEALTH ASSESSMENT NOTE - SUMMARY
Patient is a 50-year-old woman, domiciled with mother in [place], PPH of polysubstance use, opioid use disorder (on methadone) benzo dependence, anxiety, depression, prior psychiatric hospitalizations including last for 2 days at [facility] discharged on 9/23/21 after found to be diverting/sharing benzos with her boyfriend also on unit, history of suicidal gestures and non-suicidal cutting behavior, malingering, no known PMH, who was BIB self for SI in the context of breaking up with her boyfriend.    Patient described vague SI, stating that she was going to jump in front of a train, but was uninterested in outpatient treatment, only requesting inpatient treatment, and continued to request her prescription Klonopin. Per collateral, patient was admitted to [facility] for 2 days under similar circumstances and was found diverting/sharing benzos on the unit with another patient who was supposedly her boyfriend. Per staff, patient was also seen entering the ER with another patient on this occasion as well. Patient did not appear overtly interested in psychiatric treatment, and on exam was not overtly depressed, manic, psychotic, or intoxicated. She did not meet criteria for involuntary admission, and her chronic SI is likely being used as a threat to gain admission to the hospital. Overall impression is malingering with benzo use disorder and opioid use disorder as concurrent processes. Patient will therefore be discharged and given follow up resources which she refused.    PLAN:  --Psychiatrically cleared for discharge  --Patient refused safety planning  --Follow up at [facility] tomorrow 9am for psychiatric care  --Can return to ER or dial 911 if symptoms acutely worsen

## 2021-10-10 NOTE — ED ADULT NURSE NOTE - OBJECTIVE STATEMENT
pt reports she started having suicidal thoughts starting yesterday continuing today. Pt reports she planned on jumping in front of the train. Pt with hx of suicidal thoughts and attempts with taking aspirin pills. Pt takes klonopin 1 mg PRN BID and methadone 60 mg daily. Pt denies HI, etoh use, illicit drug use. pt is calm and cooperative, placed with a 1:1 sitter for safety. Pt denies hallucinations.

## 2021-10-10 NOTE — ED PROVIDER NOTE - PATIENT PORTAL LINK FT
You can access the FollowMyHealth Patient Portal offered by [facility] by registering at the following website: [email or web address]. By joining [facility]’s FollowMyHealth portal, you will also be able to view your health information using other applications (apps) compatible with our system.

## 2021-10-10 NOTE — ED PROVIDER NOTE - OBJECTIVE STATEMENT
51 y/o F pt w/ PMHx of anxiety and depression, multiple prior psychiatric hospitalizations, last hospitalized in April 2021, not seen by psychiatrist currently, takes clonazepam and methadone, and denies any other PMHx, presents to the ED with SI because she recently broke up with her boyfriend. States that she feels like she wants to throw herself in front of a train. Patient has had SI in the past due to a death of family member. Denies HI, auditory or visual hallucinations, CP, abdominal pain, nausea, vomiting, diarrhea, fevers, chills, or any other complaint. Fully vaccinated against COVID-19.

## 2021-10-10 NOTE — ED PROVIDER NOTE - CLINICAL SUMMARY MEDICAL DECISION MAKING FREE TEXT BOX
51 y/o F pt presents to the ED with SI only s/p recent breakup with her boyfriend. Plan for psychiatric consultation and EKG. Dispo pending consultation and reevaluation. 49 y/o F pt presents to the ED with SI only s/p recent breakup with her boyfriend. Plan for psychiatric consultation and EKG. Dispo pending consultation and reevaluation.    As per psych pt can be discharged with outpt psych follow up.

## 2021-10-10 NOTE — ED ADULT TRIAGE NOTE - CHIEF COMPLAINT QUOTE
Pt presents to ED C/O SI. States " My boyfriend and I broke up, I want to throw myself infront of a train, I've been feeling like this since yesterday". Denies HI, denies auditory hallucinations. Hx of anxiety/ depression.

## 2021-10-10 NOTE — ED BEHAVIORAL HEALTH ASSESSMENT NOTE - HPI (INCLUDE ILLNESS QUALITY, SEVERITY, DURATION, TIMING, CONTEXT, MODIFYING FACTORS, ASSOCIATED SIGNS AND SYMPTOMS)
Patient is a 50-year-old woman, domiciled with mother in [place], PPH of polysubstance use, opioid use disorder (on methadone) benzo dependence, anxiety, depression, prior psychiatric hospitalizations including last for 2 days at [facility] discharged on 9/23/21 after found to be diverting/sharing benzos with her boyfriend also on unit, history of suicidal gestures and non-suicidal cutting behavior, malingering, no known PMH, who was BIB self for SI in the context of breaking up with her boyfriend.    Upon evaluation, patient appeared with dirt under fingernails, unkempt appearance, calm, cooperative, with constricted affect and linear thought process. She described feeling like "I want to hurt myself, maybe jump in front of a train", in the context of breaking up with her boyfriend a few days ago, "he went to [place] and I don't know". When asked about her current living situation, she is guarded, mentions her mother in [place] but becomes irritable when asked for collateral contact information. When asked about her options, patient asked provocatively, "what, you're going to discharge me knowing I just tried to do something?" referring to almost jumping in front of a train. She was vague about her plan or intent but continued to endorse SI and requested admission. She did not want any outpatient resources. She denied any substance wilder, "I knew you would ask that", and said that she takes her Klonopin and methadone regularly, "you can check the online thing for that, I'm prescribed it". She then asked "can I get my Klonopin today please, I'm like super anxious, not my methadone, that's okay for now, just Klonopin, I really need it". Patient otherwise denied HI, AH, paranoid thoughts.     Collateral per chart demonstrated that patient was admitted to [facility] 9/21-9/23 for similar chief complaint; patient was then found to be diverting/sharing benzos on unit with her "boyfriend" and was therefore discharged. She then apparently returned to the hospital demanding benzos and methadone and throwing objects. Patient is a 50-year-old woman, domiciled with mother in [place], PPH of polysubstance use, opioid use disorder (on methadone) benzo dependence, anxiety, depression, prior psychiatric hospitalizations including last for 2 days at [facility] discharged on 9/23/21 after found to be diverting/sharing benzos with her boyfriend also on unit, history of suicidal gestures and non-suicidal cutting behavior, malingering, no known PMH, who was BIB self for SI in the context of breaking up with her boyfriend.    Upon evaluation, patient appeared with dirt under fingernails, unkempt appearance, calm, cooperative, with constricted affect and linear thought process. She described feeling like "I want to hurt myself, maybe jump in front of a train", in the context of breaking up with her boyfriend a few days ago, "he went to [place] and I don't know". When asked about her current living situation, she is guarded, mentions her mother in [place] but becomes irritable when asked for collateral contact information. When asked about her options, patient asked provocatively, "what, you're going to discharge me knowing I just tried to do something?" referring to almost jumping in front of a train. She was vague about her plan or intent but continued to endorse SI and requested admission. She did not want any outpatient resources. She denied any substance wilder, "I knew you would ask that", and said that she takes her Klonopin and methadone regularly, "you can check the online thing for that, I'm prescribed it". She then asked "can I get my Klonopin today please, I'm like super anxious, not my methadone, that's okay for now, just Klonopin, I really need it". Patient otherwise denied HI, AH, paranoid thoughts.     Collateral per chart demonstrated that patient was admitted to [facility] 9/21-9/23 for similar chief complaint; patient was then found to be diverting/sharing benzos on unit with her "boyfriend" and was therefore discharged. She then apparently returned to the hospital demanding benzos and methadone and throwing objects.    [facility]  demonstrated (ref# [id number]) monthly Klonopin 1mg 60 tablet prescriptions from Dr. Saw Scruggs internist.

## 2021-10-10 NOTE — ED ADULT NURSE NOTE - CAS EDN DISCHARGE ASSESSMENT
Detail Level: Detailed
Additional Notes: Pt is not taking any medications 10/2020
Alert and oriented to person, place and time

## 2021-10-10 NOTE — ED PROVIDER NOTE - NSFOLLOWUPINSTRUCTIONS_ED_ALL_ED_FT
Follow up with psych clinic at [facility]:  [address] [place] [address]  [phone]    Take your regularly prescribed medications.          Depression    WHAT YOU NEED TO KNOW:    Depression is a medical condition that causes feelings of sadness or hopelessness that do not go away. Depression may cause you to lose interest in things you used to enjoy. These feelings may interfere with your daily life.    DISCHARGE INSTRUCTIONS:    Call your local emergency number (911 in the ) if:   •You think about harming yourself or someone else.      •You have done something on purpose to hurt yourself.      Call your therapist or doctor if:   •Your symptoms do not improve.      •You cannot make it to your next appointment.       •You have new symptoms.      •You have questions or concerns about your condition or care.      The following resources are available at any time to help you, if needed:   •National Suicide Prevention Lifeline: 1-924.608.7174 (4-024-882-TALK)      •Suicide Hotline: 1-615.698.2449 (3-884-HFTMBGT)      •For a list of international numbers: https://save.org/find-help/international-resources/      Medicines:   •Antidepressants may be given to improve or balance your mood. You may need to take this medicine for several weeks before you begin to feel better.      •Take your medicine as directed. Contact your healthcare provider if you think your medicine is not helping or if you have side effects. Tell him of her if you are allergic to any medicine. Keep a list of the medicines, vitamins, and herbs you take. Include the amounts, and when and why you take them. Bring the list or the pill bottles to follow-up visits. Carry your medicine list with you in case of an emergency.      Therapy is often used together with medicine to relieve depression. Therapy is a way for you to talk about your feelings and anything that may be causing depression. Therapy can be done alone or in a group. It may also be done with family members or a significant other.    Self-care:   •Get regular physical activity. Try to be active for 30 minutes, 3 to 5 days a week. Physical activity can help relieve depression. Work with your healthcare provider to develop a plan that you enjoy. It may help to ask someone to be active with you.      •Create a regular sleep schedule. A routine can help you relax before bed. Listen to music, read, or do yoga. Try to go to bed and wake up at the same time every day. Sleep is important for emotional health.      •Eat a variety of healthy foods. Healthy foods include fruits, vegetables, whole-grain breads, low-fat dairy products, lean meats, fish, and cooked beans. A healthy meal plan is low in fat, salt, and added sugar.      •Do not drink alcohol or use drugs. Alcohol and drugs can make depression worse. Talk to your therapist or doctor if you need help quitting.      Follow up with your healthcare provider as directed: Your healthcare provider will monitor your progress at follow-up visits. He or she will also monitor your medicine if you take antidepressants. Your healthcare provider will ask if the medicine is helping. Tell him or her about any side effects or problems you may have with your medicine. The type or amount of medicine may need to be changed. Write down your questions so you remember to ask them during your visits.       © Copyright [employer] 2021           back to top                          © Copyright [employer] 2021

## 2021-10-10 NOTE — ED BEHAVIORAL HEALTH ASSESSMENT NOTE - RISK ASSESSMENT
Chronic risk factors include substance use disorders, history of mood symptoms, history of self-harm. Acute risk factors include vague SI, ongoing substance use. Mitigating factors include ability to seek care, ability to care for needs, desire for secondary gain. Overall patient is moderate chronic risk of suicide but low acute risk at this time. Low Acute Suicide Risk

## 2021-10-10 NOTE — ED BEHAVIORAL HEALTH ASSESSMENT NOTE - DESCRIPTION
No acute events; patient placed on 1:1   Utox + benzos, methadone None known Lives with mother in [place], unemployed

## 2021-10-12 NOTE — ED ADULT TRIAGE NOTE - TEMPERATURE IN FAHRENHEIT (DEGREES F)
Body Location Override (Optional - Billing Will Still Be Based On Selected Body Map Location If Applicable): left lower abdomen Detail Level: Detailed Add 76709 Cpt? (Important Note: In 2017 The Use Of 48446 Is Being Tracked By Cms To Determine Future Global Period Reimbursement For Global Periods): no Wound Dressing Override (Optional): vinegar water soaks 1-2 times daily Wound Evaluated By: Dr. Michelle Chevalier Additional Comments: patient to call with any spreading redness or drainage at this site 98

## 2021-10-18 NOTE — ED ADULT NURSE NOTE - NSFALLRSKPASTHIST_ED_ALL_ED
Requested Prescriptions     Pending Prescriptions Disp Refills    amLODIPine (NORVASC) 2.5 MG tablet [Pharmacy Med Name: AMLODIPINE BESYLATE 2.5MG TABLETS] 90 tablet      Sig: TAKE 1 TABLET BY MOUTH DAILY
no

## 2021-11-11 ENCOUNTER — EMERGENCY (EMERGENCY)
Facility: HOSPITAL | Age: 51
LOS: 1 days | Discharge: HOME | End: 2021-11-11
Attending: STUDENT IN AN ORGANIZED HEALTH CARE EDUCATION/TRAINING PROGRAM | Admitting: STUDENT IN AN ORGANIZED HEALTH CARE EDUCATION/TRAINING PROGRAM
Payer: MEDICAID

## 2021-11-11 VITALS
RESPIRATION RATE: 17 BRPM | HEIGHT: 64 IN | HEART RATE: 81 BPM | DIASTOLIC BLOOD PRESSURE: 79 MMHG | TEMPERATURE: 98 F | WEIGHT: 119.93 LBS | OXYGEN SATURATION: 95 % | SYSTOLIC BLOOD PRESSURE: 108 MMHG

## 2021-11-11 DIAGNOSIS — R07.81 PLEURODYNIA: ICD-10-CM

## 2021-11-11 PROCEDURE — 71046 X-RAY EXAM CHEST 2 VIEWS: CPT | Mod: 26

## 2021-11-11 PROCEDURE — 96372 THER/PROPH/DIAG INJ SC/IM: CPT

## 2021-11-11 PROCEDURE — 99283 EMERGENCY DEPT VISIT LOW MDM: CPT | Mod: 25

## 2021-11-11 PROCEDURE — 99284 EMERGENCY DEPT VISIT MOD MDM: CPT

## 2021-11-11 PROCEDURE — 71046 X-RAY EXAM CHEST 2 VIEWS: CPT

## 2021-11-11 RX ORDER — ACETAMINOPHEN 500 MG
1000 TABLET ORAL ONCE
Refills: 0 | Status: COMPLETED | OUTPATIENT
Start: 2021-11-11 | End: 2021-11-11

## 2021-11-11 RX ORDER — LIDOCAINE 4 G/100G
1 CREAM TOPICAL ONCE
Refills: 0 | Status: COMPLETED | OUTPATIENT
Start: 2021-11-11 | End: 2021-11-11

## 2021-11-11 RX ORDER — KETOROLAC TROMETHAMINE 30 MG/ML
30 SYRINGE (ML) INJECTION ONCE
Refills: 0 | Status: DISCONTINUED | OUTPATIENT
Start: 2021-11-11 | End: 2021-11-11

## 2021-11-11 RX ORDER — ACETAMINOPHEN 500 MG
500 TABLET ORAL
Qty: 30 | Refills: 0
Start: 2021-11-11

## 2021-11-11 RX ADMIN — Medication 30 MILLIGRAM(S): at 18:02

## 2021-11-11 RX ADMIN — LIDOCAINE 1 PATCH: 4 CREAM TOPICAL at 18:03

## 2021-11-11 RX ADMIN — Medication 1000 MILLIGRAM(S): at 16:30

## 2021-11-11 NOTE — ED ADULT NURSE NOTE - OBJECTIVE STATEMENT
Pt presenting with 2 days of R rib pain. Denies injury. No bruising noted. States she took her methadone and Klonopin today. Dozing off mid conversation.

## 2021-11-11 NOTE — ED ADULT TRIAGE NOTE - CHIEF COMPLAINT QUOTE
Patient reporting right sided rib pain x 2 days. Patient reporting right sided rib pain x 2 days.  Denies injury.  Eating in triage.

## 2021-11-11 NOTE — ED PROVIDER NOTE - MUSCULOSKELETAL, MLM
Spine appears normal, range of motion is not limited, no muscle or joint tenderness. No rib tenderness.

## 2021-11-11 NOTE — ED PROVIDER NOTE - PATIENT PORTAL LINK FT
You can access the FollowMyHealth Patient Portal offered by [facility] by registering at the following website: [email or web address]. By joining [facility]’s FollowMyHealth portal, you will also be able to view your health information using other applications (apps) compatible with our system.

## 2021-11-11 NOTE — ED PROVIDER NOTE - NSFOLLOWUPINSTRUCTIONS_ED_ALL_ED_FT
Call your primary doctor for follow up in 1-2 weeks if the pain does not go away. Return to the ER if you cannot breathe or if you develop new symptoms such as vomiting and inability to keep fluids down.

## 2021-11-11 NOTE — ED PROVIDER NOTE - OBJECTIVE STATEMENT
52 y/o F, currently taking methadone. Presenting with 1 day of anterior subcostal pain. States that the pain is intermittent at random times, not worse with any positional changes, and not pleuritic. Denies associated chest pain and SOB. No abdominal pain or trauma, GI symptoms. Pt has taken a methadone dose today.

## 2021-11-11 NOTE — ED PROVIDER NOTE - CLINICAL SUMMARY MEDICAL DECISION MAKING FREE TEXT BOX
50 y/o F on Methadone, presenting with atraumatic anterior subcostal pain. Given lack of abdominal tenderness, doubt GI etiology. Will evaluate for PNA vs PTX with chest xray, provide pain medication, and reassess.

## 2021-12-01 PROCEDURE — G9005: CPT

## 2022-01-12 NOTE — ED ADULT NURSE NOTE - NSFALLRSKPASTHIST_ED_ALL_ED
79F w/ hx of ESRD on HD M, W, F, IDDM2, anemia, HTN, JARVIS on CPAP, gout, trigeminal neuralgia, rotator cuff tear p/w SOB x 4 days    1) ESRD on HD  Routine HD days are MWF  Access: Left upper ext avf   Plan for Hd today- 2 to 3 kg uf  trend bmp      2) Anemia in CKD  epo 10k tiw with hd  trend hgb    3) SOB-  improved with UF  plan for HD today  will monitor      Dr Taylor  [phone] 79F w/ hx of ESRD on HD M, W, F, IDDM2, anemia, HTN, JARVIS on CPAP, gout, trigeminal neuralgia, rotator cuff tear p/w SOB x 4 days    1) ESRD on HD  Routine HD days are MWF  Access: Left upper ext avf   s/p HD yesterday- tolerated well-  plan for next HD tomm  trend bmp      2) Anemia in CKD  epo 10k tiw with hd  trend hgb    3) SOB-  improved with UF  plan for next HD tomm  will monitor      Dr Taylor  [phone] 79F w/ hx of ESRD on HD M, W, F, IDDM2, anemia, HTN, JARVIS on CPAP, gout, trigeminal neuralgia, rotator cuff tear p/w SOB x 4 days and concern for COVID 79F w/ hx of ESRD on HD M, W, F, IDDM2, anemia, HTN, JARVIS on CPAP, gout, trigeminal neuralgia, rotator cuff tear p/w SOB x 4 days and concern for COVID no

## 2022-01-29 NOTE — ED BEHAVIORAL HEALTH ASSESSMENT NOTE - DOMICILED WITH
Family AMS 2/2 Metabolic encephalopathy  - baseline AOx2, currently appears to be at baseline  Hx CVA with residual left sided weak/flaccid  off sedation >24 hrs   1/24 VEEG Negative likely-metabolic encephalopathy    1/21 CTH without acute changes, re demonstration of lacuna infarcts   s/p LP, glucose 86, protein 48- negative. all abx and anti-viral dcd

## 2022-02-17 NOTE — ED ADULT TRIAGE NOTE - CCCP TRG CHIEF CMPLNT
Patient Education       Ear Infections (Otitis Media) in Children Discharge Instructions   About this topic   The medical name for an ear infection is otitis media. It means your child has an infection or inflammation in their middle ear. The eardrum and the space behind it is the middle ear. If your child has a cold, allergies, or an infection, their middle ear can become filled with mucus. Most often, tubes in your childs throat can clear this mucus. When your child is sick, the tubes can become blocked, and fluid may build up in the middle part of their ear. Germs can infect this fluid causing an ear infection or otitis media.  An ear infection can cause ear pain and fever. You might also have trouble hearing from fluid build-up in the middle ear behind the eardrum.  Most ear infections are caused by viruses, but some are caused by bacteria. The doctor will wait to see if you get better on your own if they think the cause is a virus. The doctor will order antibiotic if they think the cause is a bacteria. Antibiotics kill bacteria, but they do not work on viruses.  If the doctor orders antibiotics, be sure to take all of them, even if you start to feel better.       What care is needed at home?   · Ask your doctor what you need to do when you go home. Make sure you ask questions if you do not understand what the doctor says.  · Use a heating pad or warm water bottle on the ear to help ease the pain. If your doctor tells you to use heat, put a heating pad on your childs ear for no more than 20 minutes at a time. Never let your child go to sleep with a heating pad on as this can cause burns.  · You can also try ice to help ease your childs pain. Place an ice pack or a bag of frozen peas wrapped in a towel over the painful part. Never put ice right on the skin. Do not leave the ice on more than 10 to 15 minutes at a time.  · Do not put anything in your ear unless it was ordered by the doctor.  · You may want to  left leg pain and swelling take medicines like ibuprofen, naproxen, or acetaminophen to help with pain.  What follow-up care is needed?   · Otitis media may need to be monitored. Your doctor may ask you to make visits to the office to check on your childs progress. Be sure to keep these visits.  · Your child may need to go see other doctors if they have problems hearing or have many ear infections.  What drugs may be needed?   The doctor may order drugs to:  · Help with pain  · Fight an infection  Will physical activity be limited?   Do not allow your child to drive or run machines if they are taking drugs that make them drowsy. Your child should avoid flying and diving. Your child may return to normal activities when signs have gone away.  What problems could happen?   · Loss of hearing  · Long-term hearing problems  · Very bad infection in the bone behind the eardrum  · Problems with balance  What can be done to prevent this health problem?   · If your child smokes, help them to quit. Keep your child away from people who smoke.  · Keep your child away from people who have colds.  · Have your child wash their hands often.  When do I need to call the doctor?   · Your symptoms are not getting better in 2 to 3 days.  · You continue to have problems hearing after 2 to 3 weeks.  · You have a fever of 100.4°F (38°C) or higher or chills.  · You have discharge or fluid coming from your ear.  Teach Back: Helping You Understand   The Teach Back Method helps you understand the information we are giving you. After you talk with the staff, tell them in your own words what you learned. This helps to make sure the staff has described each thing clearly. It also helps to explain things that may have been confusing. Before going home, make sure you can do these:  · I can tell you about my child's condition.  · I can tell you what may help ease my child's pain.  · I can tell you what I will do if my child has neck pain, a stiff neck, or fluid draining from  their ear.  Where can I learn more?   American Academy of Family Physicians  https://familydoctor.org/condition/otitis-media-with-effusion/   Kids Health  http://kidshealth.org/parent/infections/ear/otitis_media.html   National [place] on Deafness and Other Communication Disorders  http://www.nidcd.nih.gov/health/hearing/Pages/earinfections.aspx   Last Reviewed Date   2021  Consumer Information Use and Disclaimer   This information is not specific medical advice and does not replace information you receive from your health care provider. This is only a brief summary of general information. It does NOT include all information about conditions, illnesses, injuries, tests, procedures, treatments, therapies, discharge instructions or life-style choices that may apply to you. You must talk with your health care provider for complete information about your health and treatment options. This information should not be used to decide whether or not to accept your health care providers advice, instructions or recommendations. Only your health care provider has the knowledge and training to provide advice that is right for you.  Copyright   Copyright © 2021 [employer] and its affiliates and/or licensors. All rights reserved.

## 2022-02-21 ENCOUNTER — EMERGENCY (EMERGENCY)
Facility: HOSPITAL | Age: 52
LOS: 1 days | Discharge: LEFT BEFORE DISCHARGE | End: 2022-02-21
Attending: EMERGENCY MEDICINE | Admitting: EMERGENCY MEDICINE
Payer: MEDICAID

## 2022-02-21 VITALS
HEART RATE: 89 BPM | OXYGEN SATURATION: 98 % | SYSTOLIC BLOOD PRESSURE: 134 MMHG | TEMPERATURE: 98 F | RESPIRATION RATE: 18 BRPM | DIASTOLIC BLOOD PRESSURE: 77 MMHG

## 2022-02-21 DIAGNOSIS — T50.991A POISONING BY OTHER DRUGS, MEDICAMENTS AND BIOLOGICAL SUBSTANCES, ACCIDENTAL (UNINTENTIONAL), INITIAL ENCOUNTER: ICD-10-CM

## 2022-02-21 DIAGNOSIS — T50.901A POISONING BY UNSPECIFIED DRUGS, MEDICAMENTS AND BIOLOGICAL SUBSTANCES, ACCIDENTAL (UNINTENTIONAL), INITIAL ENCOUNTER: ICD-10-CM
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
## 2022-02-21 DIAGNOSIS — J96.12 CHRONIC RESPIRATORY FAILURE WITH HYPERCAPNIA: ICD-10-CM

## 2022-02-21 LAB
ALBUMIN SERPL ELPH-MCNC: 3.9 G/DL — SIGNIFICANT CHANGE UP (ref 3.4–5)
ALP SERPL-CCNC: 73 U/L — SIGNIFICANT CHANGE UP (ref 40–120)
ALT FLD-CCNC: 33 U/L — SIGNIFICANT CHANGE UP (ref 12–42)
AMPHET UR-MCNC: POSITIVE
ANION GAP SERPL CALC-SCNC: 13 MMOL/L — SIGNIFICANT CHANGE UP (ref 9–16)
AST SERPL-CCNC: 49 U/L — HIGH (ref 15–37)
BARBITURATES UR SCN-MCNC: NEGATIVE — SIGNIFICANT CHANGE UP
BASOPHILS # BLD AUTO: 0.03 K/UL — SIGNIFICANT CHANGE UP (ref 0–0.2)
BASOPHILS NFR BLD AUTO: 0.5 % — SIGNIFICANT CHANGE UP (ref 0–2)
BENZODIAZ UR-MCNC: POSITIVE
BILIRUB SERPL-MCNC: 1.2 MG/DL — SIGNIFICANT CHANGE UP (ref 0.2–1.2)
BUN SERPL-MCNC: 27 MG/DL — HIGH (ref 7–23)
CALCIUM SERPL-MCNC: 8.8 MG/DL — SIGNIFICANT CHANGE UP (ref 8.5–10.5)
CHLORIDE SERPL-SCNC: 102 MMOL/L — SIGNIFICANT CHANGE UP (ref 96–108)
CO2 SERPL-SCNC: 25 MMOL/L — SIGNIFICANT CHANGE UP (ref 22–31)
COCAINE METAB.OTHER UR-MCNC: POSITIVE
CREAT SERPL-MCNC: 0.76 MG/DL — SIGNIFICANT CHANGE UP (ref 0.5–1.3)
EOSINOPHIL # BLD AUTO: 0.02 K/UL — SIGNIFICANT CHANGE UP (ref 0–0.5)
EOSINOPHIL NFR BLD AUTO: 0.3 % — SIGNIFICANT CHANGE UP (ref 0–6)
GLUCOSE SERPL-MCNC: 81 MG/DL — SIGNIFICANT CHANGE UP (ref 70–99)
HCT VFR BLD CALC: 38.5 % — SIGNIFICANT CHANGE UP (ref 34.5–45)
HGB BLD-MCNC: 12.2 G/DL — SIGNIFICANT CHANGE UP (ref 11.5–15.5)
IMM GRANULOCYTES NFR BLD AUTO: 1.6 % — HIGH (ref 0–1.5)
LYMPHOCYTES # BLD AUTO: 1.56 K/UL — SIGNIFICANT CHANGE UP (ref 1–3.3)
LYMPHOCYTES # BLD AUTO: 25.2 % — SIGNIFICANT CHANGE UP (ref 13–44)
MAGNESIUM SERPL-MCNC: 2.3 MG/DL — SIGNIFICANT CHANGE UP (ref 1.6–2.6)
MCHC RBC-ENTMCNC: 29.1 PG — SIGNIFICANT CHANGE UP (ref 27–34)
MCHC RBC-ENTMCNC: 31.7 GM/DL — LOW (ref 32–36)
MCV RBC AUTO: 91.9 FL — SIGNIFICANT CHANGE UP (ref 80–100)
METHADONE UR-MCNC: NEGATIVE — SIGNIFICANT CHANGE UP
MONOCYTES # BLD AUTO: 0.41 K/UL — SIGNIFICANT CHANGE UP (ref 0–0.9)
MONOCYTES NFR BLD AUTO: 6.6 % — SIGNIFICANT CHANGE UP (ref 2–14)
NEUTROPHILS # BLD AUTO: 4.08 K/UL — SIGNIFICANT CHANGE UP (ref 1.8–7.4)
NEUTROPHILS NFR BLD AUTO: 65.8 % — SIGNIFICANT CHANGE UP (ref 43–77)
NRBC # BLD: 0 /100 WBCS — SIGNIFICANT CHANGE UP (ref 0–0)
OPIATES UR-MCNC: POSITIVE
PCO2 BLDV: 68 MMHG — HIGH (ref 39–42)
PCP SPEC-MCNC: SIGNIFICANT CHANGE UP
PCP UR-MCNC: NEGATIVE — SIGNIFICANT CHANGE UP
PH BLDV: 7.23 — LOW (ref 7.32–7.43)
PLATELET # BLD AUTO: 169 K/UL — SIGNIFICANT CHANGE UP (ref 150–400)
PO2 BLDV: <35 MMHG — SIGNIFICANT CHANGE UP (ref 25–45)
POTASSIUM SERPL-MCNC: 4.3 MMOL/L — SIGNIFICANT CHANGE UP (ref 3.5–5.3)
POTASSIUM SERPL-SCNC: 4.3 MMOL/L — SIGNIFICANT CHANGE UP (ref 3.5–5.3)
PROT SERPL-MCNC: 7.5 G/DL — SIGNIFICANT CHANGE UP (ref 6.4–8.2)
RBC # BLD: 4.19 M/UL — SIGNIFICANT CHANGE UP (ref 3.8–5.2)
RBC # FLD: 14.4 % — SIGNIFICANT CHANGE UP (ref 10.3–14.5)
SAO2 % BLDV: 44.9 % — LOW (ref 67–88)
SODIUM SERPL-SCNC: 140 MMOL/L — SIGNIFICANT CHANGE UP (ref 132–145)
THC UR QL: NEGATIVE — SIGNIFICANT CHANGE UP
TROPONIN I, HIGH SENSITIVITY RESULT: 6.4 NG/L — SIGNIFICANT CHANGE UP
WBC # BLD: 6.2 K/UL — SIGNIFICANT CHANGE UP (ref 3.8–10.5)
WBC # FLD AUTO: 6.2 K/UL — SIGNIFICANT CHANGE UP (ref 3.8–10.5)

## 2022-02-21 PROCEDURE — 99285 EMERGENCY DEPT VISIT HI MDM: CPT

## 2022-02-21 PROCEDURE — 99053 MED SERV 10PM-8AM 24 HR FAC: CPT

## 2022-02-21 NOTE — ED PROVIDER NOTE - PATIENT PORTAL LINK FT
You can access the FollowMyHealth Patient Portal offered by [facility] by registering at the following website: [email or web address]. By joining [facility]’s FollowMyHealth portal, you will also be able to view your health information using other applications (apps) compatible with our system.

## 2022-02-21 NOTE — ED PROVIDER NOTE - CLINICAL SUMMARY MEDICAL DECISION MAKING FREE TEXT BOX
likely sp OD on heroin, cardiac arrest w/ rosc after narcan and cpr in field, currently denies any sx, will check screening ekg, labs, xray, reassess

## 2022-02-21 NOTE — ED ADULT NURSE NOTE - OBJECTIVE STATEMENT
Pt bibems on street for overdose. Found to be [id number], CPR initiated, found to be in sinus tachy PEA. Narcan given to pt. EMS about to intubate when patient noted to be breathing spontaneously  Upon arrival to ED, pt is awake, aox4, even and unlabored respirations on RA.  Denies pain/denies any complaints. Able to ambulate steadily.   Knows where she lives and has a safe way of getting home.

## 2022-02-21 NOTE — ED PROVIDER NOTE - NSFOLLOWUPINSTRUCTIONS_ED_ALL_ED_FT
Follow up with your primary care doctor or clinics listed below if you do not have a doctor  You are leaving against medical advice  [address] [address]  To make an appointment, call [phone]  [facility]  Address: [facility] [address] [place] [id number]  Appointment Center: [phone] ([phone])   Return immediately for any new or worsening symptoms or any new concerns

## 2022-02-21 NOTE — ED PROVIDER NOTE - OBJECTIVE STATEMENT
51 yof bibems for overdose.  pt found on the ground, unresponsive, per EMS, not able to palpable pulse, initial rhythm PEA, started cpr and gave narcan.  pt woke up after narcan and 3 rounds of cpr.  pt currently denies any pain/discomfort/sob/nv.  admits to sniffed a bag of heroin tonight, possibly laced w/ fentanyl.  hx of substance abuse.  prior hx of heroin overdose.  states she lives in , came to the city today to visit friends.  reports has means go to home via [facility].  denies any other PMH.

## 2022-02-21 NOTE — ED ADULT NURSE NOTE - NSIMPLEMENTINTERV_GEN_ALL_ED
Implemented All Universal Safety Interventions:  [place] to call system. Call bell, personal items and telephone within reach. Instruct patient to call for assistance. Room bathroom lighting operational. Non-slip footwear when patient is off stretcher. Physically safe environment: no spills, clutter or unnecessary equipment. Stretcher in lowest position, wheels locked, appropriate side rails in place.

## 2022-02-21 NOTE — ED ADULT TRIAGE NOTE - CHIEF COMPLAINT QUOTE
Pt BIBA off street c/o overdose. As per paramedic, pt was found in PEA in which ACLS was initiated. 3 rounds of CPR performed, 0.5mg IV narcan administered with +ROSC. Pt is awake and conversive in triage, admits to heroine use.

## 2022-02-21 NOTE — ED PROVIDER NOTE - NS ED ROS FT
· CONSTITUTIONAL: no fever and no chills.  · EYES: no visual changes  · ENMT: no change/loss in taste  · CARDIOVASCULAR: no cp  · RESPIRATORY: no sob,   · GASTROINTESTINAL: no pain, no nvd  · MUSCULOSKELETAL: no pain  · SKIN: no bleeding  · ENDOCRINE: no DM  · NEURO: no headache

## 2022-02-21 NOTE — ED PROVIDER NOTE - PHYSICAL EXAMINATION
Physical Exam  GEN: Awake, alert, non-toxic appearing,  EYES: full EOMI,  ENT: External inspection normal, normal voice,   HEAD: atraumatic  NECK: FROM neck, supple,   CV: rrr  RESP: cta bl, speaking in full sentences, good air entry, no retraction, no tachypnea, no hypoxia, no resp distress,   MSK: dacosta x 4, no deformity  SKIN: no cyanosis, no laceration/abrasion  NEURO: ao x 3, speaking clearly and coherently, perrl, strength equal bl,

## 2022-02-21 NOTE — ED PROVIDER NOTE - PROGRESS NOTE DETAILS
blood gas noted hypercarbia, c/w hx of OD and apnea Pt informed of all lab and radiology results, demonstrates understanding of results.  Pt wants to be discharged despite being advised STRONGLY and REPEATEDLY to stay for observation/admission.  A&Ox4, speaking clearly and coherently, demonstrates decision-making capacity.  Pt understands that the risks of leaving include, but not limited to, death and/or disability; pt accepts these risks.  pt understands to return as soon as possible for persistence or any worsening of symptoms. Pt given opportunity to ask any and all questions.  Pt understands that the ER diagnosis being given today is a preliminary one, and, like many ER diagnoses, is a preliminary impression, often based on limited information, that may change as current Sx's evolve or new Sx's develop     pt states she plans to take the [facility] and has a train ticket. Pt informed of all lab and radiology results, demonstrates understanding of results.  Pt wants to be discharged despite being advised STRONGLY and REPEATEDLY to stay for observation/admission.  A&Ox4, speaking clearly and coherently, demonstrates decision-making capacity.  pt understands that opioid overdose can often have a prolonged/delayed effect even though she's feeling better now.  Pt understands that the risks of leaving include, but not limited to, death and/or disability; pt accepts these risks.  pt understands to return as soon as possible for persistence or any worsening of symptoms. Pt given opportunity to ask any and all questions.  Pt understands that the ER diagnosis being given today is a preliminary one, and, like many ER diagnoses, is a preliminary impression, often based on limited information, that may change as current Sx's evolve or new Sx's develop     pt states she plans to take the [facility] and has a train ticket.

## 2022-03-02 ENCOUNTER — EMERGENCY (EMERGENCY)
Facility: HOSPITAL | Age: 52
LOS: 1 days | Discharge: LEFT BEFORE DISCHARGE | End: 2022-03-02
Admitting: EMERGENCY MEDICINE
Payer: SELF-PAY

## 2022-03-02 VITALS
HEART RATE: 92 BPM | SYSTOLIC BLOOD PRESSURE: 109 MMHG | DIASTOLIC BLOOD PRESSURE: 71 MMHG | OXYGEN SATURATION: 96 % | TEMPERATURE: 97 F | RESPIRATION RATE: 18 BRPM | WEIGHT: 128.09 LBS | HEIGHT: 64 IN

## 2022-03-02 PROCEDURE — L9991: CPT

## 2022-03-02 NOTE — ED ADULT TRIAGE NOTE - CHIEF COMPLAINT QUOTE
Pt BIBEMS from [address] [place] [address] for oversdose. As per EMS pt was not breathing on arrival pt received 2mg of narcan IN. Pt admits to sniffing 1 bag of heroine.

## 2022-03-03 DIAGNOSIS — Z53.21 PROCEDURE AND TREATMENT NOT CARRIED OUT DUE TO PATIENT LEAVING PRIOR TO BEING SEEN BY HEALTH CARE PROVIDER: ICD-10-CM

## 2022-03-26 ENCOUNTER — EMERGENCY (EMERGENCY)
Facility: HOSPITAL | Age: 52
LOS: 1 days | Discharge: ANOTHER HOSPITAL | End: 2022-03-26
Attending: EMERGENCY MEDICINE | Admitting: EMERGENCY MEDICINE
Payer: MEDICAID

## 2022-03-26 VITALS
TEMPERATURE: 99 F | HEART RATE: 105 BPM | DIASTOLIC BLOOD PRESSURE: 75 MMHG | RESPIRATION RATE: 16 BRPM | SYSTOLIC BLOOD PRESSURE: 150 MMHG | OXYGEN SATURATION: 92 %

## 2022-03-26 DIAGNOSIS — Z56.0 UNEMPLOYMENT, UNSPECIFIED: ICD-10-CM

## 2022-03-26 DIAGNOSIS — F43.10 POST-TRAUMATIC STRESS DISORDER, UNSPECIFIED: ICD-10-CM

## 2022-03-26 DIAGNOSIS — R41.82 ALTERED MENTAL STATUS, UNSPECIFIED: ICD-10-CM

## 2022-03-26 DIAGNOSIS — R45.851 SUICIDAL IDEATIONS: ICD-10-CM

## 2022-03-26 DIAGNOSIS — F32.9 MAJOR DEPRESSIVE DISORDER, SINGLE EPISODE, UNSPECIFIED: ICD-10-CM

## 2022-03-26 DIAGNOSIS — Z59.00 HOMELESSNESS UNSPECIFIED: ICD-10-CM

## 2022-03-26 DIAGNOSIS — F11.20 OPIOID DEPENDENCE, UNCOMPLICATED: ICD-10-CM

## 2022-03-26 DIAGNOSIS — F60.3 BORDERLINE PERSONALITY DISORDER: ICD-10-CM

## 2022-03-26 DIAGNOSIS — Z20.822 CONTACT WITH AND (SUSPECTED) EXPOSURE TO COVID-19: ICD-10-CM

## 2022-03-26 DIAGNOSIS — J45.909 UNSPECIFIED ASTHMA, UNCOMPLICATED: ICD-10-CM

## 2022-03-26 PROCEDURE — 90792 PSYCH DIAG EVAL W/MED SRVCS: CPT | Mod: 95

## 2022-03-26 PROCEDURE — 99285 EMERGENCY DEPT VISIT HI MDM: CPT

## 2022-03-26 SDOH — ECONOMIC STABILITY - HOUSING INSECURITY: HOMELESSNESS UNSPECIFIED: Z59.00

## 2022-03-26 SDOH — ECONOMIC STABILITY - INCOME SECURITY: UNEMPLOYMENT, UNSPECIFIED: Z56.0

## 2022-03-26 NOTE — ED ADULT NURSE NOTE - NSIMPLEMENTINTERV_GEN_ALL_ED
Implemented All Fall Risk Interventions:  [place] to call system. Call bell, personal items and telephone within reach. Instruct patient to call for assistance. Room bathroom lighting operational. Non-slip footwear when patient is off stretcher. Physically safe environment: no spills, clutter or unnecessary equipment. Stretcher in lowest position, wheels locked, appropriate side rails in place. Provide visual cue, wrist band, yellow gown, etc. Monitor gait and stability. Monitor for mental status changes and reorient to person, place, and time. Review medications for side effects contributing to fall risk. Reinforce activity limits and safety measures with patient and family.

## 2022-03-26 NOTE — ED ADULT NURSE NOTE - OBJECTIVE STATEMENT
Pt presents to ED sp heroin ingestion. Given 4mg Narcan IN, 0.5 IVP. Pt crying on stretcher. No obvious signs of trauma.

## 2022-03-26 NOTE — ED PROVIDER NOTE - PROGRESS NOTE DETAILS
patient now awake, states she feels very anxious and needs klonopin which she is prescribed. patient also saying she is having suicidal ideations patient now awake, states she feels very anxious and needs klonopin which she is prescribed. patient also saying she is having suicidal ideations and states she has recently relapsed on heroin because she has been feeling depressed. looked up previous charts, patient has chart under MRN [id number]. patient placed on 1 to 1, basic labs ordered, psych consult pending. Received care from Dr. Carbajal.  Spoke with Telepsych attending at 10 am to confirm involuntary admission and plan for hold at this time.  Due for BID clonazepam at 1230 pm.  Will place on observation

## 2022-03-26 NOTE — ED ADULT NURSE NOTE - CHIEF COMPLAINT QUOTE
Patient found in [facility] sidewalk in resp arrest,  4mg of Narcan given intranasal , followed 0.5 IV , pt responded well. Admits to snorting heroin

## 2022-03-26 NOTE — ED ADULT TRIAGE NOTE - CHIEF COMPLAINT QUOTE
Patient found in [facility] sidewalk in resp arrest,  4mg of Narcan given intranasal , followed 0.5 IV , pt responded well. Admits to snorting heroin

## 2022-03-26 NOTE — ED PROVIDER NOTE - PHYSICAL EXAMINATION
General: no signs of trauma, somnolent, in no apparent distress.  Head: AT, NC  HEENT: Airway patent  Eyes: clear bilaterally, pupils equal, round and reactive to light.  Cardiac: Normal rate, regular rhythm.  Heart sounds S1, S2.  No murmurs, rubs or gallops.  Respiratory: Breath sounds clear and equal bilaterally.  Abdomen soft, non-tender, no guarding.  MSK: moving all extremities x 4  Neuro: somnolent, poorly follows commands, ataxia, slurred speech  Skin: normal color.

## 2022-03-26 NOTE — ED PROVIDER NOTE - OBJECTIVE STATEMENT
50 yo female BIBEMS, apparently found in [place] [address], 4mg of Narcan given intranasal, followed 0.5mg  IV by EMS. pt woke up shortly afterwards. In ED, patient admits to snorting heroin. no signs of trauma. patient somnolent, falling asleep during evaluation. rest of HPI limited. on bedside pulse ox, 02 sat 97%.

## 2022-03-27 ENCOUNTER — INPATIENT (INPATIENT)
Facility: HOSPITAL | Age: 52
LOS: 9 days | Discharge: HOME | End: 2022-04-06
Attending: PSYCHIATRY & NEUROLOGY | Admitting: PSYCHIATRY & NEUROLOGY
Payer: MEDICAID

## 2022-03-27 VITALS
DIASTOLIC BLOOD PRESSURE: 65 MMHG | HEART RATE: 76 BPM | OXYGEN SATURATION: 100 % | SYSTOLIC BLOOD PRESSURE: 104 MMHG | TEMPERATURE: 97 F | RESPIRATION RATE: 16 BRPM

## 2022-03-27 VITALS — OXYGEN SATURATION: 100 % | WEIGHT: 125 LBS | HEIGHT: 64 IN | TEMPERATURE: 99 F

## 2022-03-27 DIAGNOSIS — R45.851 SUICIDAL IDEATIONS: ICD-10-CM

## 2022-03-27 DIAGNOSIS — F11.10 OPIOID ABUSE, UNCOMPLICATED: ICD-10-CM

## 2022-03-27 DIAGNOSIS — T40.601A POISONING BY UNSPECIFIED NARCOTICS, ACCIDENTAL (UNINTENTIONAL), INITIAL ENCOUNTER: ICD-10-CM

## 2022-03-27 LAB
ALBUMIN SERPL ELPH-MCNC: 4 G/DL — SIGNIFICANT CHANGE UP (ref 3.4–5)
ALP SERPL-CCNC: 75 U/L — SIGNIFICANT CHANGE UP (ref 40–120)
ALT FLD-CCNC: 56 U/L — HIGH (ref 12–42)
AMPHET UR-MCNC: POSITIVE
ANION GAP SERPL CALC-SCNC: 7 MMOL/L — LOW (ref 9–16)
APAP SERPL-MCNC: <2 UG/ML — LOW (ref 10–30)
AST SERPL-CCNC: 167 U/L — HIGH (ref 15–37)
BARBITURATES UR SCN-MCNC: NEGATIVE — SIGNIFICANT CHANGE UP
BASOPHILS # BLD AUTO: 0.02 K/UL — SIGNIFICANT CHANGE UP (ref 0–0.2)
BASOPHILS NFR BLD AUTO: 0.3 % — SIGNIFICANT CHANGE UP (ref 0–2)
BENZODIAZ UR-MCNC: NEGATIVE — SIGNIFICANT CHANGE UP
BILIRUB SERPL-MCNC: 1.3 MG/DL — HIGH (ref 0.2–1.2)
BUN SERPL-MCNC: 22 MG/DL — SIGNIFICANT CHANGE UP (ref 7–23)
CALCIUM SERPL-MCNC: 8.9 MG/DL — SIGNIFICANT CHANGE UP (ref 8.5–10.5)
CHLORIDE SERPL-SCNC: 100 MMOL/L — SIGNIFICANT CHANGE UP (ref 96–108)
CO2 SERPL-SCNC: 31 MMOL/L — SIGNIFICANT CHANGE UP (ref 22–31)
COCAINE METAB.OTHER UR-MCNC: POSITIVE
CREAT SERPL-MCNC: 0.69 MG/DL — SIGNIFICANT CHANGE UP (ref 0.5–1.3)
EGFR: 106 ML/MIN/1.73M2 — SIGNIFICANT CHANGE UP
EOSINOPHIL # BLD AUTO: 0.03 K/UL — SIGNIFICANT CHANGE UP (ref 0–0.5)
EOSINOPHIL NFR BLD AUTO: 0.4 % — SIGNIFICANT CHANGE UP (ref 0–6)
ETHANOL SERPL-MCNC: <3 MG/DL — SIGNIFICANT CHANGE UP
GLUCOSE SERPL-MCNC: 120 MG/DL — HIGH (ref 70–99)
HCT VFR BLD CALC: 37.6 % — SIGNIFICANT CHANGE UP (ref 34.5–45)
HGB BLD-MCNC: 12.7 G/DL — SIGNIFICANT CHANGE UP (ref 11.5–15.5)
IMM GRANULOCYTES NFR BLD AUTO: 0.1 % — SIGNIFICANT CHANGE UP (ref 0–1.5)
LYMPHOCYTES # BLD AUTO: 1.12 K/UL — SIGNIFICANT CHANGE UP (ref 1–3.3)
LYMPHOCYTES # BLD AUTO: 16.5 % — SIGNIFICANT CHANGE UP (ref 13–44)
MCHC RBC-ENTMCNC: 29.5 PG — SIGNIFICANT CHANGE UP (ref 27–34)
MCHC RBC-ENTMCNC: 33.8 GM/DL — SIGNIFICANT CHANGE UP (ref 32–36)
MCV RBC AUTO: 87.2 FL — SIGNIFICANT CHANGE UP (ref 80–100)
METHADONE UR-MCNC: POSITIVE
MONOCYTES # BLD AUTO: 0.4 K/UL — SIGNIFICANT CHANGE UP (ref 0–0.9)
MONOCYTES NFR BLD AUTO: 5.9 % — SIGNIFICANT CHANGE UP (ref 2–14)
NEUTROPHILS # BLD AUTO: 5.19 K/UL — SIGNIFICANT CHANGE UP (ref 1.8–7.4)
NEUTROPHILS NFR BLD AUTO: 76.8 % — SIGNIFICANT CHANGE UP (ref 43–77)
NRBC # BLD: 0 /100 WBCS — SIGNIFICANT CHANGE UP (ref 0–0)
OPIATES UR-MCNC: POSITIVE
PCP SPEC-MCNC: SIGNIFICANT CHANGE UP
PCP UR-MCNC: NEGATIVE — SIGNIFICANT CHANGE UP
PLATELET # BLD AUTO: 186 K/UL — SIGNIFICANT CHANGE UP (ref 150–400)
POTASSIUM SERPL-MCNC: 3.7 MMOL/L — SIGNIFICANT CHANGE UP (ref 3.5–5.3)
POTASSIUM SERPL-SCNC: 3.7 MMOL/L — SIGNIFICANT CHANGE UP (ref 3.5–5.3)
PROT SERPL-MCNC: 7 G/DL — SIGNIFICANT CHANGE UP (ref 6.4–8.2)
RBC # BLD: 4.31 M/UL — SIGNIFICANT CHANGE UP (ref 3.8–5.2)
RBC # FLD: 14.4 % — SIGNIFICANT CHANGE UP (ref 10.3–14.5)
SALICYLATES SERPL-MCNC: <0.2 MG/DL — LOW (ref 2.8–20)
SARS-COV-2 RNA SPEC QL NAA+PROBE: SIGNIFICANT CHANGE UP
SODIUM SERPL-SCNC: 138 MMOL/L — SIGNIFICANT CHANGE UP (ref 132–145)
THC UR QL: NEGATIVE — SIGNIFICANT CHANGE UP
WBC # BLD: 6.77 K/UL — SIGNIFICANT CHANGE UP (ref 3.8–10.5)
WBC # FLD AUTO: 6.77 K/UL — SIGNIFICANT CHANGE UP (ref 3.8–10.5)

## 2022-03-27 RX ORDER — CLONAZEPAM 1 MG
1 TABLET ORAL ONCE
Refills: 0 | Status: DISCONTINUED | OUTPATIENT
Start: 2022-03-27 | End: 2022-03-27

## 2022-03-27 RX ORDER — OLANZAPINE 15 MG/1
5 TABLET, FILM COATED ORAL EVERY 6 HOURS
Refills: 0 | Status: DISCONTINUED | OUTPATIENT
Start: 2022-03-27 | End: 2022-04-06

## 2022-03-27 RX ORDER — CLONAZEPAM 1 MG
1 TABLET ORAL
Refills: 0 | Status: DISCONTINUED | OUTPATIENT
Start: 2022-03-27 | End: 2022-03-29

## 2022-03-27 RX ORDER — OLANZAPINE 15 MG/1
5 TABLET, FILM COATED ORAL ONCE
Refills: 0 | Status: COMPLETED | OUTPATIENT
Start: 2022-03-27 | End: 2022-03-27

## 2022-03-27 RX ORDER — SODIUM CHLORIDE 9 MG/ML
1000 INJECTION INTRAMUSCULAR; INTRAVENOUS; SUBCUTANEOUS ONCE
Refills: 0 | Status: COMPLETED | OUTPATIENT
Start: 2022-03-27 | End: 2022-03-27

## 2022-03-27 RX ORDER — ALBUTEROL 90 UG/1
2 AEROSOL, METERED ORAL EVERY 6 HOURS
Refills: 0 | Status: DISCONTINUED | OUTPATIENT
Start: 2022-03-27 | End: 2022-04-06

## 2022-03-27 RX ORDER — OLANZAPINE 15 MG/1
5 TABLET, FILM COATED ORAL ONCE
Refills: 0 | Status: DISCONTINUED | OUTPATIENT
Start: 2022-03-27 | End: 2022-04-06

## 2022-03-27 RX ADMIN — OLANZAPINE 5 MILLIGRAM(S): 15 TABLET, FILM COATED ORAL at 13:18

## 2022-03-27 RX ADMIN — Medication 1 MILLIGRAM(S): at 20:42

## 2022-03-27 RX ADMIN — Medication 1 MILLIGRAM(S): at 16:07

## 2022-03-27 RX ADMIN — Medication 1 MILLIGRAM(S): at 01:28

## 2022-03-27 RX ADMIN — Medication 1 MILLIGRAM(S): at 11:50

## 2022-03-27 RX ADMIN — SODIUM CHLORIDE 1000 MILLILITER(S): 9 INJECTION INTRAMUSCULAR; INTRAVENOUS; SUBCUTANEOUS at 01:31

## 2022-03-27 NOTE — BH PATIENT PROFILE - NSPROPTRIGHTSUPPORTPERSON_GEN_A_NUR
How Severe Are Your Spot(S)?: mild
What Type Of Note Output Would You Prefer (Optional)?: Bullet Format
What Is The Reason For Today's Visit?: Full Body Skin Examination with No Concerns
What Is The Reason For Today's Visit? (Being Monitored For X): concerning skin lesions on an annual basis
declines

## 2022-03-27 NOTE — ED BEHAVIORAL HEALTH ASSESSMENT NOTE - NSBHSABEN_PSY_A_CORE FT
use d/o per psyckes , and currently Rxed. also hx of diversion of bzd on IPP at [facility] in late 2021.

## 2022-03-27 NOTE — BH PATIENT PROFILE - HOME MEDICATIONS
Tylenol , 500 milligram(s) orally every 4 hours, As Needed -for mild pain   methadone 40 mg oral tablet, dispersible , 1.5 tab(s) orally once a day

## 2022-03-27 NOTE — ED CDU PROVIDER DISPOSITION NOTE - CLINICAL COURSE
Bed available at [facility] under Dr. Treadwell.  Patient remains cooperative and stable for transfer Bed available at [facility] under Dr. Treadwell.  Patient remains cooperative and stable for transfer    1315: patient with mild agitation unable to de-escalate.  Ordered for 5mg PO of zyprexa.

## 2022-03-27 NOTE — ED BEHAVIORAL HEALTH ASSESSMENT NOTE - ORAL MEDICATION DETAILS
From: Forrest Cárdenas  To: Zi Duran MD  Sent: 11/29/2021 5:11 PM CST  Subject: Blood test Monitor and Test Stips for my Blood sugar    Hello Doctor Razia Interiano,    Just tried to refill my Contour Test Strips and found that my [facility] Me clonazepam administered by EM

## 2022-03-27 NOTE — BH PATIENT PROFILE - NSDASAPHYSOBJ_PSY_ALL_CORE
-- DO NOT REPLY / DO NOT REPLY ALL --  -- Message is from the [facility]--    COVID-19 Universal Screening: Negative    Caller is requesting an appointment - at a sooner time than what was available.      PCP unavailable for post hospital follow up    Reason for Visit: Patient was inpatient at [facility] from 09/22-09/24. Patient would like to be seen Monday anytime before 12pm . Please call patient back to confirm appointment     Is the patient currently scheduled? No    Preferred time to be seen: any     Caller Information       Type Contact Phone    09/25/2020 03:46 PM CDT Phone (Incoming) Inna Hoff (Self) [phone] (H)          Alternative phone number:     Turnaround time given to caller:   \"This message will be sent to [state Provider's name]. The clinical team will fulfill your request as soon as they review your message.\"   No

## 2022-03-27 NOTE — BH CHART NOTE - NSEVENTNOTEFT_PSY_ALL_CORE
***NOTE: This patient's [facility] ED visit is under MRN#. Previous  assessment, vitals, and labs are taken from that chart.***    HPI  Per [facility] assessment: "NOTE: patient has multiple ED accounts created in errors at [facility], as well as [facility] account. this note makes reference to this patient's other records.   Patient is a 50 y/o woman, reportedly domiciled with mother in [place],  documented psychiatric history of Malingering, of polysubstance use, opioid use disorder (on methadone) benzo dependence (including BZD diversion at [facility]), cocaine use disorder,  anxiety, depression, prior psychiatric hospitalizations including last for 2 days at [facility] discharged on 9/23/21 after found to be diverting/sharing benzos with her boyfriend also on unit, history of suicidal gestures and non-suicidal cutting behavior,  no known medical history,  who was BIBEMS after being found unresponsive at [facility] with apparent opiate OD (responded to narcan) who in ED reported suicidal ideation.   Patient seen via [employer]. patient is a poor historian, and participates minimally in interview. she is vague and evasive during her brief participation. She states that she "sniffed heroin" today "to feel better" but she thinks it contained fentanyl and she had LOC. She reports that she has been depressed recently and that she is suicidal, she does not describe plans/intents. She is evasive when asked about housing situation, any outpatient treatment. She is very drowsy, and repeatedly falls asleep during interview. She is not able to provide further information.   Pt does not participate in COVID screener.”    Per Follow-up Behavioral Health Note: “On reassessment, the patient was irritable, evasive, otherwise alert and oriented and engaged in superficial interview.  The patient states that she relapsed yesterday and did a bag of what she thought was heroin. Of note, utox was positive for opiate, cocaine, amphetamine, methadone. She denies knowledge of doing all substances outside of heroin but did not deny the possibility. She says that she was not using for over 5 months prior. She last remembered being in [facility] and waking up in the hospital. She understands that she had likely overdosed in the field and was given Narcan and taken to the ED. She says that she has been depressed for months, citing stressors that include a brother whose death anniversary was last week. She was very evasive on details, saying that she didn't want to talk about her family. She otherwise states that she feels hopeless, is disappointed that she relapsed, has had severe suicidal ideation with plan to jump in front of the tracks, stating that [place] the subway [place] and had thoughts of jumping but didn't. She denies desire but that the plan of doing so is always there. She also has history of NSSI of using a blade to cut her wrists and that 2 weeks ago, she had cut too deep and had to go to the emergency room. She reports that she does so in order to feel better. She denies prior SA. She has had numerous inpatient admissions, reportedly last was in November, but that she doesn't know in total how many. She says the usual reasons for her admissions are for depression, anxiety, and suicidal ideation. She reports that she does not have an outpatient psychiatrist, that the last time she saw one was pre-pandemic in [place] but that she was inconsistent due to it being far from home. She does have PCP who prescribes her Klonopin 1mg twice daily for anxiety and that this is the only medication she has taken consistently for the past 5-6 months. She would not disclose other medications out of frustration. She denies being on methadone (several months since she's been in a program). She was not willing to cooperate with remainder of interview, but insisted that if she would be discharged, she would go out and grab a blade and cut her wrists to bleed out.  The patient refused to give collateral contact and cut interview short prior to COVID screen. Of note, patient is COVID negative.”    Patient evaluated by JOLENE. On assessment, patient is irritable and demanding. She responds "it's in my chart, didn't you read my chart?" to most questions. She reports that she is feeling depressed and anxious. She was feeling suicidal due to psychosocial stressors prior to admission. She refuses to elaborate on these stressors. She denies any suicidality of the time of admission and agrees to come to staff if these thoughts return. She endorses hx of SA via cutting and displays scars from superficial cuts on her wrists. She denies AH/VH. She denies any alcohol or benzo use. She reports living in [place], and is adamant that she is depressed and not just "looking for a hot cot" by coming to the hospital. She shows writer and RN blisters on the bottoms of both of her feet, which she says are from walking. She is not willing to give consent for collateral at this time.      PPHx  Documented psychiatric history of Malingering, of polysubstance use, opioid use disorder (on methadone) benzo dependence (including BZD diversion at [facility]), cocaine use disorder, anxiety, depression, borderline PD, PTSD, prior psychiatric hospitalizations including last for 2 days at [facility] discharged on 9/23/21 after found to be diverting/sharing benzos with her boyfriend also on unit, history of suicidal gestures and non-suicidal cutting behavior    PMHx  None    Medications  Klonipin 1mg BID    Allergies  NKDA     Substance Hx  Polysubstance use – opioids, benzos, cocaine    Family Hx  None known    Social Hx  Unemployed. reports living with mother in [place], [place], and having a condo in [facility]  The patient appears stated age, fair hygiene and dressed appropriately.  The patient was calm, cooperative with the interview and maintained appropriate eye contact.  No psychomotor agitation or retardation noted.  Steady gait observed.  The patient's speech was fluent, normal in tone, rate, and volume.  The patient's mood is "good."  Affect is constricted, stable and appropriate.  The patient's thoughts are goal directed.  Denies any delusions or hallucinations. Denies any suicidal or homicidal ideation, intent, or plan.  Insight is fair.  Judgment is fair.  Impulse control has been fair on the unit.    Labs          Risk Assessment  Immediate risk is minimized by inpatient admission to safe environment with appropriate supervision and limited access to lethal means. Future risk to be minimized by treatment of acute mood symptoms, maximizing outpatient supports, providing patient education, discussing emergency procedures, and ensuring close follow-up.  Acute risk factors include: recent overdose, suicidal ideation, depressed mood  Chronic risk factors for suicide include: hx MDD, hx anxiety, hx polysubstance use  Protective factors include: help seeking behavior  Based on risk assessment evaluation, patient IS NOT at acute risk of harm to self or others at this time, DOES NOT require 1:1 CO.     Assessment  Patient is a 50 y/o woman, reportedly domiciled with mother in [place],  documented psychiatric history of Malingering, of polysubstance use, opioid use disorder (on methadone) benzo dependence (including BZD diversion at [facility]), cocaine use disorder, anxiety, depression, borderline PD, PTSD, prior psychiatric hospitalizations including last for 2 days at [facility] discharged on 9/23/21 after found to be diverting/sharing benzos with her boyfriend also on unit, history of suicidal gestures and non-suicidal cutting behavior,  no known medical history,  who was BIBEMS after being found unresponsive at [facility] with apparent opiate OD (responded to Narcan) who in ED reported suicidal ideation.  The patient requires inpatient hospitalization for mood stabilization. While in the ED, she reported SI w/ intent to harm herself if discharged. Patient will be placed on CINA to monitor for withdrawal from opiates. Will continue Klonipin, defer further medications for mood to primary team.    Plan  Legal: admit to 2N on 9.27  Safety: routine observation, denies SI/HI/I/P on the unit. PRNs: Haldol, Ativan, Benadryl  Psychiatry: continue Klonipin 1mg BID  Group, milieu, individual therapy as appropriate.  Medical: CINA to monitor for withdrawal. admission labs with elevated AST/ALT, otherwise WNL  Dispo: pending clinical improvement. patient requires inpatient hospitalization for stabilization and safety.   ***NOTE: This patient's [facility] ED visit is under MRN# [id number]. Previous  assessment, vitals, and labs are taken from that chart.***    HPI  Per [facility] assessment: "NOTE: patient has multiple ED accounts created in errors at [facility], as well as [facility] account. this note makes reference to this patient's other records.   Patient is a 50 y/o woman, reportedly domiciled with mother in [place],  documented psychiatric history of Malingering, of polysubstance use, opioid use disorder (on methadone) benzo dependence (including BZD diversion at [facility]), cocaine use disorder,  anxiety, depression, prior psychiatric hospitalizations including last for 2 days at [facility] discharged on 9/23/21 after found to be diverting/sharing benzos with her boyfriend also on unit, history of suicidal gestures and non-suicidal cutting behavior,  no known medical history,  who was BIBEMS after being found unresponsive at [facility] with apparent opiate OD (responded to narcan) who in ED reported suicidal ideation.   Patient seen via [employer]. patient is a poor historian, and participates minimally in interview. she is vague and evasive during her brief participation. She states that she "sniffed heroin" today "to feel better" but she thinks it contained fentanyl and she had LOC. She reports that she has been depressed recently and that she is suicidal, she does not describe plans/intents. She is evasive when asked about housing situation, any outpatient treatment. She is very drowsy, and repeatedly falls asleep during interview. She is not able to provide further information.   Pt does not participate in COVID screener.”    Per Follow-up Behavioral Health Note: “On reassessment, the patient was irritable, evasive, otherwise alert and oriented and engaged in superficial interview.  The patient states that she relapsed yesterday and did a bag of what she thought was heroin. Of note, utox was positive for opiate, cocaine, amphetamine, methadone. She denies knowledge of doing all substances outside of heroin but did not deny the possibility. She says that she was not using for over 5 months prior. She last remembered being in [facility] and waking up in the hospital. She understands that she had likely overdosed in the field and was given Narcan and taken to the ED. She says that she has been depressed for months, citing stressors that include a brother whose death anniversary was last week. She was very evasive on details, saying that she didn't want to talk about her family. She otherwise states that she feels hopeless, is disappointed that she relapsed, has had severe suicidal ideation with plan to jump in front of the tracks, stating that she [place] the subway [place] and had thoughts of jumping but didn't. She denies desire but that the plan of doing so is always there. She also has history of NSSI of using a blade to cut her wrists and that 2 weeks ago, she had cut too deep and had to go to the emergency room. She reports that she does so in order to feel better. She denies prior SA. She has had numerous inpatient admissions, reportedly last was in November, but that she doesn't know in total how many. She says the usual reasons for her admissions are for depression, anxiety, and suicidal ideation. She reports that she does not have an outpatient psychiatrist, that the last time she saw one was pre-pandemic in [place] but that she was inconsistent due to it being far from home. She does have PCP who prescribes her Klonopin 1mg twice daily for anxiety and that this is the only medication she has taken consistently for the past 5-6 months. She would not disclose other medications out of frustration. She denies being on methadone (several months since she's been in a program). She was not willing to cooperate with remainder of interview, but insisted that if she would be discharged, she would go out and grab a blade and cut her wrists to bleed out.  The patient refused to give collateral contact and cut interview short prior to COVID screen. Of note, patient is COVID negative.”    Patient evaluated by JOLENE. On assessment, patient is irritable and demanding. She responds "it's in my chart, didn't you read my chart?" to most questions. She reports that she is feeling depressed and anxious. She was feeling suicidal due to psychosocial stressors prior to admission. She refuses to elaborate on these stressors. She denies any suicidality of the time of admission and agrees to come to staff if these thoughts return. She endorses hx of SA via cutting and displays scars from superficial cuts on her wrists. She denies AH/VH. She denies any alcohol or benzo use. She reports living in [place], and is adamant that she is depressed and not just "looking for a hot cot" by coming to the hospital. She shows writer and RN blisters on the bottoms of both of her feet, which she says are from walking. She is not willing to give consent for collateral at this time.      PPHx  Documented psychiatric history of Malingering, of polysubstance use, opioid use disorder (on methadone) benzo dependence (including BZD diversion at [facility]), cocaine use disorder, anxiety, depression, borderline PD, PTSD, prior psychiatric hospitalizations including last for 2 days at [facility] discharged on 9/23/21 after found to be diverting/sharing benzos with her boyfriend also on unit, history of suicidal gestures and non-suicidal cutting behavior    PMHx  Asthma    Medications  Klonipin 1mg BID    Allergies  NKDA     Substance Hx  Polysubstance use – opioids, benzos, cocaine    Family Hx  None known    Social Hx  Unemployed. reports living with mother in [place], having [place] from [place], and having a condo in [facility]  T(C): 36.3 (27 Mar 2022 16:08), Max: 37 (26 Mar 2022 19:37)  T(F): 97.4 (27 Mar 2022 16:08), Max: 98.6 (26 Mar 2022 19:37)  HR: 76 (27 Mar 2022 16:08) (76 - 105)  BP: 104/65 (27 Mar 2022 16:08) (100/68 - 150/75)  RR: 16 (27 Mar 2022 16:08) (16 - 20)  SpO2: 100% (27 Mar 2022 16:08) (92% - 100%)    MSE  The patient appears stated age, disheveled, poor hygiene. She is somewhat cooperative, but guarded and irritable throughout interview. Eye contact is appropriate. Psychomotor agitation is noted. Gait is steady. The patient's speech was fluent, normal in tone, rate, and volume. The patient's mood is "depressed".  Affect is anxious and irritable.  The patient's thoughts are goal directed.  Denies any delusions or hallucinations, though is somewhat suspicious. Denies any current suicidal or homicidal ideation, intent, or plan.  Insight is fair.  Judgment is fair.  Impulse control is fair.    Labs      03-27  138  |  100  |  22  ----------------------------<  120<H>  3.7   |  31  |  0.69  Ca    8.9      27 Mar 2022 02:01  TPro  7.0  /  Alb  4.0  /  TBili  1.3<H>  /  DBili  x   /  AST  167<H>  /  ALT  56<H>  /  AlkPhos  75  03-27                        12.7  6.77  )-----------( 186      ( 27 Mar 2022 02:01 )             37.6    COVID-19 PCR: NotDetec (27 Mar 2022 03:07)    Methadone, Urine: Positive (03.27.22 @ 08:00)  Opiate, Urine: Positive (03.27.22 @ 08:00)  Amphetamine, Urine: Positive (03.27.22 @ 08:00)  Cocaine Metabolite, Urine: Positive (03.27.22 @ 08:00)    Risk Assessment  Immediate risk is minimized by inpatient admission to safe environment with appropriate supervision and limited access to lethal means. Future risk to be minimized by treatment of acute mood symptoms, maximizing outpatient supports, providing patient education, discussing emergency procedures, and ensuring close follow-up.  Acute risk factors include: recent overdose, suicidal ideation, depressed mood  Chronic risk factors for suicide include: hx MDD, hx anxiety, hx polysubstance use  Protective factors include: help seeking behavior  Based on risk assessment evaluation, patient IS NOT at acute risk of harm to self or others at this time, DOES NOT require 1:1 CO.     Assessment  Patient is a 50 y/o woman, reportedly domiciled with mother in [place],  documented psychiatric history of Malingering, of polysubstance use, opioid use disorder (on methadone) benzo dependence (including BZD diversion at [facility]), cocaine use disorder, anxiety, depression, borderline PD, PTSD, prior psychiatric hospitalizations including last for 2 days at [facility] discharged on 9/23/21 after found to be diverting/sharing benzos with her boyfriend also on unit, history of suicidal gestures and non-suicidal cutting behavior,  no known medical history,  who was BIBEMS after being found unresponsive at [facility] with apparent opiate OD (responded to Narcan) who in ED reported suicidal ideation.  The patient requires inpatient hospitalization for mood stabilization. While in the ED, she reported SI w/ intent to harm herself if discharged. Denies current SIIP, no indication for CO 1:1 at this time. Patient will be placed on CINA to monitor for withdrawal from opiates. Will continue Klonipin, defer further medications for mood to primary team.    Plan  Legal: admit to 2N on 9.27  Safety: routine observation, denies SI/HI/I/P on the unit. PRNs: Haldol, Ativan, Benadryl  Psychiatry: continue Klonipin 1mg BID  Group, milieu, individual therapy as appropriate  Medical: CINA to monitor for withdrawal. Albuterol inhaler PRN for asthma. admission labs with elevated AST/ALT, otherwise WNL  Dispo: pending clinical improvement. patient requires inpatient hospitalization for stabilization and safety. ***NOTE: This patient's [facility] ED visit is under MRN# [id number]. Previous  assessment, vitals, and labs are taken from that chart.***    HPI  Per [facility] assessment: "NOTE: patient has multiple ED accounts created in errors at [facility], as well as [facility] account. this note makes reference to this patient's other records.   Patient is a 50 y/o woman, reportedly domiciled with mother in [place],  documented psychiatric history of Malingering, of polysubstance use, opioid use disorder (on methadone) benzo dependence (including BZD diversion at [facility]), cocaine use disorder,  anxiety, depression, prior psychiatric hospitalizations including last for 2 days at [facility] discharged on 9/23/21 after found to be diverting/sharing benzos with her boyfriend also on unit, history of suicidal gestures and non-suicidal cutting behavior,  no known medical history,  who was BIBEMS after being found unresponsive at [facility] with apparent opiate OD (responded to narcan) who in ED reported suicidal ideation.   Patient seen via [employer]. patient is a poor historian, and participates minimally in interview. she is vague and evasive during her brief participation. She states that she "sniffed heroin" today "to feel better" but she thinks it contained fentanyl and she had LOC. She reports that she has been depressed recently and that she is suicidal, she does not describe plans/intents. She is evasive when asked about housing situation, any outpatient treatment. She is very drowsy, and repeatedly falls asleep during interview. She is not able to provide further information.   Pt does not participate in COVID screener.”    Per Follow-up Behavioral Health Note: “On reassessment, the patient was irritable, evasive, otherwise alert and oriented and engaged in superficial interview.  The patient states that she relapsed yesterday and did a bag of what she thought was heroin. Of note, utox was positive for opiate, cocaine, amphetamine, methadone. She denies knowledge of doing all substances outside of heroin but did not deny the possibility. She says that she was not using for over 5 months prior. She last remembered being in [facility] and waking up in the hospital. She understands that she had likely overdosed in the field and was given Narcan and taken to the ED. She says that she has been depressed for months, citing stressors that include a brother whose death anniversary was last week. She was very evasive on details, saying that she didn't want to talk about her family. She otherwise states that she feels hopeless, is disappointed that she relapsed, has had severe suicidal ideation with plan to jump in front of the tracks, stating [place] and had thoughts of jumping but didn't. She denies desire but that the plan of doing so is always there. She also has history of NSSI of using a blade to cut her wrists and that 2 weeks ago, she had cut too deep and had to go to the emergency room. She reports that she does so in order to feel better. She denies prior SA. She has had numerous inpatient admissions, reportedly last was in November, but that she doesn't know in total how many. She says the usual reasons for her admissions are for depression, anxiety, and suicidal ideation. She reports that she does not have an outpatient psychiatrist, that the last time she saw one was pre-pandemic in [place] but that she was inconsistent due to it being far from home. She does have PCP who prescribes her Klonopin 1mg twice daily for anxiety and that this is the only medication she has taken consistently for the past 5-6 months. She would not disclose other medications out of frustration. She denies being on methadone (several months since she's been in a program). She was not willing to cooperate with remainder of interview, but insisted that if she would be discharged, she would go out and grab a blade and cut her wrists to bleed out.  The patient refused to give collateral contact and cut interview short prior to COVID screen. Of note, patient is COVID negative.”    Patient evaluated by JOLENE. On assessment, patient is irritable and demanding. She responds "it's in my chart, didn't you read my chart?" to most questions. She reports that she is feeling depressed and anxious. She was feeling suicidal due to psychosocial stressors prior to admission. She refuses to elaborate on these stressors. She denies any suicidality of the time of admission and agrees to come to staff if these thoughts return. She endorses hx of SA via cutting and displays scars from superficial cuts on her wrists. She denies AH/VH. She denies any alcohol or benzo use. She reports living in [place], and is adamant that she is depressed and not just "looking for a hot cot" by coming to the hospital. She shows writer and RN blisters on the bottoms of both of her feet, which she says are from walking. She is not willing to give consent for collateral at this time.        PPHx  Documented psychiatric history of Malingering, of polysubstance use, opioid use disorder (on methadone) benzo dependence (including BZD diversion at [facility]), cocaine use disorder, anxiety, depression, borderline PD, PTSD, prior psychiatric hospitalizations including last for 2 days at [facility] discharged on 9/23/21 after found to be diverting/sharing benzos with her boyfriend also on unit, history of suicidal gestures and non-suicidal cutting behavior    PMHx  Asthma    Medications  Klonipin 1mg BID    Allergies  NKDA     Substance Hx  Polysubstance use – opioids, benzos, cocaine    Family Hx  None known    Social Hx  Unemployed. reports living with mother in [place], [place], and having a condo in [facility]  T(C): 36.3 (27 Mar 2022 16:08), Max: 37 (26 Mar 2022 19:37)  T(F): 97.4 (27 Mar 2022 16:08), Max: 98.6 (26 Mar 2022 19:37)  HR: 76 (27 Mar 2022 16:08) (76 - 105)  BP: 104/65 (27 Mar 2022 16:08) (100/68 - 150/75)  RR: 16 (27 Mar 2022 16:08) (16 - 20)  SpO2: 100% (27 Mar 2022 16:08) (92% - 100%)    MSE  The patient appears stated age, disheveled, poor hygiene. She is somewhat cooperative, but guarded and irritable throughout interview. Eye contact is appropriate. Psychomotor agitation is noted. Gait is steady. The patient's speech was fluent, normal in tone, rate, and volume. The patient's mood is "depressed".  Affect is anxious and irritable.  The patient's thoughts are goal directed.  Denies any delusions or hallucinations, though is somewhat suspicious. Denies any current suicidal or homicidal ideation, intent, or plan.  Insight is fair.  Judgment is fair.  Impulse control is fair.    Labs      03-27  138  |  100  |  22  ----------------------------<  120<H>  3.7   |  31  |  0.69  Ca    8.9      27 Mar 2022 02:01  TPro  7.0  /  Alb  4.0  /  TBili  1.3<H>  /  DBili  x   /  AST  167<H>  /  ALT  56<H>  /  AlkPhos  75  03-27                        12.7  6.77  )-----------( 186      ( 27 Mar 2022 02:01 )             37.6    COVID-19 PCR: NotDetec (27 Mar 2022 03:07)    Methadone, Urine: Positive (03.27.22 @ 08:00)  Opiate, Urine: Positive (03.27.22 @ 08:00)  Amphetamine, Urine: Positive (03.27.22 @ 08:00)  Cocaine Metabolite, Urine: Positive (03.27.22 @ 08:00)    Risk Assessment  Immediate risk is minimized by inpatient admission to safe environment with appropriate supervision and limited access to lethal means. Future risk to be minimized by treatment of acute mood symptoms, maximizing outpatient supports, providing patient education, discussing emergency procedures, and ensuring close follow-up.  Acute risk factors include: recent overdose, suicidal ideation, depressed mood  Chronic risk factors for suicide include: hx MDD, hx anxiety, hx polysubstance use  Protective factors include: help seeking behavior  Based on risk assessment evaluation, patient IS NOT at acute risk of harm to self or others at this time, DOES NOT require 1:1 CO.       Assessment  Patient is a 50 y/o woman, reportedly domiciled with mother in [place],  documented psychiatric history of Malingering, of polysubstance use, opioid use disorder (on methadone) benzo dependence (including BZD diversion at [facility]), cocaine use disorder, anxiety, depression, borderline PD, PTSD, prior psychiatric hospitalizations including last for 2 days at [facility] discharged on 9/23/21 after found to be diverting/sharing benzos with her boyfriend also on unit, history of suicidal gestures and non-suicidal cutting behavior,  no known medical history,  who was BIBEMS after being found unresponsive at [facility] with apparent opiate OD (responded to Narcan) who in ED reported suicidal ideation.  The patient requires inpatient hospitalization for mood stabilization. While in the ED, she reported SI w/ intent to harm herself if discharged. Denies current SIIP, no indication for CO 1:1 at this time. Patient will be placed on CINA to monitor for withdrawal from opiates. Will continue Klonipin, defer further medications for mood to primary team.    Plan  Legal: admit to 2N on 9.27  Safety: routine observation, denies SI/HI/I/P on the unit. PRNs: Haldol, Ativan, Benadryl  Psychiatry: continue Klonipin 1mg BID  Group, milieu, individual therapy as appropriate  Medical: CINA to monitor for withdrawal. Albuterol inhaler PRN for asthma. admission labs with elevated AST/ALT, otherwise WNL  Dispo: pending clinical improvement. patient requires inpatient hospitalization for stabilization and safety. ***NOTE: This patient's [facility] ED visit is under MRN# [id number]. Previous  assessment, vitals, and labs are taken from that chart.***    HPI  Per [facility] assessment: "NOTE: patient has multiple ED accounts created in errors at [facility], as well as [facility] account. this note makes reference to this patient's other records.   Patient is a 50 y/o woman, reportedly domiciled with mother in [place],  documented psychiatric history of Malingering, of polysubstance use, opioid use disorder (on methadone) benzo dependence (including BZD diversion at [facility]), cocaine use disorder,  anxiety, depression, prior psychiatric hospitalizations including last for 2 days at [facility] discharged on 9/23/21 after found to be diverting/sharing benzos with her boyfriend also on unit, history of suicidal gestures and non-suicidal cutting behavior,  no known medical history,  who was BIBEMS after being found unresponsive at [facility] with apparent opiate OD (responded to narcan) who in ED reported suicidal ideation.   Patient seen via [facility]. patient is a poor historian, and participates minimally in interview. she is vague and evasive during her brief participation. She states that she "sniffed heroin" today "to feel better" but she thinks it contained fentanyl and she had LOC. She reports that she has been depressed recently and that she is suicidal, she does not describe plans/intents. She is evasive when asked about housing situation, any outpatient treatment. She is very drowsy, and repeatedly falls asleep during interview. She is not able to provide further information.   Pt does not participate in COVID screener.”    Per Follow-up Behavioral Health Note: “On reassessment, the patient was irritable, evasive, otherwise alert and oriented and engaged in superficial interview.  The patient states that she relapsed yesterday and did a bag of what she thought was heroin. Of note, utox was positive for opiate, cocaine, amphetamine, methadone. She denies knowledge of doing all substances outside of heroin but did not deny the possibility. She says that she was not using for over 5 months prior. She last remembered being in [facility] and waking up in the hospital. She understands that she had likely overdosed in the field and was given Narcan and taken to the ED. She says that she has been depressed for months, citing stressors that include a brother whose death anniversary was last week. She was very evasive on details, saying that she didn't want to talk about her family. She otherwise states that she feels hopeless, is disappointed that she relapsed, has had severe suicidal ideation with plan to jump in front of the tracks, stating that [place] subway [place] and had thoughts of jumping but didn't. She denies desire but that the plan of doing so is always there. She also has history of NSSI of using a blade to cut her wrists and that 2 weeks ago, she had cut too deep and had to go to the emergency room. She reports that she does so in order to feel better. She denies prior SA. She has had numerous inpatient admissions, reportedly last was in November, but that she doesn't know in total how many. She says the usual reasons for her admissions are for depression, anxiety, and suicidal ideation. She reports that she does not have an outpatient psychiatrist, that the last time she saw one was pre-pandemic in [place] but that she was inconsistent due to it being far from home. She does have PCP who prescribes her Klonopin 1mg twice daily for anxiety and that this is the only medication she has taken consistently for the past 5-6 months. She would not disclose other medications out of frustration. She denies being on methadone (several months since she's been in a program). She was not willing to cooperate with remainder of interview, but insisted that if she would be discharged, she would go out and grab a blade and cut her wrists to bleed out.  The patient refused to give collateral contact and cut interview short prior to COVID screen. Of note, patient is COVID negative.”    Patient evaluated by JOLENE. On assessment, patient is irritable and demanding. She responds "it's in my chart, didn't you read my chart?" to most questions. She reports that she is feeling depressed and anxious. She was feeling suicidal due to psychosocial stressors prior to admission. She refuses to elaborate on these stressors. She denies any suicidality of the time of admission and agrees to come to staff if these thoughts return. She endorses hx of SA via cutting and displays scars from superficial cuts on her wrists. She denies AH/VH. She denies recent alcohol or benzo use. She reports living in [place], and is adamant that she is depressed and not just "looking for a hot cot" by coming to the hospital. She shows writer and RN blisters on the bottoms of both of her feet, which she says are from walking. She is not willing to give consent for collateral at this time. At conclusion of interview, patient requests Klonipin TID instead of BID but is agreeable to BID after writer explains iSTOP showed this as her most recent RX.       PPHx  Documented psychiatric history of Malingering, of polysubstance use, opioid use disorder (on methadone) benzo dependence (including BZD diversion at [facility]), cocaine use disorder, anxiety, depression, borderline PD, PTSD, prior psychiatric hospitalizations including last for 2 days at [facility] discharged on 9/23/21 after found to be diverting/sharing benzos with her boyfriend also on unit, history of suicidal gestures and non-suicidal cutting behavior    PMHx  Asthma    Medications  Klonipin 1mg BID    Allergies  NKDA     Substance Hx  Polysubstance use – opioids, benzos, cocaine    Family Hx  None known    Social Hx  Unemployed. reports living with mother in [place], [place], and having a condo in [facility]  T(C): 36.3 (27 Mar 2022 16:08), Max: 37 (26 Mar 2022 19:37)  T(F): 97.4 (27 Mar 2022 16:08), Max: 98.6 (26 Mar 2022 19:37)  HR: 76 (27 Mar 2022 16:08) (76 - 105)  BP: 104/65 (27 Mar 2022 16:08) (100/68 - 150/75)  RR: 16 (27 Mar 2022 16:08) (16 - 20)  SpO2: 100% (27 Mar 2022 16:08) (92% - 100%)    MSE  The patient appears stated age, disheveled, poor hygiene. She is somewhat cooperative, but guarded and irritable throughout interview. Eye contact is appropriate. Psychomotor agitation is noted. Gait is steady. The patient's speech was fluent, normal in tone, rate, and volume. The patient's mood is "depressed".  Affect is anxious and irritable.  The patient's thoughts are goal directed.  Denies any delusions or hallucinations, though is somewhat suspicious. Denies any current suicidal or homicidal ideation, intent, or plan.  Insight is fair.  Judgment is fair.  Impulse control is fair.    Labs      03-27  138  |  100  |  22  ----------------------------<  120<H>  3.7   |  31  |  0.69  Ca    8.9      27 Mar 2022 02:01  TPro  7.0  /  Alb  4.0  /  TBili  1.3<H>  /  DBili  x   /  AST  167<H>  /  ALT  56<H>  /  AlkPhos  75  03-27                        12.7  6.77  )-----------( 186      ( 27 Mar 2022 02:01 )             37.6    COVID-19 PCR: NotDetec (27 Mar 2022 03:07)    Methadone, Urine: Positive (03.27.22 @ 08:00)  Opiate, Urine: Positive (03.27.22 @ 08:00)  Amphetamine, Urine: Positive (03.27.22 @ 08:00)  Cocaine Metabolite, Urine: Positive (03.27.22 @ 08:00)    Risk Assessment  Immediate risk is minimized by inpatient admission to safe environment with appropriate supervision and limited access to lethal means. Future risk to be minimized by treatment of acute mood symptoms, maximizing outpatient supports, providing patient education, discussing emergency procedures, and ensuring close follow-up.  Acute risk factors include: recent overdose, suicidal ideation, depressed mood  Chronic risk factors for suicide include: hx MDD, hx anxiety, hx polysubstance use  Protective factors include: help seeking behavior  Based on risk assessment evaluation, patient IS NOT at acute risk of harm to self or others at this time, DOES NOT require 1:1 CO.       Assessment  Patient is a 50 y/o woman, reportedly domiciled with mother in [place],  documented psychiatric history of Malingering, of polysubstance use, opioid use disorder (on methadone) benzo dependence (including BZD diversion at [facility]), cocaine use disorder, anxiety, depression, borderline PD, PTSD, prior psychiatric hospitalizations including last for 2 days at [facility] discharged on 9/23/21 after found to be diverting/sharing benzos with her boyfriend also on unit, history of suicidal gestures and non-suicidal cutting behavior, PMHx asthma, who was BIBEMS after being found unresponsive at [facility] with apparent opiate OD (responded to Narcan) who in ED reported suicidal ideation.  The patient requires inpatient hospitalization for mood stabilization. While in the ED, she reported SI w/ intent to harm herself if discharged. Denies current SIIP, no indication for CO 1:1 at this time. Will continue Klonipin, defer further medications for mood to primary team.    Plan  Legal: admit to 2N on 9.27  Safety: routine observation, denies SI/HI/I/P on the unit. PRNs: Zyprexa  Psychiatry: continue Klonipin 1mg BID  Group, milieu, individual therapy as appropriate  Medical: Albuterol inhaler PRN for asthma. admission labs with elevated AST/ALT, otherwise WNL  Dispo: pending clinical improvement. patient requires inpatient hospitalization for stabilization and safety.

## 2022-03-27 NOTE — BH PATIENT PROFILE - FALL HARM RISK - UNIVERSAL INTERVENTIONS
Bed in lowest position, wheels locked, appropriate side rails in place/Call bell, personal items and telephone in reach/Instruct patient to call for assistance before getting out of bed or chair/Non-slip footwear when patient is out of bed/[place] to call system/Physically safe environment - no spills, clutter or unnecessary equipment/Purposeful Proactive Rounding/Room/bathroom lighting operational, light cord in reach

## 2022-03-27 NOTE — ED BEHAVIORAL HEALTH ASSESSMENT NOTE - DESCRIPTION
see BH note unemployed. reports living with mother in [place], having recently moved from [place], and having a condo in [place] reports asthma hx

## 2022-03-27 NOTE — ED CDU PROVIDER INITIAL DAY NOTE - DETAILS
Received care from Dr. Carbajal.  Spoke with Telepsych attending at 10 am to confirm involuntary admission and plan for hold at this time.  Due for BID clonazepam at 1230 pm.  Will place on observation for serial assessments, medical management, and placement for acute depression and anxiety with suicidal ideations and polysubstance abuse.

## 2022-03-27 NOTE — ED BEHAVIORAL HEALTH ASSESSMENT NOTE - RISK ASSESSMENT
Unable to determine Suicide Risk chronic rf: hx of suicidal ideation, hx admission, multiple SUDs  acute rf: active ANTHONY  pf: help seeking

## 2022-03-27 NOTE — ED BEHAVIORAL HEALTH ASSESSMENT NOTE - SUMMARY
Patient is a 52 y/o woman, reportedly domiciled with mother in [place],  documented psychiatric history of Malingering, of polysubstance use, opioid use disorder (on methadone) benzo dependence (including BZD diversion at [facility]), cocaine use disorder,  anxiety, depression, prior psychiatric hospitalizations including last for 2 days at [facility] discharged on 9/23/21 after found to be diverting/sharing benzos with her boyfriend also on unit, history of suicidal gestures and non-suicidal cutting behavior,  no known medical history,  who was BIBEMS after being found unresponsive at [facility] with apparent opiate OD (responded to narcan) who in ED reported suicidal ideation. Patient is a 50 y/o woman, reportedly domiciled with mother in [place],  documented psychiatric history of Malingering, of polysubstance use, opioid use disorder (on methadone) benzo dependence (including BZD diversion at [facility]), cocaine use disorder,  anxiety, depression, prior psychiatric hospitalizations including last for 2 days at [facility] discharged on 9/23/21 after found to be diverting/sharing benzos with her boyfriend also on unit, history of suicidal gestures and non-suicidal cutting behavior,  no known medical history,  who was [facility]EMS [facility] (responded to narcan) who in ED reported suicidal ideation.    patient presents irritable and drowsy, consistent with opiate w/d. she will require reassessment when able to maintain alertness. suspect primary substance vs malingering component to presentation, however patient mental status at this time is not suitable for full eval nor possibility of safety planning/discharge.

## 2022-03-27 NOTE — BH PATIENT PROFILE - STATED REASON FOR ADMISSION
Pt stated "you already know why I'm here, it's in my chart, I don't feel like talking you right now." As per report, pt found unresponsive at [facility] from overdose. Pt became responsive after given Narcan. As per report, pt stated she was suicidal when she came to ED and said that if she was discharged, she would jump in front of a train.

## 2022-03-27 NOTE — ED ADULT NURSE REASSESSMENT NOTE - NS ED NURSE REASSESS COMMENT FT1
Delay in EKG d/t pt. refusing to get off cell phone. Pt. is restless and anxious, stating that she needs more Klonopin. Monitoring ongoing.
Pt rcvd from DOMINIQUE Jorgensen.  Pt w/ one-to-one in place.  Pending bed assignment.  Legal 2PC documents scanned and in MD hand.
Pt. now awake, restless, and agitated screaming at staff for medication. Provider made aware.
Pt. received awake and alert breathing at ease on room air in NAD. pt. is restless and anxious, refusing to give cell phone to security until she makes some phone calls. 1 to 1 and security at bedside. Monitoring ongoing.
Pt now awake and alert, requesting klonopin. Pt also now stating "I'm having suicidal ideation" with plan to keep using heroin.

## 2022-03-27 NOTE — ED BEHAVIORAL HEALTH NOTE - BEHAVIORAL HEALTH NOTE
This report was requested by: Ronald Christie | Reference #: [id number]    You have not added a LIZZY number. Keeping your LIZZY number(s) up to date on the My LIZZY # page will enable the separation of your prescriptions from others in the search results.    Others' Prescriptions  Patient Name: Liliam Shanks Date: 1970  Address: [address] [place] [address] [address] [place], [place] [address]Csj: Female  Rx Written	Rx Dispensed	Drug	Quantity	Days Supply	Prescriber Name	Prescriber Lizzy #	Payment Method	Dispenser  03/17/2022	03/17/2022	clonazepam 1 mg tablet	20	10	KarwSaw hartley MD	[id number]	[facility] #[id number]  02/08/2022	02/19/2022	clonazepam 1 mg tablet	60	30	KarwSaw hartley MD	[id number]	[facility] #[id number]  12/15/2021	01/08/2022	clonazepam 1 mg tablet	60	30	KarwosSaw carlson MD	[id number]	[facility] #[id number]  11/18/2021	11/18/2021	clonazepam 1 mg tablet	60	30	KarwosSaw carlson MD	[id number]	[facility] #[id number]  10/12/2021	10/13/2021	clonazepam 1 mg tablet	60	30	KarwoskiSaw MD	[id number]	[facility] #[id number]  09/13/2021	09/13/2021	clonazepam 1 mg tablet	60	30	KarwosSaw carlson MD	[id number]	[facility] #[id number]  08/17/2021	08/17/2021	clonazepam 1 mg tablet	1	1	LowenthalCharanjit MD	[id number]	[facility] #[id number]  07/19/2021	07/19/2021	clonazepam 1 mg tablet	60	30	KarwosSaw carlson MD	[id number]	[facility] #[id number]  06/10/2021	06/10/2021	clonazepam 1 mg tablet	60	30	KarwosSaw carlson MD	[id number]	[facility] #[id number]  05/05/2021	05/05/2021	clonazepam 1 mg tablet	60	30	Karwoski, Saw MD	[id number]	[facility] #[id number]  03/22/2021	04/05/2021	clonazepam 1 mg tablet	60	30	Saw Scruggs MD	[id number]	[facility] #[id number]  * - Drugs marked with an asterisk are compound drugs. If the compound drug is made up of more than one controlled substance, then each controlled substance will be a separate row in the

## 2022-03-27 NOTE — ED BEHAVIORAL HEALTH NOTE - BEHAVIORAL HEALTH NOTE
The patient was re-evaluated by this provider. In brief:    Patient is a 52 y/o woman, reportedly domiciled with mother in [place],  documented psychiatric history of Malingering, of polysubstance use, opioid use disorder (on methadone) benzo dependence (including BZD diversion at [facility]), cocaine use disorder, anxiety, depression, borderline PD, PTSD, prior psychiatric hospitalizations including last for 2 days at [facility] discharged on 9/23/21 after found to be diverting/sharing benzos with her boyfriend also on unit, history of suicidal gestures and non-suicidal cutting behavior,  no known medical history,  who was BIBEMS after being found unresponsive at [facility] with apparent opiate OD (responded to narcan) who in ED reported suicidal ideation.    On reassessment, the patient was irritable, evasive, otherwise alert and oriented and engaged in superficial interview.    The patient states that she relapsed yesterday and did a bag of what she thought was heroin. Of note, utox was positive for opiate, cocaine, amphetamine, methadone. She denies knowledge of doing all substances outside of heroin but did not deny the possibility. She says that she was not using for over 5 months prior. She last remembered being in [facility] and waking up in the hospital. She understands that she had likely overdosed in the field and was given Narcan and taken to the ED. She says that she has been depressed for months, citing stressors that include a brother whose death anniversary was last week. She was very evasive on details, saying that she didn't want to talk about her family. She otherwise states that she feels hopeless, is disappointed that she relapsed, has had severe suicidal ideation with plan to jump in front of the tracks, stating [place] and had thoughts of jumping but didn't. She denies desire but that the plan of doing so is always there. She also has history of NSSI of using a blade to cut her wrists and that 2 weeks ago, she had cut too deep and had to go to the emergency room. She reports that she does so in order to feel better. She denies prior SA. She has had numerous inaptient admissions, reportedly last was in November, but that she doesn't know in total how many. She says the usual reasons for her admissions are for depression, anxiety, and suicidal ideation. She reports that she does not have an outpatient psychiatrist, that the last time she saw one was pre-pandemic in [place] but that she was inconsistent due to it being far from home. She does have  PCP who prescribes her Klonopin 1mg twice daily for anxiety and that this is the only medicaiton she has taken consistently for the past 5-6 months. She would not disclose other medications out of frustration. She denies being on methadone (several months since she's been in a program). She was not willing to cooperate with remainder of interview, but insisted that if she would be discharged, she would go out and grab a blade and cut her wrists to bleed out.    The patient refused to give collateral contact and cut interview short prior to COVID screen. Of note, patient is COVID negative.    MSE: fair hygiene and poor grooming, no psychomotor agitation or abnormal movements, adequate eye contact, speech was loud with normal prosidy, patient reports "depressed" mood with irritable affect (constricted range), she was perseverative on being admitted, preoccupied with suicidal ideation and NSSI, no HI, no perceptual disturbances, she was oriented to person place and situation, average intelligence, impaired memory (could not remember last admission), normal fund of knowledge, no abnormal language, poor insight and judgment.    A/P:  Patient is a 52 y/o woman, reportedly domiciled with mother in [place],  documented psychiatric history of Malingering, of polysubstance use, opioid use disorder (on methadone) benzo dependence (including BZD diversion at [facility]), cocaine use disorder, anxiety, depression, borderline PD, PTSD, prior psychiatric hospitalizations including last for 2 days at [facility] discharged on 9/23/21 after found to be diverting/sharing benzos with her boyfriend also on unit, history of suicidal gestures and non-suicidal cutting behavior,  no known medical history,  who was BIBEMS after being found unresponsive at [facility] with apparent opiate OD (responded to narcan) who in ED reported suicidal ideation.    The patient is at chronically elevated risk due to history of substance abuse, psychiatric admissions in the past, current NSSI, and chronic SI, poor social support, poor outpatient adherence to treatment. She is at moderate acute elevated risk due to recent substance use leading to unresponsiveness requiring Narcan, history of recent NSSI requiring medical attention of cutting, recent SI with plan and conditional intent, no outpatient treatment, inability to safety plan. Protective factors include help-seeking attitude, reported secure housing, no medical comorbidities.    Due to the patient's elevated risk, active SI with plan of jumping in the tracks or slitting wrists, she meets criteria for inpatient level of care.    Plan:  -- restart Klonopin 1mg BID (I-STOP confirmed)  -- 9.27 involuntary admission, pending bed The patient was re-evaluated by this provider. In brief:    Patient is a 52 y/o woman, reportedly domiciled with mother in [place],  documented psychiatric history of Malingering, of polysubstance use, opioid use disorder (on methadone) benzo dependence (including BZD diversion at [facility]), cocaine use disorder, anxiety, depression, borderline PD, PTSD, prior psychiatric hospitalizations including last for 2 days at [facility] discharged on 9/23/21 after found to be diverting/sharing benzos with her boyfriend also on unit, history of suicidal gestures and non-suicidal cutting behavior,  no known medical history,  who was BIBEMS after being found unresponsive at [facility] with apparent opiate OD (responded to narcan) who in ED reported suicidal ideation.    On reassessment, the patient was irritable, evasive, otherwise alert and oriented and engaged in superficial interview.    The patient states that she relapsed yesterday and did a bag of what she thought was heroin. Of note, utox was positive for opiate, cocaine, amphetamine, methadone. She denies knowledge of doing all substances outside of heroin but did not deny the possibility. She says that she was not using for over 5 months prior. She last remembered being in [facility] and waking up in the hospital. She understands that she had likely overdosed in the field and was given Narcan and taken to the ED. She says that she has been depressed for months, citing stressors that include a brother whose death anniversary was last week. She was very evasive on details, saying that she didn't want to talk about her family. She otherwise states that she feels hopeless, is disappointed that she relapsed, has had severe suicidal ideation with plan to jump in front of the tracks, stating that she [place] the subway [place] and had thoughts of jumping but didn't. She denies desire but that the plan of doing so is always there. She also has history of NSSI of using a blade to cut her wrists and that 2 weeks ago, she had cut too deep and had to go to the emergency room. She reports that she does so in order to feel better. She denies prior SA. She has had numerous inaptient admissions, reportedly last was in November, but that she doesn't know in total how many. She says the usual reasons for her admissions are for depression, anxiety, and suicidal ideation. She reports that she does not have an outpatient psychiatrist, that the last time she saw one was pre-pandemic in [place] but that she was inconsistent due to it being far from home. She does have  PCP who prescribes her Klonopin 1mg twice daily for anxiety and that this is the only medicaiton she has taken consistently for the past 5-6 months. She would not disclose other medications out of frustration. She denies being on methadone (several months since she's been in a program). She was not willing to cooperate with remainder of interview, but insisted that if she would be discharged, she would go out and grab a blade and cut her wrists to bleed out.    The patient refused to give collateral contact and cut interview short prior to COVID screen. Of note, patient is COVID negative.    MSE: fair hygiene and poor grooming, no psychomotor agitation or abnormal movements, adequate eye contact, speech was loud with normal prosidy, patient reports "depressed" mood with irritable affect (constricted range), she was perseverative on being admitted, preoccupied with suicidal ideation and NSSI, no HI, no perceptual disturbances, she was oriented to person place and situation, average intelligence, impaired memory (could not remember last admission), normal fund of knowledge, no abnormal language, poor insight and judgment.    A/P:  Patient is a 52 y/o woman, reportedly domiciled with mother in [place],  documented psychiatric history of Malingering, of polysubstance use, opioid use disorder (on methadone) benzo dependence (including BZD diversion at [facility]), cocaine use disorder, anxiety, depression, borderline PD, PTSD, prior psychiatric hospitalizations including last for 2 days at [facility] discharged on 9/23/21 after found to be diverting/sharing benzos with her boyfriend also on unit, history of suicidal gestures and non-suicidal cutting behavior,  no known medical history,  who was BIBEMS after being found unresponsive at [facility] with apparent opiate OD (responded to narcan) who in ED reported suicidal ideation.    The patient is at chronically elevated risk due to history of substance abuse, psychiatric admissions in the past, current NSSI, and chronic SI, poor social support, poor outpatient adherence to treatment. She is at moderate acute elevated risk due to recent substance use leading to unresponsiveness requiring Narcan, history of recent NSSI requiring medical attention of cutting, recent SI with plan and conditional intent, no outpatient treatment, inability to safety plan. Protective factors include help-seeking attitude, reported secure housing, no medical comorbidities.    Due to the patient's elevated risk, active SI with plan of jumping in the tracks or slitting wrists, she meets criteria for inpatient level of care.    Plan:  -- restart Klonopin 1mg BID (I-STOP confirmed)  -- 9.27 involuntary admission, transfer to [facility]

## 2022-03-27 NOTE — ED BEHAVIORAL HEALTH ASSESSMENT NOTE - HPI (INCLUDE ILLNESS QUALITY, SEVERITY, DURATION, TIMING, CONTEXT, MODIFYING FACTORS, ASSOCIATED SIGNS AND SYMPTOMS)
NOTE: patient has multiple ED accounts created in errors at [facility], as well as [facility] account. this note makes reference to this patient's other records.     Patient is a 50 y/o woman, reportedly domiciled with mother in [place],  documented psychiatric history of Malingering, of polysubstance use, opioid use disorder (on methadone) benzo dependence (including BZD diversion at [facility]), cocaine use disorder,  anxiety, depression, prior psychiatric hospitalizations including last for 2 days at [facility] discharged on 9/23/21 after found to be diverting/sharing benzos with her boyfriend also on unit, history of suicidal gestures and non-suicidal cutting behavior,  no known medical history,  who was BIBEMS after being found unresponsive at [facility] with apparent opiate OD (responded to narcan) who in ED reported suicidal ideation.     Patient seen via [employer] cart. patient is a poor historian, and participates minimally in interview. she is vague and evasive during her brief participation. She states that she "sniffed heroin" today "to feel better" but she thinks it contained fentanyl and she had LOC. She reports that she has been depressed recently and that she is suicidal, she does not describe plans/intents. She is evasive when asked about housing situation, any outpatient treatment. She is very drowsy, and repeatedly falls asleep during interview. She is not able to provide further information.     Pt does not participate in COVID screener.

## 2022-03-28 DIAGNOSIS — F41.9 ANXIETY DISORDER, UNSPECIFIED: ICD-10-CM

## 2022-03-28 DIAGNOSIS — T50.901A POISONING BY UNSPECIFIED DRUGS, MEDICAMENTS AND BIOLOGICAL SUBSTANCES, ACCIDENTAL (UNINTENTIONAL), INITIAL ENCOUNTER: ICD-10-CM

## 2022-03-28 DIAGNOSIS — F19.10 OTHER PSYCHOACTIVE SUBSTANCE ABUSE, UNCOMPLICATED: ICD-10-CM

## 2022-03-28 PROCEDURE — 99222 1ST HOSP IP/OBS MODERATE 55: CPT

## 2022-03-28 RX ORDER — NICOTINE POLACRILEX 2 MG
4 GUM BUCCAL
Refills: 0 | Status: DISCONTINUED | OUTPATIENT
Start: 2022-03-28 | End: 2022-04-06

## 2022-03-28 RX ORDER — ESCITALOPRAM OXALATE 10 MG/1
5 TABLET, FILM COATED ORAL DAILY
Refills: 0 | Status: DISCONTINUED | OUTPATIENT
Start: 2022-03-28 | End: 2022-03-31

## 2022-03-28 RX ORDER — HYDROXYZINE HCL 10 MG
50 TABLET ORAL EVERY 6 HOURS
Refills: 0 | Status: DISCONTINUED | OUTPATIENT
Start: 2022-03-28 | End: 2022-04-06

## 2022-03-28 RX ORDER — LOPERAMIDE HCL 2 MG
2 TABLET ORAL EVERY 4 HOURS
Refills: 0 | Status: DISCONTINUED | OUTPATIENT
Start: 2022-03-28 | End: 2022-03-31

## 2022-03-28 RX ORDER — ACETAMINOPHEN 500 MG
650 TABLET ORAL ONCE
Refills: 0 | Status: COMPLETED | OUTPATIENT
Start: 2022-03-28 | End: 2022-03-28

## 2022-03-28 RX ORDER — NICOTINE POLACRILEX 2 MG
1 GUM BUCCAL DAILY
Refills: 0 | Status: DISCONTINUED | OUTPATIENT
Start: 2022-03-28 | End: 2022-04-06

## 2022-03-28 RX ORDER — BACITRACIN ZINC NEOMYCIN SULFATE POLYMYXIN B SULFATE 400; 3.5; 5 [IU]/G; MG/G; [IU]/G
1 OINTMENT TOPICAL
Refills: 0 | Status: DISCONTINUED | OUTPATIENT
Start: 2022-03-28 | End: 2022-04-06

## 2022-03-28 RX ORDER — ONDANSETRON 8 MG/1
4 TABLET, FILM COATED ORAL EVERY 8 HOURS
Refills: 0 | Status: DISCONTINUED | OUTPATIENT
Start: 2022-03-28 | End: 2022-04-06

## 2022-03-28 RX ORDER — IBUPROFEN 200 MG
400 TABLET ORAL EVERY 6 HOURS
Refills: 0 | Status: DISCONTINUED | OUTPATIENT
Start: 2022-03-28 | End: 2022-04-06

## 2022-03-28 RX ADMIN — Medication 650 MILLIGRAM(S): at 21:55

## 2022-03-28 RX ADMIN — Medication 1 MILLIGRAM(S): at 08:43

## 2022-03-28 RX ADMIN — BACITRACIN ZINC NEOMYCIN SULFATE POLYMYXIN B SULFATE 1 APPLICATION(S): 400; 3.5; 5 OINTMENT TOPICAL at 21:06

## 2022-03-28 RX ADMIN — Medication 1 MILLIGRAM(S): at 19:49

## 2022-03-28 RX ADMIN — Medication 650 MILLIGRAM(S): at 21:06

## 2022-03-28 NOTE — BH INPATIENT PSYCHIATRY ASSESSMENT NOTE - CURRENT MEDICATION
MEDICATIONS  (STANDING):  clonazePAM  Tablet 1 milliGRAM(s) Oral two times a day  escitalopram 5 milliGRAM(s) Oral daily    MEDICATIONS  (PRN):  ALBUTerol    90 MICROgram(s) HFA Inhaler 2 Puff(s) Inhalation every 6 hours PRN Shortness of Breath and/or Wheezing  cloNIDine 0.1 milliGRAM(s) Oral every 12 hours PRN withdrawal  hydrOXYzine hydrochloride 50 milliGRAM(s) Oral every 6 hours PRN anxiety  ibuprofen  Tablet. 400 milliGRAM(s) Oral every 6 hours PRN Mild Pain (1 - 3), Moderate Pain (4 - 6), Severe Pain (7 - 10)  loperamide 2 milliGRAM(s) Oral every 4 hours PRN diarrhea  nicotine  Polacrilex Gum 4 milliGRAM(s) Oral every 2 hours PRN nicotine withdrawal  nicotine -  14 mG/24Hr(s) Patch 1 patch Transdermal daily PRN nicotine withdrawal  OLANZapine 5 milliGRAM(s) Oral every 6 hours PRN agitation  OLANZapine Injectable 5 milliGRAM(s) IntraMuscular once PRN agitation  ondansetron    Tablet 4 milliGRAM(s) Oral every 8 hours PRN nausea

## 2022-03-28 NOTE — BH INPATIENT PSYCHIATRY ASSESSMENT NOTE - CASE SUMMARY
Pt admitted following recreational drug use which pt characterizes now as a suicide attempt.  As per documentation, pt has hx of conditional suicidality, malingering, diversion on units.  Pt describing depressive symptoms and SI.  May be major depressive episode, but may also be mood instability from cocaine withdrawal.  Unclear also if personality component and/or ongoing secondary gain.  Likely to be multifactorial etiology.  Will begin Lexapro.

## 2022-03-28 NOTE — BH INPATIENT PSYCHIATRY ASSESSMENT NOTE - NSBHMETABOLIC_PSY_ALL_CORE_FT
BMI: BMI (kg/m2): 20.6 (03-27-22 @ 18:03)  HbA1c: A1C with Estimated Average Glucose Result: 5.3 % (09-22-21 @ 11:56)    Glucose:   BP: --  Lipid Panel: Date/Time: 09-23-21 @ 08:40  Cholesterol, Serum: 177  Direct LDL: --  HDL Cholesterol, Serum: 101  Total Cholesterol/HDL Ration Measurement: --  Triglycerides, Serum: 52

## 2022-03-28 NOTE — BH SOCIAL WORK INITIAL PSYCHOSOCIAL EVALUATION - NSPROGENSOURCEINFO_PSY_ALL_CORE
Pt. refused to participate in interview. Information was taken from  Resident Note./Other(s) Pt. refused to participate in interview. Information was taken from  Resident Note./Patient

## 2022-03-28 NOTE — BH INPATIENT PSYCHIATRY ASSESSMENT NOTE - HPI (INCLUDE ILLNESS QUALITY, SEVERITY, DURATION, TIMING, CONTEXT, MODIFYING FACTORS, ASSOCIATED SIGNS AND SYMPTOMS)
Patient is a 51F, domiciled with mother in [place], documented PPHx of malingering, polysubstance use (Utox positive for opioids, methadone, benzodiazepines, cocaine), anxiety, depression, multiple prior psychiatric hospitalizations last admitted in September 2021 for 2d at [facility] (pt was diverting benzodiazepines to boyfriend who was on unit), hx of suicidal gestures and NSSIB by cutting, no PMHx, found unresponsive at [facility] with apparent opiate OD, transferred to [facility] after reporting SI in the ED.     Patient minimally compliant with previous interviews. Today, patient seen by treatment team in bed. Initially, patient was calm and superficially cooperative, noted that she is feeling very depressed lately because it is approaching the anniversary of her brother's death, also cites relationship issues as major stressor. She denies having a psychiatrist, stated that her PMD prescribed her Lexapro last week but was unable to pick it up. States that she intentionally overdosed with the intent to die, endorses feeling hopeless and having low mood for past several weeks. Also speaks generally about anxiety, stating she previously needed Xanax 1mg TID to feel better. Pt further requested team increase her benzodiazepines. When team stated that Lexapro would help for her anxiety and they would not be increasing her benzodiazepines, she terminated the interview by placing the blanket over her head and refusing to further engage with the team.

## 2022-03-28 NOTE — BH SOCIAL WORK INITIAL PSYCHOSOCIAL EVALUATION - OTHER PAST PSYCHIATRIC HISTORY (INCLUDE DETAILS REGARDING ONSET, COURSE OF ILLNESS, INPATIENT/OUTPATIENT TREATMENT)
As per  Resident Note completed on March 27th 2022: "Patient is a 50 y/o woman, reportedly domiciled with mother in [place],  documented psychiatric history of Malingering, of polysubstance use, opioid use disorder (on methadone) benzo dependence (including BZD diversion at [facility]), cocaine use disorder,  anxiety, depression, prior psychiatric hospitalizations including last for 2 days at [facility] discharged on 9/23/21 after found to be diverting/sharing benzos with her boyfriend also on unit, history of suicidal gestures and non-suicidal cutting behavior,  no known medical history,  who was BIBEMS after being found unresponsive at [facility] with apparent opiate OD (responded to narcan) who in ED reported suicidal ideation.   Patient seen via [employer] by [employer] cart. patient is a poor historian, and participates minimally in interview. she is vague and evasive during her brief participation. She states that she "sniffed heroin" today "to feel better" but she thinks it contained fentanyl and she had LOC. She reports that she has been depressed recently and that she is suicidal, she does not describe plans/intents. She is evasive when asked about housing situation, any outpatient treatment."

## 2022-03-28 NOTE — BH INPATIENT PSYCHIATRY ASSESSMENT NOTE - MSE UNSTRUCTURED FT
Appearance: No abnormalities, dressed in hospital attire supine in bed  Behavior: Alert, superficially cooperative, fair eye contact  Motor: No abnormal movements  Speech: normal rate, volume, productivity  Mood: "I'm depressed"  Affect: neutral, constricted  Thought Process: linear  Associations: fair  Thought Content: Discusses anxiety, hopelessness. Denies SI/I/P, HI/I/P  Perception: Denies AH/VH  Attention: fair  Gait: not assessed  Language: fluent  Insight: limited  Judgement: poor

## 2022-03-28 NOTE — CHART NOTE - NSCHARTNOTEFT_GEN_A_CORE
Screening Medical Evaluation    Patient Admitted from: [facility] ED    [facility] admitting diagnosis: Suicidal ideation      PAST MEDICAL & SURGICAL HISTORY:  Anxiety  Asthma  Polysubstance abuse  No significant past surgical history    ALLERGIES:  No Known Allergies    SOCIAL HISTORY:  Patient states she is a 0.5ppd smoker. Patient denies alcohol or other substance use.     FAMILY HISTORY:  No known pertinent family history in 1st degree relatives.      MEDICATIONS  (STANDING):  acetaminophen     Tablet .. 650 milliGRAM(s) Oral once  clonazePAM  Tablet 1 milliGRAM(s) Oral two times a day  escitalopram 5 milliGRAM(s) Oral daily  neomycin/bacitracin/polymyxin Topical Ointment 1 Application(s) Topical two times a day    MEDICATIONS  (PRN):  ALBUTerol    90 MICROgram(s) HFA Inhaler 2 Puff(s) Inhalation every 6 hours PRN Shortness of Breath and/or Wheezing  cloNIDine 0.1 milliGRAM(s) Oral every 12 hours PRN withdrawal  hydrOXYzine hydrochloride 50 milliGRAM(s) Oral every 6 hours PRN anxiety  ibuprofen  Tablet. 400 milliGRAM(s) Oral every 6 hours PRN Mild Pain (1 - 3), Moderate Pain (4 - 6), Severe Pain (7 - 10)  loperamide 2 milliGRAM(s) Oral every 4 hours PRN diarrhea  nicotine  Polacrilex Gum 4 milliGRAM(s) Oral every 2 hours PRN nicotine withdrawal  nicotine -  14 mG/24Hr(s) Patch 1 patch Transdermal daily PRN nicotine withdrawal  OLANZapine 5 milliGRAM(s) Oral every 6 hours PRN agitation  OLANZapine Injectable 5 milliGRAM(s) IntraMuscular once PRN agitation  ondansetron    Tablet 4 milliGRAM(s) Oral every 8 hours PRN nausea      Vital Signs Last 24 Hrs  T(C): 36.2 (28 Mar 2022 08:21), Max: 36.2 (28 Mar 2022 08:21)  T(F): 97.1 (28 Mar 2022 08:21), Max: 97.1 (28 Mar 2022 08:21)  HR: 77 (28 Mar 2022 08:21)  BP: 95/61 (28 Mar 2022 08:21)  BP(mean): --  RR: 16 (28 Mar 2022 08:21)  SpO2: --      PHYSICAL EXAM:  GENERAL: NAD, well-developed  HEAD:  Atraumatic, Normocephalic  EYES: EOMI, PERRLA, conjunctiva and sclera clear  NECK: Supple, No JVD  CHEST/LUNG: Clear to auscultation bilaterally; No wheeze  HEART: Regular rate and rhythm; No murmurs, rubs, or gallops  ABDOMEN: Soft, Nontender, Nondistended; Bowel sounds present  EXTREMITIES:  2+ Peripheral Pulses, No clubbing, cyanosis, or edema  PSYCH: AAOx3  NEUROLOGY: non-focal  SKIN: No rashed, small opened blister on back of foot       Assessment and Plan:  51F admitted to [facility] for suicidal ideation w/ PMHx of asthma seen at bedside for medical screening evaluation. Patient complaining of open blister on heel due to improper shoe-wear, tylenol given and patient advised to apply neosporin and cover with bandage. Patient has no other acute complaints at this time. Patient denies fever, chills, headache, dizziness, lightheadedness, N/V, SOB, cough, congestion, chest pain, abdominal pain, dysuria, hematuria, diarrhea, constipation. Physical exam unremarkable, VSS. AM labs ordered.    1.) Suicidal ideation: Refer to primary team recs  2.) Asthma: Continue albuterol 90mcg HFA inhaler 2 puffs Q6h PRN

## 2022-03-28 NOTE — BH INPATIENT PSYCHIATRY ASSESSMENT NOTE - RISK ASSESSMENT
Chronic risk factors include substance use disorder, previous suicidal gestures  Modifiable risk factors include active substance use, depressive sx, lack of outpatient treatment, lack of social support  Protective factors include residential stability

## 2022-03-28 NOTE — BH SOCIAL WORK INITIAL PSYCHOSOCIAL EVALUATION - NSPTSTATEDGOAL_PSY_ALL_CORE
If you are a smoker, it is important for your health to stop smoking. Please be aware that second hand smoke is also harmful. Pt. refused to participate in interview.

## 2022-03-28 NOTE — BH INPATIENT PSYCHIATRY ASSESSMENT NOTE - NSBHCHARTREVIEWVS_PSY_A_CORE FT
Vital Signs Last 24 Hrs  T(C): 36.2 (03-28-22 @ 08:21), Max: 37.1 (03-27-22 @ 18:03)  T(F): 97.1 (03-28-22 @ 08:21), Max: 98.7 (03-27-22 @ 18:03)  HR: --  BP: --  BP(mean): --  RR: --  SpO2: 100% (03-27-22 @ 18:03) (100% - 100%)    Orthostatic VS  03-28-22 @ 08:21  Lying BP: --/-- HR: --  Sitting BP: 95/61 HR: 77  Standing BP: --/-- HR: --  Site: --  Mode: --  Orthostatic VS  03-27-22 @ 18:03  Lying BP: --/-- HR: --  Sitting BP: 103/68 HR: 84  Standing BP: 98/64 HR: 92  Site: --  Mode: --

## 2022-03-29 PROCEDURE — 99232 SBSQ HOSP IP/OBS MODERATE 35: CPT

## 2022-03-29 RX ORDER — CLONAZEPAM 1 MG
1 TABLET ORAL
Refills: 0 | Status: DISCONTINUED | OUTPATIENT
Start: 2022-03-30 | End: 2022-04-05

## 2022-03-29 RX ORDER — CLONAZEPAM 1 MG
1 TABLET ORAL ONCE
Refills: 0 | Status: DISCONTINUED | OUTPATIENT
Start: 2022-03-29 | End: 2022-03-29

## 2022-03-29 RX ORDER — MIRTAZAPINE 45 MG/1
7.5 TABLET, ORALLY DISINTEGRATING ORAL AT BEDTIME
Refills: 0 | Status: DISCONTINUED | OUTPATIENT
Start: 2022-03-29 | End: 2022-04-06

## 2022-03-29 RX ADMIN — MIRTAZAPINE 7.5 MILLIGRAM(S): 45 TABLET, ORALLY DISINTEGRATING ORAL at 20:11

## 2022-03-29 RX ADMIN — ESCITALOPRAM OXALATE 5 MILLIGRAM(S): 10 TABLET, FILM COATED ORAL at 08:29

## 2022-03-29 RX ADMIN — ONDANSETRON 4 MILLIGRAM(S): 8 TABLET, FILM COATED ORAL at 12:02

## 2022-03-29 RX ADMIN — BACITRACIN ZINC NEOMYCIN SULFATE POLYMYXIN B SULFATE 1 APPLICATION(S): 400; 3.5; 5 OINTMENT TOPICAL at 20:10

## 2022-03-29 RX ADMIN — Medication 1 MILLIGRAM(S): at 13:18

## 2022-03-29 RX ADMIN — Medication 400 MILLIGRAM(S): at 12:01

## 2022-03-29 RX ADMIN — Medication 1 MILLIGRAM(S): at 08:29

## 2022-03-29 NOTE — BH INPATIENT PSYCHIATRY PROGRESS NOTE - CURRENT MEDICATION
MEDICATIONS  (STANDING):  escitalopram 5 milliGRAM(s) Oral daily  mirtazapine 7.5 milliGRAM(s) Oral at bedtime  neomycin/bacitracin/polymyxin Topical Ointment 1 Application(s) Topical two times a day    MEDICATIONS  (PRN):  ALBUTerol    90 MICROgram(s) HFA Inhaler 2 Puff(s) Inhalation every 6 hours PRN Shortness of Breath and/or Wheezing  cloNIDine 0.1 milliGRAM(s) Oral every 12 hours PRN withdrawal  hydrOXYzine hydrochloride 50 milliGRAM(s) Oral every 6 hours PRN anxiety  ibuprofen  Tablet. 400 milliGRAM(s) Oral every 6 hours PRN Mild Pain (1 - 3), Moderate Pain (4 - 6), Severe Pain (7 - 10)  loperamide 2 milliGRAM(s) Oral every 4 hours PRN diarrhea  nicotine  Polacrilex Gum 4 milliGRAM(s) Oral every 2 hours PRN nicotine withdrawal  nicotine -  14 mG/24Hr(s) Patch 1 patch Transdermal daily PRN nicotine withdrawal  OLANZapine 5 milliGRAM(s) Oral every 6 hours PRN agitation  OLANZapine Injectable 5 milliGRAM(s) IntraMuscular once PRN agitation  ondansetron    Tablet 4 milliGRAM(s) Oral every 8 hours PRN nausea   MEDICATIONS  (STANDING):  clonazePAM  Tablet 1 milliGRAM(s) Oral <User Schedule>  escitalopram 5 milliGRAM(s) Oral daily  mirtazapine 7.5 milliGRAM(s) Oral at bedtime  neomycin/bacitracin/polymyxin Topical Ointment 1 Application(s) Topical two times a day    MEDICATIONS  (PRN):  ALBUTerol    90 MICROgram(s) HFA Inhaler 2 Puff(s) Inhalation every 6 hours PRN Shortness of Breath and/or Wheezing  cloNIDine 0.1 milliGRAM(s) Oral every 12 hours PRN withdrawal  hydrOXYzine hydrochloride 50 milliGRAM(s) Oral every 6 hours PRN anxiety  ibuprofen  Tablet. 400 milliGRAM(s) Oral every 6 hours PRN Mild Pain (1 - 3), Moderate Pain (4 - 6), Severe Pain (7 - 10)  loperamide 2 milliGRAM(s) Oral every 4 hours PRN diarrhea  nicotine  Polacrilex Gum 4 milliGRAM(s) Oral every 2 hours PRN nicotine withdrawal  nicotine -  14 mG/24Hr(s) Patch 1 patch Transdermal daily PRN nicotine withdrawal  OLANZapine 5 milliGRAM(s) Oral every 6 hours PRN agitation  OLANZapine Injectable 5 milliGRAM(s) IntraMuscular once PRN agitation  ondansetron    Tablet 4 milliGRAM(s) Oral every 8 hours PRN nausea

## 2022-03-29 NOTE — BH INPATIENT PSYCHIATRY PROGRESS NOTE - NSBHCHARTREVIEWVS_PSY_A_CORE FT
Vital Signs Last 24 Hrs  T(C): --  T(F): --  HR: --  BP: --  BP(mean): --  RR: --  SpO2: --    Orthostatic VS  03-28-22 @ 08:21  Lying BP: --/-- HR: --  Sitting BP: 95/61 HR: 77  Standing BP: --/-- HR: --  Site: --  Mode: --  Orthostatic VS  03-27-22 @ 18:03  Lying BP: --/-- HR: --  Sitting BP: 103/68 HR: 84  Standing BP: 98/64 HR: 92  Site: --  Mode: --   Vital Signs Last 24 Hrs  T(C): 36.6 (03-29-22 @ 15:33), Max: 36.6 (03-29-22 @ 15:33)  T(F): 97.9 (03-29-22 @ 15:33), Max: 97.9 (03-29-22 @ 15:33)  HR: --  BP: --  BP(mean): --  RR: --  SpO2: --    Orthostatic VS  03-28-22 @ 08:21  Lying BP: --/-- HR: --  Sitting BP: 95/61 HR: 77  Standing BP: --/-- HR: --  Site: --  Mode: --

## 2022-03-29 NOTE — BH INPATIENT PSYCHIATRY PROGRESS NOTE - NSBHFUPINTERVALCCFT_PSY_A_CORE
Patient seen for follow up of suicidal ideation/overdose Patient seen for follow up of drug overdose

## 2022-03-29 NOTE — BH INPATIENT PSYCHIATRY PROGRESS NOTE - MSE UNSTRUCTURED FT
Appearance: No abnormalities, dressed in hospital attire  Behavior: Alert, superficially cooperative, fair eye contact  Motor: No abnormal movements  Speech: normal rate, volume, productivity  Mood: "I'm depressed"  Affect: increasingly irritable, especially when discussing substance use, constricted  Thought Process: linear  Associations: fair  Thought Content: Discusses anxiety, hopelessness. Denies SI/I/P, HI/I/P  Perception: Denies AH/VH  Attention: fair  Gait: not assessed  Language: fluent  Insight: limited  Judgement: poor Appearance: No abnormalities, dressed in hospital attire  Behavior: Alert, superficially cooperative, fair eye contact  Motor: No abnormal movements  Speech: normal rate, volume, productivity  Mood: "I'm depressed"  Affect: increasingly irritable, especially when discussing substance use, constricted  Thought Process: linear  Associations: fair  Thought Content: Discusses anxiety, hopelessness. Continues to have conditional suicidality.  Perception: Denies AH/VH  Attention: fair  Gait: intact  Language: fluent  Insight: limited  Judgement: poor

## 2022-03-29 NOTE — PSYCHIATRIC REHAB INITIAL EVALUATION - NSBHPRRECOMMEND_PSY_ALL_CORE
Writer attempted to meet with patient in order to orient patient to unit, provide patient with a unit schedule, and introduce patient to psychiatric rehabilitation staff and department functions. Patient was received in bed and stated that she did not feel well so she did not want to meet with writer. Patient requested gingerale and an extra blanket, which writer provided. Patient was not receptive to engaging in any further conversation; therefore, writer was unable to conduct a complete individual assessment. Patient was admitted to [facility] due to suicidal attempt via OD. Patient was brought to the hospital after being found unresponsive in [facility]. Writer and patient were unable to establish a collaborative psychiatric rehabilitation goal, therefore one will be chosen for her. In response to the COVID-19 outbreak, there has been a shift in hospital wide policies and protocols. As a result, it should be noted that unit programming will be re-evaluated on a consistent basis in effort to maintain safety guidelines. Psychiatric Rehabilitation staff will continue to engage patient daily in order to develop therapeutic rapport.

## 2022-03-30 DIAGNOSIS — F19.94 OTHER PSYCHOACTIVE SUBSTANCE USE, UNSPECIFIED WITH PSYCHOACTIVE SUBSTANCE-INDUCED MOOD DISORDER: ICD-10-CM

## 2022-03-30 PROCEDURE — 99232 SBSQ HOSP IP/OBS MODERATE 35: CPT

## 2022-03-30 RX ORDER — DIPHENHYDRAMINE HCL 50 MG
50 CAPSULE ORAL ONCE
Refills: 0 | Status: COMPLETED | OUTPATIENT
Start: 2022-03-30 | End: 2022-03-30

## 2022-03-30 RX ORDER — BUPRENORPHINE AND NALOXONE 2; .5 MG/1; MG/1
2 TABLET SUBLINGUAL ONCE
Refills: 0 | Status: DISCONTINUED | OUTPATIENT
Start: 2022-03-30 | End: 2022-03-30

## 2022-03-30 RX ADMIN — BUPRENORPHINE AND NALOXONE 2 TABLET(S): 2; .5 TABLET SUBLINGUAL at 15:45

## 2022-03-30 RX ADMIN — Medication 1 MILLIGRAM(S): at 13:58

## 2022-03-30 RX ADMIN — MIRTAZAPINE 7.5 MILLIGRAM(S): 45 TABLET, ORALLY DISINTEGRATING ORAL at 21:05

## 2022-03-30 RX ADMIN — BUPRENORPHINE AND NALOXONE 2 TABLET(S): 2; .5 TABLET SUBLINGUAL at 12:10

## 2022-03-30 RX ADMIN — BACITRACIN ZINC NEOMYCIN SULFATE POLYMYXIN B SULFATE 1 APPLICATION(S): 400; 3.5; 5 OINTMENT TOPICAL at 21:47

## 2022-03-30 RX ADMIN — BACITRACIN ZINC NEOMYCIN SULFATE POLYMYXIN B SULFATE 1 APPLICATION(S): 400; 3.5; 5 OINTMENT TOPICAL at 08:42

## 2022-03-30 RX ADMIN — Medication 1 MILLIGRAM(S): at 08:41

## 2022-03-30 RX ADMIN — ESCITALOPRAM OXALATE 5 MILLIGRAM(S): 10 TABLET, FILM COATED ORAL at 08:40

## 2022-03-30 NOTE — BH INPATIENT PSYCHIATRY PROGRESS NOTE - MSE UNSTRUCTURED FT
Appearance: No abnormalities, dressed in personal attire  Behavior: Alert, superficially cooperative, fair eye contact  Motor: No abnormal movements, no tremors  Speech: normal rate, volume, productivity  Mood: "The depression is getting better"  Affect: neutral, constricted  Thought Process: linear  Associations: fair  Thought Content: Discusses resolving hopelessness, denying suicidal ideation today.   Perception: Denies AH/VH  Attention: fair  Gait: intact  Language: fluent  Insight: limited  Judgement: poor

## 2022-03-30 NOTE — BH INPATIENT PSYCHIATRY PROGRESS NOTE - NSBHCHARTREVIEWVS_PSY_A_CORE FT
Vital Signs Last 24 Hrs  T(C): 36.3 (03-30-22 @ 08:18), Max: 36.6 (03-29-22 @ 15:33)  T(F): 97.4 (03-30-22 @ 08:18), Max: 97.9 (03-29-22 @ 15:33)  HR: --  BP: --  BP(mean): --  RR: --  SpO2: --    Orthostatic VS  03-30-22 @ 08:18  Lying BP: --/-- HR: --  Sitting BP: 128/72 HR: 74  Standing BP: --/-- HR: --  Site: --  Mode: --

## 2022-03-30 NOTE — BH INPATIENT PSYCHIATRY PROGRESS NOTE - NSICDXBHTERTIARYDX_PSY_ALL_CORE
R/O Substance induced mood disorder   F19.94  R/O Major depressive episode   F32.9  
R/O Substance induced mood disorder   F19.94  R/O Major depressive episode   F32.9

## 2022-03-30 NOTE — BH INPATIENT PSYCHIATRY PROGRESS NOTE - CURRENT MEDICATION
MEDICATIONS  (STANDING):  buprenorphine 2 mG/naloxone 0.5 mG SL  Tablet 2 Tablet(s) SubLingual once  clonazePAM  Tablet 1 milliGRAM(s) Oral <User Schedule>  escitalopram 5 milliGRAM(s) Oral daily  mirtazapine 7.5 milliGRAM(s) Oral at bedtime  neomycin/bacitracin/polymyxin Topical Ointment 1 Application(s) Topical two times a day    MEDICATIONS  (PRN):  ALBUTerol    90 MICROgram(s) HFA Inhaler 2 Puff(s) Inhalation every 6 hours PRN Shortness of Breath and/or Wheezing  cloNIDine 0.1 milliGRAM(s) Oral every 12 hours PRN withdrawal  hydrOXYzine hydrochloride 50 milliGRAM(s) Oral every 6 hours PRN anxiety  ibuprofen  Tablet. 400 milliGRAM(s) Oral every 6 hours PRN Mild Pain (1 - 3), Moderate Pain (4 - 6), Severe Pain (7 - 10)  loperamide 2 milliGRAM(s) Oral every 4 hours PRN diarrhea  nicotine  Polacrilex Gum 4 milliGRAM(s) Oral every 2 hours PRN nicotine withdrawal  nicotine -  14 mG/24Hr(s) Patch 1 patch Transdermal daily PRN nicotine withdrawal  OLANZapine 5 milliGRAM(s) Oral every 6 hours PRN agitation  OLANZapine Injectable 5 milliGRAM(s) IntraMuscular once PRN agitation  ondansetron    Tablet 4 milliGRAM(s) Oral every 8 hours PRN nausea   MEDICATIONS  (STANDING):  clonazePAM  Tablet 1 milliGRAM(s) Oral <User Schedule>  escitalopram 5 milliGRAM(s) Oral daily  mirtazapine 7.5 milliGRAM(s) Oral at bedtime  neomycin/bacitracin/polymyxin Topical Ointment 1 Application(s) Topical two times a day    MEDICATIONS  (PRN):  ALBUTerol    90 MICROgram(s) HFA Inhaler 2 Puff(s) Inhalation every 6 hours PRN Shortness of Breath and/or Wheezing  cloNIDine 0.1 milliGRAM(s) Oral every 12 hours PRN withdrawal  hydrOXYzine hydrochloride 50 milliGRAM(s) Oral every 6 hours PRN anxiety  ibuprofen  Tablet. 400 milliGRAM(s) Oral every 6 hours PRN Mild Pain (1 - 3), Moderate Pain (4 - 6), Severe Pain (7 - 10)  loperamide 2 milliGRAM(s) Oral every 4 hours PRN diarrhea  nicotine  Polacrilex Gum 4 milliGRAM(s) Oral every 2 hours PRN nicotine withdrawal  nicotine -  14 mG/24Hr(s) Patch 1 patch Transdermal daily PRN nicotine withdrawal  OLANZapine 5 milliGRAM(s) Oral every 6 hours PRN agitation  OLANZapine Injectable 5 milliGRAM(s) IntraMuscular once PRN agitation  ondansetron    Tablet 4 milliGRAM(s) Oral every 8 hours PRN nausea

## 2022-03-31 PROCEDURE — 99232 SBSQ HOSP IP/OBS MODERATE 35: CPT

## 2022-03-31 RX ORDER — ESCITALOPRAM OXALATE 10 MG/1
10 TABLET, FILM COATED ORAL DAILY
Refills: 0 | Status: DISCONTINUED | OUTPATIENT
Start: 2022-03-31 | End: 2022-04-06

## 2022-03-31 RX ORDER — SENNA PLUS 8.6 MG/1
2 TABLET ORAL AT BEDTIME
Refills: 0 | Status: DISCONTINUED | OUTPATIENT
Start: 2022-03-31 | End: 2022-04-06

## 2022-03-31 RX ORDER — BUPRENORPHINE AND NALOXONE 2; .5 MG/1; MG/1
2 TABLET SUBLINGUAL
Refills: 0 | Status: DISCONTINUED | OUTPATIENT
Start: 2022-03-31 | End: 2022-04-01

## 2022-03-31 RX ADMIN — ESCITALOPRAM OXALATE 10 MILLIGRAM(S): 10 TABLET, FILM COATED ORAL at 08:34

## 2022-03-31 RX ADMIN — MIRTAZAPINE 7.5 MILLIGRAM(S): 45 TABLET, ORALLY DISINTEGRATING ORAL at 21:29

## 2022-03-31 RX ADMIN — BUPRENORPHINE AND NALOXONE 2 TABLET(S): 2; .5 TABLET SUBLINGUAL at 20:39

## 2022-03-31 RX ADMIN — BUPRENORPHINE AND NALOXONE 2 TABLET(S): 2; .5 TABLET SUBLINGUAL at 08:35

## 2022-03-31 RX ADMIN — Medication 1 MILLIGRAM(S): at 12:33

## 2022-03-31 RX ADMIN — BACITRACIN ZINC NEOMYCIN SULFATE POLYMYXIN B SULFATE 1 APPLICATION(S): 400; 3.5; 5 OINTMENT TOPICAL at 08:34

## 2022-03-31 RX ADMIN — Medication 50 MILLIGRAM(S): at 00:48

## 2022-03-31 RX ADMIN — Medication 1 MILLIGRAM(S): at 08:34

## 2022-03-31 NOTE — BH INPATIENT PSYCHIATRY PROGRESS NOTE - NSBHCHARTREVIEWVS_PSY_A_CORE FT
Vital Signs Last 24 Hrs  T(C): 36.9 (03-31-22 @ 08:20), Max: 36.9 (03-31-22 @ 08:20)  T(F): 98.4 (03-31-22 @ 08:20), Max: 98.4 (03-31-22 @ 08:20)  HR: --  BP: --  BP(mean): --  RR: --  SpO2: --    Orthostatic VS  03-31-22 @ 08:20  Lying BP: --/-- HR: --  Sitting BP: 104/74 HR: 76  Standing BP: --/-- HR: --  Site: upper left arm  Mode: electronic  Orthostatic VS  03-30-22 @ 08:18  Lying BP: --/-- HR: --  Sitting BP: 128/72 HR: 74  Standing BP: --/-- HR: --  Site: --  Mode: --

## 2022-03-31 NOTE — BH INPATIENT PSYCHIATRY PROGRESS NOTE - CURRENT MEDICATION
MEDICATIONS  (STANDING):  buprenorphine 2 mG/naloxone 0.5 mG SL  Tablet 2 Tablet(s) SubLingual <User Schedule>  clonazePAM  Tablet 1 milliGRAM(s) Oral <User Schedule>  escitalopram 10 milliGRAM(s) Oral daily  mirtazapine 7.5 milliGRAM(s) Oral at bedtime  neomycin/bacitracin/polymyxin Topical Ointment 1 Application(s) Topical two times a day    MEDICATIONS  (PRN):  ALBUTerol    90 MICROgram(s) HFA Inhaler 2 Puff(s) Inhalation every 6 hours PRN Shortness of Breath and/or Wheezing  cloNIDine 0.1 milliGRAM(s) Oral every 12 hours PRN withdrawal  hydrOXYzine hydrochloride 50 milliGRAM(s) Oral every 6 hours PRN anxiety  ibuprofen  Tablet. 400 milliGRAM(s) Oral every 6 hours PRN Mild Pain (1 - 3), Moderate Pain (4 - 6), Severe Pain (7 - 10)  nicotine  Polacrilex Gum 4 milliGRAM(s) Oral every 2 hours PRN nicotine withdrawal  nicotine -  14 mG/24Hr(s) Patch 1 patch Transdermal daily PRN nicotine withdrawal  OLANZapine 5 milliGRAM(s) Oral every 6 hours PRN agitation  OLANZapine Injectable 5 milliGRAM(s) IntraMuscular once PRN agitation  ondansetron    Tablet 4 milliGRAM(s) Oral every 8 hours PRN nausea  senna 2 Tablet(s) Oral at bedtime PRN constipation

## 2022-03-31 NOTE — BH TREATMENT PLAN - NSTXSUICIDINTERPR_PSY_ALL_CORE
Psychiatric rehabilitation recommends patient attends 2-3 groups per day, engages in individual sessions and participates in activities on the milieu in order to explore relapse prevention strategies and complete safety plan.

## 2022-03-31 NOTE — BH INPATIENT PSYCHIATRY PROGRESS NOTE - MSE UNSTRUCTURED FT
Appearance: No abnormalities, dressed in personal attire  Behavior: Alert, superficially cooperative, fair eye contact  Motor: No abnormal movements, no tremors  Speech: normal rate, volume, productivity  Mood: "I feel okay, I guess"  Affect: neutral, constricted  Thought Process: linear  Associations: fair  Thought Content: Discusses resolving hopelessness, denying suicidal ideation today. No other evidence of abnormal thought content.  Perception: Denies AH/VH  Attention: fair  Gait: intact  Language: fluent  Insight: limited  Judgement: poor

## 2022-03-31 NOTE — BH TREATMENT PLAN - NSTXDEPRESINTERRN_PSY_ALL_CORE
Pt encouraged to verbalize thoughts and feelings to staff. Encourage use of coping skills and promoting self-talk.

## 2022-03-31 NOTE — BH TREATMENT PLAN - NSTXDCOPLKINTERSW_PSY_ALL_CORE
SW will provide psychoed on the importance of following up with outpt. treatment. SW will provide Pt. with an appropriate level of outpt. care.

## 2022-03-31 NOTE — BH TREATMENT PLAN - NSTXSUBMISDATETRGT_PSY_ALL_CORE
07-Apr-2022 [Sexually Active] : The patient is sexually active [Monogamous] : monogamous [Condom Use] : using condoms [Condom Use - All Encounters] : for every encounter [FreeTextEntry1] : 38yoM with HIV, hypertriglyceridemia, allergic rhinitis, here for f/u.  Diagnosed with HIV in 1/2016 with CD4 marsha 670. Pt well controlled on Descovy and Tivicay, denies any missed doses or side effects. Would prefer single tab.\par \par Feeling well today, no complaints.\par \par Takes multivitamin (separate from ARVs), fish oil, prn omeprazole, vitamin D3, flonase.\par \par Non smoker no drugs no alcohol\par Not exercising\par \par Went to [facility] for a month in Oct/Nov to visit wife.  She's unaware of his status. They are using condoms. Pending immigration paperwork, she should be able to come to [place] in about 6 months. They're planning to have a baby at that time. 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

## 2022-03-31 NOTE — BH TREATMENT PLAN - NSTXSUICIDINTERRN_PSY_ALL_CORE
Pt encouraged to come to staff and express when she is having any thoughts of suicide or self-injurious behavior.

## 2022-04-01 PROCEDURE — 99232 SBSQ HOSP IP/OBS MODERATE 35: CPT

## 2022-04-01 RX ORDER — BUPRENORPHINE AND NALOXONE 2; .5 MG/1; MG/1
2 TABLET SUBLINGUAL ONCE
Refills: 0 | Status: DISCONTINUED | OUTPATIENT
Start: 2022-04-01 | End: 2022-04-01

## 2022-04-01 RX ORDER — BUPRENORPHINE AND NALOXONE 2; .5 MG/1; MG/1
2 TABLET SUBLINGUAL
Refills: 0 | Status: DISCONTINUED | OUTPATIENT
Start: 2022-04-01 | End: 2022-04-06

## 2022-04-01 RX ADMIN — BACITRACIN ZINC NEOMYCIN SULFATE POLYMYXIN B SULFATE 1 APPLICATION(S): 400; 3.5; 5 OINTMENT TOPICAL at 20:40

## 2022-04-01 RX ADMIN — MIRTAZAPINE 7.5 MILLIGRAM(S): 45 TABLET, ORALLY DISINTEGRATING ORAL at 20:40

## 2022-04-01 RX ADMIN — BUPRENORPHINE AND NALOXONE 2 TABLET(S): 2; .5 TABLET SUBLINGUAL at 08:34

## 2022-04-01 RX ADMIN — BUPRENORPHINE AND NALOXONE 2 TABLET(S): 2; .5 TABLET SUBLINGUAL at 12:38

## 2022-04-01 RX ADMIN — BUPRENORPHINE AND NALOXONE 2 TABLET(S): 2; .5 TABLET SUBLINGUAL at 20:39

## 2022-04-01 RX ADMIN — Medication 1 MILLIGRAM(S): at 12:38

## 2022-04-01 RX ADMIN — ESCITALOPRAM OXALATE 10 MILLIGRAM(S): 10 TABLET, FILM COATED ORAL at 08:34

## 2022-04-01 RX ADMIN — Medication 1 MILLIGRAM(S): at 08:34

## 2022-04-01 NOTE — BH INPATIENT PSYCHIATRY PROGRESS NOTE - CURRENT MEDICATION
MEDICATIONS  (STANDING):  buprenorphine 2 mG/naloxone 0.5 mG SL  Tablet 2 Tablet(s) SubLingual <User Schedule>  buprenorphine 2 mG/naloxone 0.5 mG SL  Tablet 2 Tablet(s) SubLingual once  clonazePAM  Tablet 1 milliGRAM(s) Oral <User Schedule>  escitalopram 10 milliGRAM(s) Oral daily  mirtazapine 7.5 milliGRAM(s) Oral at bedtime  neomycin/bacitracin/polymyxin Topical Ointment 1 Application(s) Topical two times a day    MEDICATIONS  (PRN):  ALBUTerol    90 MICROgram(s) HFA Inhaler 2 Puff(s) Inhalation every 6 hours PRN Shortness of Breath and/or Wheezing  cloNIDine 0.1 milliGRAM(s) Oral every 12 hours PRN withdrawal  hydrOXYzine hydrochloride 50 milliGRAM(s) Oral every 6 hours PRN anxiety  ibuprofen  Tablet. 400 milliGRAM(s) Oral every 6 hours PRN Mild Pain (1 - 3), Moderate Pain (4 - 6), Severe Pain (7 - 10)  nicotine  Polacrilex Gum 4 milliGRAM(s) Oral every 2 hours PRN nicotine withdrawal  nicotine -  14 mG/24Hr(s) Patch 1 patch Transdermal daily PRN nicotine withdrawal  OLANZapine 5 milliGRAM(s) Oral every 6 hours PRN agitation  OLANZapine Injectable 5 milliGRAM(s) IntraMuscular once PRN agitation  ondansetron    Tablet 4 milliGRAM(s) Oral every 8 hours PRN nausea  senna 2 Tablet(s) Oral at bedtime PRN constipation   MEDICATIONS  (STANDING):  buprenorphine 2 mG/naloxone 0.5 mG SL  Tablet 2 Tablet(s) SubLingual <User Schedule>  clonazePAM  Tablet 1 milliGRAM(s) Oral <User Schedule>  escitalopram 10 milliGRAM(s) Oral daily  mirtazapine 7.5 milliGRAM(s) Oral at bedtime  neomycin/bacitracin/polymyxin Topical Ointment 1 Application(s) Topical two times a day    MEDICATIONS  (PRN):  ALBUTerol    90 MICROgram(s) HFA Inhaler 2 Puff(s) Inhalation every 6 hours PRN Shortness of Breath and/or Wheezing  cloNIDine 0.1 milliGRAM(s) Oral every 12 hours PRN withdrawal  hydrOXYzine hydrochloride 50 milliGRAM(s) Oral every 6 hours PRN anxiety  ibuprofen  Tablet. 400 milliGRAM(s) Oral every 6 hours PRN Mild Pain (1 - 3), Moderate Pain (4 - 6), Severe Pain (7 - 10)  nicotine  Polacrilex Gum 4 milliGRAM(s) Oral every 2 hours PRN nicotine withdrawal  nicotine -  14 mG/24Hr(s) Patch 1 patch Transdermal daily PRN nicotine withdrawal  OLANZapine 5 milliGRAM(s) Oral every 6 hours PRN agitation  OLANZapine Injectable 5 milliGRAM(s) IntraMuscular once PRN agitation  ondansetron    Tablet 4 milliGRAM(s) Oral every 8 hours PRN nausea  senna 2 Tablet(s) Oral at bedtime PRN constipation

## 2022-04-01 NOTE — BH INPATIENT PSYCHIATRY PROGRESS NOTE - NSBHCHARTREVIEWVS_PSY_A_CORE FT
Vital Signs Last 24 Hrs  T(C): 36.8 (04-01-22 @ 08:29), Max: 36.8 (04-01-22 @ 08:29)  T(F): 98.3 (04-01-22 @ 08:29), Max: 98.3 (04-01-22 @ 08:29)  HR: 79 (04-01-22 @ 08:29) (79 - 79)  BP: 97/70 (04-01-22 @ 08:29) (97/70 - 97/70)  BP(mean): --  RR: --  SpO2: --    Orthostatic VS  03-31-22 @ 08:20  Lying BP: --/-- HR: --  Sitting BP: 104/74 HR: 76  Standing BP: --/-- HR: --  Site: upper left arm  Mode: electronic

## 2022-04-01 NOTE — BH INPATIENT PSYCHIATRY PROGRESS NOTE - MSE UNSTRUCTURED FT
Appearance: No abnormalities, dressed in personal attire  Behavior: Alert, superficially cooperative, fair eye contact  Motor: No abnormal movements, no tremors  Speech: normal rate, volume, productivity  Mood: "I feel okay, I guess"  Affect: neutral, constricted  Thought Process: linear  Associations: fair  Thought Content: Discusses resolving hopelessness. No other evidence of abnormal thought content. Denies SI/I/P, HI/I/P  Perception: Denies AH/VH  Attention: fair  Gait: intact  Language: fluent  Insight: limited  Judgement: poor but improving

## 2022-04-01 NOTE — BH CHART NOTE - NSEVENTNOTEFT_PSY_ALL_CORE
COWS today this afternoon 9.  Will provide additional Suboxone dose 4 mg/1 mg.  May be prolonged residual withdrawal given long acting opioid prior to admission.      Given severity of known heroin/opioid use disorder, anticipate maintenance dose to be between 12-16 mg.

## 2022-04-02 RX ADMIN — ESCITALOPRAM OXALATE 10 MILLIGRAM(S): 10 TABLET, FILM COATED ORAL at 08:27

## 2022-04-02 RX ADMIN — BUPRENORPHINE AND NALOXONE 2 TABLET(S): 2; .5 TABLET SUBLINGUAL at 08:27

## 2022-04-02 RX ADMIN — BACITRACIN ZINC NEOMYCIN SULFATE POLYMYXIN B SULFATE 1 APPLICATION(S): 400; 3.5; 5 OINTMENT TOPICAL at 20:42

## 2022-04-02 RX ADMIN — MIRTAZAPINE 7.5 MILLIGRAM(S): 45 TABLET, ORALLY DISINTEGRATING ORAL at 20:42

## 2022-04-02 RX ADMIN — Medication 1 MILLIGRAM(S): at 12:07

## 2022-04-02 RX ADMIN — BACITRACIN ZINC NEOMYCIN SULFATE POLYMYXIN B SULFATE 1 APPLICATION(S): 400; 3.5; 5 OINTMENT TOPICAL at 08:28

## 2022-04-02 RX ADMIN — BUPRENORPHINE AND NALOXONE 2 TABLET(S): 2; .5 TABLET SUBLINGUAL at 20:41

## 2022-04-02 RX ADMIN — Medication 1 MILLIGRAM(S): at 08:27

## 2022-04-02 RX ADMIN — BUPRENORPHINE AND NALOXONE 2 TABLET(S): 2; .5 TABLET SUBLINGUAL at 12:06

## 2022-04-03 RX ADMIN — BACITRACIN ZINC NEOMYCIN SULFATE POLYMYXIN B SULFATE 1 APPLICATION(S): 400; 3.5; 5 OINTMENT TOPICAL at 20:21

## 2022-04-03 RX ADMIN — Medication 50 MILLIGRAM(S): at 16:56

## 2022-04-03 RX ADMIN — Medication 1 MILLIGRAM(S): at 08:36

## 2022-04-03 RX ADMIN — Medication 400 MILLIGRAM(S): at 08:35

## 2022-04-03 RX ADMIN — Medication 1 MILLIGRAM(S): at 12:15

## 2022-04-03 RX ADMIN — BUPRENORPHINE AND NALOXONE 2 TABLET(S): 2; .5 TABLET SUBLINGUAL at 20:20

## 2022-04-03 RX ADMIN — BUPRENORPHINE AND NALOXONE 2 TABLET(S): 2; .5 TABLET SUBLINGUAL at 08:35

## 2022-04-03 RX ADMIN — ESCITALOPRAM OXALATE 10 MILLIGRAM(S): 10 TABLET, FILM COATED ORAL at 08:35

## 2022-04-03 RX ADMIN — BACITRACIN ZINC NEOMYCIN SULFATE POLYMYXIN B SULFATE 1 APPLICATION(S): 400; 3.5; 5 OINTMENT TOPICAL at 08:35

## 2022-04-03 RX ADMIN — MIRTAZAPINE 7.5 MILLIGRAM(S): 45 TABLET, ORALLY DISINTEGRATING ORAL at 20:21

## 2022-04-03 RX ADMIN — Medication 400 MILLIGRAM(S): at 09:35

## 2022-04-03 RX ADMIN — BUPRENORPHINE AND NALOXONE 2 TABLET(S): 2; .5 TABLET SUBLINGUAL at 12:15

## 2022-04-04 PROCEDURE — 99231 SBSQ HOSP IP/OBS SF/LOW 25: CPT | Mod: 1L

## 2022-04-04 RX ADMIN — BACITRACIN ZINC NEOMYCIN SULFATE POLYMYXIN B SULFATE 1 APPLICATION(S): 400; 3.5; 5 OINTMENT TOPICAL at 08:04

## 2022-04-04 RX ADMIN — Medication 400 MILLIGRAM(S): at 08:03

## 2022-04-04 RX ADMIN — Medication 400 MILLIGRAM(S): at 09:09

## 2022-04-04 RX ADMIN — BACITRACIN ZINC NEOMYCIN SULFATE POLYMYXIN B SULFATE 1 APPLICATION(S): 400; 3.5; 5 OINTMENT TOPICAL at 20:49

## 2022-04-04 RX ADMIN — BUPRENORPHINE AND NALOXONE 2 TABLET(S): 2; .5 TABLET SUBLINGUAL at 12:44

## 2022-04-04 RX ADMIN — MIRTAZAPINE 7.5 MILLIGRAM(S): 45 TABLET, ORALLY DISINTEGRATING ORAL at 20:49

## 2022-04-04 RX ADMIN — ESCITALOPRAM OXALATE 10 MILLIGRAM(S): 10 TABLET, FILM COATED ORAL at 08:04

## 2022-04-04 RX ADMIN — BUPRENORPHINE AND NALOXONE 2 TABLET(S): 2; .5 TABLET SUBLINGUAL at 08:04

## 2022-04-04 RX ADMIN — Medication 1 MILLIGRAM(S): at 12:44

## 2022-04-04 RX ADMIN — Medication 50 MILLIGRAM(S): at 17:30

## 2022-04-04 RX ADMIN — BUPRENORPHINE AND NALOXONE 2 TABLET(S): 2; .5 TABLET SUBLINGUAL at 20:49

## 2022-04-04 RX ADMIN — Medication 1 MILLIGRAM(S): at 08:04

## 2022-04-04 NOTE — BH INPATIENT PSYCHIATRY PROGRESS NOTE - MSE UNSTRUCTURED FT
Appearance: No abnormalities, dressed in personal attire  Behavior: Alert, superficially cooperative, fair eye contact  Motor: No abnormal movements, no tremors  Speech: normal rate, volume, productivity  Mood: "I'm feeling fine, when can I leave?"  Affect: neutral, full  Thought Process: linear  Associations: fair  Thought Content: No evidence of abnormal thought content. Denies SI/I/P, HI/I/P  Perception: Denies AH/VH  Attention: fair  Gait: intact  Language: fluent  Insight: limited  Judgement: poor but improving

## 2022-04-04 NOTE — BH INPATIENT PSYCHIATRY PROGRESS NOTE - NSBHCHARTREVIEWVS_PSY_A_CORE FT
Vital Signs Last 24 Hrs  T(C): 36.2 (04-04-22 @ 08:37), Max: 36.4 (04-03-22 @ 17:20)  T(F): 97.1 (04-04-22 @ 08:37), Max: 97.5 (04-03-22 @ 17:20)  HR: --  BP: --  BP(mean): --  RR: --  SpO2: --    Orthostatic VS  04-04-22 @ 08:37  Lying BP: --/-- HR: --  Sitting BP: 120/60 HR: 66  Standing BP: --/-- HR: --  Site: --  Mode: --

## 2022-04-05 PROCEDURE — 99231 SBSQ HOSP IP/OBS SF/LOW 25: CPT | Mod: 1L

## 2022-04-05 RX ORDER — CLONAZEPAM 1 MG
1 TABLET ORAL
Refills: 0 | Status: DISCONTINUED | OUTPATIENT
Start: 2022-04-05 | End: 2022-04-06

## 2022-04-05 RX ORDER — MIRTAZAPINE 45 MG/1
1 TABLET, ORALLY DISINTEGRATING ORAL
Qty: 14 | Refills: 0
Start: 2022-04-05 | End: 2022-04-18

## 2022-04-05 RX ORDER — ESCITALOPRAM OXALATE 10 MG/1
1 TABLET, FILM COATED ORAL
Qty: 14 | Refills: 0
Start: 2022-04-05 | End: 2022-04-18

## 2022-04-05 RX ORDER — CLONAZEPAM 1 MG
1 TABLET ORAL
Qty: 28 | Refills: 0
Start: 2022-04-05 | End: 2022-04-18

## 2022-04-05 RX ADMIN — BUPRENORPHINE AND NALOXONE 2 TABLET(S): 2; .5 TABLET SUBLINGUAL at 12:42

## 2022-04-05 RX ADMIN — Medication 1 MILLIGRAM(S): at 12:42

## 2022-04-05 RX ADMIN — BUPRENORPHINE AND NALOXONE 2 TABLET(S): 2; .5 TABLET SUBLINGUAL at 08:20

## 2022-04-05 RX ADMIN — MIRTAZAPINE 7.5 MILLIGRAM(S): 45 TABLET, ORALLY DISINTEGRATING ORAL at 19:58

## 2022-04-05 RX ADMIN — BACITRACIN ZINC NEOMYCIN SULFATE POLYMYXIN B SULFATE 1 APPLICATION(S): 400; 3.5; 5 OINTMENT TOPICAL at 19:59

## 2022-04-05 RX ADMIN — ESCITALOPRAM OXALATE 10 MILLIGRAM(S): 10 TABLET, FILM COATED ORAL at 08:21

## 2022-04-05 RX ADMIN — Medication 1 MILLIGRAM(S): at 08:21

## 2022-04-05 RX ADMIN — BUPRENORPHINE AND NALOXONE 2 TABLET(S): 2; .5 TABLET SUBLINGUAL at 19:58

## 2022-04-05 NOTE — BH INPATIENT PSYCHIATRY PROGRESS NOTE - CASE SUMMARY
Emotionally regulated.  No withdrawal.  No suicidality.  Dispo planning.
Pt in better spirits today, pleasant, linear.    COWS 0.  Will proceed with Suboxone 4 mg/1 mg bid.  Increase escitalopram to 10 mg.    Tentative dispo plan for dual dx clinic (pt does not want inpt rehab).      
Pt today requests additional opioids including methadone or Suboxone for short taper/medically supervised withdrawal, but not maintenance.  Pt is refusing inpatient and/or outpatient substance use treatment.  Pt is reporting severe withdrawal symptoms that are not congruent with observed behavior/MSE on unit (pt is seen socializing with peers comfortably).  Details of opioid use prior to admission also fluctuate.  Pt later in day requests adjustments to timing of Klonopin doses.  Additionally, pt continues to periodically ask if she is being discharged, and makes conditional suicidal statements.      Clinical suspicion remains for substance induced mood symptoms/cluster B personality/secondary gain as multifactorial etiology of presentation.  Will move Klonopin to 1 mg 9AM and 1PM, and will order Remeron 7.5 mg qhs at pt request.
No signs or symptoms of withdrawal.  Pt reports improvement.  Dispo planning.
Pt still reporting residual symptoms of opioid withdrawal (see chart note).  Induction to continue.  Continue remainder of meds as ordered.  Pt does appear calmer and more relaxed, less dysregulated.  Begin dispo planning for dual dx clinic.
Discussed with pt recent heroin overdoses requiring multiple ER visits, narcan, CPR.  Advised pt to restart MAT, given that pt not in [facility] at this time, would be Suboxone induction.  Pt appeared ambivalent at first but then agreed, stating that it is in her best interest.  COWS score this AM = 16.  Has been >72 hrs since suspected long acting opioid (was + for methadone on urine toxicology).  Appropriate at this time to start induction.  Pt given 4 mg Suboxone to start.  Will recheck COWS in 1-2 hrs and redose.

## 2022-04-05 NOTE — BH INPATIENT PSYCHIATRY PROGRESS NOTE - MODIFICATIONS
Modifications were made to above trainee note where appropriate and/or are addressed below in case summary. 

## 2022-04-05 NOTE — BH INPATIENT PSYCHIATRY PROGRESS NOTE - MSE UNSTRUCTURED FT
Appearance: No abnormalities, dressed in personal attire, under covers  Behavior: Alert, superficially cooperative, fair eye contact  Motor: No abnormal movements, no tremors  Speech: normal rate, volume, productivity  Mood: "alright"  Affect: neutral, full  Thought Process: linear  Associations: fair  Thought Content: No evidence of abnormal thought content. Denies SI/I/P, HI/I/P  Perception: Denies AH/VH  Attention: fair  Gait: intact  Language: fluent  Insight: limited but improving  Judgement: fair

## 2022-04-05 NOTE — BH INPATIENT PSYCHIATRY PROGRESS NOTE - NSBHCHARTREVIEWVS_PSY_A_CORE FT
Vital Signs Last 24 Hrs  T(C): 36.4 (04-05-22 @ 07:06), Max: 36.4 (04-05-22 @ 07:06)  T(F): 97.6 (04-05-22 @ 07:06), Max: 97.6 (04-05-22 @ 07:06)  HR: --  BP: --  BP(mean): --  RR: --  SpO2: --    Orthostatic VS  04-05-22 @ 07:06  Lying BP: --/-- HR: --  Sitting BP: 125/70 HR: 97  Standing BP: --/-- HR: --  Site: --  Mode: --  Orthostatic VS  04-04-22 @ 08:37  Lying BP: --/-- HR: --  Sitting BP: 120/60 HR: 66  Standing BP: --/-- HR: --  Site: --  Mode: --

## 2022-04-06 VITALS — TEMPERATURE: 97 F

## 2022-04-06 PROCEDURE — 99238 HOSP IP/OBS DSCHRG MGMT 30/<: CPT | Mod: 1L

## 2022-04-06 RX ORDER — BUPRENORPHINE AND NALOXONE 2; .5 MG/1; MG/1
1 TABLET SUBLINGUAL
Qty: 42 | Refills: 0
Start: 2022-04-06 | End: 2022-04-19

## 2022-04-06 RX ADMIN — BUPRENORPHINE AND NALOXONE 2 TABLET(S): 2; .5 TABLET SUBLINGUAL at 09:16

## 2022-04-06 RX ADMIN — Medication 1 MILLIGRAM(S): at 09:17

## 2022-04-06 RX ADMIN — ESCITALOPRAM OXALATE 10 MILLIGRAM(S): 10 TABLET, FILM COATED ORAL at 09:18

## 2022-04-06 NOTE — BH INPATIENT PSYCHIATRY DISCHARGE NOTE - CASE SUMMARY
daughter Eva Eva Pt is 51F w/pphx of unspecified depression and anxiety, longstanding hx of substance use disorders (opioids/heroin and cocaine), prior psychiatric admissions and frequent ER visits.  On 2/21/2022, pt presented to [facility] ER after being found unresponsive, pt required CPR and Narcan, admitted to heroin use.  Pt was discharged from ER.  On 3/2/2022, pt presented to [facility] ER after being found unresponsive and not breathing on [address], received Narcan, admitted to heroin use, and left ER without being seen by a provider.  On 3/26/2022, pt presented to [facility] ER after being found in [facility], required Narcan (intranasal + IV), pt admitted to heroin use.  Pt was transferred to [facility] on 3/27/2022 for reported depression and suicidality.    Pt known to have severe opioid use disorder, and was in opioid withdrawal upon arrival.  Pt agreed to Suboxone induction, was titrated to maintenance dose of 4mg-1mg tid.  Pt was continued on her outpatient regimen of clonazepam 1 mg bid, pt was counseled on risks with Suboxone and that clonazepam would have to be tapered and discontinued over time.  Treatment team also suspected multifactorial depression (substance-induced, persistent depressive disorder, personality traits vs full disorder).  Secondary gain was once again suspected.  Pt was started on escitalpram 10 mg daily, with mirtazapine 7.5 mg qhs for sleep.  On this regimen, pt had resolution of opioid withdrawal and had significant improvement in depression.  She consistently denied suicidality and homicidality.  She cared for ADLs independently.  She was visible on unit, social with select peers.  Team offered inpatient rehab, pt declined, but was amenable to outpatient dual diagnosis clinic follow up.  Pt was discharged home with outpatient follow up.    Risk assessment on discharge: Pt has chronic risk factors of depression, anxiety, substance use disorders, hx of malingering, prior admissions, hx of suicidal gestures, hx of cutting, with acute risk factors of recent overdose on heroin, depression, now treated with inpatient care.  Protective factors of supportive family, is in a relationship, future oriented.  Pt has consistently denied suicidality and homicidality, is caring for ADLs independently.  Pt is high CHRONIC risk of harm, but is NO longer an acute or imminent risk of harm to self or others, can be discharged with outpatient follow up.

## 2022-04-06 NOTE — BH INPATIENT PSYCHIATRY PROGRESS NOTE - NSBHINPTBILLING_PSY_ALL_CORE
38828 - Hospital Discharge Day Management; 30 min or less
63723 - Inpatient Moderate Complexity
08907 - Inpatient Moderate Complexity
58141 - Inpatient Moderate Complexity
93008 - Inpatient Moderate Complexity
05513 - Inpatient Low Complexity
27833 - Inpatient Low Complexity

## 2022-04-06 NOTE — BH INPATIENT PSYCHIATRY DISCHARGE NOTE - HPI (INCLUDE ILLNESS QUALITY, SEVERITY, DURATION, TIMING, CONTEXT, MODIFYING FACTORS, ASSOCIATED SIGNS AND SYMPTOMS)
Patient is a 51F, domiciled with mother in [place], documented PPHx of malingering, polysubstance use (Utox positive for opioids, methadone, benzodiazepines, cocaine), anxiety, depression, multiple prior psychiatric hospitalizations last admitted in September 2021 for 2d at [facility] (pt was diverting benzodiazepines to boyfriend who was on unit), hx of suicidal gestures and NSSIB by cutting, no PMHx, found unresponsive at [facility] with apparent opiate OD, transferred to [facility] after reporting SI in the ED.     Patient minimally compliant with previous interviews. Today, patient seen by treatment team in bed. Initially, patient was calm and superficially cooperative, noted that she is feeling very depressed lately because it is approaching the anniversary of her brother's death, also cites relationship issues as major stressor. She denies having a psychiatrist, stated that her PMD prescribed her Lexapro last week but was unable to pick it up. States that she intentionally overdosed with the intent to die, endorses feeling hopeless and having low mood for past several weeks. Also speaks generally about anxiety, stating she previously needed Xanax 1mg TID to feel better. Pt further requested team increase her benzodiazepines. When team stated that Lexapro would help for her anxiety and they would not be increasing her benzodiazepines, she terminated the interview by placing the blanket over her head and refusing to further engage with the team.

## 2022-04-06 NOTE — BH DISCHARGE NOTE NURSING/SOCIAL WORK/PSYCH REHAB - NSCDUDCCRISIS_PSY_A_CORE
[facility] Well  1 (780) [facility]-WELL ([phone])  Text "WELL" to 52931  Website: www.[employer]/.[facility] 1 (053) 621-HOPE (1165) Website: www.safehorizon.org/.National Suicide Prevention Lifeline 2 (989) 394-4457/.  [facility]  1 (458) LIFENET (976-7382)/.  [facility]  [phone] [phone]/.  [facility] Behavioral Health Helpline / [facility] Crisis  [phone] 227-TALK ([phone])/.  [facility]  [address] [place] [address] [phone]   Hours:  Monday through Friday from 9 AM to 3 PM/.  [facility] Crisis Line  9 [phone], Option 1 [facility] Well  1 (902) [facility]-WELL ([phone])  Text "WELL" to 11782  Website: www.[employer]/.[facility]iz[facility] 1 (332) 431-MQQP (5418) Website: www.safehorizon.org/.National Suicide Prevention Lifeline 2 (552) 463-2616/.  [facility]  1 (806) LIFENET (047-2631)/.  [facility]  [address] [place] [address] [phone]   Hours:  Monday through Friday from 9 AM to 3 PM/.  [facility] Crisis Line  5 [phone], Option 1

## 2022-04-06 NOTE — BH INPATIENT PSYCHIATRY PROGRESS NOTE - NSBHFUPINTERVALHXFT_PSY_A_CORE
Patient seen in bed this morning. Stated she is feeling okay, just tired. Denies any issues with mood, suicidality. Denies withdrawal symptoms. Notes that she is looking forward to discharge. 
Pt reports she feels well today and ready to leave hospital.  States she will follow up with appointments, and states she is going to reduce risk of relapse by seeking more stable environments and staying away from people who use recreational drugs.  
Patient initially seen by team in bed, still noted feelings of sadness. Patient asked for a mood stabilizer, stated she had been on lithium, Depakote, and Seroquel in the past; denied wanting any of those medications again, eventually agreed to staying on Lexapro. At that time, patient adamantly denied actively using substances. Later, patient approached team on the unit, requesting methadone or benzodiazepines for withdrawal symptoms. When team stated treatment would be symptomatic, patient became verbally abusive and terminated the interview. Stated "if I'm discharged I'll just go kill myself". 
Patient pleasant this morning, states she's feeling okay today. Seen later by team, notes that she is feeling slightly depressed today. States that she isn't having withdrawal symptoms, notes that she is feeling constipated now. Otherwise, denies any other acute complaints. 
Patient reports fair mood, no suicidality today. She is focused on discharge. Denies any other acute concerns, stated that she is awaiting outpatient treatment to continue to address her depressive symptoms. Does not report any other subjective symptoms of withdrawal at this time. 
Patient reports feeling as though she is withdrawing today, states that she is still having diarrhea, feels achy. Otherwise denies any mood instability. States she slept well. Denies any other acute complaints. 
Patient seen by team in bed. Initially, patient was endorsing feeling better, noted that she wanted to go home on Friday. She denied suicidal thoughts, saying "they're gone". She further denied any withdrawal side effects. Further into the interview, patient stated she was having body aches and said "I was too scared to say anything". Discussed starting Suboxone with patient with the goal of reaching maintenance dosing and following up with dual diagnosis clinic, patient in agreement to start Suboxone today.

## 2022-04-06 NOTE — BH INPATIENT PSYCHIATRY PROGRESS NOTE - NSBHATTESTSEENBY_PSY_A_CORE
attending Psychiatrist without NP/Trainee
Attending Psychiatrist supervising NP/Trainee, meeting pt...

## 2022-04-06 NOTE — BH INPATIENT PSYCHIATRY PROGRESS NOTE - PRN MEDS
MEDICATIONS  (PRN):  ALBUTerol    90 MICROgram(s) HFA Inhaler 2 Puff(s) Inhalation every 6 hours PRN Shortness of Breath and/or Wheezing  cloNIDine 0.1 milliGRAM(s) Oral every 12 hours PRN withdrawal  hydrOXYzine hydrochloride 50 milliGRAM(s) Oral every 6 hours PRN anxiety  ibuprofen  Tablet. 400 milliGRAM(s) Oral every 6 hours PRN Mild Pain (1 - 3), Moderate Pain (4 - 6), Severe Pain (7 - 10)  loperamide 2 milliGRAM(s) Oral every 4 hours PRN diarrhea  nicotine  Polacrilex Gum 4 milliGRAM(s) Oral every 2 hours PRN nicotine withdrawal  nicotine -  14 mG/24Hr(s) Patch 1 patch Transdermal daily PRN nicotine withdrawal  OLANZapine 5 milliGRAM(s) Oral every 6 hours PRN agitation  OLANZapine Injectable 5 milliGRAM(s) IntraMuscular once PRN agitation  ondansetron    Tablet 4 milliGRAM(s) Oral every 8 hours PRN nausea  
MEDICATIONS  (PRN):  ALBUTerol    90 MICROgram(s) HFA Inhaler 2 Puff(s) Inhalation every 6 hours PRN Shortness of Breath and/or Wheezing  cloNIDine 0.1 milliGRAM(s) Oral every 12 hours PRN withdrawal  hydrOXYzine hydrochloride 50 milliGRAM(s) Oral every 6 hours PRN anxiety  ibuprofen  Tablet. 400 milliGRAM(s) Oral every 6 hours PRN Mild Pain (1 - 3), Moderate Pain (4 - 6), Severe Pain (7 - 10)  loperamide 2 milliGRAM(s) Oral every 4 hours PRN diarrhea  nicotine  Polacrilex Gum 4 milliGRAM(s) Oral every 2 hours PRN nicotine withdrawal  nicotine -  14 mG/24Hr(s) Patch 1 patch Transdermal daily PRN nicotine withdrawal  OLANZapine 5 milliGRAM(s) Oral every 6 hours PRN agitation  OLANZapine Injectable 5 milliGRAM(s) IntraMuscular once PRN agitation  ondansetron    Tablet 4 milliGRAM(s) Oral every 8 hours PRN nausea  
MEDICATIONS  (PRN):  ALBUTerol    90 MICROgram(s) HFA Inhaler 2 Puff(s) Inhalation every 6 hours PRN Shortness of Breath and/or Wheezing  cloNIDine 0.1 milliGRAM(s) Oral every 12 hours PRN withdrawal  hydrOXYzine hydrochloride 50 milliGRAM(s) Oral every 6 hours PRN anxiety  ibuprofen  Tablet. 400 milliGRAM(s) Oral every 6 hours PRN Mild Pain (1 - 3), Moderate Pain (4 - 6), Severe Pain (7 - 10)  nicotine  Polacrilex Gum 4 milliGRAM(s) Oral every 2 hours PRN nicotine withdrawal  nicotine -  14 mG/24Hr(s) Patch 1 patch Transdermal daily PRN nicotine withdrawal  OLANZapine 5 milliGRAM(s) Oral every 6 hours PRN agitation  OLANZapine Injectable 5 milliGRAM(s) IntraMuscular once PRN agitation  ondansetron    Tablet 4 milliGRAM(s) Oral every 8 hours PRN nausea  senna 2 Tablet(s) Oral at bedtime PRN constipation  

## 2022-04-06 NOTE — BH DISCHARGE NOTE NURSING/SOCIAL WORK/PSYCH REHAB - NSDCPRRECOMMEND_PSY_ALL_CORE
Psychiatric rehabilitation staff recommends that patient continue to demonstrate daily medication compliance and utilize identified coping skills to manage symptoms. Patient would benefit from attending [facility] for ongoing medication management, support and psychotherapy.

## 2022-04-06 NOTE — BH DISCHARGE NOTE NURSING/SOCIAL WORK/PSYCH REHAB - NSBHREFERPURPOSE1_PSY_ALL_CORE
Mental Health Treatment/Substance Use Disorder Treatment Please note, this is an in-person appt. Please contact [facility] for Medicaid transportation at [phone]. Thank you./Mental Health Treatment/Substance Use Disorder Treatment Please note, this is an in-person appt. Please contact [facility] for Medicaid transportation at [phone]. Please see attached campus map. Thank you./Mental Health Treatment/Substance Use Disorder Treatment

## 2022-04-06 NOTE — BH INPATIENT PSYCHIATRY DISCHARGE NOTE - NSDCCPCAREPLAN_GEN_ALL_CORE_FT
PRINCIPAL DISCHARGE DIAGNOSIS  Diagnosis: Severe opioid use disorder  Assessment and Plan of Treatment: Medication management and outpatient follow up      SECONDARY DISCHARGE DIAGNOSES  Diagnosis: Substance induced mood disorder  Assessment and Plan of Treatment: Medication management and outpatient follow up

## 2022-04-06 NOTE — BH INPATIENT PSYCHIATRY DISCHARGE NOTE - HOSPITAL COURSE
Patient initially presented to the ED after a suspected overdose when she was found unresponsive at [facility]. Patient responded to Narcan on the scene. Patient with known history of opioid use disorder, as well as hx of malingering. Patient reported suicidality in the ED which prompted transfer to [facility] for stabilization. Urine toxicology in the ED was positive for opioids, methadone, benzodiazepines, and cocaine. On admission, patient initially refused to meet with team, but still endorsed conditional suicidality, specifically if she were to be discharged. She remained isolative in her room and was suspected to be going through opioid withdrawal. She initially denied opioid use to the team, but then stated she was having withdrawal symptoms. She was amenable to starting suboxone therapy, was titrated to 6mg BID as maintenance dose. Medication had good effect and tolerability. Symptoms gradually improved over the course of hospitalization. The patient was made aware of the risks and benefits of each medication and tolerated them well with no apparent side effects. Patient was also started on escitalopram and titrated to 10mg for depressive symptoms. Home dosage of clonazepam kept at 1mg BID, and mirtazapine 7.5mg QHS. Patient consistently denied suicidality once starting suboxone therapy. On the day of discharge, the patient’s mood and affect are normal/euthymic. Patient denies depressed mood and anxiety. Patient is not hopeless. Sleep and appetite are also at baseline. Pt’s motor performance and productivity of speech are WNL. Pt denies symptoms of psychosis including AH/VH with no apparent delusions. Patient denies S/H/I/I/P. Patient to follow up with A[facility] after discharge for further suboxone maintenance therapy. Diagnostically, patient presents after suspected opioid overdose in the context of severe opioid use disorder. Despite reporting conditional suicidality, escitalopram started given other depressive symptoms reported by patient and intent to start the medication as an outpatient before arrival to the hospital.     Patient was provided with extensive psychoeducation on treatment options and motivational counseling targeting healthy life-style. Patient was instructed on actions for crisis situations, understood and agreed to follow instructions for handling crisis, including coming to ER or calling 911 should the patient or her family feel that she is in danger of hurting self or others. Patient also was given Suicide Prevention Lifeline number 1-249.287.2508 and provided with instructions on its use. Suicidal and aggression risk assessments were performed on the day of discharge. The patient is at elevated chronic risk of self-harm due to an underlying substance use disorder. She is not actively suicidal, manic, or inebriated, making her appropriate for less restrictive treatment as an outpatient without need for continued inpatient hospitalization. Risk factors include: impulsivity, polysubstance use, psychosocial stressors, poor social support, non-compliance, disengagement with treatment. Protective factors include future orientation. Immediate risk was minimized by inpatient admission to a safe environment with appropriate supervision and limited access to lethal means. Future risk was minimized before discharge by treatment of anxiety/depressive symptoms, maximizing outpatient support, providing relevant patient education, discussing emergency procedures, and ensuring close follow-up. Patient denies all suicidal and aggressive/homicidal ideation, intent and plan, and, in view of demonstrated clinical improvement, is not judged to be an acute danger to self or others at this time. Although the patient remains at their chronic baseline risk of self-harm, such risk cannot be further ameliorated by continued inpatient treatment and the patient is therefore appropriate for discharge. Patient has made clinically meaningful progress during his hospitalization and has clearly benefited from medications and psychotherapy. On day of discharge, the patient has improved significantly and no longer requires inpatient treatment and care. Pt will be discharged and follow-up with outpatient care. Patient initially presented to the ED after a suspected overdose when she was found unresponsive at [facility]. Patient responded to Narcan on the scene. Patient with known history of opioid use disorder, as well as hx of malingering. Patient reported suicidality in the ED which prompted transfer to [facility] for stabilization. Urine toxicology in the ED was positive for opioids, methadone, benzodiazepines, and cocaine. On admission, patient initially refused to meet with team, but still endorsed conditional suicidality, specifically if she were to be discharged. She remained isolative in her room and was suspected to be going through opioid withdrawal. She initially denied opioid use to the team, but then acknowledged opioid withdrawal. She was amenable to starting suboxone therapy, was titrated to 4mg-1mg TID as maintenance dose. Medication had good effect and tolerability. Symptoms gradually improved over the course of hospitalization. The patient was made aware of the risks and benefits of each medication and tolerated them well with no apparent side effects. Patient was also started on escitalopram and titrated to 10mg for depressive symptoms. Home dosage of clonazepam kept at 1mg BID, and mirtazapine 7.5mg QHS. Patient consistently denied suicidality once starting suboxone therapy. On the day of discharge, the patient’s mood and affect are normal/euthymic. Patient denies depressed mood and anxiety. Patient is not hopeless. Sleep and appetite are also at baseline. Pt’s motor performance and productivity of speech are WNL. Pt denies symptoms of psychosis including AH/VH with no apparent delusions. Patient denies S/H/I/I/P. Patient to follow up with [facility] after discharge for further suboxone maintenance therapy. Diagnostically, patient presents after suspected opioid overdose in the context of severe opioid use disorder. Despite reporting conditional suicidality, escitalopram started given other depressive symptoms reported by patient and intent to start the medication as an outpatient before arrival to the hospital.     Patient was provided with extensive psychoeducation on treatment options and motivational counseling targeting healthy life-style. Patient was instructed on actions for crisis situations, understood and agreed to follow instructions for handling crisis, including coming to ER or calling 911 should the patient or her family feel that she is in danger of hurting self or others. Patient also was given Suicide Prevention Lifeline number 1-497.724.9742 and provided with instructions on its use. Suicidal and aggression risk assessments were performed on the day of discharge. The patient is at elevated chronic risk of self-harm due to an underlying substance use disorder. She is not actively suicidal, manic, or inebriated, making her appropriate for less restrictive treatment as an outpatient without need for continued inpatient hospitalization. Risk factors include: impulsivity, polysubstance use, psychosocial stressors, poor social support, non-compliance, disengagement with treatment. Protective factors include future orientation. Immediate risk was minimized by inpatient admission to a safe environment with appropriate supervision and limited access to lethal means. Future risk was minimized before discharge by treatment of anxiety/depressive symptoms, maximizing outpatient support, providing relevant patient education, discussing emergency procedures, and ensuring close follow-up. Patient denies all suicidal and aggressive/homicidal ideation, intent and plan, and, in view of demonstrated clinical improvement, is not judged to be an acute danger to self or others at this time. Although the patient remains at their chronic baseline risk of self-harm, such risk cannot be further ameliorated by continued inpatient treatment and the patient is therefore appropriate for discharge. Patient has made clinically meaningful progress during his hospitalization and has clearly benefited from medications and psychotherapy. On day of discharge, the patient has improved significantly and no longer requires inpatient treatment and care. Pt will be discharged and follow-up with outpatient care.    *** Please refer to attending attestation at end of this document for additional clinical information.

## 2022-04-06 NOTE — BH DISCHARGE NOTE NURSING/SOCIAL WORK/PSYCH REHAB - NSBHDCREFERRAL1_PSY_ALL_CORE
This note was copied from a baby's chart  Met with mom to assist with latching baby  Mom feeding baby in cradle hold on left breast upon entering room  Baby with signs of shallow latch  Demo with teach back how to remove baby from the breast to reposition for deeper latch  Worked on positioning infant up at chest level and starting to feed infant with nose arriving at the nipple  Then, using areolar compression to achieve a deep latch that is comfortable and exchanges optimum amounts of milk  Assisted mom to place baby on left breast in cross cradle hold, mom not comfortable with hold  Repositioned to football hold and mom expressed more comfort  After several attempts baby able to achieve deep latch and mom expressed comfort with latch  Encouraged parents to call for assistance, questions, and concerns about breastfeeding  Extension provided 
This note was copied from a baby's chart  Met with mother  Provided mother with Ready, Set, Baby booklet  Discussed Skin to Skin contact an benefits to mom and baby  Talked about the delay of the first bath until baby has adjusted  Spoke about the benefits of rooming in  Feeding on cue and what that means for recognizing infant's hunger  Avoidance of pacifiers for the first month discussed  Talked about exclusive breastfeeding for the first 6 months  Positioning and latch reviewed as well as showing images of other feeding positions  Discussed the properties of a good latch in any position  Reviewed hand/manual expression  Discussed s/s that baby is getting enough milk and some s/s that breastfeeding dyad may need further help  Gave information on common concerns, what to expect the first few weeks after delivery, preparing for other caregivers, and how partners can help  Resources for support also provided  Encouraged parents to call for assistance, questions, and concerns about breastfeeding  Extension provided 
This note was copied from a baby's chart  Met with mother to go over discharge breastfeeding booklet including the feeding log  Emphasized 8 or more (12) feedings in a 24 hour period, what to expect for the number of diapers per day of life and the progression of properties of the  stooling pattern  Reviewed breastfeeding and your lifestyle, storage and preparation of breast milk, how to keep you breast pump clean, the employed breastfeeding mother and paced bottle feeding handouts  Booklet included Breastfeeding Resources for after discharge including access to the number for the [address]  No family support at bedside at this time  Encoraged MOB  to call for assistance, questions and concerns  Extension number for inpatient lactation support provided 
Aftercare Appointment

## 2022-04-06 NOTE — BH INPATIENT PSYCHIATRY PROGRESS NOTE - NSTXSUBMISINTERMD_PSY_ALL_CORE
Start suboxone therapy.
Start suboxone therapy
Start suboxone therapy.

## 2022-04-06 NOTE — BH INPATIENT PSYCHIATRY PROGRESS NOTE - NSTXSUBMISGOAL_PSY_ALL_CORE
Be able to acknowledge that substance abuse is a problem

## 2022-04-06 NOTE — BH DISCHARGE NOTE NURSING/SOCIAL WORK/PSYCH REHAB - DISCHARGE INSTRUCTIONS AFTERCARE APPOINTMENTS
In order to check the location, date, or time of your aftercare appointment, please refer to your Discharge Instructions Document given to you upon leaving the hospital.  If you have lost the instructions please call [phone]

## 2022-04-06 NOTE — BH DISCHARGE NOTE NURSING/SOCIAL WORK/PSYCH REHAB - NSDCPETBCESMAN_GEN_ALL_CORE
[FreeTextEntry1] : BARIATRIC SURGERY HISTORY: none\par \par OBESITY COMORBIDITIES: DM2, metabolic syndrome\par \par ANTI-OBESITY MEDICATIONS: none\par \par OBESITY MEDICATION SIDE EFFECTS: N/A\par \par \par 69 yo woman with obesity, DM2, metabolic syndrome, anxiety/depression \par \par Recommend the following:\par cont current lifestyle changes based on WFPB strategy\par increase physical activity as tolerated\par cont with regular f/u\par will have metabolic labs checked when estabishing care with new PCP soon.\par consider restarting trulicity\par \par rtc in 3-4 weeks.\par \par 
If you are a smoker, it is important for your health to stop smoking. Please be aware that second hand smoke is also harmful.

## 2022-04-06 NOTE — BH INPATIENT PSYCHIATRY PROGRESS NOTE - NSBHCHARTREVIEWVS_PSY_A_CORE FT
Vital Signs Last 24 Hrs  T(C): 36.2 (04-06-22 @ 08:37), Max: 36.8 (04-05-22 @ 17:21)  T(F): 97.2 (04-06-22 @ 08:37), Max: 98.3 (04-05-22 @ 17:21)  HR: --  BP: --  BP(mean): --  RR: --  SpO2: --    Orthostatic VS  04-06-22 @ 08:37  Lying BP: --/-- HR: --  Sitting BP: 102/74 HR: 76  Standing BP: --/-- HR: --  Site: --  Mode: --  Orthostatic VS  04-05-22 @ 07:06  Lying BP: --/-- HR: --  Sitting BP: 125/70 HR: 97  Standing BP: --/-- HR: --  Site: --  Mode: --

## 2022-04-06 NOTE — BH DISCHARGE NOTE NURSING/SOCIAL WORK/PSYCH REHAB - NSDCPRGOAL_PSY_ALL_CORE
Patient has demonstrated progress towards psychiatric rehabilitation goals during current hospitalization. Patient has demonstrated daily medication compliance and is tolerating medications well. Patient reports improvements in anxiety and denies SI. Patient was able to identify exercise, listening to music and talking to her supports as effective coping skills to manage symptoms. Patient reports feeling confident in ability to utilize coping skills post discharge. Patient did not attend psychiatric rehabilitation groups during current hospitalization. Patient was receptive to individual rounding from psych rehab staff during hospitalization. Patient was visible in the milieu. Patient moderately socialized with select peers. Patient was able to make needs known to staff. Patient has demonstrated improved insight into the current episode and was able to identify loss of interest in doing things, anxiety/panic, not having healthy relationships, not eating properly and poor sleep as warning signs that a crisis may be developing. Patient expressed readiness for discharge. Patient was provided with a Press G[employer] survey to complete prior to discharge.

## 2022-04-06 NOTE — BH INPATIENT PSYCHIATRY DISCHARGE NOTE - NSICDXPASTMEDICALHX_GEN_ALL_CORE_FT
PAST MEDICAL HISTORY:  Anxiety     Anxiety and depression      PAST MEDICAL HISTORY:  Substance use disorder

## 2022-04-06 NOTE — BH INPATIENT PSYCHIATRY PROGRESS NOTE - MSE UNSTRUCTURED FT
Appearance: Dressed appropriately, good hygiene and grooming, calm.  Behavior: Alert, awake, cooperative, calm.    Motor: No abnormal movements.  No psychomotor agitation or slowing.   Speech: Normal rate, volume, productivity.  Mood: "good"  Affect: Smiling, pleasant.   Thought Process: linear.  Associations: fair.   Thought Content: No evidence of abnormal thought content. Denies suicidality and homicidality.   Perception: Denies AH/VH.   Attention: fair  Gait: intact  Language: fluent  Insight: Improved.   Judgement: Improved.

## 2022-04-06 NOTE — BH INPATIENT PSYCHIATRY PROGRESS NOTE - NSTXSUICIDINTERMD_PSY_ALL_CORE
Continue medication and supportive psychotherapy as appropriate. 

## 2022-04-06 NOTE — BH INPATIENT PSYCHIATRY DISCHARGE NOTE - OTHER PAST PSYCHIATRIC HISTORY (INCLUDE DETAILS REGARDING ONSET, COURSE OF ILLNESS, INPATIENT/OUTPATIENT TREATMENT)
As per  Resident Note completed on March 27th 2022: "Patient is a 50 y/o woman, reportedly domiciled with mother in [place],  documented psychiatric history of Malingering, of polysubstance use, opioid use disorder (on methadone) benzo dependence (including BZD diversion at [facility]), cocaine use disorder,  anxiety, depression, prior psychiatric hospitalizations including last for 2 days at [facility] discharged on 9/23/21 after found to be diverting/sharing benzos with her boyfriend also on unit, history of suicidal gestures and non-suicidal cutting behavior,  no known medical history,  who was BIBEMS after being found unresponsive at [facility] with apparent opiate OD (responded to narcan) who in ED reported suicidal ideation.   Patient seen via [facility]. patient is a poor historian, and participates minimally in interview. she is vague and evasive during her brief participation. She states that she "sniffed heroin" today "to feel better" but she thinks it contained fentanyl and she had LOC. She reports that she has been depressed recently and that she is suicidal, she does not describe plans/intents. She is evasive when asked about housing situation, any outpatient treatment." Hx of unspecified depression/anxiety.  Substance use disorders (opioids/heroin, cocaine).  Hx of diversion on inpatient psychiatric units.  Hx of suicidal gestures and cutting.  Past psychiatric admissions.  Hx of secondary gain and malingering.

## 2022-04-06 NOTE — BH DISCHARGE NOTE NURSING/SOCIAL WORK/PSYCH REHAB - PATIENT PORTAL LINK FT
You can access the FollowMyHealth Patient Portal offered by [facility] by registering at the following website: [email or web address]. By joining [employer]’s FollowMyHealth portal, you will also be able to view your health information using other applications (apps) compatible with our system.

## 2022-04-06 NOTE — BH INPATIENT PSYCHIATRY PROGRESS NOTE - NSBHMETABOLIC_PSY_ALL_CORE_FT
BMI: BMI (kg/m2): 20.6 (03-27-22 @ 18:03)  HbA1c: A1C with Estimated Average Glucose Result: 5.3 % (09-22-21 @ 11:56)    Glucose:   BP: --  Lipid Panel: Date/Time: 09-23-21 @ 08:40  Cholesterol, Serum: 177  Direct LDL: --  HDL Cholesterol, Serum: 101  Total Cholesterol/HDL Ration Measurement: --  Triglycerides, Serum: 52  
BMI: BMI (kg/m2): 20.6 (03-27-22 @ 18:03)  HbA1c: A1C with Estimated Average Glucose Result: 5.3 % (09-22-21 @ 11:56)    Glucose:   BP: 97/70 (04-01-22 @ 08:29) (97/70 - 97/70)  Lipid Panel: Date/Time: 09-23-21 @ 08:40  Cholesterol, Serum: 177  Direct LDL: --  HDL Cholesterol, Serum: 101  Total Cholesterol/HDL Ration Measurement: --  Triglycerides, Serum: 52  
BMI: BMI (kg/m2): 20.6 (03-27-22 @ 18:03)  HbA1c: A1C with Estimated Average Glucose Result: 5.3 % (09-22-21 @ 11:56)    Glucose:   BP: 102/60 (04-03-22 @ 07:41) (101/60 - 102/60)  Lipid Panel: Date/Time: 09-23-21 @ 08:40  Cholesterol, Serum: 177  Direct LDL: --  HDL Cholesterol, Serum: 101  Total Cholesterol/HDL Ration Measurement: --  Triglycerides, Serum: 52  
BMI: BMI (kg/m2): 20.6 (03-27-22 @ 18:03)  HbA1c: A1C with Estimated Average Glucose Result: 5.3 % (09-22-21 @ 11:56)    Glucose:   BP: --  Lipid Panel: Date/Time: 09-23-21 @ 08:40  Cholesterol, Serum: 177  Direct LDL: --  HDL Cholesterol, Serum: 101  Total Cholesterol/HDL Ration Measurement: --  Triglycerides, Serum: 52  
BMI: BMI (kg/m2): 20.6 (03-27-22 @ 18:03)  HbA1c: A1C with Estimated Average Glucose Result: 5.3 % (09-22-21 @ 11:56)    Glucose:   BP: --  Lipid Panel: Date/Time: 09-23-21 @ 08:40  Cholesterol, Serum: 177  Direct LDL: --  HDL Cholesterol, Serum: 101  Total Cholesterol/HDL Ration Measurement: --  Triglycerides, Serum: 52  
BMI: BMI (kg/m2): 20.6 (03-27-22 @ 18:03)  HbA1c: A1C with Estimated Average Glucose Result: 5.3 % (09-22-21 @ 11:56)    Glucose:   BP: 102/60 (04-03-22 @ 07:41) (102/60 - 102/60)  Lipid Panel: Date/Time: 09-23-21 @ 08:40  Cholesterol, Serum: 177  Direct LDL: --  HDL Cholesterol, Serum: 101  Total Cholesterol/HDL Ration Measurement: --  Triglycerides, Serum: 52  
BMI: BMI (kg/m2): 20.6 (03-27-22 @ 18:03)  HbA1c: A1C with Estimated Average Glucose Result: 5.3 % (09-22-21 @ 11:56)    Glucose:   BP: --  Lipid Panel: Date/Time: 09-23-21 @ 08:40  Cholesterol, Serum: 177  Direct LDL: --  HDL Cholesterol, Serum: 101  Total Cholesterol/HDL Ration Measurement: --  Triglycerides, Serum: 52

## 2022-04-06 NOTE — BH INPATIENT PSYCHIATRY DISCHARGE NOTE - NSDCMRMEDTOKEN_GEN_ALL_CORE_FT
clonazePAM 1 mg oral tablet: 1 tab(s) orally 2 times a day MDD:2mg  escitalopram 10 mg oral tablet: 1 tab(s) orally once a day  mirtazapine 7.5 mg oral tablet: 1 tab(s) orally once a day (at bedtime)   clonazePAM 1 mg oral tablet: 1 tab(s) orally 2 times a day MDD:2mg  escitalopram 10 mg oral tablet: 1 tab(s) orally once a day  mirtazapine 7.5 mg oral tablet: 1 tab(s) orally once a day (at bedtime)  Suboxone 4 mg-1 mg sublingual film: 1 film(s) sublingually 3 times a day MDD:12mg-3mg per day

## 2022-04-06 NOTE — BH INPATIENT PSYCHIATRY PROGRESS NOTE - NSDCCRITERIA_PSY_ALL_CORE
when patient is no longer an imminent harm to herself

## 2022-04-06 NOTE — BH INPATIENT PSYCHIATRY PROGRESS NOTE - NSTXSUICIDGOAL_PSY_ALL_CORE
Will develop a suicide prevention/safety plan
Be able to state 3 reasons for living
Will develop a suicide prevention/safety plan
Will develop a suicide prevention/safety plan

## 2022-04-06 NOTE — BH INPATIENT PSYCHIATRY PROGRESS NOTE - NSBHASSESSSUMMFT_PSY_ALL_CORE
Attenuated depression, emotionally regulated.  No signs or symptoms of opioid withdrawal.  No suicidality.  Future oriented.    Risk assessment on discharge: Pt has chronic risk factors of depression, anxiety, substance use disorders, hx of malingering, prior admissions, hx of suicidal gestures, hx of cutting, with acute risk factors of recent overdose on heroin, depression, now treated with inpatient care.  Protective factors of supportive family, is in a relationship, future oriented.  Pt has consistently denied suicidality and homicidality, is caring for ADLs independently.  Pt is high CHRONIC risk of harm, but is NO longer an acute or imminent risk of harm to self or others, can be discharged with outpatient follow up.    1. Will d/c home on current regimen.  Pt counseled on this admission that clonazepam will need to be eventually tapered and discontinued given risks with concurrent Suboxone use.  2. Psychoeducation provided regarding importance of compliance with outpatient appointments and medications.  Pt refused inpatient rehab on this admission, but agreed to dual diagnosis clinic.  3. Pt was advised to remain abstinent with recreational drugs.  4. Pt was advised to dial 911 or return to ER if they become danger to self or others. 
51F hx of malingering, polysubstance use disorder, anxiety, depression presenting after reported intentional opiate overdose in context of increased depressive symptoms.    Patient remains irritable at times, reports resolving depression and suicidality. Discussed options for aftercare, including suboxone maintenance therapy and dual diagnosis clinic referrals. Patient amenable to starting initiating suboxone therapy, will initiate today. Disposition planning when patient reaches maintenance dosing. 
51F hx of malingering, polysubstance use disorder, anxiety, depression presenting after reported intentional opiate overdose in context of increased depressive symptoms.    Patient continues to present as irritable, particularly in the context of discussing substance use. Continues to remain amenable to starting antidepressant, discussed addition of Remeron for insomnia as well. Will continue to monitor for withdrawal s/p polysubstance overdose. Will collect collateral if patient allows. Disposition planning when patient stabilizes. 
51F hx of malingering, polysubstance use disorder, anxiety, depression presenting after reported intentional opiate overdose in context of increased depressive symptoms.    Continues to deny any mood issues or suicidality, no objective or subjective symptoms of withdrawal. Currently on maintenance dose of suboxone. Will continue medication as prescribed. Will continue disposition planning. 
51F hx of malingering, polysubstance use disorder, anxiety, depression presenting after reported intentional opiate overdose in context of increased depressive symptoms.    Patient denies any depressive symptoms today, denies any objective symptoms of withdrawal. Will continue medication as prescribed. Continue searching for aftercare. 
51F hx of malingering, polysubstance use disorder, anxiety, depression presenting after reported intentional opiate overdose in context of increased depressive symptoms.    Patient remains pleasant on suboxone therapy, denies any severe mood symptoms today. Will continue medication as prescribed. Disposition planning when patient reaches maintenance phase.  
51F hx of malingering, polysubstance use disorder, anxiety, depression presenting after reported intentional opiate overdose in context of increased depressive symptoms.    Patient endorsing subjective symptoms of withdrawal, will give stat dose of suboxone to find adequate maintenance dose. Denies mood symptoms or suicidality today. Will continue other medication as prescribed. Disposition planning continues with search for dual diagnosis clinic.

## 2022-04-06 NOTE — BH INPATIENT PSYCHIATRY PROGRESS NOTE - NSTXDEPRESGOAL_PSY_ALL_CORE
Will identify 2 coping skills that assist in improving mood
Report using a coping skill to overcome sadness and worry in order to socialize with peers daily
Will identify 2 coping skills that assist in improving mood

## 2022-04-07 NOTE — ED PROVIDER NOTE - CPE EDP SKIN NORM
Benny Harp is a 71 year old male here for  Chief Complaint   Patient presents with   • Lymphoma     Denies latex allergy or sensitivity.    Medication verified, no changes.  PCP and Pharmacy verified.    Social History     Tobacco Use   Smoking Status Never Smoker   Smokeless Tobacco Current User   • Types: Chew     Advance Directives Filed: Yes    ECOG:   ECOG   ECOG Performance Status        Vitals:    There were no vitals taken for this visit.    These vital signs are:  Within defined parameters (Per Reference \"Defined Limits Hospital Outpatient Department (HOD)\")    Height: No.  Ht Readings from Last 1 Encounters:   11/03/21 6' 3\" (1.905 m)     Weight:Yes, shoes on.  Wt Readings from Last 3 Encounters:   11/03/21 (!) 136.1 kg (300 lb 0.7 oz)   10/26/21 136.1 kg (300 lb)   10/06/21 (!) 139.2 kg (306 lb 14.1 oz)       BMI: There is no height or weight on file to calculate BMI.    REVIEW OF SYSTEMS  GENERAL:  Patient denies headache, fevers, chills, night sweats, excessive fatigue, change in appetite, weight loss, dizziness  ALLERGIC/IMMUNOLOGIC: Verified allergies: Yes  EYES:  Patient denies significant visual difficulties, double vision, blurred vision  ENT/MOUTH: Patient denies problems with hearing, sore throat, sinus drainage, mouth sores  ENDOCRINE:  Patient denies diabetes, thyroid disease, hormone replacement, hot flashes  HEMATOLOGIC/LYMPHATIC: Patient denies easy bruising, bleeding, tender lymph nodes, swollen lymph nodes  BREASTS: Patient denies abnormal masses of breast, nipple discharge, pain  RESPIRATORY:  Patient denies lung pain with breathing, cough, coughing up blood, shortness of breath  CARDIOVASCULAR:  Patient denies anginal chest pain, palpitations, shortness of breath when lying flat, peripheral edema  GASTROINTESTINAL: Patient denies abdominal pain , nausea, vomiting, diarrhea, GI bleeding, constipation, change in bowel habits, heartburn, sensation of feeling full, difficulty  swallowing  : Patient denies blood in the urine, burning with urination, frequency, urgency, hesitancy, incontinence  MUSCULOSKELETAL:  Patient denies joint pain, bone pain, joint swelling, redness, decreased range of motion  SKIN:  Patient denies chronic rashes, inflammation, ulcerations, skin changes, itching  NEUROLOGIC:  Patient denies loss of balance, areas of focal weakness, abnormal gait, sensory problems, numbness, tingling  PSYCHIATRIC: Patient denies insomnia, depression, anxiety    This patient reported abnormal symptoms that needed immediate verbal communication: No        Patient verbally consents to leaving detailed phone message regarding today's visit.        WOUNDS

## 2022-04-14 NOTE — SOCIAL WORK POST DISCHARGE FOLLOW UP NOTE - NSBHSWFOLLOWUP_PSY_ALL_CORE_FT
Writer was informed that pt missed her [facility] appt on 4/11. Writer attempted to reach the pt on her only number, cell - [phone], no answer, VM was left requesting call back. If no response by next week, letter to be sent to pts home address.

## 2022-04-15 NOTE — ED PROVIDER NOTE - PROGRESS NOTE DETAILS
Let's change from prilosec (omeprazole) to lansoprazole.     No changes in other meds.    Stress lexiscan next week.    Cheko Reyes MD  Internal Medicine and Pediatrics   
no ptx or consolidation or obvious rib fx. pt instructed to fu with pcp for further eval if pain persists

## 2022-04-16 ENCOUNTER — INPATIENT (INPATIENT)
Facility: HOSPITAL | Age: 52
LOS: 26 days | Discharge: HOME | End: 2022-05-13
Attending: PSYCHIATRY & NEUROLOGY | Admitting: PSYCHIATRY & NEUROLOGY
Payer: MEDICAID

## 2022-04-16 VITALS
RESPIRATION RATE: 16 BRPM | TEMPERATURE: 98 F | HEART RATE: 82 BPM | DIASTOLIC BLOOD PRESSURE: 95 MMHG | HEIGHT: 64 IN | SYSTOLIC BLOOD PRESSURE: 148 MMHG | OXYGEN SATURATION: 100 %

## 2022-04-16 DIAGNOSIS — F19.10 OTHER PSYCHOACTIVE SUBSTANCE ABUSE, UNCOMPLICATED: ICD-10-CM

## 2022-04-16 DIAGNOSIS — F11.90 OPIOID USE, UNSPECIFIED, UNCOMPLICATED: ICD-10-CM

## 2022-04-16 DIAGNOSIS — Z76.5 MALINGERER [CONSCIOUS SIMULATION]: ICD-10-CM

## 2022-04-16 DIAGNOSIS — F13.10 SEDATIVE, HYPNOTIC OR ANXIOLYTIC ABUSE, UNCOMPLICATED: ICD-10-CM

## 2022-04-16 DIAGNOSIS — F41.9 ANXIETY DISORDER, UNSPECIFIED: ICD-10-CM

## 2022-04-16 PROBLEM — F19.90 OTHER PSYCHOACTIVE SUBSTANCE USE, UNSPECIFIED, UNCOMPLICATED: Chronic | Status: ACTIVE | Noted: 2022-04-06

## 2022-04-16 LAB
ALBUMIN SERPL ELPH-MCNC: 4.1 G/DL — SIGNIFICANT CHANGE UP (ref 3.3–5)
ALP SERPL-CCNC: 69 U/L — SIGNIFICANT CHANGE UP (ref 40–120)
ALT FLD-CCNC: 34 U/L — HIGH (ref 4–33)
AMPHET UR-MCNC: POSITIVE
ANION GAP SERPL CALC-SCNC: 14 MMOL/L — SIGNIFICANT CHANGE UP (ref 7–14)
APAP SERPL-MCNC: <10 UG/ML — LOW (ref 15–25)
APPEARANCE UR: ABNORMAL
AST SERPL-CCNC: 67 U/L — HIGH (ref 4–32)
B PERT DNA SPEC QL NAA+PROBE: SIGNIFICANT CHANGE UP
B PERT+PARAPERT DNA PNL SPEC NAA+PROBE: SIGNIFICANT CHANGE UP
BACTERIA # UR AUTO: NEGATIVE — SIGNIFICANT CHANGE UP
BARBITURATES UR SCN-MCNC: NEGATIVE — SIGNIFICANT CHANGE UP
BASOPHILS # BLD AUTO: 0.02 K/UL — SIGNIFICANT CHANGE UP (ref 0–0.2)
BASOPHILS NFR BLD AUTO: 0.3 % — SIGNIFICANT CHANGE UP (ref 0–2)
BENZODIAZ UR-MCNC: POSITIVE
BILIRUB SERPL-MCNC: 0.9 MG/DL — SIGNIFICANT CHANGE UP (ref 0.2–1.2)
BILIRUB UR-MCNC: ABNORMAL
BORDETELLA PARAPERTUSSIS (RAPRVP): SIGNIFICANT CHANGE UP
BUN SERPL-MCNC: 8 MG/DL — SIGNIFICANT CHANGE UP (ref 7–23)
C PNEUM DNA SPEC QL NAA+PROBE: SIGNIFICANT CHANGE UP
CALCIUM SERPL-MCNC: 9 MG/DL — SIGNIFICANT CHANGE UP (ref 8.4–10.5)
CHLORIDE SERPL-SCNC: 101 MMOL/L — SIGNIFICANT CHANGE UP (ref 98–107)
CO2 SERPL-SCNC: 25 MMOL/L — SIGNIFICANT CHANGE UP (ref 22–31)
COCAINE METAB.OTHER UR-MCNC: POSITIVE
COLOR SPEC: ABNORMAL
COVID-19 SPIKE DOMAIN AB INTERP: POSITIVE
COVID-19 SPIKE DOMAIN ANTIBODY RESULT: >250 U/ML — HIGH
CREAT SERPL-MCNC: 0.53 MG/DL — SIGNIFICANT CHANGE UP (ref 0.5–1.3)
CREATININE URINE RESULT, DAU: 250 MG/DL — SIGNIFICANT CHANGE UP
DIFF PNL FLD: ABNORMAL
EGFR: 113 ML/MIN/1.73M2 — SIGNIFICANT CHANGE UP
EOSINOPHIL # BLD AUTO: 0.07 K/UL — SIGNIFICANT CHANGE UP (ref 0–0.5)
EOSINOPHIL NFR BLD AUTO: 1.1 % — SIGNIFICANT CHANGE UP (ref 0–6)
EPI CELLS # UR: 3 /HPF — SIGNIFICANT CHANGE UP (ref 0–5)
ETHANOL SERPL-MCNC: <10 MG/DL — SIGNIFICANT CHANGE UP
FLUAV SUBTYP SPEC NAA+PROBE: SIGNIFICANT CHANGE UP
FLUBV RNA SPEC QL NAA+PROBE: SIGNIFICANT CHANGE UP
GLUCOSE SERPL-MCNC: 126 MG/DL — HIGH (ref 70–99)
GLUCOSE UR QL: NEGATIVE — SIGNIFICANT CHANGE UP
HADV DNA SPEC QL NAA+PROBE: SIGNIFICANT CHANGE UP
HCG SERPL-ACNC: <5 MIU/ML — SIGNIFICANT CHANGE UP
HCOV 229E RNA SPEC QL NAA+PROBE: SIGNIFICANT CHANGE UP
HCOV HKU1 RNA SPEC QL NAA+PROBE: SIGNIFICANT CHANGE UP
HCOV NL63 RNA SPEC QL NAA+PROBE: SIGNIFICANT CHANGE UP
HCOV OC43 RNA SPEC QL NAA+PROBE: SIGNIFICANT CHANGE UP
HCT VFR BLD CALC: 39.5 % — SIGNIFICANT CHANGE UP (ref 34.5–45)
HGB BLD-MCNC: 12.7 G/DL — SIGNIFICANT CHANGE UP (ref 11.5–15.5)
HMPV RNA SPEC QL NAA+PROBE: SIGNIFICANT CHANGE UP
HPIV1 RNA SPEC QL NAA+PROBE: SIGNIFICANT CHANGE UP
HPIV2 RNA SPEC QL NAA+PROBE: SIGNIFICANT CHANGE UP
HPIV3 RNA SPEC QL NAA+PROBE: SIGNIFICANT CHANGE UP
HPIV4 RNA SPEC QL NAA+PROBE: SIGNIFICANT CHANGE UP
HYALINE CASTS # UR AUTO: 1 /LPF — SIGNIFICANT CHANGE UP (ref 0–7)
IANC: 4.51 K/UL — SIGNIFICANT CHANGE UP (ref 1.8–7.4)
IMM GRANULOCYTES NFR BLD AUTO: 0.2 % — SIGNIFICANT CHANGE UP (ref 0–1.5)
KETONES UR-MCNC: ABNORMAL
LEUKOCYTE ESTERASE UR-ACNC: ABNORMAL
LYMPHOCYTES # BLD AUTO: 1.33 K/UL — SIGNIFICANT CHANGE UP (ref 1–3.3)
LYMPHOCYTES # BLD AUTO: 20.6 % — SIGNIFICANT CHANGE UP (ref 13–44)
M PNEUMO DNA SPEC QL NAA+PROBE: SIGNIFICANT CHANGE UP
MCHC RBC-ENTMCNC: 29 PG — SIGNIFICANT CHANGE UP (ref 27–34)
MCHC RBC-ENTMCNC: 32.2 GM/DL — SIGNIFICANT CHANGE UP (ref 32–36)
MCV RBC AUTO: 90.2 FL — SIGNIFICANT CHANGE UP (ref 80–100)
METHADONE UR-MCNC: NEGATIVE — SIGNIFICANT CHANGE UP
MONOCYTES # BLD AUTO: 0.51 K/UL — SIGNIFICANT CHANGE UP (ref 0–0.9)
MONOCYTES NFR BLD AUTO: 7.9 % — SIGNIFICANT CHANGE UP (ref 2–14)
NEUTROPHILS # BLD AUTO: 4.51 K/UL — SIGNIFICANT CHANGE UP (ref 1.8–7.4)
NEUTROPHILS NFR BLD AUTO: 69.9 % — SIGNIFICANT CHANGE UP (ref 43–77)
NITRITE UR-MCNC: NEGATIVE — SIGNIFICANT CHANGE UP
NRBC # BLD: 0 /100 WBCS — SIGNIFICANT CHANGE UP
NRBC # FLD: 0 K/UL — SIGNIFICANT CHANGE UP
OPIATES UR-MCNC: NEGATIVE — SIGNIFICANT CHANGE UP
OXYCODONE UR-MCNC: NEGATIVE — SIGNIFICANT CHANGE UP
PCP SPEC-MCNC: SIGNIFICANT CHANGE UP
PCP UR-MCNC: NEGATIVE — SIGNIFICANT CHANGE UP
PH UR: 6.5 — SIGNIFICANT CHANGE UP (ref 5–8)
PLATELET # BLD AUTO: 283 K/UL — SIGNIFICANT CHANGE UP (ref 150–400)
POTASSIUM SERPL-MCNC: 3.1 MMOL/L — LOW (ref 3.5–5.3)
POTASSIUM SERPL-SCNC: 3.1 MMOL/L — LOW (ref 3.5–5.3)
PROT SERPL-MCNC: 6.5 G/DL — SIGNIFICANT CHANGE UP (ref 6–8.3)
PROT UR-MCNC: ABNORMAL
RAPID RVP RESULT: SIGNIFICANT CHANGE UP
RBC # BLD: 4.38 M/UL — SIGNIFICANT CHANGE UP (ref 3.8–5.2)
RBC # FLD: 14.4 % — SIGNIFICANT CHANGE UP (ref 10.3–14.5)
RBC CASTS # UR COMP ASSIST: 10 /HPF — HIGH (ref 0–4)
RSV RNA SPEC QL NAA+PROBE: SIGNIFICANT CHANGE UP
RV+EV RNA SPEC QL NAA+PROBE: SIGNIFICANT CHANGE UP
SALICYLATES SERPL-MCNC: <0.3 MG/DL — LOW (ref 15–30)
SARS-COV-2 IGG+IGM SERPL QL IA: >250 U/ML — HIGH
SARS-COV-2 IGG+IGM SERPL QL IA: POSITIVE
SARS-COV-2 RNA SPEC QL NAA+PROBE: SIGNIFICANT CHANGE UP
SODIUM SERPL-SCNC: 140 MMOL/L — SIGNIFICANT CHANGE UP (ref 135–145)
SP GR SPEC: 1.04 — SIGNIFICANT CHANGE UP (ref 1–1.05)
THC UR QL: NEGATIVE — SIGNIFICANT CHANGE UP
TOXICOLOGY SCREEN, DRUGS OF ABUSE, SERUM RESULT: SIGNIFICANT CHANGE UP
TSH SERPL-MCNC: 2.19 UIU/ML — SIGNIFICANT CHANGE UP (ref 0.27–4.2)
UROBILINOGEN FLD QL: ABNORMAL
WBC # BLD: 6.45 K/UL — SIGNIFICANT CHANGE UP (ref 3.8–10.5)
WBC # FLD AUTO: 6.45 K/UL — SIGNIFICANT CHANGE UP (ref 3.8–10.5)
WBC UR QL: 9 /HPF — HIGH (ref 0–5)

## 2022-04-16 PROCEDURE — 99285 EMERGENCY DEPT VISIT HI MDM: CPT

## 2022-04-16 PROCEDURE — 99285 EMERGENCY DEPT VISIT HI MDM: CPT | Mod: GC

## 2022-04-16 RX ORDER — HALOPERIDOL DECANOATE 100 MG/ML
5 INJECTION INTRAMUSCULAR ONCE
Refills: 0 | Status: DISCONTINUED | OUTPATIENT
Start: 2022-04-16 | End: 2022-05-13

## 2022-04-16 RX ORDER — MIRTAZAPINE 45 MG/1
7.5 TABLET, ORALLY DISINTEGRATING ORAL AT BEDTIME
Refills: 0 | Status: DISCONTINUED | OUTPATIENT
Start: 2022-04-16 | End: 2022-04-22

## 2022-04-16 RX ORDER — DIPHENHYDRAMINE HCL 50 MG
50 CAPSULE ORAL EVERY 6 HOURS
Refills: 0 | Status: DISCONTINUED | OUTPATIENT
Start: 2022-04-16 | End: 2022-05-13

## 2022-04-16 RX ORDER — HYDROXYZINE HCL 10 MG
50 TABLET ORAL EVERY 6 HOURS
Refills: 0 | Status: DISCONTINUED | OUTPATIENT
Start: 2022-04-16 | End: 2022-05-13

## 2022-04-16 RX ORDER — HALOPERIDOL DECANOATE 100 MG/ML
5 INJECTION INTRAMUSCULAR ONCE
Refills: 0 | Status: COMPLETED | OUTPATIENT
Start: 2022-04-16 | End: 2022-04-16

## 2022-04-16 RX ORDER — HALOPERIDOL DECANOATE 100 MG/ML
5 INJECTION INTRAMUSCULAR EVERY 6 HOURS
Refills: 0 | Status: DISCONTINUED | OUTPATIENT
Start: 2022-04-16 | End: 2022-05-13

## 2022-04-16 RX ORDER — DIPHENHYDRAMINE HCL 50 MG
50 CAPSULE ORAL ONCE
Refills: 0 | Status: DISCONTINUED | OUTPATIENT
Start: 2022-04-16 | End: 2022-05-13

## 2022-04-16 RX ORDER — DIPHENHYDRAMINE HCL 50 MG
50 CAPSULE ORAL ONCE
Refills: 0 | Status: COMPLETED | OUTPATIENT
Start: 2022-04-16 | End: 2022-04-16

## 2022-04-16 RX ADMIN — MIRTAZAPINE 7.5 MILLIGRAM(S): 45 TABLET, ORALLY DISINTEGRATING ORAL at 21:42

## 2022-04-16 RX ADMIN — HALOPERIDOL DECANOATE 5 MILLIGRAM(S): 100 INJECTION INTRAMUSCULAR at 18:15

## 2022-04-16 RX ADMIN — Medication 50 MILLIGRAM(S): at 18:15

## 2022-04-16 NOTE — ED BEHAVIORAL HEALTH ASSESSMENT NOTE - SUMMARY
Patient is a 51-year-old woman, domiciled with mother in [place], PPH of polysubstance use, opioid use disorder (on methadone) benzo dependence, anxiety, depression, prior psychiatric hospitalizations including last for 10 days at [facility] discharged on 4/6/22, did not attend follow up, history of suicidal gestures and non-suicidal cutting behavior, malingering, no known PMH, who was BIB self stating that her medication was stolen.    Patient denies SI at this time, although endorses thoughts of cutting. She continuously requests Klonopin, Ativan or "some other shit." Patient denies using opiates this week, endorses use of crystal meth. Patient is tangential and irritable on exam, appears to be intoxicated. Discussed with patient having her rest to then be re-evaluted. Patient stating she wants her medication, but agreeable to plan. Patient is a 51-year-old woman, domiciled with mother in [place], PPH of polysubstance use, opioid use disorder (on methadone) benzo dependence, anxiety, depression, prior psychiatric hospitalizations including last for 10 days at [facility] discharged on 4/6/22, did not attend follow up, history of suicidal gestures and non-suicidal cutting behavior, malingering, no known PMH, who was BIB self stating that her medication was stolen.    Patient denies SI at this time, although endorses thoughts of cutting. She continuously requests Klonopin, Ativan or "some other shit." Patient denies using opiates this week, endorses use of crystal meth. Patient is tangential and irritable on exam, appears to be intoxicated. Discussed with patient having her rest to then be re-evaluted. Patient stating she wants her medication, but agreeable to plan.    Patient then re-evaluated after 6+ hours of sleep. At first calm, then becomes irritable, aggressive, hitting herself, threatening suicide as staff telling her she will be discharged. Patient initially able to be redirected, becomes coopertive, then continues to become agitated, unable to be redirected, given IM Haldol 5mg/ Benadryl 50mg.     Plan to re-evaluate patient after she has calmed down. Patient is a 51-year-old woman, domiciled with mother in [place], PPH of polysubstance use, opioid use disorder (on methadone) benzo dependence, anxiety, depression, prior psychiatric hospitalizations including last for 10 days at [facility] discharged on 4/6/22, did not attend follow up, history of suicidal gestures and non-suicidal cutting behavior, malingering, no known PMH, who was BIB self stating that her medication was stolen.    Patient denies SI at this time, although endorses thoughts of cutting. She continuously requests Klonopin, Ativan or "some other shit." Patient denies using opiates this week, endorses use of crystal meth. Patient is tangential and irritable on exam, appears to be intoxicated. Discussed with patient having her rest to then be re-evaluted. Patient stating she wants her medication, but agreeable to plan.    Patient then re-evaluated after 6+ hours of sleep. At first calm, then becomes irritable, aggressive, hitting herself, threatening suicide as staff telling her she will be discharged. Patient initially able to be redirected, becomes coopertive, then continues to become agitated, unable to be redirected, given IM Haldol 5mg/ Benadryl 50mg.     Patient is unable to safety plan, is a risk to herself and to others at this moment and cannot be discharged. Patient threatening suicide, stating that she will end her life if she leaves the hospital. As such, patient offered voluntary admission but is unable to engage in the paperwork. Patient is a risk to herself and to others and requires involunatry hospitalization. As patient has been off her medications for probably the past week (at least >3 days), and was not taking as prescribed before, will HOLD OFF ON ALL BENZO at this time. Will restart patient's remeron 7.5mg PO qHS for MDD and sleep, as well as give atarax 50mg PO q6H PRN anxiety, haldol 5mg/benadryl 50mg PO/IM q6H PRN agitation/severe agitation.

## 2022-04-16 NOTE — ED BEHAVIORAL HEALTH ASSESSMENT NOTE - ADDITIONAL DETAILS ALL
Patient denying SI, although vaguely saying she may cut herself (denying this is with suicidal intent), history of multiple prior suicidal gestures, no known SA Patient denying SI, although vaguely saying she may cut herself (denying this is with suicidal intent)---- EDITED: Patient then stating that she will go kill herself if she is discharged from the hospital, will jump into traffic or kill herself by cutting

## 2022-04-16 NOTE — ED PROVIDER NOTE - CONSTITUTIONAL, MLM
normal... Well appearing, awake, alert, oriented to person, place, time/situation and in no apparent distress. agitated mood. unkempt appearing .

## 2022-04-16 NOTE — ED BEHAVIORAL HEALTH ASSESSMENT NOTE - NS ED BHA HOMICIDALITY PRESENT AGGRESSION OTHERS PAST MONTH
Health Maintenance Due   Topic Date Due   • Pneumococcal Vaccine 0-64 (1 of 2 - PPSV23) Never done   • Diabetes Eye Exam  Never done   • Hepatitis B Vaccine (1 of 3 - Risk 3-dose series) Never done   • DTaP/Tdap/Td Vaccine (1 - Tdap) Never done   • Shingles Vaccine (1 of 2) Never done   • Breast Cancer Screening  07/24/2018   • Diabetes Foot Exam  01/25/2022   • Depression Screening  01/25/2022       Patient is due for topics listed above, she wishes to proceed with Immunization(s) COVID-19, but is not proceeding with Immunization(s) Dtap/Tdap/Td, Hep B, Pneumococcal and Shingles, Diabetes Eye Exam, Diabetes Foot Exam and Mammogram at this time. The following has occurred:     Pf[employer] Booster injection given. Patient tolerated without incident. See immunization grid for documentation. VIS sheet given and reviewed; questions answered and verbal consent given by Patient for injection(s) administered.           None known

## 2022-04-16 NOTE — ED BEHAVIORAL HEALTH ASSESSMENT NOTE - OTHER PAST PSYCHIATRIC HISTORY (INCLUDE DETAILS REGARDING ONSET, COURSE OF ILLNESS, INPATIENT/OUTPATIENT TREATMENT)
Anesthesia Pre Eval Note    Anesthesia ROS/Med Hx    Overall Review:  EKG was reviewed     Anesthetic Complication History:  Patient does not have a history of anesthetic complications      Pulmonary Review:  Patient does not have a pulmonary history      Neuro/Psych Review:  Patient does not have a neuro/psych history       Cardiovascular Review:  Patient does not have a cardiovascular history       GI/HEPATIC/RENAL Review:  Patient does not have a GI/hepatic/renalhistory       End/Other Review:  Patient does not have an endo/other history        Relevant Problems   No relevant active problems       Physical Exam     Airway   Mallampati: II  TM Distance: >3 FB  Neck ROM: Full  TMJ Mobility: Good    Cardiovascular    Cardio Rhythm: Regular  Cardio Rate: Normal    Head Assessment  Head assessment: Normocephalic and Atraumatic    General Assessment  General Assessment: Alert and oriented and No acute distress    Dental Exam  Dental exam normal    Pulmonary Exam    Breath sounds clear to auscultation:  Yes    Abdominal Exam  Abdominal exam normal      Anesthesia Plan:    ASA Status: 1  Anesthesia Type: MAC    Induction: Intravenous  Preferred Airway Type: Mask  Maintenance: TIVA    Post-op Pain Management: Per Surgeon      Checklist  Reviewed: Allergies, Past Med History, Medications, Problem list, NPO Status, Beta Blocker Status, Patient Summary, Consultations and Lab Results  Consent/Risks Discussed Statement:  The proposed anesthetic plan, including its risks and benefits, have been discussed with the Patient along with the risks and benefits of alternatives. Questions were encouraged and answered and the patient and/or representative understands and agrees to proceed.        I discussed with the patient (and/or patient's legal representative) the risks and benefits of the proposed anesthesia plan, MAC, which may include services performed by other anesthesia providers.    Alternative anesthesia plans, if available,  Multiple prior hospitalizations, multiple prior ER visits, substance use disorder, not currently in outpaitent treatment, has been on methadone in the past. Patient recently d/c from inpatient psych at [facility] on 4/6/22 on klonopin, lexapro, remeron and suboxone. Patient given follow up info for [facility] but did not follow up. were reviewed with the patient (and/or patient's legal representative). Discussion has been held with the patient (and/or patient's legal representative) regarding risks of anesthesia, which include oral injury, intra-operative awareness, depressed breathing, aspiration, dental injury, sore throat, vomiting, nausea, eye injury and allergic reaction and emergent situations that may require change in anesthesia plan.    The patient (and/or patient's legal representative) has indicated understanding, his/her questions have been answered, and he/she wishes to proceed with the planned anesthetic.      Blood Products: Not Anticipated

## 2022-04-16 NOTE — ED BEHAVIORAL HEALTH ASSESSMENT NOTE - SUBSTANCE ISSUES AND PLAN (INCLUDE STANDING AND PRN MEDICATION)
poly-substance use disorder Patient with polysubstance abuse, not requiring acute withdrawal treatment

## 2022-04-16 NOTE — ED ADULT NURSE REASSESSMENT NOTE - NS ED NURSE REASSESS COMMENT FT1
pt irritable threatening to hurt self if discharge vs as noted emotional support provided eval on going.
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
## 2022-04-16 NOTE — ED PROVIDER NOTE - NS ED MD DISPO SPECIAL CONSIDERATION1
"60 yo M with PMH poorly controlled DM2, HLD, HTN, CVA, CHASE, internuclear ophthalmoplegia of the L eye seen in consult for possible vasculitis with cerebral angiogram showing multifocal areas of stenosis.     He was initially admitted 5/13-5/18 with [facility] and diagnosed with MS. MRA brain/neck, MRI brain w/wo contrast showed no vascular occlusion, multiple foci of hyperintensity in deep cerebral periventricular white matter in pattern of demyelinating process. He was given 5 days of 1g IV solumedrol. His symptoms included double vision. After solumedrol he had improvement in symptoms and DC'd 5/18.    On 5/24 he was readmitted with worsening double vision, dizziness, syncope and confusion. On 5/28 he had waxing and waning mental status, MRI suggestive of active demyelination vs ischemia if multiple areas. Since then continues to have intermittent aphasia, inattention and delayed response.    He currently says he feels well and wants to go home. His only complaint is mild blurry vision. He denies ever having HA, fever, nosebleeds, oral/nasal ulcers, LNs, rash, tingling, numbness, weakness, CP/SOB, cough, joint pain or swelling, dysuria, blood in stool or urine.    Mentions that he had a "stroke" 16 years ago and that symptoms were just memory loss.    ESR 5/25 was 15, CRP 5, acl abs-, CK 18, ACE 23, phosphatidyl serine -, TSH nl, REYMUNDO - x 2, ANCA - x 2, C3, C4 nl, NMO -, SPEP no monoclonal bands, lyme, HIV, HBV/HCV, [place], RPR negative, UA nl, CSF showed RBC 9, WBC 4, glu 280, pro 93, nl ACE, cytology negative.    He also has a duodenal ulcer treated with cautery on 5/30.        " None
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
## 2022-04-16 NOTE — ED BEHAVIORAL HEALTH NOTE - BEHAVIORAL HEALTH NOTE
COVID Exposure Screen- Patient  1.	*Have you had a COVID-19 test in the last 90 days?  (  ) Yes   ( x ) No   (  ) Unknown- Reason: _____  IF YES PROCEED TO QUESTION #2. IF NO OR UNKNOWN, PLEASE SKIP TO QUESTION #3.  2.	Date of test(s) and result(s): ________  3.	*Have you tested positive for COVID-19 antibodies? ( x ) Yes   (  ) No   (  ) Unknown- Reason: _____  IF YES PROCEED TO QUESTION #4. IF NO or UNKNOWN, PLEASE SKIP TO QUESTION #5.  4.	Date of positive antibody test: ________  5.	*Have you received 2 doses of the COVID-19 vaccine? ( x ) Yes   (  ) No   (  ) Unknown- Reason: _____   IF YES PROCEED TO QUESTION #6. IF NO or UNKNOWN, PLEASE SKIP TO QUESTION #7.  6.	Date of second dose: ________  7.	*In the past 10 days, have you been around anyone with a positive COVID-19 test?* (  ) Yes   ( x ) No   (  ) Unknown- Reason: ____  IF YES PROCEED TO QUESTION #8. IF NO or UNKNOWN, PLEASE SKIP TO QUESTION #13.  8.	Were you within 6 feet of them for at least 15 minutes? (  ) Yes   (  ) No   (  ) Unknown- Reason: _____  9.	Have you provided care for them? (  ) Yes   (  ) No   (  ) Unknown- Reason: ______  10.	Have you had direct physical contact with them (touched, hugged, or kissed them)? (  ) Yes   (  ) No    (  ) Unknown- Reason: _____  11.	Have you shared eating or drinking utensils with them? (  ) Yes   (  ) No    (  ) Unknown- Reason: ____  12.	Have they sneezed, coughed, or somehow gotten respiratory droplets on you? (  ) Yes   (  ) No    (  ) Unknown- Reason: ______  13.	*Have you been out of [place] within the past 10 days?* (  ) Yes   ( x ) No   (  ) Unknown- Reason: _____  IF YES PLEASE ANSWER THE FOLLOWING QUESTIONS:  14.	Which state/country have you been to? ______  15.	Were you there over 24 hours? (  ) Yes   (  ) No    (  ) Unknown- Reason: ______  16.	Date of return to [facility]: ______

## 2022-04-16 NOTE — ED BEHAVIORAL HEALTH ASSESSMENT NOTE - DIFFERENTIAL
Malingering  Benzo use disorder  Opioid use disorder  R/o antisocial personality traits Poly-substance withdrawal    Malingering  Benzo use disorder  Opioid use disorder  R/o antisocial personality traits

## 2022-04-16 NOTE — ED BEHAVIORAL HEALTH ASSESSMENT NOTE - RISK ASSESSMENT
Low Acute Suicide Risk Chronic risk factors include substance use disorders, history of mood symptoms, history of self-harm. Acute risk factors include vague SI, ongoing substance use. Mitigating factors include ability to seek care, ability to care for needs, desire for secondary gain. Overall patient is moderate chronic risk of suicide but low acute risk at this time. Chronic risk factors include substance use disorders, history of mood symptoms, history of self-harm. Acute risk factors include vague SI, ongoing substance use. Mitigating factors include ability to seek care, ability to care for needs, desire for secondary gain. Overall patient is moderate chronic risk of suicide but unable to determine suicide risk at this time. Unable to determine Suicide Risk Moderate Acute Suicide Risk Chronic risk factors include substance use disorders, history of mood symptoms, history of self-harm. Acute risk factors include active SI, ongoing substance use. Mitigating factors include ability to seek care, ability to care for needs, desire for secondary gain. Overall patient is moderate chronic risk of suicide, currently at elevated risk given her suicidal statements and inability to safety plan.

## 2022-04-16 NOTE — ED BEHAVIORAL HEALTH NOTE - BEHAVIORAL HEALTH NOTE
Per Dr. Castañeda, pt will be clear for d/c soon.   SW sought transport home from [facility] transport services (pt BIBEMS) however the below message occurred:  Liliam Yuridia  [id number]  1970  [phone]  [address] [place] [address]  [employer]rollee is Not Currently Eligible  - no emergency transport was available either when requested.     SW to obtain [employer] (acct #50) upon d/c due to the above. Discharge/SW     D/C resources by SW:   Pt was connected post d/c from [facility] inpt. tx to : [facility] ([facility])  Appointment Date/Time	11-Apr-2022 13:30  Address	[address] [place] [address], [place] [address]  Phone Number	[phone]    Pt did not present; SW to meet w/ Pt to discuss rescheduling the above if self-determination is for the same upon d/c.       Per Dr. Castañeda, pt will be clear for d/c soon.   SW sought transport home from [facility] transport services (pt BIBEMS) however the below message occurred:  Liliam Shi  [id number]  1970  [phone]  [address] [place] [address]  Enrollee is Not Currently Eligible  - no emergency transport was available either when requested.     SW to obtain [employer] (acct #50) upon d/c due to the above. Discharge/SW     D/C resources by SW:   Pt was connected post d/c from [facility] inpt. tx to : [facility] ([facility])  Appointment Date/Time	11-Apr-2022 13:30  Address	[address] [place] [address], [place] [address]  Phone Number	[phone]  Pt did not present; SW to meet w/ Pt to discuss rescheduling the above if self-determination is for the same upon d/c.     Per Dr. Castañeda, pt will be clear for d/c soon.   SW sought transport home from [facility] transport services (pt [employer]) however the below message occurred:  Liliam Shi  [id number]  1970  [phone]  [address] [place] [address]  Enrollee is Not Currently Eligible  - no emergency transport was available either when requested.     SW to obtain [employer] (acct #50) upon d/c due to the above.    ------------------------------------------------------------------------------------------------------  Update: 4:56PM :  Pt is d/c and has woken up from sleep.   SW to d/c home via [employer] acct #50.  SW advised RN of the same.   Pt requesting clothes. SW contacted Noemi hutton# [id number] who will provide c/b for the same.   SW to await clothing to complete d/c. Discharge/SW     D/C resources by SW:   Pt was connected post d/c from [facility] inpt. tx to : [facility] ([facility])  Appointment Date/Time	11-Apr-2022 13:30  Address	[address] [place] [address], [place] [address]  Phone Number	[phone]  Pt did not present; SW to meet w/ Pt to discuss rescheduling the above if self-determination is for the same upon d/c.     Per Dr. Castañeda, pt will be clear for d/c soon.   SW sought transport home from [employer] transport services (pt BIBEMS) however the below message occurred:  Liliam Shi  [id number]  1970  [phone]  [address] [address]  Enrollee is Not Currently Eligible  - no emergency transport was available either when requested.     SW to obtain [employer] (acct #50) upon d/c due to the above.    ------------------------------------------------------------------------------------------------------  Update: 4:56PM :  Pt is d/c and has woken up from sleep.   SW to d/c home via [employer] acct #50.  SW advised RN of the same.   Pt requesting clothes. SW contacted Noemi hutton# [id number] who will provide c/b for the same.   SW to await clothing to complete d/c.    [employer] transport coordinated and Nursing Advised of the same.   Pt d/c to home address; [address], [place].  ASHOK contacted [employer] tel# [phone] to confirm the below:  Instructions: PLEASE  FROM ADULT EMERGENCY ENTRANCE AT [facility] CALL TEL: [phone] WHEN YOU ARE HERE (Nursing station as SW is leaving at 6PM)   Trip Distance: 4.88MI    TOTAL: $22.10 Discharge/SW     D/C resources by SW:   Pt was connected post d/c from [facility] inpt. tx to : [facility] ([facility])  Appointment Date/Time	11-Apr-2022 13:30  Address	[address] [place] [address], [place] [address]  Phone Number	[phone]  Pt did not present; SW to meet w/ Pt to discuss rescheduling the above if self-determination is for the same upon d/c.   Update:  Pt was agreeable. [facility] notified via Email.     Per Dr. Castañeda, pt will be clear for d/c soon.   SW sought transport home from [employer] transport services (pt BIBEMS) however the below message occurred:  Liliam Shi  [id number]  1970  [phone]  [address] [place] [address]  Enrollee is Not Currently Eligible  - no emergency transport was available either when requested.     SW to obtain [employer] (acct #50) upon d/c due to the above.    ------------------------------------------------------------------------------------------------------  Update: 4:56PM :  Pt is d/c and has woken up from sleep.   SW to d/c home via [employer] acct #50.  SW advised RN of the same.   Pt requesting clothes. SW contacted Noemi hutton# [phone] who will provide c/b for the same.   SW to await clothing to complete d/c.    [employer] transport coordinated and Nursing Advised of the same.   Pt d[place] to [place] address; [address], [place].  ASHOK contacted [employer] tel# [phone] to confirm the below:  Instructions: PLEASE  FROM ADULT EMERGENCY ENTRANCE AT [facility] CALL TEL: [phone] WHEN YOU ARE HERE (Nursing station as SW is leaving at 6PM)   Trip Distance: 4.88MI    TOTAL: $22.10

## 2022-04-16 NOTE — BH PATIENT PROFILE - NSPROPOAPRESSUREINJURY_GEN_A_NUR
Assumed patient care around 1900. Patient a&ox3, forgetful. VSS- Afebrile. RA. No c/o pain at this time. Continent of B&B. Upx2 and walker.  Patient no longer on CIWA, although this RN did note pretty obvious tremors- PRN ativan given with tremors resolved no

## 2022-04-16 NOTE — ED ADULT NURSE REASSESSMENT NOTE - NS ED NURSE REASSESS COMMENT FT1
Received report from ayanna RN, pt c/o depression & si requesting meds & admission labs/covid collected, emotional support provided eval on going.

## 2022-04-16 NOTE — ED BEHAVIORAL HEALTH ASSESSMENT NOTE - DETAILS
Patient denying SI, although vaguely saying she may cut herself (denying this is with suicidal intent) chart collateral, email sent DEANGELO none available spoke with team handoff given to JOLENE Patient denying SI, although vaguely saying she may cut herself (denying this is with suicidal intent)---- EDITED: Patient then stating that she will go kill herself if she is discharged from the hospital, will jump into traffic or kill herself by cutting

## 2022-04-16 NOTE — ED BEHAVIORAL HEALTH ASSESSMENT NOTE - PSYCHIATRIC ISSUES AND PLAN (INCLUDE STANDING AND PRN MEDICATION)
Hold Lexapro and Klonopin at this time as patient hasn't taken in multiple days. Haldol 5mg/Benadryl 50mg PO/IM q6H PRN agitation Continue Remeron 7.5mg PO qHS-  AVOID BENZOS FOR PATIENT. Atarax 50mg Po q6H PRN anxiety, Haldol 5mg/Benadryl 50mg PO/IM q6H PRN agitation/severe agitation

## 2022-04-16 NOTE — ED BEHAVIORAL HEALTH ASSESSMENT NOTE - DESCRIPTION
None known Lives with mother in [place], unemployed Vital Signs Last 24 Hrs  T(C): 36.7 (16 Apr 2022 07:01), Max: 36.7 (16 Apr 2022 07:01)  T(F): 98 (16 Apr 2022 07:01), Max: 98 (16 Apr 2022 07:01)  HR: 82 (16 Apr 2022 07:01) (82 - 82)  BP: 148/95 (16 Apr 2022 07:01) (148/95 - 148/95)  BP(mean): --  RR: 16 (16 Apr 2022 07:01) (16 - 16)  SpO2: 100% (16 Apr 2022 07:01) (100% - 100%)

## 2022-04-16 NOTE — ED BEHAVIORAL HEALTH ASSESSMENT NOTE - MODIFICATIONS
I have personally interviewed, discussed the patient with the treating team, and reviewed the majority of the medical records.  I agree with the outline presenting history, and care with exception to the statements below:

## 2022-04-16 NOTE — ED BEHAVIORAL HEALTH ASSESSMENT NOTE - HPI (INCLUDE ILLNESS QUALITY, SEVERITY, DURATION, TIMING, CONTEXT, MODIFYING FACTORS, ASSOCIATED SIGNS AND SYMPTOMS)
Patient is a 51-year-old woman, domiciled with mother in [place], PPH of polysubstance use, opioid use disorder (on methadone) benzo dependence, anxiety, depression, prior psychiatric hospitalizations including last for 10 days at [facility] discharged on 4/6/22, did not attend follow up, history of suicidal gestures and non-suicidal cutting behavior, malingering, no known PMH, who was BIB self stating that her medication was stolen.    Upon evaluation, patient appeared with dirt under fingernails, unkempt appearance, irritable, but overall cooperative. Patient states that she has not been caring for herself and that she lost her medications. She states that this happened 2 days ago to the staff, then states that this might have happened a week ago. Patient states to staff that her medication was stolen, told provider medication was lost. She states that she was admitted to the [facility] at some point recently, unsure exactly when or for how long. States that she was given information for follow up and was discharged on Lexapro and Klonopin. Patient states that she did not follow up with outpatient providers, when asked why states she is unsure, becomes very vague and tangential. She continuously requests Klonopin, Ativan or "some other shit."     When asked about substances, patient states that she hasn't used opiates in "a while," states that the last time she used opiates was before her admission. Patient denies use of benzos or alcohol, since she lost or had her klonopin stolen. When pressed, patient states "I think I was dabbling in crystal meth" this week. Patient states she was discharged on suboxone but was not using it as prescribed, stating she was taking it "randomly."     Patient states she has been feeling depressed and hasn't been sleeping well. She denies SI, states that she has thoughts of cutting and if she currently had a razor she would cut herself because it helps her feel better. She states that she "hasn't been cutting but is a cutter." She states that she last cut in March. Patient then showed her wrists, which do not have any cuts on them.     Patient is denying hallucinations, delusions, or paranoia. Denying manic symptoms.     Patient states that she lives with her mother. When asked for the phone number to contact her, patient becomes very irritable and angry. She then states that she lives with her mother but her mother is "out of town," patient unable to state for how long, or when her mom left. She states her mom went to [place] then states she went to [place]. Patient states that she has a partner who she lived with in [place] for 3 months. She states he was then arrested but has since come up to [place] to be with her. He states that his name is Berkley, but that we cannot contact him because they share a phone. She states she hasn't seen him recently, becomes very evasive when asked more questions about it.     iSTOP  demonstrated (ref# [id number]) monthly Klonopin 1mg 60 tablet prescriptions from Dr. Saw Scruggs internist, as well as most recently discharged on Klonopin 1mg PO BID and Suboxone (given 10 days ago, 2 week supply). Patient is a 51-year-old woman, domiciled with mother in [place], PPH of polysubstance use, opioid use disorder (on methadone) benzo dependence, anxiety, depression, prior psychiatric hospitalizations including last for 10 days at [facility] discharged on 4/6/22, did not attend follow up, history of suicidal gestures and non-suicidal cutting behavior, malingering, no known PMH, who was BIB self stating that her medication was stolen.    Upon evaluation, patient appeared with dirt under fingernails, unkempt appearance, irritable, but overall cooperative. Patient states that she has not been caring for herself and that she lost her medications. She states that this happened 2 days ago to the staff, then states that this might have happened a week ago. Patient states to staff that her medication was stolen, told provider medication was lost. She states that she was admitted to the [facility] at some point recently, unsure exactly when or for how long. States that she was given information for follow up and was discharged on Lexapro and Klonopin. Patient states that she did not follow up with outpatient providers, when asked why states she is unsure, becomes very vague and tangential. She continuously requests Klonopin, Ativan or "some other shit."     When asked about substances, patient states that she hasn't used opiates in "a while," states that the last time she used opiates was before her admission. Patient denies use of benzos or alcohol, since she lost or had her klonopin stolen. When pressed, patient states "I think I was dabbling in crystal meth" this week. Patient states she was discharged on suboxone but was not using it as prescribed, stating she was taking it "randomly."     Patient states she has been feeling depressed and hasn't been sleeping well. She denies SI, states that she has thoughts of cutting and if she currently had a razor she would cut herself because it helps her feel better. She states that she "hasn't been cutting but is a cutter." She states that she last cut in March. Patient then showed her wrists, which do not have any cuts on them.     Patient is denying hallucinations, delusions, or paranoia. Denying manic symptoms.     Patient states that she lives with her mother. When asked for the phone number to contact her, patient becomes very irritable and angry. She then states that she lives with her mother but her mother is "out of town," patient unable to state for how long, or when her mom left. She states her mom went to [place] then states she went to [place]. Patient states that she has a partner who she lived with in [place] for 3 months. She states he was then arrested but has since come up to [place] to be with her. He states that his name is Berkley, but that we cannot contact him because they share a phone. She states she hasn't seen him recently, becomes very evasive when asked more questions about it.     iSTOP  demonstrated (ref# [id number]) monthly Klonopin 1mg 60 tablet prescriptions from Dr. Saw Scruggs internist, as well as most recently discharged on Klonopin 1mg PO BID and Suboxone (given 10 days ago, 2 week supply).    Collateral obtained from patient's sister Jayda Shi ([phone]), who states that patient called her stating that she didn't know where she was, that she was nervous and scared. She may have said something about being on the wrong meds, didn't say anything about wanting to hurt herself. Patient's sister lives in [place], speaks with her sister over the phone. Patient's sister states that this isn't something that is new about her presentation. States that patient's mom lives in [place] but is currently visiting her sister in [place]. Patient's sister doesn't know of anyone else who we can contact to get more information about how the patient has been doing, doesn't think there is anyone else patient has been in contact with. Patient is a 51-year-old woman, domiciled with mother in [place], PPH of polysubstance use, opioid use disorder (on methadone) benzo dependence, anxiety, depression, prior psychiatric hospitalizations including last for 10 days at [facility] discharged on 4/6/22, did not attend follow up, history of suicidal gestures and non-suicidal cutting behavior, malingering, no known PMH, who was BIB self stating that her medication was stolen.    Upon evaluation, patient appeared with dirt under fingernails, unkempt appearance, irritable, but overall cooperative. Patient states that she has not been caring for herself and that she lost her medications. She states that this happened 2 days ago to the staff, then states that this might have happened a week ago. Patient states to staff that her medication was stolen, told provider medication was lost. She states that she was admitted to the [facility] at some point recently, unsure exactly when or for how long. States that she was given information for follow up and was discharged on Lexapro and Klonopin. Patient states that she did not follow up with outpatient providers, when asked why states she is unsure, becomes very vague and tangential. She continuously requests Klonopin, Ativan or "some other shit."     When asked about substances, patient states that she hasn't used opiates in "a while," states that the last time she used opiates was before her admission. Patient denies use of benzos or alcohol, since she lost or had her klonopin stolen. When pressed, patient states "I think I was dabbling in crystal meth" this week. Patient states she was discharged on suboxone but was not using it as prescribed, stating she was taking it "randomly."     Patient states she has been feeling depressed and hasn't been sleeping well. She denies SI, states that she has thoughts of cutting and if she currently had a razor she would cut herself because it helps her feel better. She states that she "hasn't been cutting but is a cutter." She states that she last cut in March. Patient then showed her wrists, which do not have any cuts on them.     Patient is denying hallucinations, delusions, or paranoia. Denying manic symptoms.     Patient states that she lives with her mother. When asked for the phone number to contact her, patient becomes very irritable and angry. She then states that she lives with her mother but her mother is "out of town," patient unable to state for how long, or when her mom left. She states her mom went to [place] then states she went to [place]. Patient states that she has a partner who she lived with in [place] for 3 months. She states he was then arrested but has since come up to [place] to be with her. He states that his name is Berkley, but that we cannot contact him because they share a phone. She states she hasn't seen him recently, becomes very evasive when asked more questions about it.     iSTOP  demonstrated (ref# [id number]) monthly Klonopin 1mg 60 tablet prescriptions from Dr. Saw Scruggs internist, as well as most recently discharged on Klonopin 1mg PO BID and Suboxone (given 10 days ago, 2 week supply).    Collateral obtained from patient's sister Jayda Shi ([phone]), who states that patient called her stating that she didn't know where she was, that she was nervous and scared. She may have said something about being on the wrong meds, didn't say anything about wanting to hurt herself. Patient's sister lives in [place], speaks with her sister over the phone. Patient's sister states that this isn't something that is new about her presentation. States that patient's mom lives in [place] but is currently visiting her sister in [place]. Patient's sister doesn't know of anyone else who we can contact to get more information about how the patient has been doing, doesn't think there is anyone else patient has been in contact with.        Patient re-evaluted 6+ hours later. Patient had been calm and cooperative, sleeping throughout the day. On re-evaluation before discharge, patient became acutely agitated, stating that she was going to go kill herself. Stating that she will go and jump in front of traffic, hang herself, slit her wrists. Patient states "CUT CUT CUT" "KILL KILL KILL," begins crying, stating that she doesn't want to live. Patient aggressive, starts to hit her head, looks around her, stating that she is looking for something to kill herself. States she will go to [place] and go to a Blvd, go hide and then kill herself where nobody could see. Then asked patient if she wanted something for her anxiety, she became focused, stopped crying, stating "can you give me something?" Providers answer that we can, patient then calms down, when told that it will not be a benzodiazepine, she becomes agitated again. Patient unable to be redirected despite multiple attempts. Given IM Haldol 5mg/ Benadryl 50mg. Will reassess. Patient is a 51-year-old woman, domiciled with mother in [place], PPH of polysubstance use, opioid use disorder (on methadone) benzo dependence, anxiety, depression, prior psychiatric hospitalizations including last for 10 days at [facility] discharged on 4/6/22, did not attend follow up, history of suicidal gestures and non-suicidal cutting behavior, malingering, no known PMH, who was BIB self stating that her medication was stolen.    Upon evaluation, patient appeared with dirt under fingernails, unkempt appearance, irritable, but overall cooperative. Patient states that she has not been caring for herself and that she lost her medications. She states that this happened 2 days ago to the staff, then states that this might have happened a week ago. Patient states to staff that her medication was stolen, told provider medication was lost. She states that she was admitted to the [facility] at some point recently, unsure exactly when or for how long. States that she was given information for follow up and was discharged on Lexapro and Klonopin. Patient states that she did not follow up with outpatient providers, when asked why states she is unsure, becomes very vague and tangential. She continuously requests Klonopin, Ativan or "some other shit."     When asked about substances, patient states that she hasn't used opiates in "a while," states that the last time she used opiates was before her admission. Patient denies use of benzos or alcohol, since she lost or had her klonopin stolen. When pressed, patient states "I think I was dabbling in crystal meth" this week. Patient states she was discharged on suboxone but was not using it as prescribed, stating she was taking it "randomly."     Patient states she has been feeling depressed and hasn't been sleeping well. She denies SI, states that she has thoughts of cutting and if she currently had a razor she would cut herself because it helps her feel better. She states that she "hasn't been cutting but is a cutter." She states that she last cut in March. Patient then showed her wrists, which do not have any cuts on them.     Patient is denying hallucinations, delusions, or paranoia. Denying manic symptoms.     Patient states that she lives with her mother. When asked for the phone number to contact her, patient becomes very irritable and angry. She then states that she lives with her mother but her mother is "out of town," patient unable to state for how long, or when her mom left. She states her mom went to [place] then states she went to [place]. Patient states that she has a partner who she lived with in [place] for 3 months. She states he was then arrested but has since come up to [place] to be with her. He states that his name is Berkley, but that we cannot contact him because they share a phone. She states she hasn't seen him recently, becomes very evasive when asked more questions about it.     iSTOP  demonstrated (ref# [id number]) monthly Klonopin 1mg 60 tablet prescriptions from Dr. Saw Scruggs internist, as well as most recently discharged on Klonopin 1mg PO BID and Suboxone (given 10 days ago, 2 week supply).    Collateral obtained from patient's sister Jayda Shi ([phone]), who states that patient called her stating that she didn't know where she was, that she was nervous and scared. She may have said something about being on the wrong meds, didn't say anything about wanting to hurt herself. Patient's sister lives in [place], speaks with her sister over the phone. Patient's sister states that this isn't something that is new about her presentation. States that patient's mom lives in [place] but is currently visiting her sister in [place]. Patient's sister doesn't know of anyone else who we can contact to get more information about how the patient has been doing, doesn't think there is anyone else patient has been in contact with.        Patient re-evaluted 6+ hours later. Patient had been calm and cooperative, sleeping throughout the day. On re-evaluation before discharge, patient became acutely agitated, stating that she was going to go kill herself. Stating that she will go and jump in front of traffic, hang herself, slit her wrists. Patient states "CUT CUT CUT" "KILL KILL KILL," begins crying, stating that she doesn't want to live. Patient aggressive, starts to hit her head, looks around her, stating that she is looking for something to kill herself. States she will go to [place] and go to a Blvd, go hide and then kill herself where nobody could see. Then asked patient if she wanted something for her anxiety, she became focused, stopped crying, stating "can you give me something?" Providers answer that we can, patient then calms down, when told that it will not be a benzodiazepine, she becomes agitated again. Patient unable to be redirected despite multiple attempts. Given IM Haldol 5mg/ Benadryl 50mg.

## 2022-04-16 NOTE — ED BEHAVIORAL HEALTH ASSESSMENT NOTE - CASE SUMMARY
51-year-old woman, domiciled with mother in [place], PPH of polysubstance use, opioid use disorder (on methadone) benzo dependence, anxiety, depression, prior psychiatric hospitalizations including last for 10 days at [facility] discharged on 4/6/22, did not attend follow up, history of suicidal gestures and non-suicidal cutting behavior, malingering, no known PMH, who was BIB self-stating that her medication was stolen.   She presents irritable, evasive, tangential medication seeking, animated but doesn’t appear manic or psychotic which is like secondary to polysubstance use.  She spent over 6 hours in the ED sleeping (likely from a stimulant withdrawal) with no behavioral problems and awoke for discharged home and then reports SI to jump in front of a care when told she will not be given benzos in the ED and discharged home.  She started hitting herself, and escalating, and was medicated for safety.  Her primary struggle is polysubstance use and is presenting with substance withdrawal dysphoria and SI, and also manipulative for secondary gain of benzos.  She will require involuntary admission for safety.   Plan: medical clearance, admit [facility] L4 under 9.39, follow vitals, agitation medications, hold clonazepam, hold escitalopram, hold Suboxone 4 mg-1 mg sublingual film, restart mirtazapine 7.5 mg oral tablet QHS, hydroxyzine 50mg PRN anxiety

## 2022-04-16 NOTE — ED BEHAVIORAL HEALTH ASSESSMENT NOTE - CURRENT MEDICATION
Klonopin 1mg PO BID  Lexapro 10mg PO daily  Remeron 7.5mg PO qHS  Suboxone 4mg-1mg sublingual TID, states not taking as Rx (was using "randomly")

## 2022-04-16 NOTE — ED PROVIDER NOTE - CLINICAL SUMMARY MEDICAL DECISION MAKING FREE TEXT BOX
Subjective:   Dannie Thurston is a 84 y.o. male here today for dyslipidemia, low back pain, AAA, BPH, HTN, glaucoma    Dyslipidemia  Pt is on simvastatin 40 mg daily and a baby aspirin daily. He follows with cardiology. Patient is taking medication as prescribed    Patient denies myalgias. Per chart review there is no elevated liver enzymes recently    Other chest pain - atypical  Pt follows with cardiology for this.     Chronic low back pain  Pt follows with [facility] for this.     Abdominal aortic ectasia  Pt has had this seen on ultrasound. The most recent ultrasound actually showed a decreased size from previous.     BPH (benign prostatic hyperplasia)  Pt is on tamsulosin and finasteride for BPH. He follows with urology for this.     HTN (hypertension)  Pt in on lisinopril 20 mg daily for HTN. She reports she is compliant with medication. BP is at goal per JNC 8 critieria today.     Denies chest pain, shortness of breath, headache      Glaucoma  Pt follows with ophthalmology for this    Nasal congestion  Pt is on flonase for this symptoms with adequate control of symptom. He follows with ENT Dr. Chavez       Current medicines (including changes today)  Current Outpatient Prescriptions   Medication Sig Dispense Refill   • fluticasone (FLONASE) 50 MCG/ACT nasal spray Spray 1 Spray in nose every day. 16 g 3   • finasteride (PROSCAR) 5 MG TABS Take 5 mg by mouth every day.     • tamsulosin (FLOMAX) 0.4 MG capsule Take 0.4 mg by mouth ONE-HALF HOUR AFTER BREAKFAST. 2 tablets     • lisinopril (PRINIVIL) 20 MG TABS Take 30 mg by mouth every day.     • simvastatin (ZOCOR) 40 MG TABS Take 40 mg by mouth every evening.     • dorzolamide-timolol (COSOPT) 2-0.5 % SOLN Place 1 Drop in left eye 2 times a day.     • Ascorbic Acid (VITAMIN C PO) Take 500 mg by mouth.     • ASPIRIN PO Take 81 mg by mouth.     • Multiple Vitamins-Minerals (CENTRUM SILVER PO) Take  by mouth.     • GLUCOSAMINE PO Take 1,500 mg by mouth.     • Psyllium  "(METAMUCIL PO) Take  by mouth every day.     • VITAMIN D PO Take  by mouth.     • Polyethylene Glycol 3350 (MIRALAX PO) Take  by mouth as needed.       No current facility-administered medications for this visit.     He  has a past medical history of Arthritis; Hypertension; CATARACT; Hyperlipidemia; and BPH (benign prostatic hyperplasia).    ROS   No chest pain, no shortness of breath, no headache       Objective:     Blood pressure 126/64, pulse 60, temperature 36.6 °C (97.8 °F), resp. rate 16, height 1.803 m (5' 11\"), weight 86.183 kg (190 lb), SpO2 95 %. Body mass index is 26.51 kg/(m^2).   Physical Exam:  Constitutional: Alert & oriented, no acute distress  Eye: Conjunctiva clear, lids normal, no discharge  ENMT: Lips without lesions, normal external nose and ears  Neck: Trachea midline, no masses, no thyromegaly. No cervical or supraclavicular lymphadenopathy  Respiratory: Unlabored respiratory effort, lungs clear to auscultation, no wheezes, no ronchi  Cardiovascular: Normal S1, S2, no murmur, no edema  Skin: Seborrheic keratoses seen on neck  Neuro: No overt focal neurologic deficits, normal gait  Psych: Normal mood and affect      Assessment and Plan:   The following treatment plan was discussed    1. Dyslipidemia  Continue simvastatin. Recheck labs prior to follow up  - LIPID PROFILE; Future  - TSH WITH REFLEX TO FT4; Future    2. Other chest pain - atypical  Continue follow-up with cardiology    3. Chronic low back pain, unspecified back pain laterality, with sciatica presence unspecified  Continue follow-up with orthopedics    4. Abdominal aortic ectasia (CMS-HCC)  Refer to vascular surgery per patient request  - REFERRAL TO VASCULAR SURGERY    5. Benign prostatic hyperplasia, presence of lower urinary tract symptoms unspecified, unspecified morphology  Continue finasteride and tamsulosin.     6. Essential hypertension  BP at goal per JNC 8 criteria. Continue lisinopril 20 mg daily  - CBC WITH " DIFFERENTIAL; Future  - COMP METABOLIC PANEL; Future    7. Glaucoma, unspecified glaucoma, unspecified laterality  Continue follows up with ophthalmology    8. Nasal congestion  Continue flonase    Followup: Return in about 6 months (around 8/27/2017).    Please note that this dictation was created using voice recognition software. I have made every reasonable attempt to correct obvious errors, but I expect that there are errors of grammar and possibly content that I did not discover before finalizing the note.             pt p/w complaint of SI, with intention of cutting self, unkempt appearing with recent d/c from [facility] for same complaints. pt here with no slurred speech, steady gait. hd stable. pending labs, psych eval and dispo as per psych.

## 2022-04-16 NOTE — ED PROVIDER NOTE - PATIENT PORTAL LINK FT
You can access the FollowMyHealth Patient Portal offered by [facility] by registering at the following website: [email or web address]. By joining [employer]’s FollowMyHealth portal, you will also be able to view your health information using other applications (apps) compatible with our system.

## 2022-04-16 NOTE — BH PATIENT PROFILE - STATED REASON FOR ADMISSION
Pt presented to the ER with complaint of having thoughts of hurting self, has being non compliant with her discharge plan of care, and has being abusing multiple drugs.

## 2022-04-16 NOTE — ED ADULT TRIAGE NOTE - CHIEF COMPLAINT QUOTE
hopeless, suicidal + plan and depressed x 1 day, recently became noncompliant with her lexapro and klonopin

## 2022-04-17 LAB
CULTURE RESULTS: SIGNIFICANT CHANGE UP
POTASSIUM SERPL-MCNC: 3 MMOL/L — LOW (ref 3.5–5.3)
POTASSIUM SERPL-SCNC: 3 MMOL/L — LOW (ref 3.5–5.3)
SPECIMEN SOURCE: SIGNIFICANT CHANGE UP

## 2022-04-17 PROCEDURE — 99222 1ST HOSP IP/OBS MODERATE 55: CPT | Mod: 1L

## 2022-04-17 RX ORDER — CLONAZEPAM 1 MG
1 TABLET ORAL
Refills: 0 | Status: DISCONTINUED | OUTPATIENT
Start: 2022-04-17 | End: 2022-04-18

## 2022-04-17 RX ORDER — CLONAZEPAM 1 MG
1 TABLET ORAL ONCE
Refills: 0 | Status: DISCONTINUED | OUTPATIENT
Start: 2022-04-17 | End: 2022-04-17

## 2022-04-17 RX ORDER — ESCITALOPRAM OXALATE 10 MG/1
5 TABLET, FILM COATED ORAL DAILY
Refills: 0 | Status: DISCONTINUED | OUTPATIENT
Start: 2022-04-17 | End: 2022-04-21

## 2022-04-17 RX ORDER — POTASSIUM CHLORIDE 20 MEQ
40 PACKET (EA) ORAL ONCE
Refills: 0 | Status: COMPLETED | OUTPATIENT
Start: 2022-04-17 | End: 2022-04-17

## 2022-04-17 RX ADMIN — MIRTAZAPINE 7.5 MILLIGRAM(S): 45 TABLET, ORALLY DISINTEGRATING ORAL at 19:58

## 2022-04-17 RX ADMIN — Medication 1 MILLIGRAM(S): at 19:57

## 2022-04-17 RX ADMIN — ESCITALOPRAM OXALATE 5 MILLIGRAM(S): 10 TABLET, FILM COATED ORAL at 10:42

## 2022-04-17 RX ADMIN — Medication 40 MILLIEQUIVALENT(S): at 21:47

## 2022-04-17 RX ADMIN — Medication 1 MILLIGRAM(S): at 10:40

## 2022-04-17 NOTE — BH INPATIENT PSYCHIATRY ASSESSMENT NOTE - NSBHASSESSSUMMFT_PSY_ALL_CORE
Plan:  >Legal: 9.39  >Obs: Routine, no current SI. no need for CO, patient not expected to pose risk to self or others in controlled inpatient setting  >Psychiatric Meds: Restart outpatient medication regimen: Lexapro 10mg daily, Remeron 7.5mg PO qHS, Klonopin 1mg BID, Suboxone 4mg-1mg sublingual TID ( hold on restarting suboxone) Observe for tolerability and efficacy.   PRN medications:  Haldol 5mg oral Q6HR PRN for agitation.   Benadryl 50mg oral Q6HR PRN for agitation.   Vistaril 50mg oral Q6HR PRN for anxiety.  Desyrel 50mg oral QHS PRN for insomnia.   >Labs: Admission labs reviewed, no acute findings, u-tox negative. Labs pending for tomorrow: A1c and Lipid panel. Hold antipsychotics if QTc >500  >Medical: No acute concerns. No consultations needed at this time. Symptom triggered CIWA. Patient with consistently stable VS, no visible physical symptoms of withdrawal. During the course of treatment, will collaborate with medical team to manage medical issues.  >Diet: Regular  >Social: milieu/structured therapy  >Treatment Interventions: Groups and Individual Therapy/CBT, Motivational counseling for substance abuse related issues.   >Dispo: Collateral and dispo planning pending further symptom and medication optimization

## 2022-04-17 NOTE — BH INPATIENT PSYCHIATRY ASSESSMENT NOTE - OTHER PAST PSYCHIATRIC HISTORY (INCLUDE DETAILS REGARDING ONSET, COURSE OF ILLNESS, INPATIENT/OUTPATIENT TREATMENT)
Multiple prior hospitalizations, multiple prior ER visits, substance use disorder, not currently in outpaitent treatment, has been on methadone in the past. Patient recently d/c from inpatient psych at [facility] on 4/6/22 on klonopin, lexapro, remeron and suboxone. Patient given follow up info for [facility] but did not follow up.

## 2022-04-17 NOTE — BH INPATIENT PSYCHIATRY ASSESSMENT NOTE - CURRENT MEDICATION
MEDICATIONS  (STANDING):  mirtazapine 7.5 milliGRAM(s) Oral at bedtime    MEDICATIONS  (PRN):  diphenhydrAMINE 50 milliGRAM(s) Oral every 6 hours PRN EPS/agitation  diphenhydrAMINE Injectable 50 milliGRAM(s) IntraMuscular once PRN EPS/severeagitation  haloperidol     Tablet 5 milliGRAM(s) Oral every 6 hours PRN agitation  haloperidol    Injectable 5 milliGRAM(s) IntraMuscular Once PRN severe agitation  hydrOXYzine hydrochloride 50 milliGRAM(s) Oral every 6 hours PRN Anxiety   MEDICATIONS  (STANDING):  clonazePAM  Tablet 1 milliGRAM(s) Oral two times a day  escitalopram 5 milliGRAM(s) Oral daily  mirtazapine 7.5 milliGRAM(s) Oral at bedtime    MEDICATIONS  (PRN):  diphenhydrAMINE 50 milliGRAM(s) Oral every 6 hours PRN EPS/agitation  diphenhydrAMINE Injectable 50 milliGRAM(s) IntraMuscular once PRN EPS/severeagitation  haloperidol     Tablet 5 milliGRAM(s) Oral every 6 hours PRN agitation  haloperidol    Injectable 5 milliGRAM(s) IntraMuscular Once PRN severe agitation  hydrOXYzine hydrochloride 50 milliGRAM(s) Oral every 6 hours PRN Anxiety

## 2022-04-17 NOTE — BH INPATIENT PSYCHIATRY ASSESSMENT NOTE - ADDITIONAL DETAILS ALL
Patient denying SI, although vaguely saying she may cut herself (denying this is with suicidal intent)---- EDITED: Patient then stating that she will go kill herself if she is discharged from the hospital, will jump into traffic or kill herself by cutting

## 2022-04-17 NOTE — BH INPATIENT PSYCHIATRY ASSESSMENT NOTE - HPI (INCLUDE ILLNESS QUALITY, SEVERITY, DURATION, TIMING, CONTEXT, MODIFYING FACTORS, ASSOCIATED SIGNS AND SYMPTOMS)
Patient was seen and evaluated, chart reviewed. Case discussed with nursing team.  On service for this 51 year old single female with PPH of   Polysubstance use, opioid use disorder (on methadone) benzo dependence, anxiety, depression.  Pt has hx of multiple psych hospitalizations including last for 10 days at [facility] discharged on 4/6/22.  Patient  BIB by self stating that her medication was stolen.  Patient admitted to [facility] on a 9.39 legal status. I have reviewed the initial psychiatric assessment in the electronic medical record, including the history of present illness, past psychiatric history, family/social history (no pertinent changes), and exam, and have confirmed the salient findings dated 4/16/22.    As per chart review, transferring records indicated the following:  Patient is a 51-year-old woman, domiciled with mother in [place], PPH of polysubstance use, opioid use disorder (on methadone) benzo dependence, anxiety, depression, prior psychiatric hospitalizations including last for 10 days at [facility] discharged on 4/6/22, did not attend follow up, history of suicidal gestures and non-suicidal cutting behavior, malingering, no known PMH, who was BIB self stating that her medication was stolen. Upon evaluation, patient appeared with dirt under fingernails, unkempt appearance, irritable, but overall cooperative. Patient states that she has not been caring for herself and that she lost her medications. She states that this happened 2 days ago to the staff, then states that this might have happened a week ago. Patient states to staff that her medication was stolen, told provider medication was lost. She states that she was admitted to the [facility] at some point recently, unsure exactly when or for how long. States that she was given information for follow up and was discharged on Lexapro and Klonopin. Patient states that she did not follow up with outpatient providers, when asked why states she is unsure, becomes very vague and tangential. She continuously requests Klonopin, Ativan or "some other shit."  When asked about substances, patient states that she hasn't used opiates in "a while," states that the last time she used opiates was before her admission. Patient denies use of benzos or alcohol, since she lost or had her klonopin stolen. When pressed, patient states "I think I was dabbling in crystal meth" this week. Patient states she was discharged on suboxone but was not using it as prescribed, stating she was taking it "randomly."  Patient states she has been feeling depressed and hasn't been sleeping well. She denies SI, states that she has thoughts of cutting and if she currently had a razor she would cut herself because it helps her feel better. She states that she "hasn't been cutting but is a cutter." She states that she last cut in March. Patient then showed her wrists, which do not have any cuts on them.  Patient is denying hallucinations, delusions, or paranoia. Denying manic symptoms.  Patient states that she lives with her mother. When asked for the phone number to contact her, patient becomes very irritable and angry. She then states that she lives with her mother but her mother is "out of town," patient unable to state for how long, or when her mom left. She states her mom went to [place] then states she went to [place]. Patient states that she has a partner who she lived with in [place] for 3 months. She states he was then arrested but has since come up to [place] to be with her. He states that his name is Berkley, but that we cannot contact him because they share a phone. She states she hasn't seen him recently, becomes very evasive when asked more questions about it.   ISTOP  demonstrated (ref# [id number]) monthly Klonopin 1mg 60 tablet prescriptions from Dr. Saw Scruggs internist, as well as most recently discharged on Klonopin 1mg PO BID and Suboxone (given 10 days ago, 2 week supply).    Patient re-evaluted 6+ hours later. Patient had been calm and cooperative, sleeping throughout the day. On re-evaluation before discharge, patient became acutely agitated, stating that she was going to go kill herself. Stating that she will go and jump in front of traffic, hang herself, slit her wrists. Patient states "CUT CUT CUT" "KILL KILL KILL," begins crying, stating that she doesn't want to live. Patient aggressive, starts to hit her head, looks around her, stating that she is looking for something to kill herself. States she will go to [place] and go to a Blvd, go hide and then kill herself where nobody could see. Then asked patient if she wanted something for her anxiety, she became focused, stopped crying, stating "can you give me something?" Providers answer that we can, patient then calms down, when told that it will not be a benzodiazepine, she becomes agitated again. Patient unable to be redirected despite multiple attempts. Given IM Haldol 5mg/ Benadryl 50mg.    On unit: Information received from: Chart review and patient interview  Patient is worsening mood, SI, and polysubstance misuse.  Chart, medications and labs reviewed, admitting u-tox positive for Cocaine, Benzo, and amphetamine.  Patient is discussed with nursing staff. No significant overnight issues.  Per nursing report patient remains compliant with all standing medications and tolerating well. Patient remains in fair behavioral control, there has been no aggression, no prns for aggression.   Patient is observed in room.  She is approachable and engaging during interview.  Patient describes mood as “I feel hopeless” Reports depressed mood.  Patient was recently discharged from [facility] unit last week on 4/6/22.  Patient reports she did not follow up with outpatient appointments and lost her medications “I lost my bag 2 days ago” Patient unable to elaborate further. Patient reports she cannot remember a lot of the events after she was discharged.  Most interview questions patient answered “I don’t know”  She describes daily low mood,  anxiety,  anhedonia,  difficulty sleeping. Patient states that she had thoughts of cutting and if she currently had a razor she would cut herself because it helps her feel better. She states that she "hasn't been cutting but is a cutter." She states that she last cut in March ( “I cut my wrist”, no evidence of past cuts).  Patient’s demeanor/posture/attitude during interview convey an underlying depressed/anxious mood. Patient endorses passive SI, denies plan or intent “its just thoughts” Denies any urges to SIB, denies HI, engages in safety planning.  In ED patient stated that she will go kill herself if she is discharged from the hospital, will jump into traffic or kill herself by cutting. Patient became erratic, violent, started banging her head. Patient given emergent IM medications, Haldol 5mg/ Benadryl 50mg.  Patient reports “I need to be in a safe place” reports feeling safe on unit.  Patient fixated on being transferred to [facility].   In regards to psychotic symptoms patient denies AVH. No obsessive, intrusive and persistent thoughts or compulsive, ritualistic acts are reported. No hallucinations, delusions, or other symptoms of psychotic process are reported by her. Patient denies any symptoms suggestive of mariaelena: (denied grandiosity/ racing thoughts/ increased goal directed activities or engaged in risk taking behavior/ no pressured speech/ no elevated mood/ denied any increased in energy level causing sleep disruption). No signs of catatonia.     Patient reports  substance use, u-tox positive for Cocaine, Benzo, and amphetamine.  Denies alcohol use. Cocaine $10 (smoking) pt reports using twice since her discharge, amphetamine $60-80 (smoking) reports using several times since she was discharged.   ISTOP  demonstrated (ref# [id number]) monthly Klonopin 1mg 60 tablet prescriptions from Dr. Saw Scruggs internist, as well as most recently discharged on Klonopin 1mg PO BID and Suboxone (given 10 days ago, 2 week supply). Patient reports she has not been taking her Suboxone “I can do without starting it again”.  No current legal issue, no past trauma. No PMH.

## 2022-04-17 NOTE — PSYCHIATRIC REHAB INITIAL EVALUATION - NSBHPRRECOMMEND_PSY_ALL_CORE
Pt is a 50 y/o female. Pt has hx of multiple psychiatric inpatient admissions and was recently discharged from [facility] on 4/6/2022. Pt has hx of SIB via cutting. Pt has hx of malingering. Pt was admitted to [place] [address] due to medication non-compliance. Writer met with pt, pt is uncooperative, required to get transfer to [facility]. Writer has briefly introduced self, provided pt with a copy of unit schedule and welcome letter. Pt was superficially receptive. When writer started to asked questions regarding her admission, pt immediately refused to meet with writer.     The following information was obtained from medical record. Pt did not follow up with her outpatient treatment after she was discharged on 4/6/2022. Pt told doctor in ED that her medication was stolen. Pt was calm, cooperative in ED, and doctor was planning to discharge her. Pt suddenly become agitated, and started to made threaten to kill herself indulging she will jump in front of traffic, hang herself, and slit her wrist. Pt suddenly become aggressive in ED and started to hit her head, look around her a,d stated she is looking for someone to kill her. Then she made threaten again that she will go to [place] and go to a Blvd, hide, and kill herself where nobody could see. Pt received IM PRN. Pt has positive utox for benzo, cocaine, and amphetamine.   Writer will meet with pt in a later time and build rapport with pt.

## 2022-04-17 NOTE — BH INPATIENT PSYCHIATRY ASSESSMENT NOTE - DESCRIPTION
Lives with mother in [place], unemployed [employer] Counseling:  I discussed with the patient the risks of adalimumab including but not limited to myelosuppression, immunosuppression, autoimmune hepatitis, demyelinating diseases, lymphoma, and serious infections.  The patient understands that monitoring is required including a PPD at baseline and must alert us or the primary physician if symptoms of infection or other concerning signs are noted.

## 2022-04-17 NOTE — BH INPATIENT PSYCHIATRY ASSESSMENT NOTE - RISK ASSESSMENT
Chronic risk factors include substance use disorders, history of mood symptoms, history of self-harm. Acute risk factors include active SI, ongoing substance use. Mitigating factors include ability to seek care, ability to care for needs, desire for secondary gain. Overall patient is moderate chronic risk of suicide, currently at elevated risk given her suicidal statements and inability to safety plan.

## 2022-04-17 NOTE — BH INPATIENT PSYCHIATRY ASSESSMENT NOTE - NSBHCHARTREVIEWVS_PSY_A_CORE FT
Vital Signs Last 24 Hrs  T(C): 36.1 (04-17-22 @ 05:57), Max: 36.8 (04-16-22 @ 18:15)  T(F): 97 (04-17-22 @ 05:57), Max: 98.2 (04-16-22 @ 18:15)  HR: 78 (04-16-22 @ 18:15) (76 - 78)  BP: 152/84 (04-16-22 @ 18:15) (132/88 - 152/84)  BP(mean): --  RR: 16 (04-16-22 @ 18:50) (16 - 18)  SpO2: 100% (04-16-22 @ 18:15) (100% - 100%)    Orthostatic VS  04-17-22 @ 08:39  Lying BP: --/-- HR: --  Sitting BP: 96/60 HR: 82  Standing BP: --/-- HR: --  Site: --  Mode: electronic  Orthostatic VS  04-16-22 @ 18:50  Lying BP: --/-- HR: --  Sitting BP: 114/79 HR: 92  Standing BP: 102/72 HR: 95  Site: --  Mode: --   Vital Signs Last 24 Hrs  T(C): 36.1 (04-17-22 @ 05:57), Max: 36.8 (04-16-22 @ 18:15)  T(F): 97 (04-17-22 @ 05:57), Max: 98.2 (04-16-22 @ 18:15)  HR: 78 (04-16-22 @ 18:15) (78 - 78)  BP: 152/84 (04-16-22 @ 18:15) (152/84 - 152/84)  BP(mean): --  RR: 16 (04-16-22 @ 18:50) (16 - 18)  SpO2: 100% (04-16-22 @ 18:15) (100% - 100%)    Orthostatic VS  04-17-22 @ 08:39  Lying BP: --/-- HR: --  Sitting BP: 96/60 HR: 82  Standing BP: --/-- HR: --  Site: --  Mode: electronic  Orthostatic VS  04-16-22 @ 18:50  Lying BP: --/-- HR: --  Sitting BP: 114/79 HR: 92  Standing BP: 102/72 HR: 95  Site: --  Mode: --

## 2022-04-17 NOTE — PSYCHIATRIC REHAB INITIAL EVALUATION - NSBHALCSUBTREAT_PSY_ALL_CORE
Pt did not follow up with her outpatient treatment with [facility] prior to her admission./Substance abuse program (specify)

## 2022-04-17 NOTE — ED BEHAVIORAL HEALTH NOTE - BEHAVIORAL HEALTH NOTE
Writer contacted [employer] at [phone]. Writer spoke with Benjamín VERMA who provided 15 day (4/16-4/30) auth #[id number]. Next review on 4/22/22 to discuss d/c planning. Concurrent reviewer to be assigned and will outreach provided UR contact.

## 2022-04-18 LAB
ANION GAP SERPL CALC-SCNC: 12 MMOL/L — SIGNIFICANT CHANGE UP (ref 7–14)
BUN SERPL-MCNC: 8 MG/DL — SIGNIFICANT CHANGE UP (ref 7–23)
CALCIUM SERPL-MCNC: 8.8 MG/DL — SIGNIFICANT CHANGE UP (ref 8.4–10.5)
CHLORIDE SERPL-SCNC: 104 MMOL/L — SIGNIFICANT CHANGE UP (ref 98–107)
CO2 SERPL-SCNC: 25 MMOL/L — SIGNIFICANT CHANGE UP (ref 22–31)
CREAT SERPL-MCNC: 0.54 MG/DL — SIGNIFICANT CHANGE UP (ref 0.5–1.3)
EGFR: 113 ML/MIN/1.73M2 — SIGNIFICANT CHANGE UP
GLUCOSE SERPL-MCNC: 111 MG/DL — HIGH (ref 70–99)
POTASSIUM SERPL-MCNC: 3.6 MMOL/L — SIGNIFICANT CHANGE UP (ref 3.5–5.3)
POTASSIUM SERPL-SCNC: 3.6 MMOL/L — SIGNIFICANT CHANGE UP (ref 3.5–5.3)
SODIUM SERPL-SCNC: 141 MMOL/L — SIGNIFICANT CHANGE UP (ref 135–145)

## 2022-04-18 PROCEDURE — 99232 SBSQ HOSP IP/OBS MODERATE 35: CPT | Mod: 1L

## 2022-04-18 RX ORDER — ACETAMINOPHEN 500 MG
650 TABLET ORAL EVERY 6 HOURS
Refills: 0 | Status: DISCONTINUED | OUTPATIENT
Start: 2022-04-18 | End: 2022-05-13

## 2022-04-18 RX ORDER — BACITRACIN ZINC 500 UNIT/G
1 OINTMENT IN PACKET (EA) TOPICAL
Refills: 0 | Status: DISCONTINUED | OUTPATIENT
Start: 2022-04-18 | End: 2022-04-18

## 2022-04-18 RX ORDER — BACITRACIN ZINC 500 UNIT/G
1 OINTMENT IN PACKET (EA) TOPICAL
Refills: 0 | Status: DISCONTINUED | OUTPATIENT
Start: 2022-04-18 | End: 2022-04-19

## 2022-04-18 RX ORDER — CLONAZEPAM 1 MG
1 TABLET ORAL
Refills: 0 | Status: DISCONTINUED | OUTPATIENT
Start: 2022-04-18 | End: 2022-04-22

## 2022-04-18 RX ADMIN — ESCITALOPRAM OXALATE 5 MILLIGRAM(S): 10 TABLET, FILM COATED ORAL at 08:07

## 2022-04-18 RX ADMIN — Medication 1 MILLIGRAM(S): at 16:49

## 2022-04-18 RX ADMIN — Medication 1 APPLICATION(S): at 21:20

## 2022-04-18 RX ADMIN — MIRTAZAPINE 7.5 MILLIGRAM(S): 45 TABLET, ORALLY DISINTEGRATING ORAL at 21:20

## 2022-04-18 RX ADMIN — Medication 50 MILLIGRAM(S): at 15:29

## 2022-04-18 RX ADMIN — Medication 1 APPLICATION(S): at 11:13

## 2022-04-18 RX ADMIN — Medication 1 MILLIGRAM(S): at 08:07

## 2022-04-18 NOTE — BH INPATIENT PSYCHIATRY PROGRESS NOTE - NSBHFUPINTERVALHXFT_PSY_A_CORE
Liliam is followed up for depression and history of substance abuse. Chart, medications, and labs reviewed. Patent is discussed in treatment team. No significant issues overnight. Somewhat compliant with medications and sleeping throughout the night. She is requesting transfer to a neighboring unit but the request has been denied. She complains of blisters on her feet from walking around with no shoes. Endorsing feelings of depression. Currently denies SI. She had low potassium on admission and will receive bloodwork this morning to follow up potassium level.  Liliam is followed up for depression and history of substance abuse. Chart, medications, and labs reviewed. Patent is discussed in treatment team. No significant issues overnight. Somewhat compliant with medications and sleeping throughout the night. She complains of blisters on her feet, given bacitracin. Endorsing feelings of depression. Currently denies SI/HI/AVH. She had low potassium on admission, 3.0 pt was supplemented  last night and will repeat bloodwork this morning to follow up potassium level. Results pending. Discussed case with medicine team (Dr. Lambert)

## 2022-04-18 NOTE — BH INPATIENT PSYCHIATRY PROGRESS NOTE - NSBHCHARTREVIEWVS_PSY_A_CORE FT
Vital Signs Last 24 Hrs  T(C): 36.2 (04-18-22 @ 05:45), Max: 36.7 (04-17-22 @ 20:00)  T(F): 97.1 (04-18-22 @ 05:45), Max: 98 (04-17-22 @ 20:00)  HR: --  BP: --  BP(mean): --  RR: 18 (04-17-22 @ 20:00) (18 - 18)  SpO2: 100% (04-17-22 @ 20:00) (100% - 100%)    Orthostatic VS  04-17-22 @ 20:00  Lying BP: --/-- HR: --  Sitting BP: 122/78 HR: 88  Standing BP: 118/80 HR: 90  Site: --  Mode: --  Orthostatic VS  04-17-22 @ 19:33  Lying BP: --/-- HR: --  Sitting BP: 119/73 HR: 89  Standing BP: 105/61 HR: 90  Site: --  Mode: --  Orthostatic VS  04-17-22 @ 08:39  Lying BP: --/-- HR: --  Sitting BP: 96/60 HR: 82  Standing BP: --/-- HR: --  Site: --  Mode: electronic  Orthostatic VS  04-16-22 @ 18:50  Lying BP: --/-- HR: --  Sitting BP: 114/79 HR: 92  Standing BP: 102/72 HR: 95  Site: --  Mode: --   Vital Signs Last 24 Hrs  T(C): 36.2 (04-18-22 @ 05:45), Max: 36.7 (04-17-22 @ 20:00)  T(F): 97.1 (04-18-22 @ 05:45), Max: 98 (04-17-22 @ 20:00)  HR: --  BP: --  BP(mean): --  RR: 18 (04-17-22 @ 20:00) (18 - 18)  SpO2: 100% (04-17-22 @ 20:00) (100% - 100%)    Orthostatic VS  04-18-22 @ 08:18  Lying BP: --/-- HR: --  Sitting BP: 103/60 HR: 80  Standing BP: 112/70 HR: 86  Site: --  Mode: --  Orthostatic VS  04-17-22 @ 20:00  Lying BP: --/-- HR: --  Sitting BP: 122/78 HR: 88  Standing BP: 118/80 HR: 90  Site: --  Mode: --  Orthostatic VS  04-17-22 @ 19:33  Lying BP: --/-- HR: --  Sitting BP: 119/73 HR: 89  Standing BP: 105/61 HR: 90  Site: --  Mode: --  Orthostatic VS  04-17-22 @ 08:39  Lying BP: --/-- HR: --  Sitting BP: 96/60 HR: 82  Standing BP: --/-- HR: --  Site: --  Mode: electronic  Orthostatic VS  04-16-22 @ 18:50  Lying BP: --/-- HR: --  Sitting BP: 114/79 HR: 92  Standing BP: 102/72 HR: 95  Site: --  Mode: --

## 2022-04-18 NOTE — BH INPATIENT PSYCHIATRY PROGRESS NOTE - PRN MEDS
MEDICATIONS  (PRN):  diphenhydrAMINE 50 milliGRAM(s) Oral every 6 hours PRN EPS/agitation  diphenhydrAMINE Injectable 50 milliGRAM(s) IntraMuscular once PRN EPS/severeagitation  haloperidol     Tablet 5 milliGRAM(s) Oral every 6 hours PRN agitation  haloperidol    Injectable 5 milliGRAM(s) IntraMuscular Once PRN severe agitation  hydrOXYzine hydrochloride 50 milliGRAM(s) Oral every 6 hours PRN Anxiety   MEDICATIONS  (PRN):  acetaminophen     Tablet .. 650 milliGRAM(s) Oral every 6 hours PRN Moderate Pain (4 - 6)  BACItracin   Ointment 1 Application(s) Topical two times a day PRN wound  diphenhydrAMINE 50 milliGRAM(s) Oral every 6 hours PRN EPS/agitation  diphenhydrAMINE Injectable 50 milliGRAM(s) IntraMuscular once PRN EPS/severeagitation  haloperidol     Tablet 5 milliGRAM(s) Oral every 6 hours PRN agitation  haloperidol    Injectable 5 milliGRAM(s) IntraMuscular Once PRN severe agitation  hydrOXYzine hydrochloride 50 milliGRAM(s) Oral every 6 hours PRN Anxiety

## 2022-04-18 NOTE — SOCIAL WORK POST DISCHARGE FOLLOW UP NOTE - NSBHSWFOLLOWUP_PSY_ALL_CORE_FT
Writer was notified that patient was re-admitted to [place] [address] on 4/16/22. Case to be closed as pt was psychiatrically re-hospitalized.

## 2022-04-18 NOTE — BH SOCIAL WORK INITIAL PSYCHOSOCIAL EVALUATION - OTHER PAST PSYCHIATRIC HISTORY (INCLUDE DETAILS REGARDING ONSET, COURSE OF ILLNESS, INPATIENT/OUTPATIENT TREATMENT)
Pt was recently discharged from [facility] at the beginning of April and has since been readmitted to [facility]. Pt declined all consents and UTOX + for Benzos, amphetamines, and Cocaine. The following is from previous admission as pt was too tired to meet with writer.     As per  Resident Note completed on March 27th 2022: "Patient is a 52 y/o woman, reportedly domiciled with mother in [place],  documented psychiatric history of Malingering, of polysubstance use, opioid use disorder (on methadone) benzo dependence (including BZD diversion at [facility]), cocaine use disorder,  anxiety, depression, prior psychiatric hospitalizations including last for 2 days at [facility] discharged on 9/23/21 after found to be diverting/sharing benzos with her boyfriend also on unit, history of suicidal gestures and non-suicidal cutting behavior,  no known medical history,  who was BIBEMS after being found unresponsive at [facility] with apparent opiate OD (responded to narcan) who in ED reported suicidal ideation.   Patient seen via [employer]. patient is a poor historian, and participates minimally in interview. she is vague and evasive during her brief participation. She states that she "sniffed heroin" today "to feel better" but she thinks it contained fentanyl and she had LOC. She reports that she has been depressed recently and that she is suicidal, she does not describe plans/intents. She is evasive when asked about housing situation, any outpatient treatment."

## 2022-04-18 NOTE — BH INPATIENT PSYCHIATRY PROGRESS NOTE - NSBHASSESSSUMMFT_PSY_ALL_CORE
Plan:  >Legal: 9.39  >Obs: Routine, no current SI. no need for CO, patient not expected to pose risk to self or others in controlled inpatient setting  >Psychiatric Meds: Restart outpatient medication regimen: Lexapro 10mg daily, Remeron 7.5mg PO qHS, Klonopin 1mg BID, Suboxone 4mg-1mg sublingual TID ( hold on restarting suboxone) Observe for tolerability and efficacy.   PRN medications:  Haldol 5mg oral Q6HR PRN for agitation.   Benadryl 50mg oral Q6HR PRN for agitation.   Vistaril 50mg oral Q6HR PRN for anxiety.  Desyrel 50mg oral QHS PRN for insomnia.   >Labs: Admission labs reviewed, no acute findings, u-tox negative. Labs pending for tomorrow: A1c and Lipid panel. Hold antipsychotics if QTc >500  >Medical: No acute concerns. No consultations needed at this time. Symptom triggered CIWA. Patient with consistently stable VS, no visible physical symptoms of withdrawal. During the course of treatment, will collaborate with medical team to manage medical issues.  >Diet: Regular  >Social: milieu/structured therapy  >Treatment Interventions: Groups and Individual Therapy/CBT, Motivational counseling for substance abuse related issues.   >Dispo: Collateral and dispo planning pending further symptom and medication optimization     Plan:  >Legal: 9.39  >Obs: Routine, no current SI. no need for CO, patient not expected to pose risk to self or others in controlled inpatient setting  >Psychiatric Meds: Restart outpatient medication regimen: Lexapro 10mg daily, Remeron 7.5mg PO qHS, Klonopin 1mg BID, Suboxone 4mg-1mg sublingual TID ( hold on restarting suboxone) Observe for tolerability and efficacy.   PRN medications:  Haldol 5mg oral Q6HR PRN for agitation.   Benadryl 50mg oral Q6HR PRN for agitation.   Vistaril 50mg oral Q6HR PRN for anxiety.  Desyrel 50mg oral QHS PRN for insomnia.   >Labs: Admission labs reviewed, no acute findings, u-tox negative. Labs pending for tomorrow: A1c and Lipid panel. Hold antipsychotics if QTc >500. Follow up potassium level this morning. Result:   >Medical: No acute concerns. No consultations needed at this time. Symptom triggered CIWA. Patient with consistently stable VS, no visible physical symptoms of withdrawal. During the course of treatment, will collaborate with medical team to manage medical issues.  >Diet: Regular  >Social: milieu/structured therapy  >Treatment Interventions: Groups and Individual Therapy/CBT, Motivational counseling for substance abuse related issues.   >Dispo: Collateral and dispo planning pending further symptom and medication optimization

## 2022-04-18 NOTE — BH SOCIAL WORK INITIAL PSYCHOSOCIAL EVALUATION - NSBHSAAMPHET_PSY_A_CORE FT
Details are unknown, UTOX + for amphetamines. In ED, pt stated she thinks that she "dabbled" with crystal meth this weekend.

## 2022-04-18 NOTE — BH INPATIENT PSYCHIATRY PROGRESS NOTE - CURRENT MEDICATION
MEDICATIONS  (STANDING):  clonazePAM  Tablet 1 milliGRAM(s) Oral two times a day  escitalopram 5 milliGRAM(s) Oral daily  mirtazapine 7.5 milliGRAM(s) Oral at bedtime    MEDICATIONS  (PRN):  diphenhydrAMINE 50 milliGRAM(s) Oral every 6 hours PRN EPS/agitation  diphenhydrAMINE Injectable 50 milliGRAM(s) IntraMuscular once PRN EPS/severeagitation  haloperidol     Tablet 5 milliGRAM(s) Oral every 6 hours PRN agitation  haloperidol    Injectable 5 milliGRAM(s) IntraMuscular Once PRN severe agitation  hydrOXYzine hydrochloride 50 milliGRAM(s) Oral every 6 hours PRN Anxiety   MEDICATIONS  (STANDING):  clonazePAM  Tablet 1 milliGRAM(s) Oral two times a day  escitalopram 5 milliGRAM(s) Oral daily  mirtazapine 7.5 milliGRAM(s) Oral at bedtime    MEDICATIONS  (PRN):  acetaminophen     Tablet .. 650 milliGRAM(s) Oral every 6 hours PRN Moderate Pain (4 - 6)  BACItracin   Ointment 1 Application(s) Topical two times a day PRN wound  diphenhydrAMINE 50 milliGRAM(s) Oral every 6 hours PRN EPS/agitation  diphenhydrAMINE Injectable 50 milliGRAM(s) IntraMuscular once PRN EPS/severeagitation  haloperidol     Tablet 5 milliGRAM(s) Oral every 6 hours PRN agitation  haloperidol    Injectable 5 milliGRAM(s) IntraMuscular Once PRN severe agitation  hydrOXYzine hydrochloride 50 milliGRAM(s) Oral every 6 hours PRN Anxiety

## 2022-04-18 NOTE — BH INPATIENT PSYCHIATRY PROGRESS NOTE - NSBHMETABOLIC_PSY_ALL_CORE_FT
BMI: BMI (kg/m2): 20.9 (04-16-22 @ 18:50)  HbA1c: A1C with Estimated Average Glucose Result: 5.3 % (09-22-21 @ 11:56)    Glucose:   BP: 152/84 (04-16-22 @ 18:15) (132/88 - 152/84)  Lipid Panel: Date/Time: 09-23-21 @ 08:40  Cholesterol, Serum: 177  Direct LDL: --  HDL Cholesterol, Serum: 101  Total Cholesterol/HDL Ration Measurement: --  Triglycerides, Serum: 52

## 2022-04-18 NOTE — BH SOCIAL WORK INITIAL PSYCHOSOCIAL EVALUATION - NSBHBARRIERS_PSY_ALL_CORE
Co-morbid personality/Financial difficulties/Lack of support/Lack of insight/Low motivation/Non-compliant with treatment/Unable to obtain (specify)

## 2022-04-19 PROCEDURE — 99232 SBSQ HOSP IP/OBS MODERATE 35: CPT | Mod: 1L

## 2022-04-19 RX ORDER — BACITRACIN ZINC 500 UNIT/G
1 OINTMENT IN PACKET (EA) TOPICAL THREE TIMES A DAY
Refills: 0 | Status: DISCONTINUED | OUTPATIENT
Start: 2022-04-19 | End: 2022-05-13

## 2022-04-19 RX ORDER — BUPRENORPHINE AND NALOXONE 2; .5 MG/1; MG/1
2 TABLET SUBLINGUAL THREE TIMES A DAY
Refills: 0 | Status: DISCONTINUED | OUTPATIENT
Start: 2022-04-19 | End: 2022-04-26

## 2022-04-19 RX ADMIN — ESCITALOPRAM OXALATE 5 MILLIGRAM(S): 10 TABLET, FILM COATED ORAL at 08:09

## 2022-04-19 RX ADMIN — BUPRENORPHINE AND NALOXONE 2 FILM(S): 2; .5 TABLET SUBLINGUAL at 13:18

## 2022-04-19 RX ADMIN — Medication 1 APPLICATION(S): at 20:51

## 2022-04-19 RX ADMIN — Medication 50 MILLIGRAM(S): at 13:17

## 2022-04-19 RX ADMIN — MIRTAZAPINE 7.5 MILLIGRAM(S): 45 TABLET, ORALLY DISINTEGRATING ORAL at 20:51

## 2022-04-19 RX ADMIN — Medication 0.1 MILLIGRAM(S): at 09:15

## 2022-04-19 RX ADMIN — BUPRENORPHINE AND NALOXONE 2 FILM(S): 2; .5 TABLET SUBLINGUAL at 09:18

## 2022-04-19 RX ADMIN — Medication 1 MILLIGRAM(S): at 08:09

## 2022-04-19 RX ADMIN — Medication 1 MILLIGRAM(S): at 17:08

## 2022-04-19 RX ADMIN — BUPRENORPHINE AND NALOXONE 2 FILM(S): 2; .5 TABLET SUBLINGUAL at 20:51

## 2022-04-19 NOTE — BH INPATIENT PSYCHIATRY PROGRESS NOTE - NSBHASSESSSUMMFT_PSY_ALL_CORE
Plan:  >Legal: 9.39  >Obs: Routine, no current SI. no need for CO, patient not expected to pose risk to self or others in controlled inpatient setting  >Psychiatric Meds: Restart outpatient medication regimen: Lexapro 10mg daily, Remeron 7.5mg PO qHS, Klonopin 1mg BID, Suboxone 4mg-1mg sublingual TID ( hold on restarting suboxone) Observe for tolerability and efficacy.   PRN medications:  Haldol 5mg oral Q6HR PRN for agitation.   Benadryl 50mg oral Q6HR PRN for agitation.   Vistaril 50mg oral Q6HR PRN for anxiety.  Desyrel 50mg oral QHS PRN for insomnia.   >Labs: Labs reviewed, no acute findings, u-tox negative. Hold antipsychotics if QTc >500. Follow up potassium level this morning.   >Medical: wound/blisters under BL plantar  Wound care consultation ordered.  Patient with consistently stable VS, no visible physical symptoms of withdrawal. During the course of treatment, will collaborate with medical team to manage medical issues.  >Diet: Regular  >Social: milieu/structured therapy  >Treatment Interventions: Groups and Individual Therapy/CBT, Motivational counseling for substance abuse related issues.   >Dispo: Collateral and dispo planning pending further symptom and medication optimization

## 2022-04-19 NOTE — ADVANCED PRACTICE NURSE CONSULT - RECOMMEDATIONS
Recommended to clean right knee with water and soap pat and dry apply bacitracin and cove with Mepilex foam with boarder daily  Reinforce foot care and proper shoes   Reported off to staff nurse  Will be available as needed

## 2022-04-19 NOTE — BH INPATIENT PSYCHIATRY PROGRESS NOTE - NSBHFUPINTERVALHXFT_PSY_A_CORE
Liliam is followed up for depression and history of substance abuse. Chart, medications, and labs reviewed. Patent is discussed in treatment team. No significant issues overnight. Somewhat compliant with medications and sleeping throughout the night. Requesting Clonidine and Suboxone this morning for withdrawal symptoms. ISTOP reference #: [id number] showed prescription for Suboxone for opiate withdrawal. States withdrawal syumtpoms include goosebumps, chills, ot/cold sweats, diarrhea, and sweaty palms. Wound care consult ordered for blisters on plantar aspects of bilateral feet. Supplemented potassium with 40, new result is 3.6.Admits to passive SI symptoms stating " I wish I was dead" but denies thoughts of hurting herself or having a plan to do so. Is tearful during interview this morning. Denies HI, AVH, psychosis. Admits to depression today and does not feel safe on the unit, so she is requesting transfer to a different unit. Will continue to provide therapeutic support. Liliam is followed up for depression and history of substance abuse. Chart, medications, and labs reviewed. Patent is discussed in treatment team. No significant issues overnight. Somewhat compliant with medications and sleeping throughout the night. Requesting Clonidine and Suboxone this morning for withdrawal symptoms. ISTOP reference #: [id number] showed prescription for Suboxone maintenancel. States withdrawal symtpoms include goosebumps, chills, ot/cold sweats, diarrhea, and sweaty palms. Wound care consult ordered for blisters on plantar aspects of bilateral feet. Supplemented potassium with 40, new result is 3.6.Admits to passive SI symptoms stating " I wish I was dead" but denies thoughts of hurting herself or having a plan to do so. Is tearful during interview this morning. Denies HI, AVH, psychosis. Admits to depression today and she is requesting transfer to [facility]. Will continue to provide therapeutic support.
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
## 2022-04-19 NOTE — BH INPATIENT PSYCHIATRY PROGRESS NOTE - CURRENT MEDICATION
MEDICATIONS  (STANDING):  BACItracin   Ointment 1 Application(s) Topical two times a day  clonazePAM  Tablet 1 milliGRAM(s) Oral <User Schedule>  escitalopram 5 milliGRAM(s) Oral daily  mirtazapine 7.5 milliGRAM(s) Oral at bedtime    MEDICATIONS  (PRN):  acetaminophen     Tablet .. 650 milliGRAM(s) Oral every 6 hours PRN Moderate Pain (4 - 6)  diphenhydrAMINE 50 milliGRAM(s) Oral every 6 hours PRN EPS/agitation  diphenhydrAMINE Injectable 50 milliGRAM(s) IntraMuscular once PRN EPS/severeagitation  haloperidol     Tablet 5 milliGRAM(s) Oral every 6 hours PRN agitation  haloperidol    Injectable 5 milliGRAM(s) IntraMuscular Once PRN severe agitation  hydrOXYzine hydrochloride 50 milliGRAM(s) Oral every 6 hours PRN Anxiety   MEDICATIONS  (STANDING):  buprenorphine 2 mG/naloxone 0.5 mG SL Film 2 Film(s) SubLingual three times a day  clonazePAM  Tablet 1 milliGRAM(s) Oral <User Schedule>  escitalopram 5 milliGRAM(s) Oral daily  mirtazapine 7.5 milliGRAM(s) Oral at bedtime    MEDICATIONS  (PRN):  acetaminophen     Tablet .. 650 milliGRAM(s) Oral every 6 hours PRN Moderate Pain (4 - 6)  BACItracin   Ointment 1 Application(s) Topical three times a day PRN wound  cloNIDine 0.1 milliGRAM(s) Oral every 6 hours PRN anxiety  diphenhydrAMINE 50 milliGRAM(s) Oral every 6 hours PRN EPS/agitation  diphenhydrAMINE Injectable 50 milliGRAM(s) IntraMuscular once PRN EPS/severeagitation  haloperidol     Tablet 5 milliGRAM(s) Oral every 6 hours PRN agitation  haloperidol    Injectable 5 milliGRAM(s) IntraMuscular Once PRN severe agitation  hydrOXYzine hydrochloride 50 milliGRAM(s) Oral every 6 hours PRN Anxiety

## 2022-04-19 NOTE — BH INPATIENT PSYCHIATRY PROGRESS NOTE - PRN MEDS
MEDICATIONS  (PRN):  acetaminophen     Tablet .. 650 milliGRAM(s) Oral every 6 hours PRN Moderate Pain (4 - 6)  diphenhydrAMINE 50 milliGRAM(s) Oral every 6 hours PRN EPS/agitation  diphenhydrAMINE Injectable 50 milliGRAM(s) IntraMuscular once PRN EPS/severeagitation  haloperidol     Tablet 5 milliGRAM(s) Oral every 6 hours PRN agitation  haloperidol    Injectable 5 milliGRAM(s) IntraMuscular Once PRN severe agitation  hydrOXYzine hydrochloride 50 milliGRAM(s) Oral every 6 hours PRN Anxiety   MEDICATIONS  (PRN):  acetaminophen     Tablet .. 650 milliGRAM(s) Oral every 6 hours PRN Moderate Pain (4 - 6)  BACItracin   Ointment 1 Application(s) Topical three times a day PRN wound  cloNIDine 0.1 milliGRAM(s) Oral every 6 hours PRN anxiety  diphenhydrAMINE 50 milliGRAM(s) Oral every 6 hours PRN EPS/agitation  diphenhydrAMINE Injectable 50 milliGRAM(s) IntraMuscular once PRN EPS/severeagitation  haloperidol     Tablet 5 milliGRAM(s) Oral every 6 hours PRN agitation  haloperidol    Injectable 5 milliGRAM(s) IntraMuscular Once PRN severe agitation  hydrOXYzine hydrochloride 50 milliGRAM(s) Oral every 6 hours PRN Anxiety

## 2022-04-19 NOTE — BH INPATIENT PSYCHIATRY PROGRESS NOTE - NSBHCHARTREVIEWVS_PSY_A_CORE FT
Vital Signs Last 24 Hrs  T(C): 36.2 (04-19-22 @ 06:19), Max: 36.5 (04-18-22 @ 15:03)  T(F): 97.2 (04-19-22 @ 06:19), Max: 97.7 (04-18-22 @ 15:03)  HR: --  BP: --  BP(mean): --  RR: 17 (04-18-22 @ 21:21) (17 - 17)  SpO2: --    Orthostatic VS  04-18-22 @ 19:36  Lying BP: --/-- HR: --  Sitting BP: 119/66 HR: 73  Standing BP: --/-- HR: --  Site: --  Mode: --  Orthostatic VS  04-18-22 @ 08:18  Lying BP: --/-- HR: --  Sitting BP: 103/60 HR: 80  Standing BP: 112/70 HR: 86  Site: --  Mode: --  Orthostatic VS  04-17-22 @ 20:00  Lying BP: --/-- HR: --  Sitting BP: 122/78 HR: 88  Standing BP: 118/80 HR: 90  Site: --  Mode: --  Orthostatic VS  04-17-22 @ 19:33  Lying BP: --/-- HR: --  Sitting BP: 119/73 HR: 89  Standing BP: 105/61 HR: 90  Site: --  Mode: --  Orthostatic VS  04-17-22 @ 08:39  Lying BP: --/-- HR: --  Sitting BP: 96/60 HR: 82  Standing BP: --/-- HR: --  Site: --  Mode: electronic   Vital Signs Last 24 Hrs  T(C): 36.2 (04-19-22 @ 06:19), Max: 36.5 (04-18-22 @ 15:03)  T(F): 97.2 (04-19-22 @ 06:19), Max: 97.7 (04-18-22 @ 15:03)  HR: --  BP: --  BP(mean): --  RR: 17 (04-18-22 @ 21:21) (17 - 17)  SpO2: --    Orthostatic VS  04-19-22 @ 08:24  Lying BP: --/-- HR: --  Sitting BP: 130/93 HR: 88  Standing BP: 125/88 HR: 91  Site: --  Mode: electronic  Orthostatic VS  04-18-22 @ 19:36  Lying BP: --/-- HR: --  Sitting BP: 119/66 HR: 73  Standing BP: --/-- HR: --  Site: --  Mode: --  Orthostatic VS  04-18-22 @ 08:18  Lying BP: --/-- HR: --  Sitting BP: 103/60 HR: 80  Standing BP: 112/70 HR: 86  Site: --  Mode: --  Orthostatic VS  04-17-22 @ 20:00  Lying BP: --/-- HR: --  Sitting BP: 122/78 HR: 88  Standing BP: 118/80 HR: 90  Site: --  Mode: --  Orthostatic VS  04-17-22 @ 19:33  Lying BP: --/-- HR: --  Sitting BP: 119/73 HR: 89  Standing BP: 105/61 HR: 90  Site: --  Mode: --

## 2022-04-20 PROCEDURE — 99231 SBSQ HOSP IP/OBS SF/LOW 25: CPT | Mod: 1L

## 2022-04-20 RX ADMIN — BUPRENORPHINE AND NALOXONE 2 FILM(S): 2; .5 TABLET SUBLINGUAL at 12:59

## 2022-04-20 RX ADMIN — Medication 650 MILLIGRAM(S): at 12:28

## 2022-04-20 RX ADMIN — BUPRENORPHINE AND NALOXONE 2 FILM(S): 2; .5 TABLET SUBLINGUAL at 20:53

## 2022-04-20 RX ADMIN — ESCITALOPRAM OXALATE 5 MILLIGRAM(S): 10 TABLET, FILM COATED ORAL at 08:12

## 2022-04-20 RX ADMIN — Medication 1 MILLIGRAM(S): at 08:14

## 2022-04-20 RX ADMIN — Medication 650 MILLIGRAM(S): at 11:32

## 2022-04-20 RX ADMIN — Medication 1 MILLIGRAM(S): at 17:08

## 2022-04-20 RX ADMIN — BUPRENORPHINE AND NALOXONE 2 FILM(S): 2; .5 TABLET SUBLINGUAL at 08:16

## 2022-04-20 RX ADMIN — MIRTAZAPINE 7.5 MILLIGRAM(S): 45 TABLET, ORALLY DISINTEGRATING ORAL at 20:53

## 2022-04-20 NOTE — BH INPATIENT PSYCHIATRY PROGRESS NOTE - PRN MEDS
MEDICATIONS  (PRN):  acetaminophen     Tablet .. 650 milliGRAM(s) Oral every 6 hours PRN Moderate Pain (4 - 6)  BACItracin   Ointment 1 Application(s) Topical three times a day PRN wound  cloNIDine 0.1 milliGRAM(s) Oral every 6 hours PRN anxiety  diphenhydrAMINE 50 milliGRAM(s) Oral every 6 hours PRN EPS/agitation  diphenhydrAMINE Injectable 50 milliGRAM(s) IntraMuscular once PRN EPS/severeagitation  haloperidol     Tablet 5 milliGRAM(s) Oral every 6 hours PRN agitation  haloperidol    Injectable 5 milliGRAM(s) IntraMuscular Once PRN severe agitation  hydrOXYzine hydrochloride 50 milliGRAM(s) Oral every 6 hours PRN Anxiety

## 2022-04-20 NOTE — BH INPATIENT PSYCHIATRY PROGRESS NOTE - NSBHFUPINTERVALHXFT_PSY_A_CORE
Patient is followed up this morning for MDD and polysubstance abuse. Chart, medications, and labs reviewed. Patient is discussed in treatment team. No significant events overnight. She is becoming more visible on the unity sitting in the day room. Patient is tearful during interview with labile mood moving from irritable to appreciative quickly. She states she is "just OK" today and appears better than yesterday. She denies further opiate withdrawal symptoms since starting the Suboxone and is comfortable on it. No visible signs of withdrawal. She expresses minor concerns regarding the Suboxone affecting her teeth/bones, and she is worried about becoming physically addicted to it. Medication teaching provided and all questions answered. She is continuing to request a transfer to a different unit. Expresses interest in an outpatient [facility] program upon discharge in the future. Admits to passive SI with no plan, citing an inability to carry out a potential plan with the amount of staff and peers present on the unit. Denies HI, AVH, or psychosis. Will continue to provide therapeutic support. Patient is followed up this morning for MDD and polysubstance abuse. Chart, medications, and labs reviewed. Patient is discussed in treatment team. No significant events overnight. She is becoming more visible on the unit sitting in the day room. Patient is tearful during interview with labile mood moving from irritable to appreciative quickly. She states she is "just OK" today and appears better than yesterday. She denies further opiate withdrawal symptoms since starting the Suboxone and is comfortable on it. No visible signs of withdrawal. She expresses minor concerns regarding the Suboxone affecting her teeth/bones, and she is worried about becoming physically addicted to it. Medication teaching provided and all questions answered. She is continuing to request a transfer to a different unit. Expresses interest in an outpatient [facility] program upon discharge in the future. Admits to passive SI with no plan, citing an inability to carry out a potential plan with the amount of staff and peers present on the unit. States she will approach a staff member for assistance if she starts experiencing further SI and if safety becomes an issue. Denies HI, AVH, or psychosis. Will continue to provide therapeutic support.

## 2022-04-20 NOTE — BH INPATIENT PSYCHIATRY PROGRESS NOTE - NSBHCHARTREVIEWVS_PSY_A_CORE FT
Vital Signs Last 24 Hrs  T(C): 36.2 (04-20-22 @ 06:23), Max: 36.7 (04-19-22 @ 18:51)  T(F): 97.1 (04-20-22 @ 06:23), Max: 98.1 (04-19-22 @ 18:51)  HR: --  BP: --  BP(mean): --  RR: 17 (04-19-22 @ 20:53) (17 - 17)  SpO2: --    Orthostatic VS  04-19-22 @ 19:17  Lying BP: --/-- HR: --  Sitting BP: 99/68 HR: 97  Standing BP: 113/60 HR: 76  Site: upper left arm  Mode: electronic  Orthostatic VS  04-19-22 @ 18:51  Lying BP: --/-- HR: --  Sitting BP: 113/60 HR: 76  Standing BP: 99/68 HR: 97  Site: --  Mode: electronic  Orthostatic VS  04-19-22 @ 08:24  Lying BP: --/-- HR: --  Sitting BP: 130/93 HR: 88  Standing BP: 125/88 HR: 91  Site: --  Mode: electronic  Orthostatic VS  04-18-22 @ 19:36  Lying BP: --/-- HR: --  Sitting BP: 119/66 HR: 73  Standing BP: --/-- HR: --  Site: --  Mode: --  Orthostatic VS  04-18-22 @ 08:18  Lying BP: --/-- HR: --  Sitting BP: 103/60 HR: 80  Standing BP: 112/70 HR: 86  Site: --  Mode: --   Vital Signs Last 24 Hrs  T(C): 36.2 (04-20-22 @ 06:23), Max: 36.7 (04-19-22 @ 18:51)  T(F): 97.1 (04-20-22 @ 06:23), Max: 98.1 (04-19-22 @ 18:51)  HR: --  BP: --  BP(mean): --  RR: 17 (04-19-22 @ 20:53) (17 - 17)  SpO2: --    Orthostatic VS  04-20-22 @ 08:37  Lying BP: --/-- HR: --  Sitting BP: 92/60 HR: 76  Standing BP: 96/65 HR: 80  Site: upper left arm  Mode: electronic  Orthostatic VS  04-19-22 @ 19:17  Lying BP: --/-- HR: --  Sitting BP: 99/68 HR: 97  Standing BP: 113/60 HR: 76  Site: upper left arm  Mode: electronic  Orthostatic VS  04-19-22 @ 18:51  Lying BP: --/-- HR: --  Sitting BP: 113/60 HR: 76  Standing BP: 99/68 HR: 97  Site: --  Mode: electronic  Orthostatic VS  04-19-22 @ 08:24  Lying BP: --/-- HR: --  Sitting BP: 130/93 HR: 88  Standing BP: 125/88 HR: 91  Site: --  Mode: electronic  Orthostatic VS  04-18-22 @ 19:36  Lying BP: --/-- HR: --  Sitting BP: 119/66 HR: 73  Standing BP: --/-- HR: --  Site: --  Mode: --

## 2022-04-20 NOTE — BH INPATIENT PSYCHIATRY PROGRESS NOTE - NSBHMETABOLIC_PSY_ALL_CORE_FT
BMI: BMI (kg/m2): 20.9 (04-16-22 @ 18:50)  HbA1c: A1C with Estimated Average Glucose Result: 5.3 % (09-22-21 @ 11:56)    Glucose:   BP: --  Lipid Panel: Date/Time: 09-23-21 @ 08:40  Cholesterol, Serum: 177  Direct LDL: --  HDL Cholesterol, Serum: 101  Total Cholesterol/HDL Ration Measurement: --  Triglycerides, Serum: 52

## 2022-04-20 NOTE — BH INPATIENT PSYCHIATRY PROGRESS NOTE - CURRENT MEDICATION
MEDICATIONS  (STANDING):  buprenorphine 2 mG/naloxone 0.5 mG SL Film 2 Film(s) SubLingual three times a day  clonazePAM  Tablet 1 milliGRAM(s) Oral <User Schedule>  escitalopram 5 milliGRAM(s) Oral daily  mirtazapine 7.5 milliGRAM(s) Oral at bedtime    MEDICATIONS  (PRN):  acetaminophen     Tablet .. 650 milliGRAM(s) Oral every 6 hours PRN Moderate Pain (4 - 6)  BACItracin   Ointment 1 Application(s) Topical three times a day PRN wound  cloNIDine 0.1 milliGRAM(s) Oral every 6 hours PRN anxiety  diphenhydrAMINE 50 milliGRAM(s) Oral every 6 hours PRN EPS/agitation  diphenhydrAMINE Injectable 50 milliGRAM(s) IntraMuscular once PRN EPS/severeagitation  haloperidol     Tablet 5 milliGRAM(s) Oral every 6 hours PRN agitation  haloperidol    Injectable 5 milliGRAM(s) IntraMuscular Once PRN severe agitation  hydrOXYzine hydrochloride 50 milliGRAM(s) Oral every 6 hours PRN Anxiety

## 2022-04-20 NOTE — BH INPATIENT PSYCHIATRY PROGRESS NOTE - NSBHASSESSSUMMFT_PSY_ALL_CORE
Plan:  >Legal: 9.39  >Obs: Routine, no current SI. no need for CO, patient not expected to pose risk to self or others in controlled inpatient setting  >Psychiatric Meds: Restart outpatient medication regimen: Lexapro 10mg daily, Remeron 7.5mg PO qHS, Klonopin 1mg BID, Suboxone 4mg-1mg sublingual TID ( hold on restarting suboxone) Observe for tolerability and efficacy.   PRN medications:  Haldol 5mg oral Q6HR PRN for agitation.   Benadryl 50mg oral Q6HR PRN for agitation.   Vistaril 50mg oral Q6HR PRN for anxiety.  Desyrel 50mg oral QHS PRN for insomnia.   >Labs: Labs reviewed, no acute findings, u-tox negative. Hold antipsychotics if QTc >500. Follow up potassium level this morning.   >Medical: wound/blisters under BL plantar  Wound care consultation ordered.  Patient with consistently stable VS, no visible physical symptoms of withdrawal. During the course of treatment, will collaborate with medical team to manage medical issues.  >Diet: Regular  >Social: milieu/structured therapy  >Treatment Interventions: Groups and Individual Therapy/CBT, Motivational counseling for substance abuse related issues.   >Dispo: Collateral and dispo planning pending further symptom and medication optimization     Plan:  >Legal: 9.39  >Obs: Routine, no current SI. No need for CO, patient not expected to pose risk to self or others in controlled inpatient setting  >Psychiatric Meds: Restart outpatient medication regimen: Lexapro 10mg daily, Remeron 7.5mg PO qHS, Klonopin 1mg BID, Suboxone 4mg-1mg sublingual TID ( hold on restarting suboxone) Observe for tolerability and efficacy.   PRN medications:  Haldol 5mg oral Q6HR PRN for agitation.   Benadryl 50mg oral Q6HR PRN for agitation.   Vistaril 50mg oral Q6HR PRN for anxiety.  Desyrel 50mg oral QHS PRN for insomnia.   >Labs: Labs reviewed, no acute findings, u-tox negative. Hold antipsychotics if QTc >500. Follow up potassium level this morning.   >Medical: wound/blisters under BL plantar surface of feet.  Wound care consultation ordered.  Patient with consistently stable VS, no visible physical symptoms of withdrawal. During the course of treatment, will collaborate with medical team to manage medical issues.  >Diet: Regular  >Social: milieu/structured therapy  >Treatment Interventions: Groups and Individual Therapy/CBT, Motivational counseling for substance abuse related issues.   >Dispo: Collateral and dispo planning pending further symptom and medication optimization

## 2022-04-21 LAB
ANION GAP SERPL CALC-SCNC: 9 MMOL/L — SIGNIFICANT CHANGE UP (ref 7–14)
BUN SERPL-MCNC: 13 MG/DL — SIGNIFICANT CHANGE UP (ref 7–23)
CALCIUM SERPL-MCNC: 8.7 MG/DL — SIGNIFICANT CHANGE UP (ref 8.4–10.5)
CHLORIDE SERPL-SCNC: 99 MMOL/L — SIGNIFICANT CHANGE UP (ref 98–107)
CO2 SERPL-SCNC: 28 MMOL/L — SIGNIFICANT CHANGE UP (ref 22–31)
CREAT SERPL-MCNC: 0.6 MG/DL — SIGNIFICANT CHANGE UP (ref 0.5–1.3)
EGFR: 110 ML/MIN/1.73M2 — SIGNIFICANT CHANGE UP
FERRITIN SERPL-MCNC: 31 NG/ML — SIGNIFICANT CHANGE UP (ref 15–150)
GLUCOSE SERPL-MCNC: 85 MG/DL — SIGNIFICANT CHANGE UP (ref 70–99)
HCT VFR BLD CALC: 37.9 % — SIGNIFICANT CHANGE UP (ref 34.5–45)
HGB BLD-MCNC: 11.9 G/DL — SIGNIFICANT CHANGE UP (ref 11.5–15.5)
IRON SATN MFR SERPL: 104 UG/DL — SIGNIFICANT CHANGE UP (ref 30–160)
IRON SATN MFR SERPL: 40 % — SIGNIFICANT CHANGE UP (ref 14–50)
MCHC RBC-ENTMCNC: 28.5 PG — SIGNIFICANT CHANGE UP (ref 27–34)
MCHC RBC-ENTMCNC: 31.4 GM/DL — LOW (ref 32–36)
MCV RBC AUTO: 90.7 FL — SIGNIFICANT CHANGE UP (ref 80–100)
NRBC # BLD: 0 /100 WBCS — SIGNIFICANT CHANGE UP
NRBC # FLD: 0 K/UL — SIGNIFICANT CHANGE UP
PLATELET # BLD AUTO: 216 K/UL — SIGNIFICANT CHANGE UP (ref 150–400)
POTASSIUM SERPL-MCNC: 4.7 MMOL/L — SIGNIFICANT CHANGE UP (ref 3.5–5.3)
POTASSIUM SERPL-SCNC: 4.7 MMOL/L — SIGNIFICANT CHANGE UP (ref 3.5–5.3)
RBC # BLD: 4.18 M/UL — SIGNIFICANT CHANGE UP (ref 3.8–5.2)
RBC # FLD: 14.2 % — SIGNIFICANT CHANGE UP (ref 10.3–14.5)
SODIUM SERPL-SCNC: 136 MMOL/L — SIGNIFICANT CHANGE UP (ref 135–145)
TIBC SERPL-MCNC: 259 UG/DL — SIGNIFICANT CHANGE UP (ref 220–430)
UIBC SERPL-MCNC: 155 UG/DL — SIGNIFICANT CHANGE UP (ref 110–370)
WBC # BLD: 4.49 K/UL — SIGNIFICANT CHANGE UP (ref 3.8–10.5)
WBC # FLD AUTO: 4.49 K/UL — SIGNIFICANT CHANGE UP (ref 3.8–10.5)

## 2022-04-21 PROCEDURE — 99232 SBSQ HOSP IP/OBS MODERATE 35: CPT | Mod: 1L

## 2022-04-21 PROCEDURE — 99223 1ST HOSP IP/OBS HIGH 75: CPT

## 2022-04-21 RX ORDER — POLYETHYLENE GLYCOL 3350 17 G/17G
17 POWDER, FOR SOLUTION ORAL DAILY
Refills: 0 | Status: DISCONTINUED | OUTPATIENT
Start: 2022-04-21 | End: 2022-05-13

## 2022-04-21 RX ORDER — ESCITALOPRAM OXALATE 10 MG/1
10 TABLET, FILM COATED ORAL DAILY
Refills: 0 | Status: DISCONTINUED | OUTPATIENT
Start: 2022-04-21 | End: 2022-04-28

## 2022-04-21 RX ORDER — SENNA PLUS 8.6 MG/1
2 TABLET ORAL AT BEDTIME
Refills: 0 | Status: DISCONTINUED | OUTPATIENT
Start: 2022-04-21 | End: 2022-05-13

## 2022-04-21 RX ORDER — PHENYLEPHRINE-SHARK LIVER OIL-MINERAL OIL-PETROLATUM RECTAL OINTMENT
1 OINTMENT (GRAM) RECTAL DAILY
Refills: 0 | Status: DISCONTINUED | OUTPATIENT
Start: 2022-04-21 | End: 2022-05-13

## 2022-04-21 RX ADMIN — MIRTAZAPINE 7.5 MILLIGRAM(S): 45 TABLET, ORALLY DISINTEGRATING ORAL at 20:57

## 2022-04-21 RX ADMIN — BUPRENORPHINE AND NALOXONE 2 FILM(S): 2; .5 TABLET SUBLINGUAL at 08:02

## 2022-04-21 RX ADMIN — Medication 1 MILLIGRAM(S): at 08:02

## 2022-04-21 RX ADMIN — SENNA PLUS 2 TABLET(S): 8.6 TABLET ORAL at 20:57

## 2022-04-21 RX ADMIN — BUPRENORPHINE AND NALOXONE 2 FILM(S): 2; .5 TABLET SUBLINGUAL at 13:30

## 2022-04-21 RX ADMIN — ESCITALOPRAM OXALATE 10 MILLIGRAM(S): 10 TABLET, FILM COATED ORAL at 08:02

## 2022-04-21 RX ADMIN — BUPRENORPHINE AND NALOXONE 2 FILM(S): 2; .5 TABLET SUBLINGUAL at 20:58

## 2022-04-21 RX ADMIN — Medication 1 MILLIGRAM(S): at 17:09

## 2022-04-21 RX ADMIN — Medication 1 APPLICATION(S): at 17:25
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
## 2022-04-21 NOTE — BH INPATIENT PSYCHIATRY PROGRESS NOTE - NSBHCHARTREVIEWVS_PSY_A_CORE FT
Vital Signs Last 24 Hrs  T(C): 36.3 (04-21-22 @ 06:43), Max: 37 (04-20-22 @ 19:38)  T(F): 97.4 (04-21-22 @ 06:43), Max: 98.6 (04-20-22 @ 19:38)  HR: --  BP: --  BP(mean): --  RR: --  SpO2: --    Orthostatic VS  04-20-22 @ 19:38  Lying BP: --/-- HR: --  Sitting BP: 103/69 HR: 75  Standing BP: 100/65 HR: 80  Site: --  Mode: --  Orthostatic VS  04-20-22 @ 08:37  Lying BP: --/-- HR: --  Sitting BP: 92/60 HR: 76  Standing BP: 96/65 HR: 80  Site: upper left arm  Mode: electronic  Orthostatic VS  04-19-22 @ 19:17  Lying BP: --/-- HR: --  Sitting BP: 99/68 HR: 97  Standing BP: 113/60 HR: 76  Site: upper left arm  Mode: electronic  Orthostatic VS  04-19-22 @ 18:51  Lying BP: --/-- HR: --  Sitting BP: 113/60 HR: 76  Standing BP: 99/68 HR: 97  Site: --  Mode: electronic  Orthostatic VS  04-19-22 @ 08:24  Lying BP: --/-- HR: --  Sitting BP: 130/93 HR: 88  Standing BP: 125/88 HR: 91  Site: --  Mode: electronic   Vital Signs Last 24 Hrs  T(C): 36.3 (04-21-22 @ 06:43), Max: 37 (04-20-22 @ 19:38)  T(F): 97.4 (04-21-22 @ 06:43), Max: 98.6 (04-20-22 @ 19:38)  HR: --  BP: --  BP(mean): --  RR: --  SpO2: --    Orthostatic VS  04-21-22 @ 07:36  Lying BP: --/-- HR: --  Sitting BP: 106/66 HR: 74  Standing BP: 112/69 HR: 77  Site: upper left arm  Mode: electronic  Orthostatic VS  04-20-22 @ 19:38  Lying BP: --/-- HR: --  Sitting BP: 103/69 HR: 75  Standing BP: 100/65 HR: 80  Site: --  Mode: --  Orthostatic VS  04-20-22 @ 08:37  Lying BP: --/-- HR: --  Sitting BP: 92/60 HR: 76  Standing BP: 96/65 HR: 80  Site: upper left arm  Mode: electronic  Orthostatic VS  04-19-22 @ 19:17  Lying BP: --/-- HR: --  Sitting BP: 99/68 HR: 97  Standing BP: 113/60 HR: 76  Site: upper left arm  Mode: electronic  Orthostatic VS  04-19-22 @ 18:51  Lying BP: --/-- HR: --  Sitting BP: 113/60 HR: 76  Standing BP: 99/68 HR: 97  Site: --  Mode: electronic

## 2022-04-21 NOTE — BH INPATIENT PSYCHIATRY PROGRESS NOTE - NSBHASSESSSUMMFT_PSY_ALL_CORE
Plan:  >Legal: 9.39  >Obs: Routine, no current SI. No need for CO, patient not expected to pose risk to self or others in controlled inpatient setting  >Psychiatric Meds: Restart outpatient medication regimen: Lexapro 10mg daily, Remeron 7.5mg PO qHS, Klonopin 1mg BID, Suboxone 4mg-1mg sublingual TID ( hold on restarting suboxone) Observe for tolerability and efficacy.   PRN medications:  Haldol 5mg oral Q6HR PRN for agitation.   Benadryl 50mg oral Q6HR PRN for agitation.   Vistaril 50mg oral Q6HR PRN for anxiety.  Desyrel 50mg oral QHS PRN for insomnia.   >Labs: Labs reviewed, no acute findings, u-tox negative. Hold antipsychotics if QTc >500. Follow up potassium level this morning.   >Medical: wound/blisters under BL plantar surface of feet.  Wound care consultation ordered.  Patient with consistently stable VS, no visible physical symptoms of withdrawal. During the course of treatment, will collaborate with medical team to manage medical issues.  >Diet: Regular  >Social: milieu/structured therapy  >Treatment Interventions: Groups and Individual Therapy/CBT, Motivational counseling for substance abuse related issues.   >Dispo: Collateral and dispo planning pending further symptom and medication optimization

## 2022-04-21 NOTE — CONSULT NOTE ADULT - ASSESSMENT
51F admitted for depression, opioid dependence on agonist therapy with blood in stool today    Plan:  Blood in Stool: No anemia, hemodynamically stable.  Liekly abraded hemorrhoid from hard stool.  Hemorrhoid ointment ordered.  Senna qhs, miralax prn.  Monitor for recurrence.  If significant recurrence, outpt GI f/u.    Depression, polysubstance abuse: Management per primary team

## 2022-04-21 NOTE — CONSULT NOTE ADULT - SUBJECTIVE AND OBJECTIVE BOX
HPI: Pt is 51F admitted to [facility] 4/16/22 for depression and polysubstance abuse.  She noted blood-streaked stool today, observed by nurse.  Small amount adherent to stool.  Pt reports that she had to strain a bit today, nurse confirmed that stool looked hard.  Two years ago pt had similar episode due to hard stools when taking metahdone.  She said she had a "colon ulcer" at the time.  She does not think she lost a     PAST MEDICAL & SURGICAL HISTORY:  Substance use disorder    No significant past surgical history        Review of Systems:   CONSTITUTIONAL: No fever, weight loss, or fatigue  EYES: No eye pain, visual disturbances, or discharge  ENMT:  No difficulty hearing, tinnitus, vertigo; No sinus or throat pain  NECK: No pain or stiffness  RESPIRATORY: No cough, wheezing, chills or hemoptysis; No shortness of breath  CARDIOVASCULAR: No chest pain, palpitations, dizziness, or leg swelling  GASTROINTESTINAL:see HPI  GENITOURINARY: finishing menstrual period, had some blood mixed with urine today  NEUROLOGICAL: No headaches, memory loss, loss of strength, numbness, or tremors  SKIN: No itching, burning, rashes, or lesions   LYMPH NODES: No enlarged glands  ENDOCRINE: No heat or cold intolerance; No hair loss  MUSCULOSKELETAL: No joint pain or swelling; No muscle, back, or extremity pain  HEME/LYMPH: No easy bruising, or bleeding gums  ALLERY AND IMMUNOLOGIC: No hives or eczema    Allergies    No Known Allergies    Intolerances        Social History: heroin, benzos    FAMILY HISTORY: noncontributory      MEDICATIONS  (STANDING):  buprenorphine 2 mG/naloxone 0.5 mG SL Film 2 Film(s) SubLingual three times a day  clonazePAM  Tablet 1 milliGRAM(s) Oral <User Schedule>  escitalopram 10 milliGRAM(s) Oral daily  mirtazapine 7.5 milliGRAM(s) Oral at bedtime    MEDICATIONS  (PRN):  acetaminophen     Tablet .. 650 milliGRAM(s) Oral every 6 hours PRN Moderate Pain (4 - 6)  BACItracin   Ointment 1 Application(s) Topical three times a day PRN wound  cloNIDine 0.1 milliGRAM(s) Oral every 6 hours PRN anxiety  diphenhydrAMINE 50 milliGRAM(s) Oral every 6 hours PRN EPS/agitation  diphenhydrAMINE Injectable 50 milliGRAM(s) IntraMuscular once PRN EPS/severeagitation  haloperidol     Tablet 5 milliGRAM(s) Oral every 6 hours PRN agitation  haloperidol    Injectable 5 milliGRAM(s) IntraMuscular Once PRN severe agitation  hemorrhoidal Ointment 1 Application(s) Rectal daily PRN hemorrhoid  hydrOXYzine hydrochloride 50 milliGRAM(s) Oral every 6 hours PRN Anxiety      Vital Signs Last 24 Hrs  T(C): 36.3 (21 Apr 2022 06:43), Max: 37 (20 Apr 2022 19:38)  T(F): 97.4 (21 Apr 2022 06:43), Max: 98.6 (20 Apr 2022 19:38)  HR: 74  BP: 106/66  BP(mean): --  RR: 14            PHYSICAL EXAM:  GENERAL: NAD, well-developed  HEAD:  Atraumatic, Normocephalic  EYES: EOMI, conjunctiva and sclera clear  NECK: Supple, No JVD  CHEST/LUNG: Clear to auscultation bilaterally; No wheeze  HEART: Regular rate and rhythm; No murmurs, rubs, or gallops  ABDOMEN: Soft, Nontender, Nondistended; Bowel sounds present  EXTREMITIES:  2+ Peripheral Pulses, No clubbing, cyanosis, or edema  NEUROLOGY: non-focal  SKIN: No rashes or lesions    LABS:                        11.9   4.49  )-----------( 216      ( 21 Apr 2022 12:10 )             37.9     04-21    136  |  99  |  13  ----------------------------<  85  4.7   |  28  |  0.60    Ca    8.7      21 Apr 2022 12:10  Iron with Total Binding Capacity (04.21.22 @ 12:10)    Iron - Total Binding Capacity.: 259 ug/dL    % Saturation, Iron: 40 %    Iron Total, Serum: 104 ug/dL    Unsaturated Iron Binding Capacity: 155 ug/dL    Ferritin, Serum (04.21.22 @ 12:10)    Ferritin, Serum: 31 ng/mL            Care Discussed with Consultants/Other Providers: TOMÁS Delaney

## 2022-04-21 NOTE — BH CHART NOTE - NSEVENTNOTEFT_PSY_ALL_CORE
Writer receives call by nursing reporting that patient reports rectal bleeding. Was witnessed by nursing, bright red blood, moderate amount mixed in her stool. Patient reports diarrhea had 5 BM's yesterday, and 1BM today. Denies any pain at this time or abdominal discomfort.  Discussed case with medicine team, Dr. Lambert will evaluate patient.  Order CBC

## 2022-04-21 NOTE — BH INPATIENT PSYCHIATRY PROGRESS NOTE - CURRENT MEDICATION
MEDICATIONS  (STANDING):  buprenorphine 2 mG/naloxone 0.5 mG SL Film 2 Film(s) SubLingual three times a day  clonazePAM  Tablet 1 milliGRAM(s) Oral <User Schedule>  escitalopram 10 milliGRAM(s) Oral daily  mirtazapine 7.5 milliGRAM(s) Oral at bedtime    MEDICATIONS  (PRN):  acetaminophen     Tablet .. 650 milliGRAM(s) Oral every 6 hours PRN Moderate Pain (4 - 6)  BACItracin   Ointment 1 Application(s) Topical three times a day PRN wound  cloNIDine 0.1 milliGRAM(s) Oral every 6 hours PRN anxiety  diphenhydrAMINE 50 milliGRAM(s) Oral every 6 hours PRN EPS/agitation  diphenhydrAMINE Injectable 50 milliGRAM(s) IntraMuscular once PRN EPS/severeagitation  haloperidol     Tablet 5 milliGRAM(s) Oral every 6 hours PRN agitation  haloperidol    Injectable 5 milliGRAM(s) IntraMuscular Once PRN severe agitation  hydrOXYzine hydrochloride 50 milliGRAM(s) Oral every 6 hours PRN Anxiety

## 2022-04-22 PROCEDURE — 99231 SBSQ HOSP IP/OBS SF/LOW 25: CPT | Mod: 1L

## 2022-04-22 RX ORDER — CLONAZEPAM 1 MG
0.5 TABLET ORAL
Refills: 0 | Status: DISCONTINUED | OUTPATIENT
Start: 2022-04-22 | End: 2022-04-26

## 2022-04-22 RX ORDER — MIRTAZAPINE 45 MG/1
15 TABLET, ORALLY DISINTEGRATING ORAL AT BEDTIME
Refills: 0 | Status: DISCONTINUED | OUTPATIENT
Start: 2022-04-22 | End: 2022-04-28

## 2022-04-22 RX ORDER — CLONAZEPAM 1 MG
1 TABLET ORAL DAILY
Refills: 0 | Status: DISCONTINUED | OUTPATIENT
Start: 2022-04-23 | End: 2022-04-26

## 2022-04-22 RX ADMIN — BUPRENORPHINE AND NALOXONE 2 FILM(S): 2; .5 TABLET SUBLINGUAL at 12:07

## 2022-04-22 RX ADMIN — Medication 1 MILLIGRAM(S): at 08:04

## 2022-04-22 RX ADMIN — Medication 0.5 MILLIGRAM(S): at 12:07

## 2022-04-22 RX ADMIN — Medication 0.5 MILLIGRAM(S): at 20:18

## 2022-04-22 RX ADMIN — BUPRENORPHINE AND NALOXONE 2 FILM(S): 2; .5 TABLET SUBLINGUAL at 20:04

## 2022-04-22 RX ADMIN — Medication 1 APPLICATION(S): at 14:56

## 2022-04-22 RX ADMIN — MIRTAZAPINE 15 MILLIGRAM(S): 45 TABLET, ORALLY DISINTEGRATING ORAL at 20:04

## 2022-04-22 RX ADMIN — ESCITALOPRAM OXALATE 10 MILLIGRAM(S): 10 TABLET, FILM COATED ORAL at 08:04

## 2022-04-22 RX ADMIN — BUPRENORPHINE AND NALOXONE 2 FILM(S): 2; .5 TABLET SUBLINGUAL at 08:04

## 2022-04-22 RX ADMIN — SENNA PLUS 2 TABLET(S): 8.6 TABLET ORAL at 20:04
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
## 2022-04-22 NOTE — BH INPATIENT PSYCHIATRY PROGRESS NOTE - NSBHMETABOLIC_PSY_ALL_CORE_FT
BMI: BMI (kg/m2): 20.9 (04-16-22 @ 18:50)  HbA1c: A1C with Estimated Average Glucose Result: 5.3 % (09-22-21 @ 11:56)    Glucose:   BP: 96/63 (04-22-22 @ 08:35) (96/63 - 104/73)  Lipid Panel: Date/Time: 09-23-21 @ 08:40  Cholesterol, Serum: 177  Direct LDL: --  HDL Cholesterol, Serum: 101  Total Cholesterol/HDL Ration Measurement: --  Triglycerides, Serum: 52

## 2022-04-22 NOTE — BH INPATIENT PSYCHIATRY PROGRESS NOTE - PRN MEDS
MEDICATIONS  (PRN):  acetaminophen     Tablet .. 650 milliGRAM(s) Oral every 6 hours PRN Moderate Pain (4 - 6)  BACItracin   Ointment 1 Application(s) Topical three times a day PRN wound  cloNIDine 0.1 milliGRAM(s) Oral every 6 hours PRN anxiety  diphenhydrAMINE 50 milliGRAM(s) Oral every 6 hours PRN EPS/agitation  diphenhydrAMINE Injectable 50 milliGRAM(s) IntraMuscular once PRN EPS/severeagitation  haloperidol     Tablet 5 milliGRAM(s) Oral every 6 hours PRN agitation  haloperidol    Injectable 5 milliGRAM(s) IntraMuscular Once PRN severe agitation  hemorrhoidal Ointment 1 Application(s) Rectal daily PRN hemorrhoid  hydrOXYzine hydrochloride 50 milliGRAM(s) Oral every 6 hours PRN Anxiety  polyethylene glycol 3350 17 Gram(s) Oral daily PRN constipation

## 2022-04-22 NOTE — BH INPATIENT PSYCHIATRY PROGRESS NOTE - NSBHCHARTREVIEWVS_PSY_A_CORE FT
Vital Signs Last 24 Hrs  T(C): 36.4 (04-22-22 @ 06:39), Max: 36.9 (04-21-22 @ 22:34)  T(F): 97.5 (04-22-22 @ 06:39), Max: 98.4 (04-21-22 @ 22:34)  HR: 71 (04-22-22 @ 08:35) (71 - 71)  BP: 96/63 (04-22-22 @ 08:35) (96/63 - 104/73)  BP(mean): 72 (04-21-22 @ 19:56) (72 - 72)  RR: --  SpO2: --    Orthostatic VS  04-21-22 @ 07:36  Lying BP: --/-- HR: --  Sitting BP: 106/66 HR: 74  Standing BP: 112/69 HR: 77  Site: upper left arm  Mode: electronic  Orthostatic VS  04-20-22 @ 19:38  Lying BP: --/-- HR: --  Sitting BP: 103/69 HR: 75  Standing BP: 100/65 HR: 80  Site: --  Mode: --

## 2022-04-22 NOTE — BH INPATIENT PSYCHIATRY PROGRESS NOTE - CURRENT MEDICATION
MEDICATIONS  (STANDING):  buprenorphine 2 mG/naloxone 0.5 mG SL Film 2 Film(s) SubLingual three times a day  clonazePAM  Tablet 1 milliGRAM(s) Oral <User Schedule>  escitalopram 10 milliGRAM(s) Oral daily  mirtazapine 15 milliGRAM(s) Oral at bedtime  senna 2 Tablet(s) Oral at bedtime    MEDICATIONS  (PRN):  acetaminophen     Tablet .. 650 milliGRAM(s) Oral every 6 hours PRN Moderate Pain (4 - 6)  BACItracin   Ointment 1 Application(s) Topical three times a day PRN wound  cloNIDine 0.1 milliGRAM(s) Oral every 6 hours PRN anxiety  diphenhydrAMINE 50 milliGRAM(s) Oral every 6 hours PRN EPS/agitation  diphenhydrAMINE Injectable 50 milliGRAM(s) IntraMuscular once PRN EPS/severeagitation  haloperidol     Tablet 5 milliGRAM(s) Oral every 6 hours PRN agitation  haloperidol    Injectable 5 milliGRAM(s) IntraMuscular Once PRN severe agitation  hemorrhoidal Ointment 1 Application(s) Rectal daily PRN hemorrhoid  hydrOXYzine hydrochloride 50 milliGRAM(s) Oral every 6 hours PRN Anxiety  polyethylene glycol 3350 17 Gram(s) Oral daily PRN constipation   MEDICATIONS  (STANDING):  buprenorphine 2 mG/naloxone 0.5 mG SL Film 2 Film(s) SubLingual three times a day  clonazePAM  Tablet 0.5 milliGRAM(s) Oral <User Schedule>  escitalopram 10 milliGRAM(s) Oral daily  mirtazapine 15 milliGRAM(s) Oral at bedtime  senna 2 Tablet(s) Oral at bedtime    MEDICATIONS  (PRN):  acetaminophen     Tablet .. 650 milliGRAM(s) Oral every 6 hours PRN Moderate Pain (4 - 6)  BACItracin   Ointment 1 Application(s) Topical three times a day PRN wound  cloNIDine 0.1 milliGRAM(s) Oral every 6 hours PRN anxiety  diphenhydrAMINE 50 milliGRAM(s) Oral every 6 hours PRN EPS/agitation  diphenhydrAMINE Injectable 50 milliGRAM(s) IntraMuscular once PRN EPS/severeagitation  haloperidol     Tablet 5 milliGRAM(s) Oral every 6 hours PRN agitation  haloperidol    Injectable 5 milliGRAM(s) IntraMuscular Once PRN severe agitation  hemorrhoidal Ointment 1 Application(s) Rectal daily PRN hemorrhoid  hydrOXYzine hydrochloride 50 milliGRAM(s) Oral every 6 hours PRN Anxiety  polyethylene glycol 3350 17 Gram(s) Oral daily PRN constipation

## 2022-04-23 RX ADMIN — Medication 0.5 MILLIGRAM(S): at 12:25

## 2022-04-23 RX ADMIN — BUPRENORPHINE AND NALOXONE 2 FILM(S): 2; .5 TABLET SUBLINGUAL at 19:52

## 2022-04-23 RX ADMIN — Medication 1 MILLIGRAM(S): at 08:08

## 2022-04-23 RX ADMIN — ESCITALOPRAM OXALATE 10 MILLIGRAM(S): 10 TABLET, FILM COATED ORAL at 08:08

## 2022-04-23 RX ADMIN — BUPRENORPHINE AND NALOXONE 2 FILM(S): 2; .5 TABLET SUBLINGUAL at 08:09

## 2022-04-23 RX ADMIN — BUPRENORPHINE AND NALOXONE 2 FILM(S): 2; .5 TABLET SUBLINGUAL at 12:25

## 2022-04-23 RX ADMIN — SENNA PLUS 2 TABLET(S): 8.6 TABLET ORAL at 19:51

## 2022-04-23 RX ADMIN — MIRTAZAPINE 15 MILLIGRAM(S): 45 TABLET, ORALLY DISINTEGRATING ORAL at 19:51

## 2022-04-23 RX ADMIN — Medication 0.5 MILLIGRAM(S): at 19:51

## 2022-04-24 RX ADMIN — MIRTAZAPINE 15 MILLIGRAM(S): 45 TABLET, ORALLY DISINTEGRATING ORAL at 20:31

## 2022-04-24 RX ADMIN — Medication 50 MILLIGRAM(S): at 01:05

## 2022-04-24 RX ADMIN — Medication 0.5 MILLIGRAM(S): at 20:35

## 2022-04-24 RX ADMIN — SENNA PLUS 2 TABLET(S): 8.6 TABLET ORAL at 20:31

## 2022-04-24 RX ADMIN — BUPRENORPHINE AND NALOXONE 2 FILM(S): 2; .5 TABLET SUBLINGUAL at 20:31

## 2022-04-24 RX ADMIN — POLYETHYLENE GLYCOL 3350 17 GRAM(S): 17 POWDER, FOR SOLUTION ORAL at 10:21

## 2022-04-24 RX ADMIN — BUPRENORPHINE AND NALOXONE 2 FILM(S): 2; .5 TABLET SUBLINGUAL at 13:39

## 2022-04-24 RX ADMIN — ESCITALOPRAM OXALATE 10 MILLIGRAM(S): 10 TABLET, FILM COATED ORAL at 08:01

## 2022-04-24 RX ADMIN — Medication 1 MILLIGRAM(S): at 08:02

## 2022-04-24 RX ADMIN — BUPRENORPHINE AND NALOXONE 2 FILM(S): 2; .5 TABLET SUBLINGUAL at 08:02

## 2022-04-24 RX ADMIN — Medication 0.5 MILLIGRAM(S): at 15:39

## 2022-04-25 PROCEDURE — 99231 SBSQ HOSP IP/OBS SF/LOW 25: CPT | Mod: 1L

## 2022-04-25 PROCEDURE — 99238 HOSP IP/OBS DSCHRG MGMT 30/<: CPT | Mod: 1L

## 2022-04-25 RX ADMIN — Medication 1 APPLICATION(S): at 14:18

## 2022-04-25 RX ADMIN — ESCITALOPRAM OXALATE 10 MILLIGRAM(S): 10 TABLET, FILM COATED ORAL at 08:10

## 2022-04-25 RX ADMIN — Medication 1 MILLIGRAM(S): at 08:09

## 2022-04-25 RX ADMIN — BUPRENORPHINE AND NALOXONE 2 FILM(S): 2; .5 TABLET SUBLINGUAL at 08:10

## 2022-04-25 RX ADMIN — SENNA PLUS 2 TABLET(S): 8.6 TABLET ORAL at 20:10

## 2022-04-25 RX ADMIN — Medication 0.5 MILLIGRAM(S): at 20:10

## 2022-04-25 RX ADMIN — Medication 1 APPLICATION(S): at 20:10

## 2022-04-25 RX ADMIN — BUPRENORPHINE AND NALOXONE 2 FILM(S): 2; .5 TABLET SUBLINGUAL at 12:14

## 2022-04-25 RX ADMIN — Medication 0.5 MILLIGRAM(S): at 12:14

## 2022-04-25 RX ADMIN — BUPRENORPHINE AND NALOXONE 2 FILM(S): 2; .5 TABLET SUBLINGUAL at 20:10

## 2022-04-25 RX ADMIN — MIRTAZAPINE 15 MILLIGRAM(S): 45 TABLET, ORALLY DISINTEGRATING ORAL at 20:11

## 2022-04-25 NOTE — BH INPATIENT PSYCHIATRY DISCHARGE NOTE - NSBHMETABOLIC_PSY_ALL_CORE_FT
BMI: BMI (kg/m2): 20.9 (04-16-22 @ 18:50)  HbA1c: A1C with Estimated Average Glucose Result: 5.3 % (09-22-21 @ 11:56)    Glucose:   BP: 96/63 (04-22-22 @ 08:35) (96/63 - 96/63)  Lipid Panel: Date/Time: 09-23-21 @ 08:40  Cholesterol, Serum: 177  Direct LDL: --  HDL Cholesterol, Serum: 101  Total Cholesterol/HDL Ration Measurement: --  Triglycerides, Serum: 52

## 2022-04-25 NOTE — BH INPATIENT PSYCHIATRY PROGRESS NOTE - NSBHCHARTREVIEWVS_PSY_A_CORE FT
Vital Signs Last 24 Hrs  T(C): 37 (04-25-22 @ 06:18), Max: 37 (04-25-22 @ 06:18)  T(F): 98.6 (04-25-22 @ 06:18), Max: 98.6 (04-25-22 @ 06:18)  HR: --  BP: --  BP(mean): --  RR: --  SpO2: --    Orthostatic VS  04-24-22 @ 08:18  Lying BP: --/-- HR: --  Sitting BP: 105/66 HR: 70  Standing BP: 102/68 HR: 80  Site: --  Mode: --  Orthostatic VS  04-23-22 @ 19:40  Lying BP: --/-- HR: --  Sitting BP: 122/78 HR: 80  Standing BP: 120/78 HR: 82  Site: --  Mode: --   Vital Signs Last 24 Hrs  T(C): 37 (04-25-22 @ 06:18), Max: 37 (04-25-22 @ 06:18)  T(F): 98.6 (04-25-22 @ 06:18), Max: 98.6 (04-25-22 @ 06:18)  HR: --  BP: --  BP(mean): --  RR: --  SpO2: --    Orthostatic VS  04-25-22 @ 08:12  Lying BP: --/-- HR: --  Sitting BP: 99/66 HR: 74  Standing BP: 99/64 HR: 73  Site: upper left arm  Mode: electronic  Orthostatic VS  04-24-22 @ 08:18  Lying BP: --/-- HR: --  Sitting BP: 105/66 HR: 70  Standing BP: 102/68 HR: 80  Site: --  Mode: --  Orthostatic VS  04-23-22 @ 19:40  Lying BP: --/-- HR: --  Sitting BP: 122/78 HR: 80  Standing BP: 120/78 HR: 82  Site: --  Mode: --

## 2022-04-25 NOTE — BH INPATIENT PSYCHIATRY DISCHARGE NOTE - OTHER PAST PSYCHIATRIC HISTORY (INCLUDE DETAILS REGARDING ONSET, COURSE OF ILLNESS, INPATIENT/OUTPATIENT TREATMENT)
Pt was recently discharged from [facility] at the beginning of April and has since been readmitted to [facility]. Pt declined all consents and UTOX + for Benzos, amphetamines, and Cocaine. The following is from previous admission as pt was too tired to meet with writer.     As per  Resident Note completed on March 27th 2022: "Patient is a 50 y/o woman, reportedly domiciled with mother in [place],  documented psychiatric history of Malingering, of polysubstance use, opioid use disorder (on methadone) benzo dependence (including BZD diversion at [facility]), cocaine use disorder,  anxiety, depression, prior psychiatric hospitalizations including last for 2 days at Health system discharged on 9/23/21 after found to be diverting/sharing benzos with her boyfriend also on unit, history of suicidal gestures and non-suicidal cutting behavior,  no known medical history,  who was BIBEMS after being found unresponsive at [facility] with apparent opiate OD (responded to narcan) who in ED reported suicidal ideation.   Patient seen via [employer] Real-time cart. patient is a poor historian, and participates minimally in interview. she is vague and evasive during her brief participation. She states that she "sniffed heroin" today "to feel better" but she thinks it contained fentanyl and she had LOC. She reports that she has been depressed recently and that she is suicidal, she does not describe plans/intents. She is evasive when asked about housing situation, any outpatient treatment."

## 2022-04-25 NOTE — BH INPATIENT PSYCHIATRY DISCHARGE NOTE - NSBHDCHANDOFFFT_PSY_ALL_CORE
Medication list and discharge summary included discussion of hospital course, treatment, and medications, with phone number left for call back provided to outpatient clinic. Writer's contact information was provided for any further questions or concerns to contact Lalitha Adair NP at [phone].

## 2022-04-25 NOTE — BH INPATIENT PSYCHIATRY DISCHARGE NOTE - HPI (INCLUDE ILLNESS QUALITY, SEVERITY, DURATION, TIMING, CONTEXT, MODIFYING FACTORS, ASSOCIATED SIGNS AND SYMPTOMS)
Patient was seen and evaluated, chart reviewed. Case discussed with nursing team.  On service for this 51 year old single female with PPH of   Polysubstance use, opioid use disorder (on methadone) benzo dependence, anxiety, depression.  Pt has hx of multiple psych hospitalizations including last for 10 days at [facility] discharged on 4/6/22.  Patient  BIB by self stating that her medication was stolen.  Patient admitted to [facility] on a 9.39 legal status. I have reviewed the initial psychiatric assessment in the electronic medical record, including the history of present illness, past psychiatric history, family/social history (no pertinent changes), and exam, and have confirmed the salient findings dated 4/16/22.    As per chart review, transferring records indicated the following:  Patient is a 51-year-old woman, domiciled with mother in [place], PPH of polysubstance use, opioid use disorder (on methadone) benzo dependence, anxiety, depression, prior psychiatric hospitalizations including last for 10 days at [facility] discharged on 4/6/22, did not attend follow up, history of suicidal gestures and non-suicidal cutting behavior, malingering, no known PMH, who was BIB self stating that her medication was stolen. Upon evaluation, patient appeared with dirt under fingernails, unkempt appearance, irritable, but overall cooperative. Patient states that she has not been caring for herself and that she lost her medications. She states that this happened 2 days ago to the staff, then states that this might have happened a week ago. Patient states to staff that her medication was stolen, told provider medication was lost. She states that she was admitted to the [facility] at some point recently, unsure exactly when or for how long. States that she was given information for follow up and was discharged on Lexapro and Klonopin. Patient states that she did not follow up with outpatient providers, when asked why states she is unsure, becomes very vague and tangential. She continuously requests Klonopin, Ativan or "some other shit."  When asked about substances, patient states that she hasn't used opiates in "a while," states that the last time she used opiates was before her admission. Patient denies use of benzos or alcohol, since she lost or had her klonopin stolen. When pressed, patient states "I think I was dabbling in crystal meth" this week. Patient states she was discharged on suboxone but was not using it as prescribed, stating she was taking it "randomly."  Patient states she has been feeling depressed and hasn't been sleeping well. She denies SI, states that she has thoughts of cutting and if she currently had a razor she would cut herself because it helps her feel better. She states that she "hasn't been cutting but is a cutter." She states that she last cut in March. Patient then showed her wrists, which do not have any cuts on them.  Patient is denying hallucinations, delusions, or paranoia. Denying manic symptoms.  Patient states that she lives with her mother. When asked for the phone number to contact her, patient becomes very irritable and angry. She then states that she lives with her mother but her mother is "out of town," patient unable to state for how long, or when her mom left. She states her mom went to [place] then states she went to [place]. Patient states that she has a partner who she lived with in [place] for 3 months. She states he was then arrested but has since come up to [place] to be with her. He states that his name is Berkley, but that we cannot contact him because they share a phone. She states she hasn't seen him recently, becomes very evasive when asked more questions about it.   ISTOP  demonstrated (ref# [id number]) monthly Klonopin 1mg 60 tablet prescriptions from Dr. Saw Scruggs internist, as well as most recently discharged on Klonopin 1mg PO BID and Suboxone (given 10 days ago, 2 week supply).    Patient re-evaluted 6+ hours later. Patient had been calm and cooperative, sleeping throughout the day. On re-evaluation before discharge, patient became acutely agitated, stating that she was going to go kill herself. Stating that she will go and jump in front of traffic, hang herself, slit her wrists. Patient states "CUT CUT CUT" "KILL KILL KILL," begins crying, stating that she doesn't want to live. Patient aggressive, starts to hit her head, looks around her, stating that she is looking for something to kill herself. States she will go to [place] and go to a Blvd, go hide and then kill herself where nobody could see. Then asked patient if she wanted something for her anxiety, she became focused, stopped crying, stating "can you give me something?" Providers answer that we can, patient then calms down, when told that it will not be a benzodiazepine, she becomes agitated again. Patient unable to be redirected despite multiple attempts. Given IM Haldol 5mg/ Benadryl 50mg.    On unit: Information received from: Chart review and patient interview  Patient is worsening mood, SI, and polysubstance misuse.  Chart, medications and labs reviewed, admitting u-tox positive for Cocaine, Benzo, and amphetamine.  Patient is discussed with nursing staff. No significant overnight issues.  Per nursing report patient remains compliant with all standing medications and tolerating well. Patient remains in fair behavioral control, there has been no aggression, no prns for aggression.   Patient is observed in room.  She is approachable and engaging during interview.  Patient describes mood as “I feel hopeless” Reports depressed mood.  Patient was recently discharged from [facility] unit last week on 4/6/22.  Patient reports she did not follow up with outpatient appointments and lost her medications “I lost my bag 2 days ago” Patient unable to elaborate further. Patient reports she cannot remember a lot of the events after she was discharged.  Most interview questions patient answered “I don’t know”  She describes daily low mood,  anxiety,  anhedonia,  difficulty sleeping. Patient states that she had thoughts of cutting and if she currently had a razor she would cut herself because it helps her feel better. She states that she "hasn't been cutting but is a cutter." She states that she last cut in March ( “I cut my wrist”, no evidence of past cuts).  Patient’s demeanor/posture/attitude during interview convey an underlying depressed/anxious mood. Patient endorses passive SI, denies plan or intent “its just thoughts” Denies any urges to SIB, denies HI, engages in safety planning.  In ED patient stated that she will go kill herself if she is discharged from the hospital, will jump into traffic or kill herself by cutting. Patient became erratic, violent, started banging her head. Patient given emergent IM medications, Haldol 5mg/ Benadryl 50mg.  Patient reports “I need to be in a safe place” reports feeling safe on unit.  Patient fixated on being transferred to [facility].   In regards to psychotic symptoms patient denies AVH. No obsessive, intrusive and persistent thoughts or compulsive, ritualistic acts are reported. No hallucinations, delusions, or other symptoms of psychotic process are reported by her. Patient denies any symptoms suggestive of mariaelena: (denied grandiosity/ racing thoughts/ increased goal directed activities or engaged in risk taking behavior/ no pressured speech/ no elevated mood/ denied any increased in energy level causing sleep disruption). No signs of catatonia.     Patient reports  substance use, u-tox positive for Cocaine, Benzo, and amphetamine.  Denies alcohol use. Cocaine $10 (smoking) pt reports using twice since her discharge, amphetamine $60-80 (smoking) reports using several times since she was discharged.   ISTOP  demonstrated (ref# [id number]) monthly Klonopin 1mg 60 tablet prescriptions from Dr. Saw Scruggs internist, as well as most recently discharged on Klonopin 1mg PO BID and Suboxone (given 10 days ago, 2 week supply). Patient reports she has not been taking her Suboxone “I can do without starting it again”.  No current legal issue, no past trauma. No PMH.

## 2022-04-25 NOTE — BH INPATIENT PSYCHIATRY DISCHARGE NOTE - NSDCMRMEDTOKEN_GEN_ALL_CORE_FT
mirtazapine 7.5 mg oral tablet: 1 tab(s) orally once a day (at bedtime)   escitalopram 20 mg oral tablet: 1 tab(s) orally once a day in the morning  KlonoPIN 1 mg oral tablet: 1 tab(s) orally 2 times a day MDD:2  mirtazapine 30 mg oral tablet: 1 tab(s) orally once a day (at bedtime)  Suboxone 2 mg-0.5 mg sublingual film: 2 film(s) sublingually 3 times a day MDD:12mg buprenorphine   buprenorphine-naloxone 2 mg-0.5 mg sublingual film: 2  sublingual 4 times a day MDD:8  escitalopram 20 mg oral tablet: 1 tab(s) orally once a day in the morning  KlonoPIN 1 mg oral tablet: 1 tab(s) orally 2 times a day MDD:2  mirtazapine 30 mg oral tablet: 1 tab(s) orally once a day (at bedtime)

## 2022-04-25 NOTE — BH INPATIENT PSYCHIATRY PROGRESS NOTE - NSBHFUPINTERVALHXFT_PSY_A_CORE
Patient is followed up for MDD and polysubstance abuse this morning. Chart, medications, and labs reviewed. Patient is discussed in treatment team. No significant events overnight. Patient is less irritable, compliant with medications, eating well, sleeping throughout the night, cooperative with interview today. She has brighter affect but states she states that she is still feeling depressed and sad. She refused wound care to her right knee yesterday and is requesting to be seen by a dentist today for issues with her tooth. She is preoccupied with thoughts and concerns about her fiance, and she wants to know his whereabouts and psychiatric status. He has been calling her sister in [place], who is hesitant to provide the patient with his phone number. She states she is not ready to think about discharge yet and would consider staying another week or 2 for further treatment. Patient is interested in attending outpatient treatment sessions after discharge. She is not interested in attending group therapy sessions on the unit. Her Remeron was increased. Silvia SI, HI, AVH. Will continue to provide therapeutic support. Patient is followed up for MDD and polysubstance abuse this morning. Chart, medications, and labs reviewed. Patient is discussed in treatment team. No significant events overnight. Patient is less irritable, compliant with medications, eating well, sleeping throughout the night, cooperative with interview today. She has brighter affect but states she states that she is still feeling depressed and sad. She refused wound care to her right knee yesterday and is requesting to be seen by a dentist today for issues with her tooth (then states she will go to her own dentist in [facility]). She is preoccupied with thoughts and concerns about her fiance, and she wants to know his whereabouts and psychiatric status. He has been calling her sister in [place], who is hesitant to provide the patient with his phone number. She states she is not ready to think about discharge yet and would consider staying another week or 2 for further treatment. Patient is interested in attending outpatient treatment sessions after discharge. She is not interested in attending group therapy sessions on the unit. Remeron was increased 15mg. ORESTES Vega SI, AVSHARA. Will continue to provide therapeutic support.

## 2022-04-25 NOTE — BH INPATIENT PSYCHIATRY PROGRESS NOTE - NSBHMETABOLIC_PSY_ALL_CORE_FT
BMI: BMI (kg/m2): 20.9 (04-16-22 @ 18:50)  HbA1c: A1C with Estimated Average Glucose Result: 5.3 % (09-22-21 @ 11:56)    Glucose:   BP: 96/63 (04-22-22 @ 08:35) (96/63 - 96/63)  Lipid Panel: Date/Time: 09-23-21 @ 08:40  Cholesterol, Serum: 177  Direct LDL: --  HDL Cholesterol, Serum: 101  Total Cholesterol/HDL Ration Measurement: --  Triglycerides, Serum: 52   BMI: BMI (kg/m2): 20.9 (04-16-22 @ 18:50)  HbA1c: A1C with Estimated Average Glucose Result: 5.3 % (09-22-21 @ 11:56)    Glucose:   BP: --  Lipid Panel: Date/Time: 09-23-21 @ 08:40  Cholesterol, Serum: 177  Direct LDL: --  HDL Cholesterol, Serum: 101  Total Cholesterol/HDL Ration Measurement: --  Triglycerides, Serum: 52

## 2022-04-25 NOTE — BH INPATIENT PSYCHIATRY PROGRESS NOTE - NSBHASSESSSUMMFT_PSY_ALL_CORE
Plan:  >Legal: 9.39  >Obs: Routine, no current SI. No need for CO, patient not expected to pose risk to self or others in controlled inpatient setting  >Psychiatric Meds: Lexapro 10mg daily, Increase Remeron 15mg PO qHS, Klonopin 1mg BID, Suboxone 4mg-1mg sublingual TID. Observe for tolerability and efficacy.   PRN medications:  Haldol 5mg oral Q6HR PRN for agitation.   Benadryl 50mg oral Q6HR PRN for agitation.   Vistaril 50mg oral Q6HR PRN for anxiety.  Desyrel 50mg oral QHS PRN for insomnia.   >Labs: Labs reviewed, no acute findings, u-tox negative. Hold antipsychotics if QTc >500. Follow up potassium level this morning.   >Medical: wound/blisters under BL plantar surface of feet.  Wound care consultation ordered.  Patient with consistently stable VS, no visible physical symptoms of withdrawal. During the course of treatment, will collaborate with medical team to manage medical issues.  >Diet: Regular  >Social: milieu/structured therapy  >Treatment Interventions: Groups and Individual Therapy/CBT, Motivational counseling for substance abuse related issues.   >Dispo: Collateral and dispo planning pending further symptom and medication optimization

## 2022-04-25 NOTE — BH INPATIENT PSYCHIATRY DISCHARGE NOTE - CASE SUMMARY
Patient is a 51 year old single female with PPH of  MDD and Polysubstance use, opioid use disorder (on methadone) benzo dependence, anxiety, depression.  Pt has hx of multiple psych hospitalizations including last for 10 days at [facility] discharged on 4/6/22.  Patient  BIB by self stating that her medication was stolen.  Patient hospitalized with a primary problem of  low mood, depression with passive SI, and recent drug use, +u-tox for benzo, cocaine, amphetamine.    Has history of multiple psychiatric admissions. Patient was admitted to [facility] inpatient service on 9.39 legal status. On admission patient arrived depressed, passive SI.  She was calm, cooperative during session She was very vague and tangential during initial interview. She denied SI/SIB/HI on admission.  Patient denied paranoia, mariaelena, delusions, and disorganization.  Following a prolonged discussion regarding risks (including but not limited to EPS, weight gain, NMS, TD, metabolic syndrome) and benefits (attenuated psychosis, more organized thought process and behavior), patient was restarted on her outpatient medications: Suboxone 2mg/0.5mg TID, Klonopin 1mg BID, Lexapro 10mg titrated to 20mg, and Remeron 15mg titrated to 30mg with good tolerability and effect.  Patient had no reported or observed adverse effects, such as akathisia, tremor, EPS.   Clinical progress was slow but steady and patient began showing improvements in her symptoms.  She showed improvement in her depressive symptoms.  Patient minimally engaged in treatment.  On occasion patient attended groups and milieu therapy. Seldomly agreed to individual psychotherapy.  She remained mostly in her room throughout the day. She remained in good behavioral control and has not required emergent intramuscular medications or seclusion / restraints. She denied any suicidal or homicidal ideations throughout course of treatment. Patient initially reported she wanted to go to inpatient rehab following discharge however patient changed her mind. In regards to substance abuse, the patient was provided with motivational counseling to abstain from illicit substances, as these can directly worsen her mood and symptoms. The patient was provided with motivational counseling to tackle stressors and enhance coping skills.  Patient was also given literature on “Personal safety plan.”   Patient was provided with extensive psycho-education regarding importance of compliance with medications.  Risk and benefits discussed.   PROCEDURES AND TREATMENT:  >Individual psychotherapy/CBT modality and group therapy  >Milieu therapy and supportive therapy  >Psychopharmacologic management.  >Motivational counseling.   Patient labs were all WNL. Labs include---CBC/Chemistry/LFT/A1C/Lipid. Panel/TSH/CPK/HBA1-C/U-A/U-Tox/EKG/COVID  EKG: NSR    QT/QTC: 412/475ms  Covid at discharge: nondetectable   Consultation: NA  Medical Issues: NA  Imaging: NA    Medications at discharge:   Psychiatric: Suboxone 2mg/0.5mg TID, Klonopin 1mg BID, Lexapro 20mg, and Remeron 30mg  Medical: NA  Problem Pertinent Review of Symptoms/Associated Signs and Symptoms: Patient is visible on the unit and is calm, cooperative, pleasant, AAOX3 upon approach. On suicide risk assessment at the time of discharge, patient is at elevated chronic risk due the following factors: history of psychiatric illness, history on noncompliance, hx of multiple inpatient psychiatric admissions, and recent inpatient psychiatric discharge.  Protective factors include patient denying any anxiety, panic attacks, global insomnia, severe anhedonia.  Patient denies any suicidal/homicidal ideations, intent, or plan at the time of discharge.  Psycho education was provided to the patient prior to discharge. The patient was educated about the proper and safe use of medications as well as the risks and benefits of over and under dosing. Discussed pitfalls of treatment failure and reinforced taking medication as directed. Discussed not stopping medications when she feels better, and processed discontinuation symptoms. Patient was informed of risk/benefit of medication, alternative treatment and no treatment.  Patient was educated about side effects of her medications, including EPS, TD.  Patient verbalized understanding and in accord with aftercare recommendations. Given the mitigation of aforementioned acute risk factors and considerable protective factors, patient's acute risk for self-harm has been minimized and is currently low.  There are no other acute risk factors that could be mitigated by further inpatient hospitalization.  Patient is not deemed to be an imminent danger to self or others at the time of discharge.  The patient has been instructed that should she develop thoughts of self-harm, suicidal thoughts, homicidal thoughts, aggression, agitation or any other distressing psychiatric symptoms, she should call 911 or go the emergency room. The patient has expressed understanding and is in agreement with the above plan.   Discharge Pan:  -Risk, benefits and alternatives discussed with patient. Patient verbalized understanding and in accord with aftercare recommendations.   - Patient given 4 weeks supply of medications. Risk, benefits and alternatives discussed with patient.   -Patient instructed to call 911 or go to nearest ER in case of emergency.   -Patient given Suicide Prevention Lifeline number [phone] and provided instructions on its use  -Patient will follow up with outpatient appointments   - Encouraged routine follow up for ongoing monitoring and medical management.  Recommend physical exam including EKG and metabolic screen with PCP on ongoing basis for preventative care and to maintain health and wellness.

## 2022-04-25 NOTE — BH INPATIENT PSYCHIATRY PROGRESS NOTE - CURRENT MEDICATION
MEDICATIONS  (STANDING):  buprenorphine 2 mG/naloxone 0.5 mG SL Film 2 Film(s) SubLingual three times a day  clonazePAM  Tablet 1 milliGRAM(s) Oral daily  clonazePAM  Tablet 0.5 milliGRAM(s) Oral <User Schedule>  escitalopram 10 milliGRAM(s) Oral daily  mirtazapine 15 milliGRAM(s) Oral at bedtime  senna 2 Tablet(s) Oral at bedtime    MEDICATIONS  (PRN):  acetaminophen     Tablet .. 650 milliGRAM(s) Oral every 6 hours PRN Moderate Pain (4 - 6)  BACItracin   Ointment 1 Application(s) Topical three times a day PRN wound  cloNIDine 0.1 milliGRAM(s) Oral every 6 hours PRN anxiety  diphenhydrAMINE 50 milliGRAM(s) Oral every 6 hours PRN EPS/agitation  diphenhydrAMINE Injectable 50 milliGRAM(s) IntraMuscular once PRN EPS/severeagitation  haloperidol     Tablet 5 milliGRAM(s) Oral every 6 hours PRN agitation  haloperidol    Injectable 5 milliGRAM(s) IntraMuscular Once PRN severe agitation  hemorrhoidal Ointment 1 Application(s) Rectal daily PRN hemorrhoid  hydrOXYzine hydrochloride 50 milliGRAM(s) Oral every 6 hours PRN Anxiety  polyethylene glycol 3350 17 Gram(s) Oral daily PRN constipation

## 2022-04-25 NOTE — BH INPATIENT PSYCHIATRY DISCHARGE NOTE - MODIFICATIONS
Admitted for acute depression in setting of recent [facility] hospitalization, opioid use disorder, additional psychiatric history of MDD and PTSD. Stabilized in response to medication and inpatient psychiatric hospitalization. Although remains chronically elevated risk, acute risk sufficiently ameliorated to no longer require inpatient psychiatric hospitalization.
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
## 2022-04-25 NOTE — BH INPATIENT PSYCHIATRY DISCHARGE NOTE - NSBHSUICIDESTATUS_PSY_ALL_CORE
On safety assessment on day of discharge, patient has the following risk factors which are nonmodifiable which include: gender, age, chronic mental illness, remote suicide attempt   Modifiable risk factors: psychosis, treatment non-adherence, substance abuse, impulsivity, inadequate social support. Currently there are no modifiable risk factors that could further be addressed in the inpatient hospital setting as patient denies any depressive sxs, patient is not suicidal with no intent or plan, has improvement in sleep and energy, medication compliant, improvement in manic sxs (such as impulsivity, irritability, intrusiveness, psychomotor agitation).  Protective factors include: denies SIIP, denies HIIP, future oriented, hopeful, willingness to try medication, not depressed, no access to weapons, engaged in treatment, stable residence, support of family, close outpatient follow up appointment.   Patient is at chronic higher risk than the general population for suicide because of risk factors as above, however, they can be mitigated by adjusting medications, medication compliance, and continued therapy.  At the time of discharge, patient not an acute danger to self or others.

## 2022-04-25 NOTE — BH INPATIENT PSYCHIATRY DISCHARGE NOTE - NSDCCPCAREPLAN_GEN_ALL_CORE_FT
PRINCIPAL DISCHARGE DIAGNOSIS  Diagnosis: MDD (major depressive disorder)  Assessment and Plan of Treatment:       SECONDARY DISCHARGE DIAGNOSES  Diagnosis: Anxiety  Assessment and Plan of Treatment:     Diagnosis: Polysubstance abuse  Assessment and Plan of Treatment:     Diagnosis: Patient nonadherence  Assessment and Plan of Treatment:

## 2022-04-25 NOTE — BH INPATIENT PSYCHIATRY DISCHARGE NOTE - NSBHDCMEDICALFT_PSY_A_CORE
No pertinent medical issues. At the time of this treatment summary, the patient has not had any acute medical changes during her hospitalization. There have been no medical consultations. Patient was discharge with COVID 19 negative results. No cough, SOB, CP, or fever at time of discharge. Vitals WNL.

## 2022-04-26 PROCEDURE — 99232 SBSQ HOSP IP/OBS MODERATE 35: CPT | Mod: 1L

## 2022-04-26 RX ORDER — CLONAZEPAM 1 MG
0.5 TABLET ORAL
Refills: 0 | Status: DISCONTINUED | OUTPATIENT
Start: 2022-04-26 | End: 2022-05-03

## 2022-04-26 RX ORDER — CLONAZEPAM 1 MG
1 TABLET ORAL DAILY
Refills: 0 | Status: DISCONTINUED | OUTPATIENT
Start: 2022-04-26 | End: 2022-05-03

## 2022-04-26 RX ORDER — BUPRENORPHINE AND NALOXONE 2; .5 MG/1; MG/1
2 TABLET SUBLINGUAL THREE TIMES A DAY
Refills: 0 | Status: DISCONTINUED | OUTPATIENT
Start: 2022-04-26 | End: 2022-05-03

## 2022-04-26 RX ORDER — BUPRENORPHINE AND NALOXONE 2; .5 MG/1; MG/1
1 TABLET SUBLINGUAL THREE TIMES A DAY
Refills: 0 | Status: DISCONTINUED | OUTPATIENT
Start: 2022-04-26 | End: 2022-04-26

## 2022-04-26 RX ADMIN — BUPRENORPHINE AND NALOXONE 2 FILM(S): 2; .5 TABLET SUBLINGUAL at 08:19

## 2022-04-26 RX ADMIN — MIRTAZAPINE 15 MILLIGRAM(S): 45 TABLET, ORALLY DISINTEGRATING ORAL at 20:12

## 2022-04-26 RX ADMIN — Medication 650 MILLIGRAM(S): at 14:07

## 2022-04-26 RX ADMIN — Medication 1 MILLIGRAM(S): at 08:19

## 2022-04-26 RX ADMIN — Medication 50 MILLIGRAM(S): at 20:12

## 2022-04-26 RX ADMIN — BUPRENORPHINE AND NALOXONE 2 FILM(S): 2; .5 TABLET SUBLINGUAL at 20:12

## 2022-04-26 RX ADMIN — Medication 0.5 MILLIGRAM(S): at 20:11

## 2022-04-26 RX ADMIN — Medication 0.1 MILLIGRAM(S): at 20:11

## 2022-04-26 RX ADMIN — Medication 50 MILLIGRAM(S): at 20:11

## 2022-04-26 RX ADMIN — BUPRENORPHINE AND NALOXONE 2 FILM(S): 2; .5 TABLET SUBLINGUAL at 12:16

## 2022-04-26 RX ADMIN — SENNA PLUS 2 TABLET(S): 8.6 TABLET ORAL at 20:12

## 2022-04-26 RX ADMIN — POLYETHYLENE GLYCOL 3350 17 GRAM(S): 17 POWDER, FOR SOLUTION ORAL at 11:23

## 2022-04-26 RX ADMIN — Medication 650 MILLIGRAM(S): at 11:24

## 2022-04-26 RX ADMIN — Medication 0.1 MILLIGRAM(S): at 08:05

## 2022-04-26 RX ADMIN — ESCITALOPRAM OXALATE 10 MILLIGRAM(S): 10 TABLET, FILM COATED ORAL at 08:06

## 2022-04-26 RX ADMIN — Medication 0.5 MILLIGRAM(S): at 12:16

## 2022-04-26 NOTE — BH INPATIENT PSYCHIATRY PROGRESS NOTE - NSBHFUPINTERVALHXFT_PSY_A_CORE
Patient followed up this morning. Chart, medications, and labs reviewed. Patient discussed in treatment team this morning. No significant events overnight. She states she "had a bad start to my morning", which she attributes to issues with medications and negative thoughts about her fiance. She went back to sleep and woke up feeling "sad but OK". She was irritable with labile mood during interview. She is sleeping well and compliant with medications. Denies SI/HI/AVH. Will continue to provide therapeutic support.

## 2022-04-26 NOTE — BH INPATIENT PSYCHIATRY PROGRESS NOTE - NSBHCHARTREVIEWVS_PSY_A_CORE FT
Vital Signs Last 24 Hrs  T(C): 36.2 (04-26-22 @ 06:26), Max: 36.2 (04-25-22 @ 22:24)  T(F): 97.1 (04-26-22 @ 06:26), Max: 97.1 (04-25-22 @ 22:24)  HR: --  BP: --  BP(mean): --  RR: 17 (04-25-22 @ 20:05) (17 - 17)  SpO2: --    Orthostatic VS  04-25-22 @ 08:12  Lying BP: --/-- HR: --  Sitting BP: 99/66 HR: 74  Standing BP: 99/64 HR: 73  Site: upper left arm  Mode: electronic  Orthostatic VS  04-24-22 @ 08:18  Lying BP: --/-- HR: --  Sitting BP: 105/66 HR: 70  Standing BP: 102/68 HR: 80  Site: --  Mode: --   Vital Signs Last 24 Hrs  T(C): 36.2 (04-26-22 @ 06:26), Max: 36.2 (04-25-22 @ 22:24)  T(F): 97.1 (04-26-22 @ 06:26), Max: 97.1 (04-25-22 @ 22:24)  HR: --  BP: --  BP(mean): --  RR: 17 (04-25-22 @ 20:05) (17 - 17)  SpO2: --    Orthostatic VS  04-26-22 @ 07:52  Lying BP: --/-- HR: --  Sitting BP: 97/60 HR: 73  Standing BP: 91/69 HR: 83  Site: upper left arm  Mode: electronic  Orthostatic VS  04-25-22 @ 08:12  Lying BP: --/-- HR: --  Sitting BP: 99/66 HR: 74  Standing BP: 99/64 HR: 73  Site: upper left arm  Mode: electronic

## 2022-04-27 PROCEDURE — 99231 SBSQ HOSP IP/OBS SF/LOW 25: CPT | Mod: 1L

## 2022-04-27 RX ADMIN — SENNA PLUS 2 TABLET(S): 8.6 TABLET ORAL at 20:07

## 2022-04-27 RX ADMIN — ESCITALOPRAM OXALATE 10 MILLIGRAM(S): 10 TABLET, FILM COATED ORAL at 08:05

## 2022-04-27 RX ADMIN — Medication 0.5 MILLIGRAM(S): at 20:06

## 2022-04-27 RX ADMIN — BUPRENORPHINE AND NALOXONE 2 FILM(S): 2; .5 TABLET SUBLINGUAL at 20:06

## 2022-04-27 RX ADMIN — Medication 1 MILLIGRAM(S): at 08:04

## 2022-04-27 RX ADMIN — Medication 0.5 MILLIGRAM(S): at 12:02

## 2022-04-27 RX ADMIN — MIRTAZAPINE 15 MILLIGRAM(S): 45 TABLET, ORALLY DISINTEGRATING ORAL at 20:06

## 2022-04-27 RX ADMIN — BUPRENORPHINE AND NALOXONE 2 FILM(S): 2; .5 TABLET SUBLINGUAL at 12:02

## 2022-04-27 RX ADMIN — BUPRENORPHINE AND NALOXONE 2 FILM(S): 2; .5 TABLET SUBLINGUAL at 08:04

## 2022-04-27 NOTE — BH INPATIENT PSYCHIATRY PROGRESS NOTE - NSBHASSESSSUMMFT_PSY_ALL_CORE
Plan:  >Legal: 9.39  >Obs: Routine, no current SI. No need for CO, patient not expected to pose risk to self or others in controlled inpatient setting  >Psychiatric Meds: Lexapro 10mg daily, Increase Remeron 15mg PO qHS, Klonopin 1mg BID, Suboxone 4mg-1mg sublingual TID. Observe for tolerability and efficacy.   PRN medications:  Haldol 5mg oral Q6HR PRN for agitation.   Benadryl 50mg oral Q6HR PRN for agitation.   Vistaril 50mg oral Q6HR PRN for anxiety.  Desyrel 50mg oral QHS PRN for insomnia.   >Labs: Labs reviewed, no acute findings, u-tox negative. Hold antipsychotics if QTc >500.    >Medical: wound/blisters under BL plantar surface of feet.  Wound care consultation ordered.  Patient with consistently stable VS, no visible physical symptoms of withdrawal. During the course of treatment, will collaborate with medical team to manage medical issues.  >Diet: Regular  >Social: milieu/structured therapy  >Treatment Interventions: Groups and Individual Therapy/CBT, Motivational counseling for substance abuse related issues.   >Dispo: Collateral and dispo planning pending further symptom and medication optimization     Plan:  >Legal: 9.39  >Obs: Routine, no current SI. No need for CO, patient not expected to pose risk to self or others in controlled inpatient setting  >Psychiatric Meds: Lexapro 10mg daily, Increase Remeron 15mg PO qHS, Klonopin 1mg BID, Suboxone 4mg-1mg sublingual TID. Observe for tolerability and efficacy.   PRN medications:  Haldol 5mg oral Q6HR PRN for agitation.   Benadryl 50mg oral Q6HR PRN for agitation.   Vistaril 50mg oral Q6HR PRN for anxiety.  Desyrel 50mg oral QHS PRN for insomnia.   >Labs: Labs reviewed, no acute findings, u-tox negative. Hold antipsychotics if QTc >500.    >Medical: wound/blisters under BL plantar surface of feet.  Wound care consultation ordered.  Patient with consistently stable VS, no visible physical symptoms of withdrawal. During the course of treatment, will collaborate with medical team to manage medical issues.  >Diet: Regular  >Social: milieu/structured therapy  >Treatment Interventions: Groups and Individual Therapy/CBT, Motivational counseling for substance abuse related issues.   >Dispo: Collateral and dispo planning pending further symptom and medication optimization

## 2022-04-27 NOTE — BH INPATIENT PSYCHIATRY PROGRESS NOTE - NSBHFUPINTERVALHXFT_PSY_A_CORE
Patient is followed up for MDD and substance abuse this morning. Chart, labs, and medications reviewed. Patient is discussed in treatment team. No significant events overnight. States she is "feeling sad today" about the situation with her fiance. States today is better than yesterday so far. Sleeping well and is compliant with medications. Currently denies SI/HI/AVH. Will continue to provide therapeutic support.

## 2022-04-27 NOTE — BH INPATIENT PSYCHIATRY PROGRESS NOTE - NSBHCHARTREVIEWVS_PSY_A_CORE FT
Vital Signs Last 24 Hrs  T(C): 37 (04-26-22 @ 19:07), Max: 37 (04-26-22 @ 19:07)  T(F): 98.6 (04-26-22 @ 19:07), Max: 98.6 (04-26-22 @ 19:07)  HR: --  BP: --  BP(mean): --  RR: --  SpO2: --    Orthostatic VS  04-26-22 @ 19:07  Lying BP: --/-- HR: --  Sitting BP: 92/60 HR: 79  Standing BP: 90/65 HR: 84  Site: --  Mode: --  Orthostatic VS  04-26-22 @ 07:52  Lying BP: --/-- HR: --  Sitting BP: 97/60 HR: 73  Standing BP: 91/69 HR: 83  Site: upper left arm  Mode: electronic  Orthostatic VS  04-25-22 @ 08:12  Lying BP: --/-- HR: --  Sitting BP: 99/66 HR: 74  Standing BP: 99/64 HR: 73  Site: upper left arm  Mode: electronic   Vital Signs Last 24 Hrs  T(C): 36.7 (04-27-22 @ 07:52), Max: 37 (04-26-22 @ 19:07)  T(F): 98.1 (04-27-22 @ 07:52), Max: 98.6 (04-26-22 @ 19:07)  HR: --  BP: --  BP(mean): --  RR: --  SpO2: --    Orthostatic VS  04-27-22 @ 07:52  Lying BP: --/-- HR: --  Sitting BP: 98/56 HR: 69  Standing BP: 98/60 HR: 77  Site: upper left arm  Mode: electronic  Orthostatic VS  04-26-22 @ 19:07  Lying BP: --/-- HR: --  Sitting BP: 92/60 HR: 79  Standing BP: 90/65 HR: 84  Site: --  Mode: --  Orthostatic VS  04-26-22 @ 07:52  Lying BP: --/-- HR: --  Sitting BP: 97/60 HR: 73  Standing BP: 91/69 HR: 83  Site: upper left arm  Mode: electronic

## 2022-04-28 PROCEDURE — 99231 SBSQ HOSP IP/OBS SF/LOW 25: CPT | Mod: 1L

## 2022-04-28 RX ORDER — ESCITALOPRAM OXALATE 10 MG/1
20 TABLET, FILM COATED ORAL DAILY
Refills: 0 | Status: DISCONTINUED | OUTPATIENT
Start: 2022-04-28 | End: 2022-05-13

## 2022-04-28 RX ORDER — MIRTAZAPINE 45 MG/1
30 TABLET, ORALLY DISINTEGRATING ORAL AT BEDTIME
Refills: 0 | Status: DISCONTINUED | OUTPATIENT
Start: 2022-04-28 | End: 2022-05-13

## 2022-04-28 RX ADMIN — Medication 1 MILLIGRAM(S): at 08:03

## 2022-04-28 RX ADMIN — MIRTAZAPINE 30 MILLIGRAM(S): 45 TABLET, ORALLY DISINTEGRATING ORAL at 20:00

## 2022-04-28 RX ADMIN — Medication 0.5 MILLIGRAM(S): at 20:00

## 2022-04-28 RX ADMIN — ESCITALOPRAM OXALATE 20 MILLIGRAM(S): 10 TABLET, FILM COATED ORAL at 08:08

## 2022-04-28 RX ADMIN — BUPRENORPHINE AND NALOXONE 2 FILM(S): 2; .5 TABLET SUBLINGUAL at 08:03

## 2022-04-28 RX ADMIN — BUPRENORPHINE AND NALOXONE 2 FILM(S): 2; .5 TABLET SUBLINGUAL at 20:00

## 2022-04-28 RX ADMIN — SENNA PLUS 2 TABLET(S): 8.6 TABLET ORAL at 20:01

## 2022-04-28 RX ADMIN — BUPRENORPHINE AND NALOXONE 2 FILM(S): 2; .5 TABLET SUBLINGUAL at 12:17

## 2022-04-28 RX ADMIN — Medication 0.5 MILLIGRAM(S): at 12:17

## 2022-04-28 NOTE — BH INPATIENT PSYCHIATRY PROGRESS NOTE - NSBHMETABOLIC_PSY_ALL_CORE_FT
BMI: BMI (kg/m2): 20.9 (04-16-22 @ 18:50)  HbA1c: A1C with Estimated Average Glucose Result: 5.3 % (09-22-21 @ 11:56)    Glucose:   BP: 98/60 (04-27-22 @ 19:32) (98/60 - 98/60)  Lipid Panel: Date/Time: 09-23-21 @ 08:40  Cholesterol, Serum: 177  Direct LDL: --  HDL Cholesterol, Serum: 101  Total Cholesterol/HDL Ration Measurement: --  Triglycerides, Serum: 52

## 2022-04-28 NOTE — BH INPATIENT PSYCHIATRY PROGRESS NOTE - NSBHFUPINTERVALHXFT_PSY_A_CORE
Patient is followed up this morning. Chart, labs, and medications reviewed. Patient is discussed in treatment team. Slept through the night and is compliant with medications. Tearful, sad, and irritable this morning. Patient is interested in attending 1:1 psychotherapy sessions and journaling. Lexapro was increased to 20 and Remeron increased to 30. Silvia SI, HI, AVH. Will continue to provide therapeutic support. Patient is followed up this morning. Chart, labs, and medications reviewed. Patient is discussed in treatment team. Slept through the night and is compliant with medications. Tearful, sad, and irritable this morning. Patient is interested in attending 1:1 psychotherapy sessions and journaling. Lexapro was increased to 20 and Remeron increased to 30. Denies SI, HI, AVH. Will continue to provide therapeutic support. PT converted to Vol.

## 2022-04-28 NOTE — BH INPATIENT PSYCHIATRY PROGRESS NOTE - NSBHCHARTREVIEWVS_PSY_A_CORE FT
Vital Signs Last 24 Hrs  T(C): 35.9 (04-28-22 @ 05:40), Max: 36.7 (04-27-22 @ 07:52)  T(F): 96.6 (04-28-22 @ 05:40), Max: 98.1 (04-27-22 @ 07:52)  HR: --  BP: 98/60 (04-27-22 @ 19:32) (98/60 - 98/60)  BP(mean): 70 (04-27-22 @ 19:32) (70 - 70)  RR: --  SpO2: --    Orthostatic VS  04-27-22 @ 07:52  Lying BP: --/-- HR: --  Sitting BP: 98/56 HR: 69  Standing BP: 98/60 HR: 77  Site: upper left arm  Mode: electronic  Orthostatic VS  04-26-22 @ 19:07  Lying BP: --/-- HR: --  Sitting BP: 92/60 HR: 79  Standing BP: 90/65 HR: 84  Site: --  Mode: --  Orthostatic VS  04-26-22 @ 07:52  Lying BP: --/-- HR: --  Sitting BP: 97/60 HR: 73  Standing BP: 91/69 HR: 83  Site: upper left arm  Mode: electronic   Vital Signs Last 24 Hrs  T(C): 35.9 (04-28-22 @ 05:40), Max: 36.7 (04-27-22 @ 19:32)  T(F): 96.6 (04-28-22 @ 05:40), Max: 98 (04-27-22 @ 19:32)  HR: --  BP: 98/60 (04-27-22 @ 19:32) (98/60 - 98/60)  BP(mean): 70 (04-27-22 @ 19:32) (70 - 70)  RR: --  SpO2: --    Orthostatic VS  04-28-22 @ 07:57  Lying BP: --/-- HR: --  Sitting BP: 97/60 HR: 76  Standing BP: 97/69 HR: 79  Site: upper left arm  Mode: electronic  Orthostatic VS  04-27-22 @ 07:52  Lying BP: --/-- HR: --  Sitting BP: 98/56 HR: 69  Standing BP: 98/60 HR: 77  Site: upper left arm  Mode: electronic  Orthostatic VS  04-26-22 @ 19:07  Lying BP: --/-- HR: --  Sitting BP: 92/60 HR: 79  Standing BP: 90/65 HR: 84  Site: --  Mode: --

## 2022-04-28 NOTE — BH INPATIENT PSYCHIATRY PROGRESS NOTE - NSBHASSESSSUMMFT_PSY_ALL_CORE
Plan:  >Legal: 9.39  >Obs: Routine, no current SI. No need for CO, patient not expected to pose risk to self or others in controlled inpatient setting  >Psychiatric Meds: Increase  Lexapro 20mg daily, Increase Remeron 30mg PO qHS, Klonopin 1mg BID, Suboxone 4mg-1mg sublingual TID. Observe for tolerability and efficacy.   PRN medications:  Haldol 5mg oral Q6HR PRN for agitation.   Benadryl 50mg oral Q6HR PRN for agitation.   Vistaril 50mg oral Q6HR PRN for anxiety.  Desyrel 50mg oral QHS PRN for insomnia.   >Labs: Labs reviewed, no acute findings, u-tox negative. Hold antipsychotics if QTc >500.    >Medical: wound/blisters under BL plantar surface of feet.  Wound care consultation ordered.  Patient with consistently stable VS, no visible physical symptoms of withdrawal. During the course of treatment, will collaborate with medical team to manage medical issues.  >Diet: Regular  >Social: milieu/structured therapy  >Treatment Interventions: Groups and Individual Therapy/CBT, Motivational counseling for substance abuse related issues.   >Dispo: Collateral and dispo planning pending further symptom and medication optimization

## 2022-04-28 NOTE — BH INPATIENT PSYCHIATRY PROGRESS NOTE - CURRENT MEDICATION
MEDICATIONS  (STANDING):  buprenorphine 2 mG/naloxone 0.5 mG SL Film 2 Film(s) SubLingual three times a day  clonazePAM  Tablet 1 milliGRAM(s) Oral daily  clonazePAM  Tablet 0.5 milliGRAM(s) Oral <User Schedule>  escitalopram 20 milliGRAM(s) Oral daily  mirtazapine 30 milliGRAM(s) Oral at bedtime  senna 2 Tablet(s) Oral at bedtime    MEDICATIONS  (PRN):  acetaminophen     Tablet .. 650 milliGRAM(s) Oral every 6 hours PRN Moderate Pain (4 - 6)  BACItracin   Ointment 1 Application(s) Topical three times a day PRN wound  cloNIDine 0.1 milliGRAM(s) Oral every 6 hours PRN anxiety  diphenhydrAMINE 50 milliGRAM(s) Oral every 6 hours PRN EPS/agitation  diphenhydrAMINE Injectable 50 milliGRAM(s) IntraMuscular once PRN EPS/severeagitation  haloperidol     Tablet 5 milliGRAM(s) Oral every 6 hours PRN agitation  haloperidol    Injectable 5 milliGRAM(s) IntraMuscular Once PRN severe agitation  hemorrhoidal Ointment 1 Application(s) Rectal daily PRN hemorrhoid  hydrOXYzine hydrochloride 50 milliGRAM(s) Oral every 6 hours PRN Anxiety  polyethylene glycol 3350 17 Gram(s) Oral daily PRN constipation

## 2022-04-28 NOTE — BH CHART NOTE - NSPSYPRGNOTEFT_PSY_ALL_CORE
Writer attempted to meet with patient for individual therapy session and patient declined because she had a phone call.

## 2022-04-29 PROCEDURE — 99231 SBSQ HOSP IP/OBS SF/LOW 25: CPT

## 2022-04-29 RX ADMIN — Medication 0.5 MILLIGRAM(S): at 20:18

## 2022-04-29 RX ADMIN — Medication 50 MILLIGRAM(S): at 02:26

## 2022-04-29 RX ADMIN — MIRTAZAPINE 30 MILLIGRAM(S): 45 TABLET, ORALLY DISINTEGRATING ORAL at 20:13

## 2022-04-29 RX ADMIN — BUPRENORPHINE AND NALOXONE 2 FILM(S): 2; .5 TABLET SUBLINGUAL at 12:01

## 2022-04-29 RX ADMIN — BUPRENORPHINE AND NALOXONE 2 FILM(S): 2; .5 TABLET SUBLINGUAL at 08:03

## 2022-04-29 RX ADMIN — Medication 50 MILLIGRAM(S): at 02:27

## 2022-04-29 RX ADMIN — Medication 0.5 MILLIGRAM(S): at 12:01

## 2022-04-29 RX ADMIN — BUPRENORPHINE AND NALOXONE 2 FILM(S): 2; .5 TABLET SUBLINGUAL at 20:13

## 2022-04-29 RX ADMIN — Medication 1 MILLIGRAM(S): at 08:02

## 2022-04-29 RX ADMIN — ESCITALOPRAM OXALATE 20 MILLIGRAM(S): 10 TABLET, FILM COATED ORAL at 08:02

## 2022-04-29 RX ADMIN — SENNA PLUS 2 TABLET(S): 8.6 TABLET ORAL at 20:13

## 2022-04-29 NOTE — BH INPATIENT PSYCHIATRY PROGRESS NOTE - CASE SUMMARY
Patient showing some improvement in symptoms, no SI on the unit, but feels unsafe outside the hospital.  Tolerating medications well.  Will continue.

## 2022-04-29 NOTE — BH INPATIENT PSYCHIATRY PROGRESS NOTE - NSBHASSESSSUMMFT_PSY_ALL_CORE
Plan:  >Legal: 9.39  >Obs: Routine, no current SI. No need for CO, patient not expected to pose risk to self or others in controlled inpatient setting  >Psychiatric Meds: Increase  Lexapro 20mg daily, Increase Remeron 30mg PO qHS, Klonopin 1mg BID, Suboxone 4mg-1mg sublingual TID. Observe for tolerability and efficacy.   PRN medications:  Haldol 5mg oral Q6HR PRN for agitation.   Benadryl 50mg oral Q6HR PRN for agitation.   Vistaril 50mg oral Q6HR PRN for anxiety.  Desyrel 50mg oral QHS PRN for insomnia.   >Labs: Labs reviewed, no acute findings, u-tox negative. Hold antipsychotics if QTc >500.    >Medical: wound/blisters under BL plantar surface of feet.  Wound care consultation ordered.  Patient with consistently stable VS, no visible physical symptoms of withdrawal. During the course of treatment, will collaborate with medical team to manage medical issues.  >Diet: Regular  >Social: milieu/structured therapy  >Treatment Interventions: Groups and Individual Therapy/CBT, Motivational counseling for substance abuse related issues.   >Dispo: Collateral and dispo planning pending further symptom and medication optimization Patient showing some improvement in symptoms, no SI on the unit, but feels unsafe outside the hospital.  Tolerating medications well.    Plan:  >Legal: 9.39  >Obs: Routine, no current SI. No need for CO, patient not expected to pose risk to self or others in controlled inpatient setting  >Psychiatric Meds: Increased Lexapro 20mg daily, Increased Remeron 30mg PO qHS, Klonopin 1mg BID, Suboxone 4mg-1mg sublingual TID. Observe for tolerability and efficacy.   PRN medications:  Haldol 5mg oral Q6HR PRN for agitation.   Benadryl 50mg oral Q6HR PRN for agitation.   Vistaril 50mg oral Q6HR PRN for anxiety.  Desyrel 50mg oral QHS PRN for insomnia.   >Labs: Labs reviewed, no acute findings, u-tox negative. Hold antipsychotics if QTc >500.    >Medical: wound/blisters under BL plantar surface of feet.  Wound care consultation ordered.  Patient with consistently stable VS, no visible physical symptoms of withdrawal. During the course of treatment, will collaborate with medical team to manage medical issues.  >Diet: Regular  >Social: milieu/structured therapy  >Treatment Interventions: Groups and Individual Therapy/CBT, Motivational counseling for substance abuse related issues.   >Dispo: Collateral and dispo planning pending further symptom and medication optimization

## 2022-04-29 NOTE — BH INPATIENT PSYCHIATRY PROGRESS NOTE - NSBHFUPINTERVALHXFT_PSY_A_CORE
Patient is followed up this morning for depression and substance abuse. Chart, labs, and medications reviewed. Patient is discussed in treatment team. No significant events overnight. Experienced mildly interrupted sleep overnight and is compliant with medications. She states she is feeling a little better today. Expresses that she feels safe on the unit and is not ready for discharge yet. She is working on coping with her emotions. Currently denies SI, HI, AVH. Will continue to provide therapeutic support.

## 2022-04-29 NOTE — BH INPATIENT PSYCHIATRY PROGRESS NOTE - NSBHCHARTREVIEWVS_PSY_A_CORE FT
Vital Signs Last 24 Hrs  T(C): 36.3 (04-29-22 @ 06:35), Max: 37.2 (04-28-22 @ 22:20)  T(F): 97.3 (04-29-22 @ 06:35), Max: 98.9 (04-28-22 @ 22:20)  HR: --  BP: --  BP(mean): --  RR: --  SpO2: --    Orthostatic VS  04-29-22 @ 08:20  Lying BP: --/-- HR: --  Sitting BP: 90/60 HR: 64  Standing BP: 94/61 HR: 73  Site: --  Mode: electronic  Orthostatic VS  04-28-22 @ 19:02  Lying BP: --/-- HR: --  Sitting BP: 90/64 HR: 89  Standing BP: 90/60 HR: 73  Site: --  Mode: --  Orthostatic VS  04-28-22 @ 07:57  Lying BP: --/-- HR: --  Sitting BP: 97/60 HR: 76  Standing BP: 97/69 HR: 79  Site: upper left arm  Mode: electronic

## 2022-04-30 RX ADMIN — SENNA PLUS 2 TABLET(S): 8.6 TABLET ORAL at 19:53

## 2022-04-30 RX ADMIN — BUPRENORPHINE AND NALOXONE 2 FILM(S): 2; .5 TABLET SUBLINGUAL at 08:03

## 2022-04-30 RX ADMIN — Medication 0.5 MILLIGRAM(S): at 12:03

## 2022-04-30 RX ADMIN — MIRTAZAPINE 30 MILLIGRAM(S): 45 TABLET, ORALLY DISINTEGRATING ORAL at 19:53

## 2022-04-30 RX ADMIN — ESCITALOPRAM OXALATE 20 MILLIGRAM(S): 10 TABLET, FILM COATED ORAL at 08:03

## 2022-04-30 RX ADMIN — Medication 1 MILLIGRAM(S): at 08:04

## 2022-04-30 RX ADMIN — Medication 0.5 MILLIGRAM(S): at 19:53

## 2022-04-30 RX ADMIN — BUPRENORPHINE AND NALOXONE 2 FILM(S): 2; .5 TABLET SUBLINGUAL at 12:04

## 2022-04-30 RX ADMIN — BUPRENORPHINE AND NALOXONE 2 FILM(S): 2; .5 TABLET SUBLINGUAL at 19:52

## 2022-05-01 RX ADMIN — BUPRENORPHINE AND NALOXONE 2 FILM(S): 2; .5 TABLET SUBLINGUAL at 19:52

## 2022-05-01 RX ADMIN — SENNA PLUS 2 TABLET(S): 8.6 TABLET ORAL at 19:52

## 2022-05-01 RX ADMIN — BUPRENORPHINE AND NALOXONE 2 FILM(S): 2; .5 TABLET SUBLINGUAL at 12:02

## 2022-05-01 RX ADMIN — Medication 0.5 MILLIGRAM(S): at 12:02

## 2022-05-01 RX ADMIN — Medication 650 MILLIGRAM(S): at 10:18

## 2022-05-01 RX ADMIN — Medication 1 MILLIGRAM(S): at 08:01

## 2022-05-01 RX ADMIN — MIRTAZAPINE 30 MILLIGRAM(S): 45 TABLET, ORALLY DISINTEGRATING ORAL at 19:52

## 2022-05-01 RX ADMIN — Medication 0.5 MILLIGRAM(S): at 19:55

## 2022-05-01 RX ADMIN — ESCITALOPRAM OXALATE 20 MILLIGRAM(S): 10 TABLET, FILM COATED ORAL at 08:01

## 2022-05-01 RX ADMIN — BUPRENORPHINE AND NALOXONE 2 FILM(S): 2; .5 TABLET SUBLINGUAL at 08:01

## 2022-05-01 RX ADMIN — POLYETHYLENE GLYCOL 3350 17 GRAM(S): 17 POWDER, FOR SOLUTION ORAL at 10:18

## 2022-05-01 RX ADMIN — Medication 650 MILLIGRAM(S): at 11:18

## 2022-05-02 PROCEDURE — 99232 SBSQ HOSP IP/OBS MODERATE 35: CPT | Mod: 1L

## 2022-05-02 RX ADMIN — SENNA PLUS 2 TABLET(S): 8.6 TABLET ORAL at 20:06

## 2022-05-02 RX ADMIN — Medication 0.5 MILLIGRAM(S): at 12:03

## 2022-05-02 RX ADMIN — Medication 0.5 MILLIGRAM(S): at 20:06

## 2022-05-02 RX ADMIN — BUPRENORPHINE AND NALOXONE 2 FILM(S): 2; .5 TABLET SUBLINGUAL at 08:13

## 2022-05-02 RX ADMIN — ESCITALOPRAM OXALATE 20 MILLIGRAM(S): 10 TABLET, FILM COATED ORAL at 08:14

## 2022-05-02 RX ADMIN — POLYETHYLENE GLYCOL 3350 17 GRAM(S): 17 POWDER, FOR SOLUTION ORAL at 12:03

## 2022-05-02 RX ADMIN — BUPRENORPHINE AND NALOXONE 2 FILM(S): 2; .5 TABLET SUBLINGUAL at 12:03

## 2022-05-02 RX ADMIN — Medication 1 MILLIGRAM(S): at 08:14

## 2022-05-02 RX ADMIN — BUPRENORPHINE AND NALOXONE 2 FILM(S): 2; .5 TABLET SUBLINGUAL at 20:06

## 2022-05-02 RX ADMIN — MIRTAZAPINE 30 MILLIGRAM(S): 45 TABLET, ORALLY DISINTEGRATING ORAL at 20:06

## 2022-05-02 NOTE — BH INPATIENT PSYCHIATRY PROGRESS NOTE - NSBHCHARTREVIEWVS_PSY_A_CORE FT
Vital Signs Last 24 Hrs  T(C): 35.9 (05-02-22 @ 06:06), Max: 35.9 (05-01-22 @ 14:16)  T(F): 96.6 (05-02-22 @ 06:06), Max: 96.6 (05-01-22 @ 14:16)  HR: 88 (05-01-22 @ 08:30) (88 - 88)  BP: 95/62 (05-01-22 @ 08:30) (95/62 - 95/62)  BP(mean): --  RR: 18 (05-01-22 @ 08:30) (18 - 18)  SpO2: --    Orthostatic VS  05-01-22 @ 07:56  Lying BP: --/-- HR: --  Sitting BP: 98/57 HR: 79  Standing BP: 96/58 HR: 82  Site: upper left arm  Mode: electronic   Vital Signs Last 24 Hrs  T(C): 35.9 (05-02-22 @ 06:06), Max: 35.9 (05-01-22 @ 14:16)  T(F): 96.6 (05-02-22 @ 06:06), Max: 96.6 (05-01-22 @ 14:16)  HR: --  BP: --  BP(mean): --  RR: 22 (05-02-22 @ 10:28) (22 - 22)  SpO2: --    Orthostatic VS  05-02-22 @ 08:07  Lying BP: --/-- HR: --  Sitting BP: 97/67 HR: 79  Standing BP: 97/64 HR: 96  Site: --  Mode: --  Orthostatic VS  05-01-22 @ 07:56  Lying BP: --/-- HR: --  Sitting BP: 98/57 HR: 79  Standing BP: 96/58 HR: 82  Site: upper left arm  Mode: electronic

## 2022-05-02 NOTE — BH INPATIENT PSYCHIATRY PROGRESS NOTE - NSBHFUPINTERVALHXFT_PSY_A_CORE
Patient followed up today for depression and substance abuse. Chart, labs, and medications reviewed. Patient is discussed in treatment team. No significant events overnight. Patient is pleasant and cooperative with interview this morning. Compliant with medications and sleeping throughout the night. States she does not feel motivated and is hoping that she will become motivated again soon. Acknowledges that she needs to "stay away from the drugs" after discharge. She would like to stay another week for further treatment and is thinking about discharge later next week. Denies SI/HI/AVH. Will continue to provide therapeutic support.

## 2022-05-02 NOTE — BH INPATIENT PSYCHIATRY PROGRESS NOTE - NSBHASSESSSUMMFT_PSY_ALL_CORE
Patient showing some improvement in symptoms, no SI on the unit, but feels unsafe outside the hospital.  Tolerating medications well.    Plan:  >Legal: 9.39  >Obs: Routine, no current SI. No need for CO, patient not expected to pose risk to self or others in controlled inpatient setting  >Psychiatric Meds: Lexapro 20mg daily, Remeron 30mg PO qHS, Klonopin 1mg BID, Suboxone 4mg-1mg sublingual TID. Observe for tolerability and efficacy.   PRN medications:  Haldol 5mg oral Q6HR PRN for agitation.   Benadryl 50mg oral Q6HR PRN for agitation.   Vistaril 50mg oral Q6HR PRN for anxiety.  Desyrel 50mg oral QHS PRN for insomnia.   >Labs: Labs reviewed, no acute findings, u-tox negative. Hold antipsychotics if QTc >500.    >Medical: wound/blisters under BL plantar surface of feet.  Wound care consultation ordered.  Patient with consistently stable VS, no visible physical symptoms of withdrawal. During the course of treatment, will collaborate with medical team to manage medical issues.  >Diet: Regular  >Social: milieu/structured therapy  >Treatment Interventions: Groups and Individual Therapy/CBT, Motivational counseling for substance abuse related issues.   >Dispo: Collateral and dispo planning pending further symptom and medication optimization Patient showing some improvement in symptoms, no SI on the unit, but feels unsafe outside the hospital.  Tolerating medications well.    Plan:  >Legal: 9.39  >Obs: Routine, no current SI. No need for CO, patient not expected to pose risk to self or others in controlled inpatient setting  >Psychiatric Meds: Lexapro 20mg daily, Remeron 30mg PO qHS, Klonopin 1mg BID, Suboxone 4mg-1mg sublingual TID. Observe for tolerability and efficacy.   PRN medications:  Haldol 5mg oral Q6HR PRN for agitation.   Benadryl 50mg oral Q6HR PRN for agitation.   Vistaril 50mg oral Q6HR PRN for anxiety.  Desyrel 50mg oral QHS PRN for insomnia.   >Labs: Labs reviewed, no acute findings, u-tox negative. Hold antipsychotics if QTc >500.    >Medical: wound/blisters under BL plantar surface of feet.  Wound care consultation ordered.  Patient with consistently stable VS, no visible physical symptoms of withdrawal. During the course of treatment, will collaborate with medical team to manage medical issues.  >Diet: Regular  >Social: milieu/structured therapy  >Treatment Interventions: Groups and Individual Therapy/CBT, Motivational counseling for substance abuse related issues.   >Dispo: Collateral and dispo planning pending further symptom and medication optimization. Discharge planning for late next week, potentially Thursday 5/12.

## 2022-05-02 NOTE — BH INPATIENT PSYCHIATRY PROGRESS NOTE - NSBHMETABOLIC_PSY_ALL_CORE_FT
BMI: BMI (kg/m2): 20.9 (04-16-22 @ 18:50)  HbA1c: A1C with Estimated Average Glucose Result: 5.3 % (09-22-21 @ 11:56)    Glucose:   BP: 95/62 (05-01-22 @ 08:30) (95/62 - 95/62)  Lipid Panel: Date/Time: 09-23-21 @ 08:40  Cholesterol, Serum: 177  Direct LDL: --  HDL Cholesterol, Serum: 101  Total Cholesterol/HDL Ration Measurement: --  Triglycerides, Serum: 52

## 2022-05-03 PROCEDURE — 99232 SBSQ HOSP IP/OBS MODERATE 35: CPT | Mod: 1L

## 2022-05-03 RX ORDER — CLONAZEPAM 1 MG
0.5 TABLET ORAL
Refills: 0 | Status: DISCONTINUED | OUTPATIENT
Start: 2022-05-03 | End: 2022-05-10

## 2022-05-03 RX ORDER — BUPRENORPHINE AND NALOXONE 2; .5 MG/1; MG/1
2 TABLET SUBLINGUAL THREE TIMES A DAY
Refills: 0 | Status: DISCONTINUED | OUTPATIENT
Start: 2022-05-03 | End: 2022-05-10

## 2022-05-03 RX ADMIN — POLYETHYLENE GLYCOL 3350 17 GRAM(S): 17 POWDER, FOR SOLUTION ORAL at 08:22

## 2022-05-03 RX ADMIN — Medication 650 MILLIGRAM(S): at 21:27

## 2022-05-03 RX ADMIN — BUPRENORPHINE AND NALOXONE 2 FILM(S): 2; .5 TABLET SUBLINGUAL at 12:04

## 2022-05-03 RX ADMIN — Medication 0.5 MILLIGRAM(S): at 19:56

## 2022-05-03 RX ADMIN — Medication 0.5 MILLIGRAM(S): at 12:05

## 2022-05-03 RX ADMIN — SENNA PLUS 2 TABLET(S): 8.6 TABLET ORAL at 19:56

## 2022-05-03 RX ADMIN — ESCITALOPRAM OXALATE 20 MILLIGRAM(S): 10 TABLET, FILM COATED ORAL at 08:23

## 2022-05-03 RX ADMIN — Medication 1 MILLIGRAM(S): at 08:23

## 2022-05-03 RX ADMIN — BUPRENORPHINE AND NALOXONE 2 FILM(S): 2; .5 TABLET SUBLINGUAL at 19:56

## 2022-05-03 RX ADMIN — MIRTAZAPINE 30 MILLIGRAM(S): 45 TABLET, ORALLY DISINTEGRATING ORAL at 19:56

## 2022-05-03 RX ADMIN — BUPRENORPHINE AND NALOXONE 2 FILM(S): 2; .5 TABLET SUBLINGUAL at 08:23

## 2022-05-03 RX ADMIN — Medication 650 MILLIGRAM(S): at 22:00

## 2022-05-03 NOTE — BH INPATIENT PSYCHIATRY PROGRESS NOTE - NSBHCHARTREVIEWVS_PSY_A_CORE FT
Vital Signs Last 24 Hrs  T(C): 36.3 (05-03-22 @ 06:16), Max: 36.8 (05-02-22 @ 22:27)  T(F): 97.4 (05-03-22 @ 06:16), Max: 98.3 (05-02-22 @ 22:27)  HR: --  BP: --  BP(mean): --  RR: 18 (05-02-22 @ 20:00) (18 - 22)  SpO2: --    Orthostatic VS  05-02-22 @ 20:00  Lying BP: --/-- HR: --  Sitting BP: 94/62 HR: 95  Standing BP: 103/66 HR: 89  Site: --  Mode: --  Orthostatic VS  05-02-22 @ 08:07  Lying BP: --/-- HR: --  Sitting BP: 97/67 HR: 79  Standing BP: 97/64 HR: 96  Site: --  Mode: --  Orthostatic VS  05-01-22 @ 07:56  Lying BP: --/-- HR: --  Sitting BP: 98/57 HR: 79  Standing BP: 96/58 HR: 82  Site: upper left arm  Mode: electronic   Vital Signs Last 24 Hrs  T(C): 36.3 (05-03-22 @ 06:16), Max: 36.8 (05-02-22 @ 22:27)  T(F): 97.4 (05-03-22 @ 06:16), Max: 98.3 (05-02-22 @ 22:27)  HR: --  BP: --  BP(mean): --  RR: 18 (05-02-22 @ 20:00) (18 - 22)  SpO2: --    Orthostatic VS  05-03-22 @ 08:24  Lying BP: --/-- HR: --  Sitting BP: 90/60 HR: 63  Standing BP: 92/65 HR: 76  Site: upper left arm  Mode: electronic  Orthostatic VS  05-02-22 @ 20:00  Lying BP: --/-- HR: --  Sitting BP: 94/62 HR: 95  Standing BP: 103/66 HR: 89  Site: --  Mode: --  Orthostatic VS  05-02-22 @ 08:07  Lying BP: --/-- HR: --  Sitting BP: 97/67 HR: 79  Standing BP: 97/64 HR: 96  Site: --  Mode: --   Vital Signs Last 24 Hrs  T(C): 36.3 (05-03-22 @ 06:16), Max: 36.8 (05-02-22 @ 22:27)  T(F): 97.4 (05-03-22 @ 06:16), Max: 98.3 (05-02-22 @ 22:27)  HR: --  BP: --  BP(mean): --  RR: 18 (05-02-22 @ 20:00) (18 - 18)  SpO2: --    Orthostatic VS  05-03-22 @ 08:24  Lying BP: --/-- HR: --  Sitting BP: 90/60 HR: 63  Standing BP: 92/65 HR: 76  Site: upper left arm  Mode: electronic  Orthostatic VS  05-02-22 @ 20:00  Lying BP: --/-- HR: --  Sitting BP: 94/62 HR: 95  Standing BP: 103/66 HR: 89  Site: --  Mode: --  Orthostatic VS  05-02-22 @ 08:07  Lying BP: --/-- HR: --  Sitting BP: 97/67 HR: 79  Standing BP: 97/64 HR: 96  Site: --  Mode: --

## 2022-05-03 NOTE — BH INPATIENT PSYCHIATRY PROGRESS NOTE - CURRENT MEDICATION
MEDICATIONS  (STANDING):  buprenorphine 2 mG/naloxone 0.5 mG SL Film 2 Film(s) SubLingual three times a day  clonazePAM  Tablet 1 milliGRAM(s) Oral daily  clonazePAM  Tablet 0.5 milliGRAM(s) Oral <User Schedule>  escitalopram 20 milliGRAM(s) Oral daily  mirtazapine 30 milliGRAM(s) Oral at bedtime  senna 2 Tablet(s) Oral at bedtime    MEDICATIONS  (PRN):  acetaminophen     Tablet .. 650 milliGRAM(s) Oral every 6 hours PRN Moderate Pain (4 - 6)  BACItracin   Ointment 1 Application(s) Topical three times a day PRN wound  cloNIDine 0.1 milliGRAM(s) Oral every 6 hours PRN anxiety  diphenhydrAMINE 50 milliGRAM(s) Oral every 6 hours PRN EPS/agitation  diphenhydrAMINE Injectable 50 milliGRAM(s) IntraMuscular once PRN EPS/severeagitation  haloperidol     Tablet 5 milliGRAM(s) Oral every 6 hours PRN agitation  haloperidol    Injectable 5 milliGRAM(s) IntraMuscular Once PRN severe agitation  hemorrhoidal Ointment 1 Application(s) Rectal daily PRN hemorrhoid  hydrOXYzine hydrochloride 50 milliGRAM(s) Oral every 6 hours PRN Anxiety  polyethylene glycol 3350 17 Gram(s) Oral daily PRN constipation   MEDICATIONS  (STANDING):  buprenorphine 2 mG/naloxone 0.5 mG SL Film 2 Film(s) SubLingual three times a day  clonazePAM  Tablet 0.5 milliGRAM(s) Oral <User Schedule>  escitalopram 20 milliGRAM(s) Oral daily  mirtazapine 30 milliGRAM(s) Oral at bedtime  senna 2 Tablet(s) Oral at bedtime    MEDICATIONS  (PRN):  acetaminophen     Tablet .. 650 milliGRAM(s) Oral every 6 hours PRN Moderate Pain (4 - 6)  BACItracin   Ointment 1 Application(s) Topical three times a day PRN wound  cloNIDine 0.1 milliGRAM(s) Oral every 6 hours PRN anxiety  diphenhydrAMINE 50 milliGRAM(s) Oral every 6 hours PRN EPS/agitation  diphenhydrAMINE Injectable 50 milliGRAM(s) IntraMuscular once PRN EPS/severeagitation  haloperidol     Tablet 5 milliGRAM(s) Oral every 6 hours PRN agitation  haloperidol    Injectable 5 milliGRAM(s) IntraMuscular Once PRN severe agitation  hemorrhoidal Ointment 1 Application(s) Rectal daily PRN hemorrhoid  hydrOXYzine hydrochloride 50 milliGRAM(s) Oral every 6 hours PRN Anxiety  polyethylene glycol 3350 17 Gram(s) Oral daily PRN constipation

## 2022-05-03 NOTE — BH INPATIENT PSYCHIATRY PROGRESS NOTE - NSBHFUPINTERVALHXFT_PSY_A_CORE
Patient followed up this morning. Chart, labs, and medications reviewed. Patient is discussed in treatment team. No significant events overnight. States she is feeling "blue" today and less depressed. Experiencing brighter affect today with more visible energy than the last few days. Plan for discharge next Thursday 5/12. Denies SI, HI, AVH. Will continue to provide therapeutic support.

## 2022-05-03 NOTE — BH INPATIENT PSYCHIATRY PROGRESS NOTE - NSBHASSESSSUMMFT_PSY_ALL_CORE
Patient showing some improvement in symptoms, no SI on the unit, but feels unsafe outside the hospital.  Tolerating medications well.    Plan:  >Legal: 9.39  >Obs: Routine, no current SI. No need for CO, patient not expected to pose risk to self or others in controlled inpatient setting  >Psychiatric Meds: Lexapro 20mg daily, Remeron 30mg PO qHS, Klonopin 1mg BID, Suboxone 4mg-1mg sublingual TID. Observe for tolerability and efficacy.   PRN medications:  Haldol 5mg oral Q6HR PRN for agitation.   Benadryl 50mg oral Q6HR PRN for agitation.   Vistaril 50mg oral Q6HR PRN for anxiety.  Desyrel 50mg oral QHS PRN for insomnia.   >Labs: Labs reviewed, no acute findings, u-tox negative. Hold antipsychotics if QTc >500.    >Medical: wound/blisters under BL plantar surface of feet.  Wound care consultation ordered.  Patient with consistently stable VS, no visible physical symptoms of withdrawal. During the course of treatment, will collaborate with medical team to manage medical issues.  >Diet: Regular  >Social: milieu/structured therapy  >Treatment Interventions: Groups and Individual Therapy/CBT, Motivational counseling for substance abuse related issues.   >Dispo: Collateral and dispo planning pending further symptom and medication optimization. Discharge planning for late next week, potentially Thursday 5/12.

## 2022-05-04 PROCEDURE — 99232 SBSQ HOSP IP/OBS MODERATE 35: CPT

## 2022-05-04 RX ORDER — CLONAZEPAM 1 MG
1 TABLET ORAL DAILY
Refills: 0 | Status: DISCONTINUED | OUTPATIENT
Start: 2022-05-04 | End: 2022-05-11

## 2022-05-04 RX ADMIN — POLYETHYLENE GLYCOL 3350 17 GRAM(S): 17 POWDER, FOR SOLUTION ORAL at 20:05

## 2022-05-04 RX ADMIN — ESCITALOPRAM OXALATE 20 MILLIGRAM(S): 10 TABLET, FILM COATED ORAL at 08:03

## 2022-05-04 RX ADMIN — Medication 0.5 MILLIGRAM(S): at 20:06

## 2022-05-04 RX ADMIN — BUPRENORPHINE AND NALOXONE 2 FILM(S): 2; .5 TABLET SUBLINGUAL at 08:03

## 2022-05-04 RX ADMIN — Medication 1 MILLIGRAM(S): at 08:16

## 2022-05-04 RX ADMIN — BUPRENORPHINE AND NALOXONE 2 FILM(S): 2; .5 TABLET SUBLINGUAL at 12:01

## 2022-05-04 RX ADMIN — SENNA PLUS 2 TABLET(S): 8.6 TABLET ORAL at 20:05

## 2022-05-04 RX ADMIN — Medication 0.5 MILLIGRAM(S): at 12:02

## 2022-05-04 RX ADMIN — BUPRENORPHINE AND NALOXONE 2 FILM(S): 2; .5 TABLET SUBLINGUAL at 20:06

## 2022-05-04 RX ADMIN — MIRTAZAPINE 30 MILLIGRAM(S): 45 TABLET, ORALLY DISINTEGRATING ORAL at 20:05

## 2022-05-04 NOTE — BH INPATIENT PSYCHIATRY PROGRESS NOTE - NSBHFUPINTERVALHXFT_PSY_A_CORE
Patient followed up this morning. Chart, labs, and medications reviewed. Patient discussed in treatment team. Improving, with plan to discharge later next week. Denies SI, HI, AVH. Will continue to provide therapeutic support.

## 2022-05-04 NOTE — BH INPATIENT PSYCHIATRY PROGRESS NOTE - NSBHMETABOLIC_PSY_ALL_CORE_FT
BMI: BMI (kg/m2): 20.9 (04-16-22 @ 18:50)  HbA1c: A1C with Estimated Average Glucose Result: 5.3 % (09-22-21 @ 11:56)    Glucose:   BP: 95/62 (05-01-22 @ 08:30) (95/62 - 95/62)  Lipid Panel: Date/Time: 09-23-21 @ 08:40  Cholesterol, Serum: 177  Direct LDL: --  HDL Cholesterol, Serum: 101  Total Cholesterol/HDL Ration Measurement: --  Triglycerides, Serum: 52   BMI: BMI (kg/m2): 20.9 (04-16-22 @ 18:50)  HbA1c: A1C with Estimated Average Glucose Result: 5.3 % (09-22-21 @ 11:56)    Glucose:   BP: --  Lipid Panel: Date/Time: 09-23-21 @ 08:40  Cholesterol, Serum: 177  Direct LDL: --  HDL Cholesterol, Serum: 101  Total Cholesterol/HDL Ration Measurement: --  Triglycerides, Serum: 52

## 2022-05-04 NOTE — BH INPATIENT PSYCHIATRY PROGRESS NOTE - CURRENT MEDICATION
MEDICATIONS  (STANDING):  buprenorphine 2 mG/naloxone 0.5 mG SL Film 2 Film(s) SubLingual three times a day  clonazePAM  Tablet 0.5 milliGRAM(s) Oral <User Schedule>  escitalopram 20 milliGRAM(s) Oral daily  mirtazapine 30 milliGRAM(s) Oral at bedtime  senna 2 Tablet(s) Oral at bedtime    MEDICATIONS  (PRN):  acetaminophen     Tablet .. 650 milliGRAM(s) Oral every 6 hours PRN Moderate Pain (4 - 6)  BACItracin   Ointment 1 Application(s) Topical three times a day PRN wound  cloNIDine 0.1 milliGRAM(s) Oral every 6 hours PRN anxiety  diphenhydrAMINE 50 milliGRAM(s) Oral every 6 hours PRN EPS/agitation  diphenhydrAMINE Injectable 50 milliGRAM(s) IntraMuscular once PRN EPS/severeagitation  haloperidol     Tablet 5 milliGRAM(s) Oral every 6 hours PRN agitation  haloperidol    Injectable 5 milliGRAM(s) IntraMuscular Once PRN severe agitation  hemorrhoidal Ointment 1 Application(s) Rectal daily PRN hemorrhoid  hydrOXYzine hydrochloride 50 milliGRAM(s) Oral every 6 hours PRN Anxiety  polyethylene glycol 3350 17 Gram(s) Oral daily PRN constipation   MEDICATIONS  (STANDING):  buprenorphine 2 mG/naloxone 0.5 mG SL Film 2 Film(s) SubLingual three times a day  clonazePAM  Tablet 0.5 milliGRAM(s) Oral <User Schedule>  clonazePAM  Tablet 1 milliGRAM(s) Oral daily  escitalopram 20 milliGRAM(s) Oral daily  mirtazapine 30 milliGRAM(s) Oral at bedtime  senna 2 Tablet(s) Oral at bedtime    MEDICATIONS  (PRN):  acetaminophen     Tablet .. 650 milliGRAM(s) Oral every 6 hours PRN Moderate Pain (4 - 6)  BACItracin   Ointment 1 Application(s) Topical three times a day PRN wound  cloNIDine 0.1 milliGRAM(s) Oral every 6 hours PRN anxiety  diphenhydrAMINE 50 milliGRAM(s) Oral every 6 hours PRN EPS/agitation  diphenhydrAMINE Injectable 50 milliGRAM(s) IntraMuscular once PRN EPS/severeagitation  haloperidol     Tablet 5 milliGRAM(s) Oral every 6 hours PRN agitation  haloperidol    Injectable 5 milliGRAM(s) IntraMuscular Once PRN severe agitation  hemorrhoidal Ointment 1 Application(s) Rectal daily PRN hemorrhoid  hydrOXYzine hydrochloride 50 milliGRAM(s) Oral every 6 hours PRN Anxiety  polyethylene glycol 3350 17 Gram(s) Oral daily PRN constipation

## 2022-05-04 NOTE — BH INPATIENT PSYCHIATRY PROGRESS NOTE - NSBHCHARTREVIEWVS_PSY_A_CORE FT
Vital Signs Last 24 Hrs  T(C): 36.6 (05-04-22 @ 06:14), Max: 36.7 (05-03-22 @ 22:27)  T(F): 97.9 (05-04-22 @ 06:14), Max: 98.1 (05-03-22 @ 22:27)  HR: --  BP: --  BP(mean): --  RR: --  SpO2: --    Orthostatic VS  05-03-22 @ 19:29  Lying BP: --/-- HR: --  Sitting BP: 100/59 HR: 100  Standing BP: 93/54 HR: 83  Site: --  Mode: --  Orthostatic VS  05-03-22 @ 08:24  Lying BP: --/-- HR: --  Sitting BP: 90/60 HR: 63  Standing BP: 92/65 HR: 76  Site: upper left arm  Mode: electronic  Orthostatic VS  05-02-22 @ 20:00  Lying BP: --/-- HR: --  Sitting BP: 94/62 HR: 95  Standing BP: 103/66 HR: 89  Site: --  Mode: --  Orthostatic VS  05-02-22 @ 08:07  Lying BP: --/-- HR: --  Sitting BP: 97/67 HR: 79  Standing BP: 97/64 HR: 96  Site: --  Mode: --   Vital Signs Last 24 Hrs  T(C): 36.6 (05-04-22 @ 06:14), Max: 36.7 (05-03-22 @ 22:27)  T(F): 97.9 (05-04-22 @ 06:14), Max: 98.1 (05-03-22 @ 22:27)  HR: --  BP: --  BP(mean): --  RR: --  SpO2: --    Orthostatic VS  05-03-22 @ 19:29  Lying BP: --/-- HR: --  Sitting BP: 100/59 HR: 100  Standing BP: 93/54 HR: 83  Site: --  Mode: --  Orthostatic VS  05-03-22 @ 08:24  Lying BP: --/-- HR: --  Sitting BP: 90/60 HR: 63  Standing BP: 92/65 HR: 76  Site: upper left arm  Mode: electronic  Orthostatic VS  05-02-22 @ 20:00  Lying BP: --/-- HR: --  Sitting BP: 94/62 HR: 95  Standing BP: 103/66 HR: 89  Site: --  Mode: --   Vital Signs Last 24 Hrs  T(C): 36.6 (05-04-22 @ 06:14), Max: 36.7 (05-03-22 @ 22:27)  T(F): 97.9 (05-04-22 @ 06:14), Max: 98.1 (05-03-22 @ 22:27)  HR: --  BP: --  BP(mean): --  RR: 19 (05-04-22 @ 07:45) (19 - 19)  SpO2: --    Orthostatic VS  05-04-22 @ 07:45  Lying BP: --/-- HR: --  Sitting BP: 100/65 HR: 84  Standing BP: 127/70 HR: 94  Site: --  Mode: --  Orthostatic VS  05-03-22 @ 19:29  Lying BP: --/-- HR: --  Sitting BP: 100/59 HR: 100  Standing BP: 93/54 HR: 83  Site: --  Mode: --  Orthostatic VS  05-03-22 @ 08:24  Lying BP: --/-- HR: --  Sitting BP: 90/60 HR: 63  Standing BP: 92/65 HR: 76  Site: upper left arm  Mode: electronic  Orthostatic VS  05-02-22 @ 20:00  Lying BP: --/-- HR: --  Sitting BP: 94/62 HR: 95  Standing BP: 103/66 HR: 89  Site: --  Mode: --

## 2022-05-05 PROCEDURE — 99232 SBSQ HOSP IP/OBS MODERATE 35: CPT

## 2022-05-05 RX ADMIN — Medication 1 MILLIGRAM(S): at 08:08

## 2022-05-05 RX ADMIN — SENNA PLUS 2 TABLET(S): 8.6 TABLET ORAL at 20:07

## 2022-05-05 RX ADMIN — BUPRENORPHINE AND NALOXONE 2 FILM(S): 2; .5 TABLET SUBLINGUAL at 08:08

## 2022-05-05 RX ADMIN — BUPRENORPHINE AND NALOXONE 2 FILM(S): 2; .5 TABLET SUBLINGUAL at 12:03

## 2022-05-05 RX ADMIN — BUPRENORPHINE AND NALOXONE 2 FILM(S): 2; .5 TABLET SUBLINGUAL at 20:07

## 2022-05-05 RX ADMIN — ESCITALOPRAM OXALATE 20 MILLIGRAM(S): 10 TABLET, FILM COATED ORAL at 08:07

## 2022-05-05 RX ADMIN — MIRTAZAPINE 30 MILLIGRAM(S): 45 TABLET, ORALLY DISINTEGRATING ORAL at 20:06

## 2022-05-05 RX ADMIN — Medication 50 MILLIGRAM(S): at 17:47

## 2022-05-05 RX ADMIN — Medication 0.5 MILLIGRAM(S): at 20:07

## 2022-05-05 RX ADMIN — Medication 0.5 MILLIGRAM(S): at 12:03

## 2022-05-05 RX ADMIN — Medication 50 MILLIGRAM(S): at 17:46

## 2022-05-05 RX ADMIN — HALOPERIDOL DECANOATE 5 MILLIGRAM(S): 100 INJECTION INTRAMUSCULAR at 17:47

## 2022-05-05 NOTE — BH INPATIENT PSYCHIATRY PROGRESS NOTE - NSBHCHARTREVIEWVS_PSY_A_CORE FT
Vital Signs Last 24 Hrs  T(C): 36.3 (05-05-22 @ 06:30), Max: 36.3 (05-05-22 @ 06:30)  T(F): 97.4 (05-05-22 @ 06:30), Max: 97.4 (05-05-22 @ 06:30)  HR: --  BP: --  BP(mean): --  RR: 18 (05-04-22 @ 20:07) (18 - 18)  SpO2: --    Orthostatic VS  05-05-22 @ 08:27  Lying BP: --/-- HR: --  Sitting BP: 99/74 HR: 74  Standing BP: 100/65 HR: 81  Site: upper left arm  Mode: electronic  Orthostatic VS  05-04-22 @ 07:45  Lying BP: --/-- HR: --  Sitting BP: 100/65 HR: 84  Standing BP: 127/70 HR: 94  Site: --  Mode: --  Orthostatic VS  05-03-22 @ 19:29  Lying BP: --/-- HR: --  Sitting BP: 100/59 HR: 100  Standing BP: 93/54 HR: 83  Site: --  Mode: --   Vital Signs Last 24 Hrs  T(C): 36.3 (05-05-22 @ 06:30), Max: 36.3 (05-05-22 @ 06:30)  T(F): 97.4 (05-05-22 @ 06:30), Max: 97.4 (05-05-22 @ 06:30)  HR: --  BP: --  BP(mean): --  RR: 19 (05-05-22 @ 08:05) (18 - 19)  SpO2: --    Orthostatic VS  05-05-22 @ 08:27  Lying BP: --/-- HR: --  Sitting BP: 99/74 HR: 74  Standing BP: 100/65 HR: 81  Site: upper left arm  Mode: electronic  Orthostatic VS  05-04-22 @ 07:45  Lying BP: --/-- HR: --  Sitting BP: 100/65 HR: 84  Standing BP: 127/70 HR: 94  Site: --  Mode: --  Orthostatic VS  05-03-22 @ 19:29  Lying BP: --/-- HR: --  Sitting BP: 100/59 HR: 100  Standing BP: 93/54 HR: 83  Site: --  Mode: --

## 2022-05-05 NOTE — BH INPATIENT PSYCHIATRY PROGRESS NOTE - CURRENT MEDICATION
MEDICATIONS  (STANDING):  buprenorphine 2 mG/naloxone 0.5 mG SL Film 2 Film(s) SubLingual three times a day  clonazePAM  Tablet 0.5 milliGRAM(s) Oral <User Schedule>  clonazePAM  Tablet 1 milliGRAM(s) Oral daily  escitalopram 20 milliGRAM(s) Oral daily  mirtazapine 30 milliGRAM(s) Oral at bedtime  senna 2 Tablet(s) Oral at bedtime    MEDICATIONS  (PRN):  acetaminophen     Tablet .. 650 milliGRAM(s) Oral every 6 hours PRN Moderate Pain (4 - 6)  BACItracin   Ointment 1 Application(s) Topical three times a day PRN wound  cloNIDine 0.1 milliGRAM(s) Oral every 6 hours PRN anxiety  diphenhydrAMINE 50 milliGRAM(s) Oral every 6 hours PRN EPS/agitation  diphenhydrAMINE Injectable 50 milliGRAM(s) IntraMuscular once PRN EPS/severeagitation  haloperidol     Tablet 5 milliGRAM(s) Oral every 6 hours PRN agitation  haloperidol    Injectable 5 milliGRAM(s) IntraMuscular Once PRN severe agitation  hemorrhoidal Ointment 1 Application(s) Rectal daily PRN hemorrhoid  hydrOXYzine hydrochloride 50 milliGRAM(s) Oral every 6 hours PRN Anxiety  polyethylene glycol 3350 17 Gram(s) Oral daily PRN constipation

## 2022-05-05 NOTE — BH INPATIENT PSYCHIATRY PROGRESS NOTE - NSBHFUPINTERVALHXFT_PSY_A_CORE
Patient followed up this morning. Chart, labs, and medications reviewed. Patient is discussed in treatment team. No significant events overnight. Compliant with medications and slept throughout the night. She has brighter affect this morning and is pleasant. States she feels slightly anxious and tired this morning but is improving. Patient is clinically stabilizing. Denies SI, HI, AVH. No medication changes. Will continue to provide therapeutic support. Plan for discharge late next week.

## 2022-05-06 PROCEDURE — 99231 SBSQ HOSP IP/OBS SF/LOW 25: CPT | Mod: 1L

## 2022-05-06 RX ADMIN — SENNA PLUS 2 TABLET(S): 8.6 TABLET ORAL at 19:54

## 2022-05-06 RX ADMIN — Medication 0.5 MILLIGRAM(S): at 12:04

## 2022-05-06 RX ADMIN — BUPRENORPHINE AND NALOXONE 2 FILM(S): 2; .5 TABLET SUBLINGUAL at 12:04

## 2022-05-06 RX ADMIN — MIRTAZAPINE 30 MILLIGRAM(S): 45 TABLET, ORALLY DISINTEGRATING ORAL at 19:55

## 2022-05-06 RX ADMIN — ESCITALOPRAM OXALATE 20 MILLIGRAM(S): 10 TABLET, FILM COATED ORAL at 08:05

## 2022-05-06 RX ADMIN — BUPRENORPHINE AND NALOXONE 2 FILM(S): 2; .5 TABLET SUBLINGUAL at 20:03

## 2022-05-06 RX ADMIN — Medication 650 MILLIGRAM(S): at 12:45

## 2022-05-06 RX ADMIN — POLYETHYLENE GLYCOL 3350 17 GRAM(S): 17 POWDER, FOR SOLUTION ORAL at 11:04

## 2022-05-06 RX ADMIN — Medication 0.5 MILLIGRAM(S): at 19:56

## 2022-05-06 RX ADMIN — Medication 650 MILLIGRAM(S): at 11:04

## 2022-05-06 RX ADMIN — BUPRENORPHINE AND NALOXONE 2 FILM(S): 2; .5 TABLET SUBLINGUAL at 08:05

## 2022-05-06 RX ADMIN — Medication 1 MILLIGRAM(S): at 08:05

## 2022-05-06 RX ADMIN — Medication 0.1 MILLIGRAM(S): at 18:32

## 2022-05-06 NOTE — BH INPATIENT PSYCHIATRY PROGRESS NOTE - NSBHCHARTREVIEWVS_PSY_A_CORE FT
Vital Signs Last 24 Hrs  T(C): 35.9 (05-06-22 @ 06:05), Max: 36.7 (05-05-22 @ 18:10)  T(F): 96.7 (05-06-22 @ 06:05), Max: 98 (05-05-22 @ 18:10)  HR: --  BP: --  BP(mean): --  RR: 17 (05-05-22 @ 22:29) (17 - 19)  SpO2: --    Orthostatic VS  05-05-22 @ 18:10  Lying BP: --/-- HR: --  Sitting BP: 118/71 HR: 73  Standing BP: 96/66 HR: 79  Site: --  Mode: --  Orthostatic VS  05-05-22 @ 08:27  Lying BP: --/-- HR: --  Sitting BP: 99/74 HR: 74  Standing BP: 100/65 HR: 81  Site: upper left arm  Mode: electronic  Orthostatic VS  05-04-22 @ 07:45  Lying BP: --/-- HR: --  Sitting BP: 100/65 HR: 84  Standing BP: 127/70 HR: 94  Site: --  Mode: --   Vital Signs Last 24 Hrs  T(C): 35.9 (05-06-22 @ 06:05), Max: 36.7 (05-05-22 @ 18:10)  T(F): 96.7 (05-06-22 @ 06:05), Max: 98 (05-05-22 @ 18:10)  HR: --  BP: --  BP(mean): --  RR: 17 (05-05-22 @ 22:29) (17 - 17)  SpO2: --    Orthostatic VS  05-06-22 @ 07:56  Lying BP: --/-- HR: --  Sitting BP: 97/68 HR: 70  Standing BP: 97/64 HR: 77  Site: upper left arm  Mode: electronic  Orthostatic VS  05-05-22 @ 18:10  Lying BP: --/-- HR: --  Sitting BP: 118/71 HR: 73  Standing BP: 96/66 HR: 79  Site: --  Mode: --  Orthostatic VS  05-05-22 @ 08:27  Lying BP: --/-- HR: --  Sitting BP: 99/74 HR: 74  Standing BP: 100/65 HR: 81  Site: upper left arm  Mode: electronic

## 2022-05-06 NOTE — BH TREATMENT PLAN - NSTXPLANTHERAPYSESSIONSFT_PSY_ALL_CORE
05-01-22  Type of therapy: NO group   Type of session: Individual  Level of patient participation: Resistance to participation  Duration of participation: Less than 15 minutes  Therapy conducted by: Psych rehab  Therapy Summary: During the individual therapy session, pt stated she has a bad stomachache today and asked writer to talk to her tomorrow. Initially, pt stated she has not suicidal ideation. Writer provided positive feedback. Pt immediately stated she has a little bit suicidal thought. Pt denies any intent plan. Pt stated she still feel depressed sometimes. Pt stated relaxing her mind is her coping skills. Writer has encouraged pt to explore other coping skills including talking to staff/others, deep breathings. Pt was not receptive. NP has encouraged pt to journal and writer provided pt with a journal book over this past week. Pt admitted she did not use journal at all. Writer provided support and encouraged pt to start to work on her coping skills to manage her depression and anxiety. Pt was superficially receptive. Pt currently denies HI, AH, and VH.    Psychiatric rehabilitation staff has been providing group with requirement of social distancing and wearing mask. Pt did not attend any group despite psychiatric rehabilitation staff’s daily prompting. Pt was observed to be self-isolative, showed minimal interactions with others. Pt showed fair ADLs.

## 2022-05-06 NOTE — BH TREATMENT PLAN - NSTXCOPEINTERRN_PSY_ALL_CORE
Educate patient on utilizing coping skills to  help manage stressors.
Educate patient on utilizing coping skills to help manage stressors.   Educate patient on complying with treatment plan.

## 2022-05-06 NOTE — BH TREATMENT PLAN - NSTXDCOTHRINTERSW_PSY_ALL_CORE
SW will provide support, insight, discharge planning, psycho-education, and maintain contact with identified supports.      Pt will return home and will be referred to outpt psychiatry.
SW participated in interdisciplinary treatment team meeting to coordinate patient care and discharge plans. SW provided patient with supportive therapy and psycho-education.  SW continuously meets with the treatment team to evaluate and assist in discharge planning.SW will continue to provide support, insight, discharge planning, psycho-education, and maintain contact with identified supports.

## 2022-05-06 NOTE — BH TREATMENT PLAN - NSTXDCOTHRGOAL_PSY_ALL_CORE
Pt will show a reduction of symptoms and engage meaningfully with writer to identify a safe discharge plan. Pt will comply with recommended tx plan and medications for 5 days, identify 2 benefits for adhering to tx.
Pt will show a reduction of symptoms and engage meaningfully with writer to identify a safe discharge plan. Pt will comply with recommended tx plan and medications for 5 days, identify 2 benefits for adhering to tx.

## 2022-05-06 NOTE — BH TREATMENT PLAN - NSCMSPTSTRENGTHS_PSY_ALL_CORE
05/22/20 1400   CM/SW Screening   Patient lives with Spouse   Discharge Needs   Anticipated D/C needs To be determined     Patient was screened, no dc needs are identified at this time. RN to contact SW/CM if needs arise.     Tere Martínez RN CM  15
Future/goal oriented
Compliance to treatment/Physically healthy/Supportive family

## 2022-05-06 NOTE — BH INPATIENT PSYCHIATRY PROGRESS NOTE - NSBHFUPINTERVALHXFT_PSY_A_CORE
Patient followed up today, with visible improvement in mood stabilization. Chart, labs, and medications reviewed. Patient is discussed in treatment team this morning. Is pleasant with interview but isolative on the unit. Compliant with medications. No significant concerns at this time. As patient continues to improve, discharge is planning for Thursday 5/12 next week. Denies SI, HI, and AVH. Will continue to provide therapeutic support.

## 2022-05-06 NOTE — BH TREATMENT PLAN - NSTXCOPEINTERPR_PSY_ALL_CORE
Pt made very minimal progress over this past week. Pt stated relaxing her mind is her coping skills to manage symptoms. Writer has encouraged pt to explore other coping skills to manage symptoms.
Pt would benefit from identify 2-3 coping skills to manage symptoms by day seven. Psychiatric rehabilitation staff will encourage pt to attend daily symptom management group and engage in individual therapy session to achieve her goal.

## 2022-05-07 RX ADMIN — Medication 0.5 MILLIGRAM(S): at 12:02

## 2022-05-07 RX ADMIN — Medication 650 MILLIGRAM(S): at 11:27

## 2022-05-07 RX ADMIN — BUPRENORPHINE AND NALOXONE 2 FILM(S): 2; .5 TABLET SUBLINGUAL at 20:12

## 2022-05-07 RX ADMIN — Medication 0.5 MILLIGRAM(S): at 20:12

## 2022-05-07 RX ADMIN — SENNA PLUS 2 TABLET(S): 8.6 TABLET ORAL at 20:12

## 2022-05-07 RX ADMIN — Medication 1 MILLIGRAM(S): at 08:04

## 2022-05-07 RX ADMIN — Medication 650 MILLIGRAM(S): at 10:28

## 2022-05-07 RX ADMIN — BUPRENORPHINE AND NALOXONE 2 FILM(S): 2; .5 TABLET SUBLINGUAL at 12:02

## 2022-05-07 RX ADMIN — MIRTAZAPINE 30 MILLIGRAM(S): 45 TABLET, ORALLY DISINTEGRATING ORAL at 20:12

## 2022-05-07 RX ADMIN — POLYETHYLENE GLYCOL 3350 17 GRAM(S): 17 POWDER, FOR SOLUTION ORAL at 08:04

## 2022-05-07 RX ADMIN — ESCITALOPRAM OXALATE 20 MILLIGRAM(S): 10 TABLET, FILM COATED ORAL at 08:04

## 2022-05-07 RX ADMIN — BUPRENORPHINE AND NALOXONE 2 FILM(S): 2; .5 TABLET SUBLINGUAL at 08:04

## 2022-05-08 RX ADMIN — MIRTAZAPINE 30 MILLIGRAM(S): 45 TABLET, ORALLY DISINTEGRATING ORAL at 20:13

## 2022-05-08 RX ADMIN — SENNA PLUS 2 TABLET(S): 8.6 TABLET ORAL at 20:12

## 2022-05-08 RX ADMIN — BUPRENORPHINE AND NALOXONE 2 FILM(S): 2; .5 TABLET SUBLINGUAL at 08:03

## 2022-05-08 RX ADMIN — Medication 0.1 MILLIGRAM(S): at 18:36

## 2022-05-08 RX ADMIN — BUPRENORPHINE AND NALOXONE 2 FILM(S): 2; .5 TABLET SUBLINGUAL at 12:11

## 2022-05-08 RX ADMIN — Medication 0.5 MILLIGRAM(S): at 20:13

## 2022-05-08 RX ADMIN — ESCITALOPRAM OXALATE 20 MILLIGRAM(S): 10 TABLET, FILM COATED ORAL at 08:04

## 2022-05-08 RX ADMIN — POLYETHYLENE GLYCOL 3350 17 GRAM(S): 17 POWDER, FOR SOLUTION ORAL at 12:15

## 2022-05-08 RX ADMIN — Medication 0.5 MILLIGRAM(S): at 12:11

## 2022-05-08 RX ADMIN — Medication 1 MILLIGRAM(S): at 08:04

## 2022-05-08 RX ADMIN — BUPRENORPHINE AND NALOXONE 2 FILM(S): 2; .5 TABLET SUBLINGUAL at 20:13

## 2022-05-09 PROCEDURE — 99232 SBSQ HOSP IP/OBS MODERATE 35: CPT | Mod: 1L

## 2022-05-09 RX ADMIN — Medication 0.1 MILLIGRAM(S): at 16:28

## 2022-05-09 RX ADMIN — Medication 0.5 MILLIGRAM(S): at 12:06

## 2022-05-09 RX ADMIN — BUPRENORPHINE AND NALOXONE 2 FILM(S): 2; .5 TABLET SUBLINGUAL at 20:03

## 2022-05-09 RX ADMIN — Medication 650 MILLIGRAM(S): at 10:45

## 2022-05-09 RX ADMIN — ESCITALOPRAM OXALATE 20 MILLIGRAM(S): 10 TABLET, FILM COATED ORAL at 08:03

## 2022-05-09 RX ADMIN — BUPRENORPHINE AND NALOXONE 2 FILM(S): 2; .5 TABLET SUBLINGUAL at 08:05

## 2022-05-09 RX ADMIN — Medication 50 MILLIGRAM(S): at 18:32

## 2022-05-09 RX ADMIN — MIRTAZAPINE 30 MILLIGRAM(S): 45 TABLET, ORALLY DISINTEGRATING ORAL at 20:03

## 2022-05-09 RX ADMIN — Medication 0.5 MILLIGRAM(S): at 20:03

## 2022-05-09 RX ADMIN — SENNA PLUS 2 TABLET(S): 8.6 TABLET ORAL at 20:03

## 2022-05-09 RX ADMIN — Medication 1 MILLIGRAM(S): at 08:03

## 2022-05-09 RX ADMIN — BUPRENORPHINE AND NALOXONE 2 FILM(S): 2; .5 TABLET SUBLINGUAL at 12:05

## 2022-05-09 NOTE — BH INPATIENT PSYCHIATRY PROGRESS NOTE - NSBHASSESSSUMMFT_PSY_ALL_CORE
Patient showing some improvement in symptoms, no SI on the unit, but feels unsafe outside the hospital.  Tolerating medications well.    Plan:  >Legal: 9.39  >Obs: Routine, no current SI. No need for CO, patient not expected to pose risk to self or others in controlled inpatient setting  >Psychiatric Meds: Lexapro 20mg daily, Remeron 30mg PO qHS, Klonopin 1mg BID, Suboxone 4mg-1mg sublingual TID. Observe for tolerability and efficacy.   PRN medications:  Haldol 5mg oral Q6HR PRN for agitation.   Benadryl 50mg oral Q6HR PRN for agitation.   Vistaril 50mg oral Q6HR PRN for anxiety.  Desyrel 50mg oral QHS PRN for insomnia.   >Labs: Labs reviewed, no acute findings, u-tox negative. Hold antipsychotics if QTc >500.    >Medical: wound/blisters under BL plantar surface of feet.  Wound care consultation ordered.  Patient with consistently stable VS, no visible physical symptoms of withdrawal. During the course of treatment, will collaborate with medical team to manage medical issues.  >Diet: Regular  >Social: milieu/structured therapy  >Treatment Interventions: Groups and Individual Therapy/CBT, Motivational counseling for substance abuse related issues.   >Dispo: Collateral and dispo planning pending further symptom and medication optimization. Discharge planning for late next week, potentially Thursday 5/12. Patient showing some improvement in symptoms, no SI on the unit, but feels unsafe outside the hospital.  Tolerating medications well.    Plan:  >Legal: 9.39  >Obs: Routine, no current SI. No need for CO, patient not expected to pose risk to self or others in controlled inpatient setting  >Psychiatric Meds: Lexapro 20mg daily, Remeron 30mg PO qHS, Klonopin 1mg BID, Suboxone 4mg-1mg sublingual TID. Observe for tolerability and efficacy.   PRN medications:  Haldol 5mg oral Q6HR PRN for agitation.   Benadryl 50mg oral Q6HR PRN for agitation.   Vistaril 50mg oral Q6HR PRN for anxiety.  Desyrel 50mg oral QHS PRN for insomnia.   >Labs: Labs reviewed, no acute findings, u-tox negative. Hold antipsychotics if QTc >500.    >Medical: wound/blisters under BL plantar surface of feet.  Wound care consultation ordered.  Patient with consistently stable VS, no visible physical symptoms of withdrawal. During the course of treatment, will collaborate with medical team to manage medical issues.  >Diet: Regular  >Social: milieu/structured therapy  >Treatment Interventions: Groups and Individual Therapy/CBT, Motivational counseling for substance abuse related issues.   >Dispo: Collateral and dispo planning pending further symptom and medication optimization. Discharge planning for lThursday 5/12.

## 2022-05-09 NOTE — BH INPATIENT PSYCHIATRY PROGRESS NOTE - NSBHCHARTREVIEWVS_PSY_A_CORE FT
Vital Signs Last 24 Hrs  T(C): 36.3 (05-09-22 @ 06:23), Max: 36.5 (05-08-22 @ 19:59)  T(F): 97.3 (05-09-22 @ 06:23), Max: 97.7 (05-08-22 @ 19:59)  HR: --  BP: --  BP(mean): --  RR: 17 (05-08-22 @ 19:59) (17 - 17)  SpO2: --    Orthostatic VS  05-08-22 @ 19:59  Lying BP: --/-- HR: --  Sitting BP: 122/69 HR: 79  Standing BP: 97/67 HR: 90  Site: --  Mode: --  Orthostatic VS  05-08-22 @ 08:53  Lying BP: --/-- HR: --  Sitting BP: 100/69 HR: 79  Standing BP: 92/62 HR: 78  Site: --  Mode: electronic  Orthostatic VS  05-07-22 @ 20:03  Lying BP: --/-- HR: --  Sitting BP: 90/60 HR: 83  Standing BP: 97/60 HR: 81  Site: --  Mode: --  Orthostatic VS  05-07-22 @ 08:39  Lying BP: --/-- HR: --  Sitting BP: 86/40 HR: 73  Standing BP: 62/47 HR: 93  Site: --  Mode: --   Vital Signs Last 24 Hrs  T(C): 36.3 (05-09-22 @ 06:23), Max: 36.5 (05-08-22 @ 19:59)  T(F): 97.3 (05-09-22 @ 06:23), Max: 97.7 (05-08-22 @ 19:59)  HR: --  BP: --  BP(mean): --  RR: 17 (05-08-22 @ 19:59) (17 - 17)  SpO2: --    Orthostatic VS  05-09-22 @ 08:44  Lying BP: --/-- HR: --  Sitting BP: 94/60 HR: 72  Standing BP: 90/61 HR: 79  Site: upper left arm  Mode: electronic  Orthostatic VS  05-08-22 @ 19:59  Lying BP: --/-- HR: --  Sitting BP: 122/69 HR: 79  Standing BP: 97/67 HR: 90  Site: --  Mode: --  Orthostatic VS  05-08-22 @ 08:53  Lying BP: --/-- HR: --  Sitting BP: 100/69 HR: 79  Standing BP: 92/62 HR: 78  Site: --  Mode: electronic  Orthostatic VS  05-07-22 @ 20:03  Lying BP: --/-- HR: --  Sitting BP: 90/60 HR: 83  Standing BP: 97/60 HR: 81  Site: --  Mode: --

## 2022-05-09 NOTE — BH INPATIENT PSYCHIATRY PROGRESS NOTE - NSBHFUPINTERVALHXFT_PSY_A_CORE
Patient is followed up this morning for MDD and polysubstance abuse. Chart, labs, and medications reviewed. Patient is discussed in treatment team. No significant events, changes, or complaints at this time. Patient states she is "feeling better" today and is optimistic about the future. 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 She is motivated to attend outpatient therapy sessions in order to keep her on the right track. Plan for discharge Thursday 5/12 to a friend's house where she will stay temporarily. Denies SI, HI, AVH. Will continue to provide therapeutic support.

## 2022-05-10 PROCEDURE — 99231 SBSQ HOSP IP/OBS SF/LOW 25: CPT | Mod: 1L

## 2022-05-10 RX ADMIN — MIRTAZAPINE 30 MILLIGRAM(S): 45 TABLET, ORALLY DISINTEGRATING ORAL at 20:06

## 2022-05-10 RX ADMIN — Medication 0.5 MILLIGRAM(S): at 12:01

## 2022-05-10 RX ADMIN — Medication 1 MILLIGRAM(S): at 08:09

## 2022-05-10 RX ADMIN — Medication 0.1 MILLIGRAM(S): at 00:59

## 2022-05-10 RX ADMIN — Medication 0.1 MILLIGRAM(S): at 18:13

## 2022-05-10 RX ADMIN — BUPRENORPHINE AND NALOXONE 2 FILM(S): 2; .5 TABLET SUBLINGUAL at 08:08

## 2022-05-10 RX ADMIN — BUPRENORPHINE AND NALOXONE 2 FILM(S): 2; .5 TABLET SUBLINGUAL at 12:01

## 2022-05-10 RX ADMIN — ESCITALOPRAM OXALATE 20 MILLIGRAM(S): 10 TABLET, FILM COATED ORAL at 08:09

## 2022-05-10 RX ADMIN — Medication 0.5 MILLIGRAM(S): at 20:06

## 2022-05-10 RX ADMIN — SENNA PLUS 2 TABLET(S): 8.6 TABLET ORAL at 20:06

## 2022-05-10 RX ADMIN — POLYETHYLENE GLYCOL 3350 17 GRAM(S): 17 POWDER, FOR SOLUTION ORAL at 08:09

## 2022-05-10 RX ADMIN — BUPRENORPHINE AND NALOXONE 2 FILM(S): 2; .5 TABLET SUBLINGUAL at 20:06

## 2022-05-10 RX ADMIN — Medication 0.1 MILLIGRAM(S): at 09:59

## 2022-05-10 RX ADMIN — Medication 650 MILLIGRAM(S): at 08:09

## 2022-05-10 NOTE — BH INPATIENT PSYCHIATRY PROGRESS NOTE - NSBHCHARTREVIEWVS_PSY_A_CORE FT
Vital Signs Last 24 Hrs  T(C): 36.1 (05-10-22 @ 06:03), Max: 36.2 (05-09-22 @ 22:21)  T(F): 97 (05-10-22 @ 06:03), Max: 97.1 (05-09-22 @ 22:21)  HR: --  BP: --  BP(mean): --  RR: 16 (05-09-22 @ 08:44) (16 - 16)  SpO2: --    Orthostatic VS  05-09-22 @ 08:44  Lying BP: --/-- HR: --  Sitting BP: 94/60 HR: 72  Standing BP: 90/61 HR: 79  Site: upper left arm  Mode: electronic  Orthostatic VS  05-08-22 @ 19:59  Lying BP: --/-- HR: --  Sitting BP: 122/69 HR: 79  Standing BP: 97/67 HR: 90  Site: --  Mode: --  Orthostatic VS  05-08-22 @ 08:53  Lying BP: --/-- HR: --  Sitting BP: 100/69 HR: 79  Standing BP: 92/62 HR: 78  Site: --  Mode: electronic   Vital Signs Last 24 Hrs  T(C): 36.1 (05-10-22 @ 06:03), Max: 36.2 (05-09-22 @ 22:21)  T(F): 97 (05-10-22 @ 06:03), Max: 97.1 (05-09-22 @ 22:21)  HR: --  BP: --  BP(mean): --  RR: --  SpO2: --    Orthostatic VS  05-10-22 @ 08:18  Lying BP: --/-- HR: --  Sitting BP: 97/60 HR: 79  Standing BP: 97/67 HR: 84  Site: --  Mode: --  Orthostatic VS  05-09-22 @ 08:44  Lying BP: --/-- HR: --  Sitting BP: 94/60 HR: 72  Standing BP: 90/61 HR: 79  Site: upper left arm  Mode: electronic  Orthostatic VS  05-08-22 @ 19:59  Lying BP: --/-- HR: --  Sitting BP: 122/69 HR: 79  Standing BP: 97/67 HR: 90  Site: --  Mode: --

## 2022-05-10 NOTE — BH INPATIENT PSYCHIATRY PROGRESS NOTE - NSBHFUPINTERVALHXFT_PSY_A_CORE
Patient is followed up this morning for MDD and polysubstance abuse. Chart, labs, medications reviewed. Patient is discussed in treatment team. Slept throughout the night and is compliant with medications. Today she is complaining of arthritis pain in her hands, which she received Tylenol for. She is also complaining of generalized abdominal pain, stating she wants to rest. Denies SI, HI, and AVH. Will continue to provide therapeutic support. Plan for discharge Thursday 5/12.

## 2022-05-10 NOTE — BH INPATIENT PSYCHIATRY PROGRESS NOTE - NSBHASSESSSUMMFT_PSY_ALL_CORE
Patient showing some improvement in symptoms, no SI on the unit, but feels unsafe outside the hospital.  Tolerating medications well.    Plan:  >Legal: 9.39  >Obs: Routine, no current SI.   >Psychiatric Meds: Lexapro 20mg daily, Remeron 30mg PO qHS, Klonopin 1mg BID, Suboxone 4mg-1mg sublingual TID. Observe for tolerability and efficacy.   PRN medications:  Haldol 5mg oral Q6HR PRN for agitation.   Benadryl 50mg oral Q6HR PRN for agitation.   Vistaril 50mg oral Q6HR PRN for anxiety.  Desyrel 50mg oral QHS PRN for insomnia.   >Labs: Labs reviewed, no acute findings, u-tox negative. Hold antipsychotics if QTc >500.    >Medical: wound/blisters under BL plantar surface of feet.  Wound care consultation ordered.  Patient with consistently stable VS, no visible physical symptoms of withdrawal. During the course of treatment, will collaborate with medical team to manage medical issues.  >Diet: Regular  >Social: milieu/structured therapy  >Treatment Interventions: Groups and Individual Therapy/CBT, Motivational counseling for substance abuse related issues.   >Dispo: Discharge planning for Thursday 5/12.

## 2022-05-11 PROCEDURE — 99231 SBSQ HOSP IP/OBS SF/LOW 25: CPT | Mod: 1L

## 2022-05-11 RX ORDER — BUPRENORPHINE AND NALOXONE 2; .5 MG/1; MG/1
1 TABLET SUBLINGUAL ONCE
Refills: 0 | Status: DISCONTINUED | OUTPATIENT
Start: 2022-05-11 | End: 2022-05-11

## 2022-05-11 RX ORDER — BUPRENORPHINE AND NALOXONE 2; .5 MG/1; MG/1
2 TABLET SUBLINGUAL
Qty: 90 | Refills: 0
Start: 2022-05-11 | End: 2022-05-25

## 2022-05-11 RX ORDER — MIRTAZAPINE 45 MG/1
1 TABLET, ORALLY DISINTEGRATING ORAL
Qty: 30 | Refills: 0
Start: 2022-05-11 | End: 2022-06-09

## 2022-05-11 RX ORDER — BUPRENORPHINE AND NALOXONE 2; .5 MG/1; MG/1
2 TABLET SUBLINGUAL THREE TIMES A DAY
Refills: 0 | Status: DISCONTINUED | OUTPATIENT
Start: 2022-05-11 | End: 2022-05-13

## 2022-05-11 RX ORDER — CLONAZEPAM 1 MG
1 TABLET ORAL
Qty: 28 | Refills: 0
Start: 2022-05-11 | End: 2022-05-24

## 2022-05-11 RX ORDER — CLONAZEPAM 1 MG
0.5 TABLET ORAL
Refills: 0 | Status: DISCONTINUED | OUTPATIENT
Start: 2022-05-11 | End: 2022-05-13

## 2022-05-11 RX ORDER — BUPRENORPHINE AND NALOXONE 2; .5 MG/1; MG/1
1 TABLET SUBLINGUAL THREE TIMES A DAY
Refills: 0 | Status: DISCONTINUED | OUTPATIENT
Start: 2022-05-11 | End: 2022-05-11

## 2022-05-11 RX ORDER — ESCITALOPRAM OXALATE 10 MG/1
1 TABLET, FILM COATED ORAL
Qty: 30 | Refills: 0
Start: 2022-05-11 | End: 2022-06-09

## 2022-05-11 RX ADMIN — ESCITALOPRAM OXALATE 20 MILLIGRAM(S): 10 TABLET, FILM COATED ORAL at 08:24

## 2022-05-11 RX ADMIN — Medication 50 MILLIGRAM(S): at 15:44

## 2022-05-11 RX ADMIN — Medication 0.1 MILLIGRAM(S): at 04:06

## 2022-05-11 RX ADMIN — BUPRENORPHINE AND NALOXONE 2 FILM(S): 2; .5 TABLET SUBLINGUAL at 20:16

## 2022-05-11 RX ADMIN — BUPRENORPHINE AND NALOXONE 1 TABLET(S): 2; .5 TABLET SUBLINGUAL at 09:14

## 2022-05-11 RX ADMIN — Medication 650 MILLIGRAM(S): at 09:14

## 2022-05-11 RX ADMIN — Medication 0.5 MILLIGRAM(S): at 12:14

## 2022-05-11 RX ADMIN — BUPRENORPHINE AND NALOXONE 2 FILM(S): 2; .5 TABLET SUBLINGUAL at 12:15

## 2022-05-11 RX ADMIN — Medication 1 MILLIGRAM(S): at 08:24

## 2022-05-11 RX ADMIN — BUPRENORPHINE AND NALOXONE 1 FILM(S): 2; .5 TABLET SUBLINGUAL at 08:24

## 2022-05-11 RX ADMIN — Medication 0.5 MILLIGRAM(S): at 20:16

## 2022-05-11 RX ADMIN — POLYETHYLENE GLYCOL 3350 17 GRAM(S): 17 POWDER, FOR SOLUTION ORAL at 09:16

## 2022-05-11 RX ADMIN — MIRTAZAPINE 30 MILLIGRAM(S): 45 TABLET, ORALLY DISINTEGRATING ORAL at 20:16

## 2022-05-11 RX ADMIN — SENNA PLUS 2 TABLET(S): 8.6 TABLET ORAL at 20:15

## 2022-05-11 NOTE — BH DISCHARGE NOTE NURSING/SOCIAL WORK/PSYCH REHAB - NSCDUDCCRISIS_PSY_A_CORE
[facility]  1 [phone]) [facility]-WELL ([phone])  Text "WELL" to 71156  Website: www.TicketsNow/.[employer] 1 (440) 531-VOLG (5749) Website: www.safehorizon.org/.National Suicide Prevention Lifeline 7 (211) 589-0168/.  [employer]  1 (157) LIFEN[employer] (127-3118)/.  [facility]  [address] [place] [address] [phone]   Hours:  Monday through Friday from 9 AM to 3 PM/.  [place] [employer] - [facility] Crisis Line  [phone], Option 1

## 2022-05-11 NOTE — BH INPATIENT PSYCHIATRY PROGRESS NOTE - NSBHFUPINTERVALHXFT_PSY_A_CORE
Patient is followed up for MDD and polysubstance abuse this morning. Chart, labs, and medications reviewed. Patient states she slept well throughout the night and is compliant with medications. Plan for discharge this Friday 5/13 to a friend's house. ARS appointment obtained for after discharge. Today she complains of continuing arthritis pain in bilateral hands which began "a few days ago". Pain is mildly relieved with Tylenol. She also complains of abdominal bloating which began yesterday morning. States she is feeling better in regards to her depression and is looking forward to being discharged later this week. Denies SI, HI, and AVH. Will continue to provide therapeutic support.

## 2022-05-11 NOTE — UM REPORT PROGRESS NOTE - NSUMSWACTION_GEN_A_CORE FT
SW referred pt for ARS for clinical services and to receive Suboxone. Pt was accepted and has an appt scheduled for next week.

## 2022-05-11 NOTE — BH DISCHARGE NOTE NURSING/SOCIAL WORK/PSYCH REHAB - PATIENT PORTAL LINK FT
You can access the FollowMyHealth Patient Portal offered by [facility] by registering at the following website: [email or web address]. By joining [facility]’s FollowMyHealth portal, you will also be able to view your health information using other applications (apps) compatible with our system.

## 2022-05-11 NOTE — BH INPATIENT PSYCHIATRY PROGRESS NOTE - CURRENT MEDICATION
MEDICATIONS  (STANDING):  buprenorphine 2 mG/naloxone 0.5 mG SL Film 1 Film(s) SubLingual three times a day  clonazePAM  Tablet 0.5 milliGRAM(s) Oral <User Schedule>  clonazePAM  Tablet 1 milliGRAM(s) Oral daily  escitalopram 20 milliGRAM(s) Oral daily  mirtazapine 30 milliGRAM(s) Oral at bedtime  senna 2 Tablet(s) Oral at bedtime    MEDICATIONS  (PRN):  acetaminophen     Tablet .. 650 milliGRAM(s) Oral every 6 hours PRN Moderate Pain (4 - 6)  BACItracin   Ointment 1 Application(s) Topical three times a day PRN wound  cloNIDine 0.1 milliGRAM(s) Oral every 6 hours PRN anxiety  diphenhydrAMINE 50 milliGRAM(s) Oral every 6 hours PRN EPS/agitation  diphenhydrAMINE Injectable 50 milliGRAM(s) IntraMuscular once PRN EPS/severeagitation  haloperidol     Tablet 5 milliGRAM(s) Oral every 6 hours PRN agitation  haloperidol    Injectable 5 milliGRAM(s) IntraMuscular Once PRN severe agitation  hemorrhoidal Ointment 1 Application(s) Rectal daily PRN hemorrhoid  hydrOXYzine hydrochloride 50 milliGRAM(s) Oral every 6 hours PRN Anxiety  polyethylene glycol 3350 17 Gram(s) Oral daily PRN constipation   MEDICATIONS  (STANDING):  clonazePAM  Tablet 0.5 milliGRAM(s) Oral <User Schedule>  escitalopram 20 milliGRAM(s) Oral daily  mirtazapine 30 milliGRAM(s) Oral at bedtime  senna 2 Tablet(s) Oral at bedtime    MEDICATIONS  (PRN):  acetaminophen     Tablet .. 650 milliGRAM(s) Oral every 6 hours PRN Moderate Pain (4 - 6)  BACItracin   Ointment 1 Application(s) Topical three times a day PRN wound  cloNIDine 0.1 milliGRAM(s) Oral every 6 hours PRN anxiety  diphenhydrAMINE 50 milliGRAM(s) Oral every 6 hours PRN EPS/agitation  diphenhydrAMINE Injectable 50 milliGRAM(s) IntraMuscular once PRN EPS/severeagitation  haloperidol     Tablet 5 milliGRAM(s) Oral every 6 hours PRN agitation  haloperidol    Injectable 5 milliGRAM(s) IntraMuscular Once PRN severe agitation  hemorrhoidal Ointment 1 Application(s) Rectal daily PRN hemorrhoid  hydrOXYzine hydrochloride 50 milliGRAM(s) Oral every 6 hours PRN Anxiety  polyethylene glycol 3350 17 Gram(s) Oral daily PRN constipation   MEDICATIONS  (STANDING):  buprenorphine 2 mG/naloxone 0.5 mG SL Film 2 Film(s) SubLingual three times a day  clonazePAM  Tablet 0.5 milliGRAM(s) Oral <User Schedule>  escitalopram 20 milliGRAM(s) Oral daily  mirtazapine 30 milliGRAM(s) Oral at bedtime  senna 2 Tablet(s) Oral at bedtime    MEDICATIONS  (PRN):  acetaminophen     Tablet .. 650 milliGRAM(s) Oral every 6 hours PRN Moderate Pain (4 - 6)  BACItracin   Ointment 1 Application(s) Topical three times a day PRN wound  cloNIDine 0.1 milliGRAM(s) Oral every 6 hours PRN anxiety  diphenhydrAMINE 50 milliGRAM(s) Oral every 6 hours PRN EPS/agitation  diphenhydrAMINE Injectable 50 milliGRAM(s) IntraMuscular once PRN EPS/severeagitation  haloperidol     Tablet 5 milliGRAM(s) Oral every 6 hours PRN agitation  haloperidol    Injectable 5 milliGRAM(s) IntraMuscular Once PRN severe agitation  hemorrhoidal Ointment 1 Application(s) Rectal daily PRN hemorrhoid  hydrOXYzine hydrochloride 50 milliGRAM(s) Oral every 6 hours PRN Anxiety  polyethylene glycol 3350 17 Gram(s) Oral daily PRN constipation

## 2022-05-11 NOTE — BH DISCHARGE NOTE NURSING/SOCIAL WORK/PSYCH REHAB - NSDCADDINFO1FT_PSY_ALL_CORE
Please arrive at least 15 minutes early to your scheduled intake with your insurance card and photo ID.

## 2022-05-11 NOTE — BH INPATIENT PSYCHIATRY PROGRESS NOTE - NSBHCHARTREVIEWVS_PSY_A_CORE FT
Vital Signs Last 24 Hrs  T(C): 35.6 (05-11-22 @ 06:00), Max: 37.2 (05-10-22 @ 19:25)  T(F): 96.1 (05-11-22 @ 06:00), Max: 98.9 (05-10-22 @ 19:25)  HR: --  BP: --  BP(mean): --  RR: --  SpO2: --    Orthostatic VS  05-10-22 @ 19:25  Lying BP: --/-- HR: --  Sitting BP: 90/60 HR: 81  Standing BP: 90/69 HR: 110  Site: --  Mode: --  Orthostatic VS  05-10-22 @ 08:18  Lying BP: --/-- HR: --  Sitting BP: 97/60 HR: 79  Standing BP: 97/67 HR: 84  Site: --  Mode: --  Orthostatic VS  05-09-22 @ 08:44  Lying BP: --/-- HR: --  Sitting BP: 94/60 HR: 72  Standing BP: 90/61 HR: 79  Site: upper left arm  Mode: electronic   Vital Signs Last 24 Hrs  T(C): 35.6 (05-11-22 @ 06:00), Max: 37.2 (05-10-22 @ 19:25)  T(F): 96.1 (05-11-22 @ 06:00), Max: 98.9 (05-10-22 @ 19:25)  HR: --  BP: --  BP(mean): --  RR: --  SpO2: --    Orthostatic VS  05-11-22 @ 08:14  Lying BP: --/-- HR: --  Sitting BP: 99/60 HR: 92  Standing BP: 98/64 HR: 98  Site: upper left arm  Mode: electronic  Orthostatic VS  05-10-22 @ 19:25  Lying BP: --/-- HR: --  Sitting BP: 90/60 HR: 81  Standing BP: 90/69 HR: 110  Site: --  Mode: --  Orthostatic VS  05-10-22 @ 08:18  Lying BP: --/-- HR: --  Sitting BP: 97/60 HR: 79  Standing BP: 97/67 HR: 84  Site: --  Mode: --

## 2022-05-11 NOTE — BH DISCHARGE NOTE NURSING/SOCIAL WORK/PSYCH REHAB - NSDCPRGOAL_PSY_ALL_CORE
Writer met with patient in order to discuss progress towards psychiatric rehabilitation goal upon discharge. Pt has made minimal progress, as patient terminated session prematurely and was unable to engage writer despite encouragement. Writer provided support and encouraged patient to explore positive coping skills post discharge. Pt was receptive.    On the unit, patient was visible, interacting with peers. Patient was minimally engaged with psych rehab staff during current hospitalization. Patient states she is doing well and ready for discharge, however terminated session prematurely. Patient denies SI and is able to contract for safety. Patient refuses to engage in safety plan.     Patient has not attended any psychiatric rehabilitation groups during current hospitalization despite daily prompting and encouragement from staff. Patient refused press ganey survey.

## 2022-05-11 NOTE — BH DISCHARGE NOTE NURSING/SOCIAL WORK/PSYCH REHAB - DISCHARGE INSTRUCTIONS AFTERCARE APPOINTMENTS
In order to check the location, date, or time of your aftercare appointment, please refer to your Discharge Instructions Document given to you upon leaving the hospital.  If you have lost the instructions please call [phone]

## 2022-05-11 NOTE — BH INPATIENT PSYCHIATRY PROGRESS NOTE - NSBHASSESSSUMMFT_PSY_ALL_CORE
Patient showing some improvement in symptoms, no SI on the unit, but feels unsafe outside the hospital.  Tolerating medications well.    Plan:  >Legal: 9.39  >Obs: Routine, no current SI.   >Psychiatric Meds: Lexapro 20mg daily, Remeron 30mg PO qHS, Klonopin 1mg BID, Suboxone 4mg-1mg sublingual TID. Observe for tolerability and efficacy.   PRN medications:  Haldol 5mg oral Q6HR PRN for agitation.   Benadryl 50mg oral Q6HR PRN for agitation.   Vistaril 50mg oral Q6HR PRN for anxiety.  Desyrel 50mg oral QHS PRN for insomnia.   >Labs: Labs reviewed, no acute findings, u-tox negative. Hold antipsychotics if QTc >500.    >Medical: wound/blisters under BL plantar surface of feet.  Wound care consultation ordered.  Patient with consistently stable VS, no visible physical symptoms of withdrawal. During the course of treatment, will collaborate with medical team to manage medical issues.  >Diet: Regular  >Social: milieu/structured therapy  >Treatment Interventions: Groups and Individual Therapy/CBT, Motivational counseling for substance abuse related issues.   >Dispo: Discharge planning for Thursday 5/12. Patient showing some improvement in symptoms, no SI on the unit, but feels unsafe outside the hospital.  Tolerating medications well.    Plan:  >Legal: 9.39  >Obs: Routine, no current SI.   >Psychiatric Meds: Lexapro 20mg daily, Remeron 30mg PO qHS, Klonopin 1mg BID, Suboxone 4mg-1mg sublingual TID. Observe for tolerability and efficacy.   PRN medications:  Haldol 5mg oral Q6HR PRN for agitation.   Benadryl 50mg oral Q6HR PRN for agitation.   Vistaril 50mg oral Q6HR PRN for anxiety.  Desyrel 50mg oral QHS PRN for insomnia.   >Labs: Labs reviewed, no acute findings, u-tox negative. Hold antipsychotics if QTc >500.    >Medical: wound/blisters under BL plantar surface of feet.  Wound care consultation ordered.  Patient with consistently stable VS, no visible physical symptoms of withdrawal. During the course of treatment, will collaborate with medical team to manage medical issues.  >Diet: Regular  >Social: milieu/structured therapy  >Treatment Interventions: Groups and Individual Therapy/CBT, Motivational counseling for substance abuse related issues.   >Dispo: Discharge planning for Friday 5/13 to friend's house. ARS appointment obtained for after discharge.

## 2022-05-12 PROCEDURE — 99231 SBSQ HOSP IP/OBS SF/LOW 25: CPT | Mod: 1L

## 2022-05-12 RX ORDER — CLONAZEPAM 1 MG
1 TABLET ORAL DAILY
Refills: 0 | Status: DISCONTINUED | OUTPATIENT
Start: 2022-05-12 | End: 2022-05-13

## 2022-05-12 RX ADMIN — Medication 50 MILLIGRAM(S): at 17:56

## 2022-05-12 RX ADMIN — MIRTAZAPINE 30 MILLIGRAM(S): 45 TABLET, ORALLY DISINTEGRATING ORAL at 20:00

## 2022-05-12 RX ADMIN — BUPRENORPHINE AND NALOXONE 2 FILM(S): 2; .5 TABLET SUBLINGUAL at 08:13

## 2022-05-12 RX ADMIN — SENNA PLUS 2 TABLET(S): 8.6 TABLET ORAL at 20:00

## 2022-05-12 RX ADMIN — Medication 1 MILLIGRAM(S): at 08:13

## 2022-05-12 RX ADMIN — Medication 50 MILLIGRAM(S): at 17:57

## 2022-05-12 RX ADMIN — Medication 650 MILLIGRAM(S): at 12:07

## 2022-05-12 RX ADMIN — Medication 0.5 MILLIGRAM(S): at 12:06

## 2022-05-12 RX ADMIN — BUPRENORPHINE AND NALOXONE 2 FILM(S): 2; .5 TABLET SUBLINGUAL at 19:59

## 2022-05-12 RX ADMIN — BUPRENORPHINE AND NALOXONE 2 FILM(S): 2; .5 TABLET SUBLINGUAL at 12:08

## 2022-05-12 RX ADMIN — Medication 0.5 MILLIGRAM(S): at 20:00

## 2022-05-12 RX ADMIN — ESCITALOPRAM OXALATE 20 MILLIGRAM(S): 10 TABLET, FILM COATED ORAL at 08:13

## 2022-05-12 RX ADMIN — Medication 650 MILLIGRAM(S): at 05:56

## 2022-05-12 NOTE — BH INPATIENT PSYCHIATRY PROGRESS NOTE - NSBHCHARTREVIEWVS_PSY_A_CORE FT
Vital Signs Last 24 Hrs  T(C): 36.8 (05-12-22 @ 06:01), Max: 36.8 (05-11-22 @ 22:21)  T(F): 98.2 (05-12-22 @ 06:01), Max: 98.3 (05-11-22 @ 22:21)  HR: --  BP: --  BP(mean): --  RR: 18 (05-11-22 @ 20:45) (18 - 18)  SpO2: --    Orthostatic VS  05-11-22 @ 20:45  Lying BP: --/-- HR: --  Sitting BP: 97/68 HR: 81  Standing BP: 99/60 HR: 95  Site: --  Mode: --  Orthostatic VS  05-11-22 @ 20:40  Lying BP: --/-- HR: --  Sitting BP: 97/66 HR: 81  Standing BP: 99/607 HR: --  Site: --  Mode: --  Orthostatic VS  05-11-22 @ 08:14  Lying BP: --/-- HR: --  Sitting BP: 99/60 HR: 92  Standing BP: 98/64 HR: 98  Site: upper left arm  Mode: electronic  Orthostatic VS  05-10-22 @ 19:25  Lying BP: --/-- HR: --  Sitting BP: 90/60 HR: 81  Standing BP: 90/69 HR: 110  Site: --  Mode: --  Orthostatic VS  05-10-22 @ 08:18  Lying BP: --/-- HR: --  Sitting BP: 97/60 HR: 79  Standing BP: 97/67 HR: 84  Site: --  Mode: --   Vital Signs Last 24 Hrs  T(C): 36.8 (05-12-22 @ 06:01), Max: 36.8 (05-11-22 @ 22:21)  T(F): 98.2 (05-12-22 @ 06:01), Max: 98.3 (05-11-22 @ 22:21)  HR: --  BP: --  BP(mean): --  RR: 18 (05-11-22 @ 20:45) (18 - 18)  SpO2: --    Orthostatic VS  05-12-22 @ 08:29  Lying BP: --/-- HR: --  Sitting BP: 97/66 HR: 65  Standing BP: 97/60 HR: 91  Site: --  Mode: --  Orthostatic VS  05-11-22 @ 20:45  Lying BP: --/-- HR: --  Sitting BP: 97/68 HR: 81  Standing BP: 99/60 HR: 95  Site: --  Mode: --  Orthostatic VS  05-11-22 @ 20:40  Lying BP: --/-- HR: --  Sitting BP: 97/66 HR: 81  Standing BP: 99/607 HR: --  Site: --  Mode: --  Orthostatic VS  05-11-22 @ 08:14  Lying BP: --/-- HR: --  Sitting BP: 99/60 HR: 92  Standing BP: 98/64 HR: 98  Site: upper left arm  Mode: electronic  Orthostatic VS  05-10-22 @ 19:25  Lying BP: --/-- HR: --  Sitting BP: 90/60 HR: 81  Standing BP: 90/69 HR: 110  Site: --  Mode: --   Vital Signs Last 24 Hrs  T(C): 36.4 (05-13-22 @ 06:15), Max: 36.8 (05-12-22 @ 14:52)  T(F): 97.6 (05-13-22 @ 06:15), Max: 98.2 (05-12-22 @ 14:52)  HR: --  BP: --  BP(mean): --  RR: --  SpO2: --    Orthostatic VS  05-12-22 @ 19:34  Lying BP: --/-- HR: --  Sitting BP: 107/66 HR: 72  Standing BP: 90/60 HR: 82  Site: --  Mode: --  Orthostatic VS  05-12-22 @ 08:29  Lying BP: --/-- HR: --  Sitting BP: 97/66 HR: 65  Standing BP: 97/60 HR: 91  Site: --  Mode: --  Orthostatic VS  05-11-22 @ 20:45  Lying BP: --/-- HR: --  Sitting BP: 97/68 HR: 81  Standing BP: 99/60 HR: 95  Site: --  Mode: --  Orthostatic VS  05-11-22 @ 20:40  Lying BP: --/-- HR: --  Sitting BP: 97/66 HR: 81  Standing BP: 99/607 HR: --  Site: --  Mode: --  Orthostatic VS  05-11-22 @ 08:14  Lying BP: --/-- HR: --  Sitting BP: 99/60 HR: 92  Standing BP: 98/64 HR: 98  Site: upper left arm  Mode: electronic

## 2022-05-12 NOTE — BH INPATIENT PSYCHIATRY PROGRESS NOTE - CURRENT MEDICATION
MEDICATIONS  (STANDING):  buprenorphine 2 mG/naloxone 0.5 mG SL Film 2 Film(s) SubLingual three times a day  clonazePAM  Tablet 0.5 milliGRAM(s) Oral <User Schedule>  escitalopram 20 milliGRAM(s) Oral daily  mirtazapine 30 milliGRAM(s) Oral at bedtime  senna 2 Tablet(s) Oral at bedtime    MEDICATIONS  (PRN):  acetaminophen     Tablet .. 650 milliGRAM(s) Oral every 6 hours PRN Moderate Pain (4 - 6)  BACItracin   Ointment 1 Application(s) Topical three times a day PRN wound  diphenhydrAMINE 50 milliGRAM(s) Oral every 6 hours PRN EPS/agitation  diphenhydrAMINE Injectable 50 milliGRAM(s) IntraMuscular once PRN EPS/severeagitation  haloperidol     Tablet 5 milliGRAM(s) Oral every 6 hours PRN agitation  haloperidol    Injectable 5 milliGRAM(s) IntraMuscular Once PRN severe agitation  hemorrhoidal Ointment 1 Application(s) Rectal daily PRN hemorrhoid  hydrOXYzine hydrochloride 50 milliGRAM(s) Oral every 6 hours PRN Anxiety  polyethylene glycol 3350 17 Gram(s) Oral daily PRN constipation   MEDICATIONS  (STANDING):  buprenorphine 2 mG/naloxone 0.5 mG SL Film 2 Film(s) SubLingual three times a day  clonazePAM  Tablet 0.5 milliGRAM(s) Oral <User Schedule>  clonazePAM  Tablet 1 milliGRAM(s) Oral daily  escitalopram 20 milliGRAM(s) Oral daily  mirtazapine 30 milliGRAM(s) Oral at bedtime  senna 2 Tablet(s) Oral at bedtime    MEDICATIONS  (PRN):  acetaminophen     Tablet .. 650 milliGRAM(s) Oral every 6 hours PRN Moderate Pain (4 - 6)  BACItracin   Ointment 1 Application(s) Topical three times a day PRN wound  diphenhydrAMINE 50 milliGRAM(s) Oral every 6 hours PRN EPS/agitation  diphenhydrAMINE Injectable 50 milliGRAM(s) IntraMuscular once PRN EPS/severeagitation  haloperidol     Tablet 5 milliGRAM(s) Oral every 6 hours PRN agitation  haloperidol    Injectable 5 milliGRAM(s) IntraMuscular Once PRN severe agitation  hemorrhoidal Ointment 1 Application(s) Rectal daily PRN hemorrhoid  hydrOXYzine hydrochloride 50 milliGRAM(s) Oral every 6 hours PRN Anxiety  polyethylene glycol 3350 17 Gram(s) Oral daily PRN constipation

## 2022-05-12 NOTE — BH INPATIENT PSYCHIATRY PROGRESS NOTE - NSBHASSESSSUMMFT_PSY_ALL_CORE
Patient showing some improvement in symptoms, no SI on the unit, but feels unsafe outside the hospital.  Tolerating medications well.    Plan:  >Legal: 9.39  >Obs: Routine, no current SI.   >Psychiatric Meds: Lexapro 20mg daily, Remeron 30mg PO qHS, Klonopin 1mg BID, Suboxone 4mg-1mg sublingual TID. Observe for tolerability and efficacy.   PRN medications:  Haldol 5mg oral Q6HR PRN for agitation.   Benadryl 50mg oral Q6HR PRN for agitation.   Vistaril 50mg oral Q6HR PRN for anxiety.  Desyrel 50mg oral QHS PRN for insomnia.   >Labs: Labs reviewed, no acute findings, u-tox negative. Hold antipsychotics if QTc >500.    >Medical: wound/blisters under BL plantar surface of feet.  Wound care consultation ordered.  Patient with consistently stable VS, no visible physical symptoms of withdrawal. During the course of treatment, will collaborate with medical team to manage medical issues.  >Diet: Regular  >Social: milieu/structured therapy  >Treatment Interventions: Groups and Individual Therapy/CBT, Motivational counseling for substance abuse related issues.   >Dispo: Discharge planning for Friday 5/13 to friend's house. ARS appointment obtained for after discharge.

## 2022-05-12 NOTE — BH INPATIENT PSYCHIATRY PROGRESS NOTE - NSBHFUPINTERVALHXFT_PSY_A_CORE
Patient is followed up for MDD and polysubstance abuse this morning. Chart, labs, medications reviewed. She complains of abdominal discomfort today with mutliple episodes of diarrhea throughout the night. Compliant with medications. Is preoccupied with concerns regarding discharge tomorrow 5/13. Denies SI, HI, AVH. Will continue to provide therapeutic support. Patient is followed up for MDD and polysubstance abuse this morning. Chart, labs, medications reviewed. She complains of abdominal discomfort today with mutliple episodes of diarrhea throughout the night pt given medications. Compliant with medications. Is preoccupied with concerns regarding discharge tomorrow 5/13. Denies SI, HI, AVH. Will continue to provide therapeutic support.

## 2022-05-12 NOTE — BH INPATIENT PSYCHIATRY PROGRESS NOTE - PRN MEDS
MEDICATIONS  (PRN):  acetaminophen     Tablet .. 650 milliGRAM(s) Oral every 6 hours PRN Moderate Pain (4 - 6)  BACItracin   Ointment 1 Application(s) Topical three times a day PRN wound  diphenhydrAMINE 50 milliGRAM(s) Oral every 6 hours PRN EPS/agitation  diphenhydrAMINE Injectable 50 milliGRAM(s) IntraMuscular once PRN EPS/severeagitation  haloperidol     Tablet 5 milliGRAM(s) Oral every 6 hours PRN agitation  haloperidol    Injectable 5 milliGRAM(s) IntraMuscular Once PRN severe agitation  hemorrhoidal Ointment 1 Application(s) Rectal daily PRN hemorrhoid  hydrOXYzine hydrochloride 50 milliGRAM(s) Oral every 6 hours PRN Anxiety  polyethylene glycol 3350 17 Gram(s) Oral daily PRN constipation

## 2022-05-13 VITALS — TEMPERATURE: 98 F

## 2022-05-13 PROCEDURE — 99238 HOSP IP/OBS DSCHRG MGMT 30/<: CPT | Mod: 1L

## 2022-05-13 RX ORDER — BUPRENORPHINE AND NALOXONE 2; .5 MG/1; MG/1
2 TABLET SUBLINGUAL
Qty: 112 | Refills: 0
Start: 2022-05-13 | End: 2022-05-26

## 2022-05-13 RX ADMIN — BUPRENORPHINE AND NALOXONE 2 FILM(S): 2; .5 TABLET SUBLINGUAL at 08:20

## 2022-05-13 RX ADMIN — ESCITALOPRAM OXALATE 20 MILLIGRAM(S): 10 TABLET, FILM COATED ORAL at 08:20

## 2022-05-13 RX ADMIN — POLYETHYLENE GLYCOL 3350 17 GRAM(S): 17 POWDER, FOR SOLUTION ORAL at 08:20

## 2022-05-13 RX ADMIN — Medication 1 MILLIGRAM(S): at 08:19

## 2022-05-13 NOTE — BH INPATIENT PSYCHIATRY PROGRESS NOTE - NSTXPROBCOPE_PSY_ALL_CORE
COPING, INEFFECTIVE

## 2022-05-13 NOTE — BH INPATIENT PSYCHIATRY PROGRESS NOTE - NSBHMSEKNOWHOW_PSY_ALL_CORE
Current Events

## 2022-05-13 NOTE — BH INPATIENT PSYCHIATRY PROGRESS NOTE - NSTXDCOTHRDATEEST_PSY_ALL_CORE
18-Apr-2022
26-Apr-2022
18-Apr-2022
03-May-2022
10-May-2022
18-Apr-2022
26-Apr-2022
10-May-2022
18-Apr-2022
10-May-2022
18-Apr-2022
26-Apr-2022
26-Apr-2022
03-May-2022
18-Apr-2022
03-May-2022
03-May-2022
26-Apr-2022
03-May-2022

## 2022-05-13 NOTE — BH INPATIENT PSYCHIATRY PROGRESS NOTE - NSTXPROBDCOTHR_PSY_ALL_CORE
DISCHARGE ISSUE - OTHER

## 2022-05-13 NOTE — BH INPATIENT PSYCHIATRY PROGRESS NOTE - NSBHFUPINTERVALHXFT_PSY_A_CORE
Patient is followed up this morning for depression and polysubstance abuse, being discharged today 5/13 to a friend's house. Chart, labs, medications reviewed. No acute complaints at this time. She is looking forward to being discharged today. She will be going to a friend's house where she will stay temporarily. Patient expresses interest in exercising, eating healthy, and attending outpatient treatment to maintain her course. Denies ORESTES HEDRICK, ROXANA.  Patient is followed up this morning for depression and polysubstance abuse, being discharged today 5/13 to a friend's house. Chart, labs, medications reviewed. No acute complaints at this time. She is looking forward to being discharged today. She will be going to a friend's house where she will stay temporarily. Patient expresses interest in exercising, eating healthy, and attending outpatient treatment to maintain her course. Denies SI, HI, AVH. No signs of distress upon dc.

## 2022-05-13 NOTE — BH INPATIENT PSYCHIATRY PROGRESS NOTE - NSTXDCOTHRPROGRES_PSY_ALL_CORE
No Change
Improving
No Change
Improving
No Change
No Change
Improving
No Change
No Change
Improving

## 2022-05-13 NOTE — BH INPATIENT PSYCHIATRY PROGRESS NOTE - NSBHMSERECMEM_PSY_A_CORE
Impaired
Pt arrives to ED from home complaining of weakness, poor PO intake. pt states she spoke to her MD and was told to come be evaluated to r/o UTI. S/p slip and fall yesterday. Denies hitting head, denies LOC.
Impaired

## 2022-05-13 NOTE — BH INPATIENT PSYCHIATRY PROGRESS NOTE - NSICDXBHPRIMARYDX_PSY_ALL_CORE
Major depression, chronic   F32.9  

## 2022-05-13 NOTE — BH INPATIENT PSYCHIATRY PROGRESS NOTE - NSTXDCOTHRDATETRGT_PSY_ALL_CORE
25-Apr-2022
10-May-2022
03-May-2022
25-Apr-2022
17-May-2022
03-May-2022
25-Apr-2022
10-May-2022
10-May-2022
03-May-2022
17-May-2022
03-May-2022
10-May-2022
25-Apr-2022
17-May-2022
10-May-2022
03-May-2022

## 2022-05-13 NOTE — BH INPATIENT PSYCHIATRY PROGRESS NOTE - NSBHMSEMOOD_PSY_A_CORE
Depressed/Anxious

## 2022-05-13 NOTE — BH INPATIENT PSYCHIATRY PROGRESS NOTE - NSBHCHARTREVIEWLAB_PSY_A_CORE FT
Reviewed, no acute findings
Reviewed, no acute findings
Reynold
Reviewed, no acute findings
Reviewed, WNL
Reviewed, no acute findings
Reviewed, WNL
Reviewed, no acute findings
Reviewed, no acute findings
WNL, no acute findings
Reviewed, no acute findings

## 2022-05-13 NOTE — BH INPATIENT PSYCHIATRY PROGRESS NOTE - NSCGISEVERILLNESS_PSY_ALL_CORE
4 = Moderately ill – overt symptoms causing noticeable, but modest, functional impairment or distress; symptom level may warrant medication

## 2022-05-13 NOTE — BH INPATIENT PSYCHIATRY PROGRESS NOTE - NSBHASSESSSUMMFT_PSY_ALL_CORE
Patient showing some improvement in symptoms, no SI on the unit, but feels unsafe outside the hospital.  Tolerating medications well.    Plan:  >Legal: 9.39  >Obs: Routine, no current SI.   >Psychiatric Meds: Lexapro 20mg daily, Remeron 30mg PO qHS, Klonopin 1mg BID, Suboxone 4mg-1mg sublingual TID. Observe for tolerability and efficacy.   PRN medications:  Haldol 5mg oral Q6HR PRN for agitation.   Benadryl 50mg oral Q6HR PRN for agitation.   Vistaril 50mg oral Q6HR PRN for anxiety.  Desyrel 50mg oral QHS PRN for insomnia.   >Labs: Labs reviewed, no acute findings, u-tox negative. Hold antipsychotics if QTc >500.    >Medical: wound/blisters under BL plantar surface of feet.  Wound care consultation ordered.  Patient with consistently stable VS, no visible physical symptoms of withdrawal. During the course of treatment, will collaborate with medical team to manage medical issues.  >Diet: Regular  >Social: milieu/structured therapy  >Treatment Interventions: Groups and Individual Therapy/CBT, Motivational counseling for substance abuse related issues.   >Dispo: Discharge planning for Friday 5/13 to friend's house. ARS appointment obtained for after discharge. Patient showing some improvement in symptoms, no SI on the unit, but feels unsafe outside the hospital.  Tolerating medications well.    Plan:  >Legal: 9.39  >Obs: Routine, no current SI.   >Psychiatric Meds: Lexapro 20mg daily, Remeron 30mg PO qHS, Klonopin 1mg BID, Suboxone 4mg-1mg sublingual TID. Observe for tolerability and efficacy.   PRN medications:  Haldol 5mg oral Q6HR PRN for agitation.   Benadryl 50mg oral Q6HR PRN for agitation.   Vistaril 50mg oral Q6HR PRN for anxiety.  Desyrel 50mg oral QHS PRN for insomnia.   >Labs: Labs reviewed, no acute findings, u-tox negative. Hold antipsychotics if QTc >500.    >Medical: wound/blisters under BL plantar surface of feet.  Wound care consultation ordered.  Patient with consistently stable VS, no visible physical symptoms of withdrawal. During the course of treatment, will collaborate with medical team to manage medical issues.  >Diet: Regular  >Social: milieu/structured therapy  >Treatment Interventions: Groups and Individual Therapy/CBT, Motivational counseling for substance abuse related issues.   >Dispo: Discharge planning for Friday 5/13 to friend's house. ARS appointment obtained for after discharge.  Upon discharge to friend's home, patient denies SI, HI, AVH, psychosis. No acute safety concerns at this time. If that changes, patient states she will seek medical/psychiatric assistance. She will continue to follow up with outpatient ARS. She expresses interest in exercising and eating healthy to maintain her overall health. Medication teaching was provided, side effects discussed. Patient responded well to education. Patient expressed understanding of her course and all questions were answered.

## 2022-05-13 NOTE — BH INPATIENT PSYCHIATRY PROGRESS NOTE - NSTXCOPEPROGRES_PSY_ALL_CORE
No Change
No Change
Improving
No Change
No Change
Improving
No Change
Improving
Improving
No Change
No Change
Improving
No Change
Improving

## 2022-05-13 NOTE — BH INPATIENT PSYCHIATRY PROGRESS NOTE - NSTXDCOTHRGOAL_PSY_ALL_CORE
Pt will show a reduction of symptoms and engage meaningfully with writer to identify a safe discharge plan. Pt will comply with recommended tx plan and medications for 5 days, identify 2 benefits for adhering to tx.

## 2022-05-13 NOTE — BH INPATIENT PSYCHIATRY PROGRESS NOTE - NSBHCHARTREVIEWVS_PSY_A_CORE FT
Vital Signs Last 24 Hrs  T(C): 36.4 (05-13-22 @ 06:15), Max: 36.8 (05-12-22 @ 14:52)  T(F): 97.6 (05-13-22 @ 06:15), Max: 98.2 (05-12-22 @ 14:52)  HR: --  BP: --  BP(mean): --  RR: --  SpO2: --    Orthostatic VS  05-12-22 @ 19:34  Lying BP: --/-- HR: --  Sitting BP: 107/66 HR: 72  Standing BP: 90/60 HR: 82  Site: --  Mode: --  Orthostatic VS  05-12-22 @ 08:29  Lying BP: --/-- HR: --  Sitting BP: 97/66 HR: 65  Standing BP: 97/60 HR: 91  Site: --  Mode: --  Orthostatic VS  05-11-22 @ 20:45  Lying BP: --/-- HR: --  Sitting BP: 97/68 HR: 81  Standing BP: 99/60 HR: 95  Site: --  Mode: --  Orthostatic VS  05-11-22 @ 20:40  Lying BP: --/-- HR: --  Sitting BP: 97/66 HR: 81  Standing BP: 99/607 HR: --  Site: --  Mode: --  Orthostatic VS  05-11-22 @ 08:14  Lying BP: --/-- HR: --  Sitting BP: 99/60 HR: 92  Standing BP: 98/64 HR: 98  Site: upper left arm  Mode: electronic   Vital Signs Last 24 Hrs  T(C): 36.4 (05-13-22 @ 06:15), Max: 36.8 (05-12-22 @ 14:52)  T(F): 97.6 (05-13-22 @ 06:15), Max: 98.2 (05-12-22 @ 14:52)  HR: --  BP: --  BP(mean): --  RR: --  SpO2: --    Orthostatic VS  05-12-22 @ 19:34  Lying BP: --/-- HR: --  Sitting BP: 107/66 HR: 72  Standing BP: 90/60 HR: 82  Site: --  Mode: --  Orthostatic VS  05-12-22 @ 08:29  Lying BP: --/-- HR: --  Sitting BP: 97/66 HR: 65  Standing BP: 97/60 HR: 91  Site: --  Mode: --  Orthostatic VS  05-11-22 @ 20:45  Lying BP: --/-- HR: --  Sitting BP: 97/68 HR: 81  Standing BP: 99/60 HR: 95  Site: --  Mode: --  Orthostatic VS  05-11-22 @ 20:40  Lying BP: --/-- HR: --  Sitting BP: 97/66 HR: 81  Standing BP: 99/607 HR: --  Site: --  Mode: --   Vital Signs Last 24 Hrs  T(C): 36.4 (05-13-22 @ 10:01), Max: 36.8 (05-12-22 @ 14:52)  T(F): 97.6 (05-13-22 @ 10:01), Max: 98.2 (05-12-22 @ 14:52)  HR: --  BP: --  BP(mean): --  RR: --  SpO2: --    Orthostatic VS  05-13-22 @ 10:01  Lying BP: --/-- HR: --  Sitting BP: 107/75 HR: 85  Standing BP: 106/73 HR: 86  Site: --  Mode: --  Orthostatic VS  05-12-22 @ 19:34  Lying BP: --/-- HR: --  Sitting BP: 107/66 HR: 72  Standing BP: 90/60 HR: 82  Site: --  Mode: --  Orthostatic VS  05-12-22 @ 08:29  Lying BP: --/-- HR: --  Sitting BP: 97/66 HR: 65  Standing BP: 97/60 HR: 91  Site: --  Mode: --  Orthostatic VS  05-11-22 @ 20:45  Lying BP: --/-- HR: --  Sitting BP: 97/68 HR: 81  Standing BP: 99/60 HR: 95  Site: --  Mode: --  Orthostatic VS  05-11-22 @ 20:40  Lying BP: --/-- HR: --  Sitting BP: 97/66 HR: 81  Standing BP: 99/607 HR: --  Site: --  Mode: --

## 2022-05-13 NOTE — BH INPATIENT PSYCHIATRY PROGRESS NOTE - NSICDXBHSECONDARYDX_PSY_ALL_CORE
Polysubstance abuse   F19.10  Opioid use disorder   F11.90  Benzodiazepine abuse   F13.10  Malingering   Z76.5  

## 2022-05-13 NOTE — BH INPATIENT PSYCHIATRY PROGRESS NOTE - CURRENT MEDICATION
MEDICATIONS  (STANDING):  buprenorphine 2 mG/naloxone 0.5 mG SL Film 2 Film(s) SubLingual three times a day  clonazePAM  Tablet 0.5 milliGRAM(s) Oral <User Schedule>  clonazePAM  Tablet 1 milliGRAM(s) Oral daily  escitalopram 20 milliGRAM(s) Oral daily  mirtazapine 30 milliGRAM(s) Oral at bedtime  senna 2 Tablet(s) Oral at bedtime    MEDICATIONS  (PRN):  acetaminophen     Tablet .. 650 milliGRAM(s) Oral every 6 hours PRN Moderate Pain (4 - 6)  BACItracin   Ointment 1 Application(s) Topical three times a day PRN wound  diphenhydrAMINE 50 milliGRAM(s) Oral every 6 hours PRN EPS/agitation  diphenhydrAMINE Injectable 50 milliGRAM(s) IntraMuscular once PRN EPS/severeagitation  haloperidol     Tablet 5 milliGRAM(s) Oral every 6 hours PRN agitation  haloperidol    Injectable 5 milliGRAM(s) IntraMuscular Once PRN severe agitation  hemorrhoidal Ointment 1 Application(s) Rectal daily PRN hemorrhoid  hydrOXYzine hydrochloride 50 milliGRAM(s) Oral every 6 hours PRN Anxiety  polyethylene glycol 3350 17 Gram(s) Oral daily PRN constipation

## 2022-05-13 NOTE — BH INPATIENT PSYCHIATRY PROGRESS NOTE - NSTXCOPEGOAL_PSY_ALL_CORE
Identify and utilize 2 coping skills that meet their needs

## 2022-05-13 NOTE — BH INPATIENT PSYCHIATRY PROGRESS NOTE - NSBHMSETHTCONTENT_PSY_A_CORE
Unremarkable

## 2022-05-13 NOTE — BH INPATIENT PSYCHIATRY PROGRESS NOTE - NSBHMSEEYE_PSY_A_CORE
Fair
alert and oriented x 3/responds to verbal commands/no spontaneous movement/normal strength

## 2022-05-13 NOTE — BH INPATIENT PSYCHIATRY PROGRESS NOTE - NSTXCOPEDATEEST_PSY_ALL_CORE
16-Apr-2022
17-Apr-2022
16-Apr-2022
17-Apr-2022
16-Apr-2022
17-Apr-2022
16-Apr-2022
17-Apr-2022
17-Apr-2022
16-Apr-2022
17-Apr-2022

## 2022-05-13 NOTE — BH INPATIENT PSYCHIATRY PROGRESS NOTE - NSDCCRITERIA_PSY_ALL_CORE
symptom stabilization  CGI<=3

## 2022-05-13 NOTE — BH INPATIENT PSYCHIATRY PROGRESS NOTE - NSTXCOPEDATETRGT_PSY_ALL_CORE
15-May-2022
15-May-2022
24-Apr-2022
27-Apr-2022
08-May-2022
08-May-2022
11-May-2022
27-Apr-2022
11-May-2022
01-May-2022
01-May-2022
24-Apr-2022
15-May-2022
27-Apr-2022
01-May-2022
11-May-2022
18-May-2022
01-May-2022
18-May-2022
01-May-2022

## 2022-05-13 NOTE — BH INPATIENT PSYCHIATRY PROGRESS NOTE - NSBHMETABOLICLABS_PSY_ALL_CORE
All labs not within last 12 months, ordered

## 2022-05-23 NOTE — ED PROVIDER NOTE - CONSTITUTIONAL DISTRESS
Detail Level: Detailed mildly anxious but tremors or fasciculations/MILD mildly anxious but no tremors or fasciculations/MILD

## 2022-06-01 NOTE — SOCIAL WORK POST DISCHARGE FOLLOW UP NOTE - NSBHSWFOLLOWUP_PSY_ALL_CORE_FT
Writer made an attempt to reach pt, phone is out of service at this time. Writer to make another attempt.

## 2022-06-02 ENCOUNTER — EMERGENCY (EMERGENCY)
Facility: HOSPITAL | Age: 52
LOS: 1 days | Discharge: LEFT BEFORE DISCHARGE | End: 2022-06-02
Admitting: EMERGENCY MEDICINE

## 2022-06-02 VITALS
TEMPERATURE: 98 F | SYSTOLIC BLOOD PRESSURE: 124 MMHG | WEIGHT: 115.08 LBS | OXYGEN SATURATION: 98 % | HEIGHT: 68 IN | RESPIRATION RATE: 98 BRPM | HEART RATE: 90 BPM | DIASTOLIC BLOOD PRESSURE: 89 MMHG

## 2022-06-02 DIAGNOSIS — T50.901A POISONING BY UNSPECIFIED DRUGS, MEDICAMENTS AND BIOLOGICAL SUBSTANCES, ACCIDENTAL (UNINTENTIONAL), INITIAL ENCOUNTER: ICD-10-CM
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
## 2022-06-02 DIAGNOSIS — Z53.29 PROCEDURE AND TREATMENT NOT CARRIED OUT BECAUSE OF PATIENT'S DECISION FOR OTHER REASONS: ICD-10-CM
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
## 2022-06-02 PROCEDURE — 99284 EMERGENCY DEPT VISIT MOD MDM: CPT

## 2022-06-02 NOTE — ED PROVIDER NOTE - OBJECTIVE STATEMENT
51 year old female seen in the ed s/p heroine od.   Was given 2 mg intranasal narcan by ems.   Now steady gait, vitals normal requesting discharge.

## 2022-06-14 NOTE — SOCIAL WORK POST DISCHARGE FOLLOW UP NOTE - NSBHSWFOLLOWUP_PSY_ALL_CORE_FT
Writer made several attempts to reach pt to reschedule her missed appt, however her phone is out of service and unable to leave messages. PT was assessed by the tx team at time of dc as stable. Case to be closed.

## 2022-08-01 ENCOUNTER — EMERGENCY (EMERGENCY)
Facility: HOSPITAL | Age: 52
LOS: 1 days | Discharge: HOME | End: 2022-08-01
Attending: EMERGENCY MEDICINE | Admitting: EMERGENCY MEDICINE

## 2022-08-01 VITALS
RESPIRATION RATE: 12 BRPM | TEMPERATURE: 99 F | OXYGEN SATURATION: 100 % | DIASTOLIC BLOOD PRESSURE: 67 MMHG | HEIGHT: 64 IN | HEART RATE: 93 BPM | SYSTOLIC BLOOD PRESSURE: 109 MMHG | WEIGHT: 139.99 LBS

## 2022-08-01 PROCEDURE — 99284 EMERGENCY DEPT VISIT MOD MDM: CPT

## 2022-08-01 NOTE — ED PROVIDER NOTE - PATIENT PORTAL LINK FT
You can access the FollowMyHealth Patient Portal offered by [facility] by registering at the following website: [email or web address]. By joining [employer]’s FollowMyHealth portal, you will also be able to view your health information using other applications (apps) compatible with our system.

## 2022-08-01 NOTE — ED PROVIDER NOTE - NSFOLLOWUPINSTRUCTIONS_ED_ALL_ED_FT
Opioid Overdose      Opioids are drugs that are often used to treat pain. Opioids include illegal drugs, such as heroin, as well as prescription pain medicines, such as codeine, morphine, hydrocodone, and fentanyl.    An opioid overdose happens when you take too much of an opioid. An overdose may be intentional or accidental and can happen with any type of opioid.    The effects of an overdose can be mild, dangerous, or even deadly. Opioid overdose is a medical emergency.      What are the causes?    This condition may be caused by:  •Taking too much of an opioid on purpose.      •Taking too much of an opioid by accident.      •Using two or more substances that contain opioids at the same time.      •Taking an opioid with a substance that affects your heart, breathing, or blood pressure. These include alcohol, tranquilizers, sleeping pills, illegal drugs, and some over-the-counter medicines.      This condition may also happen due to an error made by:  •A health care provider who prescribes a medicine.      •The pharmacist who fills the prescription.        What increases the risk?    This condition is more likely in:  •Children. They may be attracted to colorful pills. Because of a child's small size, even a small amount of a medicine can be dangerous.      •Older people. They may be taking many different medicines. Older people may have difficulty reading labels or remembering when they last took their medicines. They may also be more sensitive to the effects of opioids.      •People with chronic medical conditions, especially heart, liver, kidney, or neurological diseases.      •People who take an opioid for a long period of time.      •People who take opioids and use illegal drugs, such as heroin, or other substances, such as alcohol.    •People who:  •Have a history of drug or alcohol abuse.      •Have certain mental health conditions.      •Have a history of previous drug overdoses.        •People who take opioids that are not prescribed for them.        What are the signs or symptoms?    Symptoms of this condition depend on the type of opioid and the amount that was taken. Common symptoms include:  •Sleepiness or difficulty waking from sleep.      •Confusion.      •Slurred speech.      •Slowed breathing and a slow pulse (bradycardia).      •Nausea and vomiting.      •Abnormally small pupils.      Signs and symptoms that require emergency treatment include:  •Cold, clammy, and pale skin.      •Blue lips and fingernails.      •Vomiting.      •Gurgling sounds in the throat.      •A pulse that is very slow or difficult to detect.      •Breathing that is very irregular, slow, noisy, or difficult to detect.      •Inability to respond to speech or be awakened from sleep (stupor).      •Seizures.        How is this diagnosed?    This condition is diagnosed based on your symptoms and medical history. It is important to tell your health care provider:  •About all of the opioids that you took.      •When you took the opioids.      •Whether you were drinking alcohol or using marijuana, cocaine, or other drugs.      Your health care provider will do a physical exam. This exam may include:  •Checking and monitoring your heart rate and rhythm, breathing rate, temperature, and blood pressure.      •Measuring oxygen levels in your blood.      •Checking for abnormally small pupils.      You may also have blood tests or urine tests. You may have X-rays if you are having severe breathing problems.      How is this treated?    This condition requires immediate medical treatment and hospitalization. Reversing the effects of the opioid is the first step in treatment. If you have a Narcan kit or naloxone, use it right away. Follow your health care provider's instructions. A friend or family member can also help you with this.    The rest of your treatment will be given in the hospital intensive care (ICU). Treatment in the hospital may include:  •Giving salts and minerals (electrolytes) along with fluids through an IV.      •Inserting a breathing tube (endotracheal tube) in your airway to help you breathe if you cannot breathe on your own or you are in danger of not being able to breathe on your own.      •Giving oxygen through a small tube under your nose.      •Passing a tube through your nose and into your stomach (nasogastric tube, or NG tube) to empty your stomach.    •Giving medicines that:  •Increase your blood pressure.      •Relieve nausea and vomiting.      •Relieve abdominal pain and cramping.      •Reverse the effects of the opioid (naloxone).        •Monitoring your heart and oxygen levels.      •Ongoing counseling and mental health support if you intentionally overdosed or used an illegal drug.        Follow these instructions at home:  Three cups showing dark yellow, yellow, and pale yellow urine.   Medicines     •Take over-the-counter and prescription medicines only as told by your health care provider.      •Always ask your health care provider about possible side effects and interactions of any new medicine that you start taking.      •Keep a list of all the medicines that you take, including over-the-counter medicines. Bring this list with you to all your medical visits.      General instructions     •Drink enough fluid to keep your urine pale yellow.      •Keep all follow-up visits. This is important.        How is this prevented?    •Read the drug inserts that come with your opioid pain medicines.      •Take medicines only as told by your health care provider. Do not take more medicine than you are told. Do not take medicines more frequently than you are told.      • Do not drink alcohol or take sedatives when taking opioids.      • Do not use illegal or recreational drugs, including cocaine, ecstasy, and marijuana.      • Do not take opioid medicines that are not prescribed for you.      •Store all medicines in safety containers that are out of the reach of children.    •Get help if you are struggling with:  •Alcohol or drug use.      •Depression or another mental health problem.      •Thoughts of hurting yourself or another person.        •Keep the phone number of your local poison control center near your phone or in your mobile phone. In the [place] the hotline of the [facility] is (560) 514-0237.      •If you were prescribed naloxone, make sure you understand how to take it.        Contact a health care provider if:    •You need help understanding how to take your pain medicines.      •You feel your medicines are too strong.      •You are concerned that your pain medicines are not working well for your pain.      •You develop new symptoms or side effects when you are taking medicines.        Get help right away if:    •You or someone else is having symptoms of an opioid overdose. Get help even if you are not sure.      •You have thoughts about hurting yourself or others.    •You have:  •Chest pain.      •Difficulty breathing.      •A loss of consciousness.        These symptoms may represent a serious problem that is an emergency. Do not wait to see if the symptoms will go away. Get medical help right away. Call your local emergency services (366 in the [place] Do not drive yourself to the hospital.     If you ever feel like you may hurt yourself or others, or have thoughts about taking your own life, get help right away. You can go to your nearest emergency department or:   • Call your local emergency services (395 in the [place]        •  Call a suicide crisis helpline, such as the National Suicide Prevention Lifeline at 1-525.113.5847. This is open 24 hours a day in the [place]        • Text the Crisis Text Line at 722318 (in the [place]         Summary    •Opioids are drugs that are often used to treat pain. Opioids include illegal drugs, such as heroin, as well as prescription pain medicines.      •An opioid overdose happens when you take too much of an opioid.      •Overdoses can be intentional or accidental.      •Opioid overdose is very dangerous. It is a life-threatening emergency.      •If you or someone you know is experiencing an opioid overdose, get help right away.      This information is not intended to replace advice given to you by your health care provider. Make sure you discuss any questions you have with your health care provider.

## 2022-08-01 NOTE — ED PROVIDER NOTE - CLINICAL SUMMARY MEDICAL DECISION MAKING FREE TEXT BOX
Plan to observe for approximately 3 hours to determine if she will require more Narcan, otherwise anticipate discharge home.

## 2022-08-01 NOTE — ED PROVIDER NOTE - PHYSICAL EXAMINATION
VITAL SIGNS: I have reviewed nursing notes and confirm.  CONSTITUTIONAL: Disheveled; awake, alert, oriented to person, place, time/situation and in no apparent distress.  ENT: Airway patent, Nasal mucosa clear. Mouth with normal mucosa.  EYES: Clear bilaterally.  RESPIRATORY: Breathing comfortably with normal RR.  MSK: Range of motion is not limited, no deformities noted.  NEURO: Alert and oriented, no focal deficits.  SKIN: Skin normal color for race, warm, dry and intact.   PSYCH: Alert and oriented to person, place, time/situation. normal mood and affect.

## 2022-08-01 NOTE — ED ADULT NURSE NOTE - CHIEF COMPLAINT QUOTE
Pt found unresponsive in [facility] restroom, given 4mg Narcan intranasal. Pt tolerated well, on arrival pt alert

## 2022-08-01 NOTE — ED PROVIDER NOTE - OBJECTIVE STATEMENT
52 y/o female, PMHx of depression and polysubstance abuse on methadone, brought by EMS after being found poorly responsive in a [employer] bathroom, with pinpoint pupils and was given 4 mg of intranasal Narcan with good effect. Pt woke up and was able to ambulate into the ambulance. She arrives at the ED with no active medical complaints. Pt is eating cookies during the Hx taking. She has no real memories of the events surrounding going to the bathroom at [employer] but admits to a likely overdose on heroin. No report of pain, recent trauma, and no active suicidal thoughts.

## 2022-08-01 NOTE — ED ADULT TRIAGE NOTE - CHIEF COMPLAINT QUOTE
Pt found unresponsive in [facility] restroom, given 4mg Narcan intranasal. Pt tolerated well, on arrival pt alert

## 2022-08-01 NOTE — ED ADULT NURSE NOTE - OBJECTIVE STATEMENT
Pt denies any drug or alcohol use to this RN, however pt responded to narcan. Pt with no complaints at this time, unable to recall events of what happened, this RN reoriented pt to situation. Pt oriented to place and self, states it's June and can't recall what brought her to ED.

## 2022-08-03 DIAGNOSIS — T40.2X1A POISONING BY OTHER OPIOIDS, ACCIDENTAL (UNINTENTIONAL), INITIAL ENCOUNTER: ICD-10-CM
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
## 2022-08-03 DIAGNOSIS — R41.82 ALTERED MENTAL STATUS, UNSPECIFIED: ICD-10-CM

## 2022-08-03 DIAGNOSIS — F19.10 OTHER PSYCHOACTIVE SUBSTANCE ABUSE, UNCOMPLICATED: ICD-10-CM

## 2022-08-03 DIAGNOSIS — F32.1 MAJOR DEPRESSIVE DISORDER, SINGLE EPISODE, MODERATE: ICD-10-CM
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
## 2022-08-08 NOTE — BH INPATIENT PSYCHIATRY ASSESSMENT NOTE - NSICDXBHSECONDARYDX_PSY_ALL_CORE
08-Aug-2022 12:45 Polysubstance abuse   F19.10  Opioid use disorder   F11.90  Benzodiazepine abuse   F13.10  Malingering   Z76.5

## 2022-08-09 NOTE — BH PATIENT PROFILE - NSPROPTRIGHTBILLOFRIGHTS_GEN_A_NUR
Sonu. patient

## 2022-08-19 NOTE — PROGRESS NOTE BEHAVIORAL HEALTH - [facility] ED [facility] MED ROS ENDOCRINE
Chief complaint:   Chief Complaint   Patient presents with   • Heart Problem     Follow up      Vitals:  Visit Vitals  /78 (BP Location: RUE - Right upper extremity, Patient Position: Sitting, Cuff Size: Regular)   Pulse 90   Resp 16   Ht 5' 4\" (1.626 m)   Wt 89.8 kg (198 lb)   SpO2 98%   BMI 33.99 kg/m²         HISTORY OF PRESENT ILLNESS        Problem List:    1. STEMI 7/12/22: dRCA treated with GUADALUPE 2.64mtd01yc, pRCA treated with UGADALUPE 3.5mmx12  2. Coronary Artery Disease: Coronary Angiogram 8/12/22: mCX GUADALUPE 3.6hjc26nn, dLAD GUADALUPE 2.7hwb66cd, mLAD GUADALUPE 2.6pnx4de, pLAD GUADALUPE 3.2gbd00lp  3. NSTEMI 10/31/2017: 2.75 x 23 mm GUADALUPE mCX, 3.0 x 23 mm GUADALUPE OM2, 3.5 x 23 mm GUADALUPE mRCA  4. Hypertension  5. Hyperlipidemia  6. S/P Nephrectomy  7. Diabetes Mellitus    Note: In patients with acute coronary syndrome (ACS) (non-ST elevation [NSTE]-ACS or ST elevation myocardial infarction [STEMI]) treated with DAPT after BMS or GUADALUPE implantation, P2Y12 inhibitor therapy (clopidogrel, prasugrel, or ticagrelor) should be given for at least 12 months (Class I).      Chauncey Burroughs a 65 year old  male patient, born 1957, Medical record Number: [id number], was seen in the [facility] at 3:00 PM,on 9/2/2022 for follow-up cardiology visit.    Chauncey Burroughs PMH of NSTEMI 10/31/17 with 2.75 x 23 mm GUADALUPE mCX, 3.0 x 23 mm GUADALUPE OM2, 3.5 x 23 mm GUADALUPE mRCA. Echo 11/01/17 unremarkable, LVEF 70%.     ED 07/20/2020 with complaints of Chest Pain and Shortness of Breath. ACS ruled out. Patient transferred to [facility] where CT Chest demonstrated a 6 mm T2 Abscess - seroma present at Cervical Decompression and Fusion surgical site. MRI Lumbar Spine for evaluation of peripheral numbness was unremarkable.     Follow-up 08/04/2020, patients blood pressure continued to be poorly controlled (220/120 mmHg). Patient related this to extreme pain. Patient refused ED evaluation. Hydralazine added to regime and Carvedilol dose increased.     Follow-up 9/14/2020, 
patient is unable to feel his left leg and it has been going on for 2 weeks. Patient states there is a numbness and tingling in that left leg. Patient states the numbness/tingling is starting to go into both legs. Patient directed to the ER and MRI was unchanged from prior.     Patient presented 7/12/2022 with inferior STEMI. Patient returned on 8/12/2022 for planned intervention.     Today, patient reports here inquiring  about possibility of elective back surgery. Patient reports the inability to walk without a walker. Patient currently following Hugh Lopez MD at [facility] [place], [address] [place], [place], office # [phone], fax #[phone] for back pain.     Other significant problems:  Patient Active Problem List   Diagnosis   • Postlaminectomy syndrome, lumbar region   • Hypertension associated with diabetes (CMS/HCC)   • Type 2 diabetes mellitus with hyperglycemia, without long-term current use of insulin (CMS/HCC)   • Chronic bilateral low back pain with right-sided sciatica   • Mixed hyperlipidemia   • Left Leg weakness and numbness, with urinary incontinence   • Lumbosacral spondylosis without myelopathy   • Spinal stenosis of lumbar region with neurogenic claudication   • Chronic prescription opiate use   • S/p nephrectomy   • Chronic gout with tophus   • Atherosclerosis of coronary artery   • Hemorrhoids   • Thoracic spinal stenosis   • S/P cervical spinal fusion   • Failed back surgical syndrome   • BPH (benign prostatic hyperplasia)   • Chronic pain   • Hepatomegaly   • Cervical stenosis of spinal canal   • Diverticulosis of colon   • Low testosterone   • Acute bilateral low back pain with bilateral sciatica   • Acquired absence of other right toe(s) (CMS/HCC)   • Opioid dependence, uncomplicated (CMS/HCC)   • Thoracic aortic ectasia (CMS/HCC)   • Morbid obesity (CMS/HCC)   • Adrenal nodule (CMS/HCC)   • STEMI (ST elevation myocardial infarction) (CMS/HCC)   • Post PTCA     PAST MEDICAL, 
FAMILY AND SOCIAL HISTORY     Medications:  Current Outpatient Medications   Medication Sig Dispense Refill   • calcipotriene (DOVONEX) 0.005 % cream Apply 1 application topically 2 times daily as needed (psoriasis). 120 g 0   • carvedilol (COREG) 25 MG tablet Take 1 tablet by mouth every 12 hours. 60 tablet 0   • ticagrelor (BRILINTA) 90 MG Tab Take 1 tablet by mouth every 12 hours. 60 tablet 3   • acetaminophen (TYLENOL) 500 MG tablet Take 1,000 mg by mouth every 6 hours as needed for Pain.     • chlorthalidone (THALITONE) 25 MG tablet Take 1 tablet by mouth every morning. 90 tablet 0   • allopurinol (ZYLOPRIM) 300 MG tablet Take 1 tablet by mouth daily. 90 tablet 1   • testosterone 1% (ANDROGEL) 25mg/2.5g gel packet Place 1 packet onto the skin daily. 90 packet 1   • metformin (GLUCOPHAGE) 1000 MG tablet TAKE ONE TABLET BY MOUTH TWICE A DAY WITH MEALS 180 tablet 2   • Semaglutide, 1 MG/DOSE, (Ozempic, 1 MG/DOSE,) 4 MG/3ML Solution Pen-injector Inject 1 mg into the skin every 7 days. Do not start before January 26, 2022. 9 mL 1   • morphine ER (VIOLA) 20 MG 24 hr capsule Take 20 mg by mouth every 12 hours.     • glimepiride (AMARYL) 4 MG tablet Take 1 tablet by mouth 2 times daily. 180 tablet 3   • Coenzyme Q10 (CoQ10) 200 MG Cap Take 200 mg by mouth daily.     • diazePAM (VALIUM) 5 MG tablet Take 1 tablet by mouth every 6 hours as needed for Anxiety or Muscle spasms. (Patient taking differently: Take 5 mg by mouth every 8 hours.) 12 tablet 0   • oxyCODONE HCl 10 MG immediate release tablet Take 1 tablet by mouth every 4 hours as needed for Pain. (Patient taking differently: Take 10 mg by mouth every 6 hours as needed for Pain.) 18 tablet 0   • atorvastatin (LIPITOR) 80 MG tablet Take 1 tablet by mouth nightly. 90 tablet 1   • Probiotic Product (FLORAJEN3) capsule Take 1 capsule by mouth daily.     • krill oil 1000 MG capsule Take 1 capsule by mouth daily. 90 capsule 0   • Saw Palmetto 450 MG Cap Take 450 mg by 
mouth daily.     • CINNAMON PO Take 2,000 mg by mouth 2 times daily.      • Cholecalciferol 5000 units Tab Take 1 tablet by mouth daily.     • ferrous sulfate 325 (65 FE) MG tablet Take 1 tablet by mouth daily (with breakfast).     • aspirin 81 MG chewable tablet Chew 1 tablet by mouth daily.       No current facility-administered medications for this visit.       Allergies:  ALLERGIES:   Allergen Reactions   • Cephalosporins Other (See Comments) and ANAPHYLAXIS     Cardiac arrest - patient states happend in surgery and it remembers it was a serious reaction.  5/15/2020 - As patient  Reaction:  Cardiac arrest    How quickly did the reaction occur?:  immediately   When did the reaction occur?:  ~2012 during surgery    How was the reaction managed?: stop medication    Beta-lactams taken since the reaction: cefepime x2 doses in 2019, no mention of reaction    Has the patient had penicillin skin testing?: unsure     • Penicillins ANAPHYLAXIS, Other (See Comments) and HIVES     cardiac arrest  5/15/2020 - As patient  Reaction:  Cardiac arrest    How quickly did the reaction occur?:  immediately   When did the reaction occur?:  ~2012 during surgery    How was the reaction managed?: stop medication    Beta-lactams taken since the reaction: cefepime x2 doses in 2019, no mention of reaction    Has the patient had penicillin skin testing?: unsure     • Absorbine Arthritis Strength SWELLING, NAUSEA and CARDIAC DISTURBANCES   • Ace Inhibitors Other (See Comments)     Hyperkalemia, patient has a history of a low aldosterone  and only has one kidney, has a propensity toward hyperkalemia   • Amlodipine RASH   • Cat Dander Other (See Comments)     Eyes itchy    • Cyclobenzaprine NAUSEA, Nausea & Vomiting and PRURITUS   • Cymbalta [Duloxetine Hcl] RASH   • Doxycycline HIVES   • Gabapentin HIVES   • Gabitril SWELLING   • Lyrica HIVES   • Nortriptyline RASH   • Nsaids HIVES   • Pregabalin HIVES       Past 
Medical  History/Surgeries:  Past Medical History:   Diagnosis Date   • Acquired solitary kidney 2006    Cx: Left nephrectomy during lumbar surgery   • Anemia of chronic disease    • CAD (coronary artery disease)     S/P Stents  X 3   • Chronic gout    • Chronic kidney disease    • Chronic pain syndrome     Failed back syndrome   • Chronic prescription opiate use     Pain contract Dr. John Johnson   • Deep vein thrombosis (DVT) of left upper extremity (CMS/HCC)     2/2 PIC line   • GERD (gastroesophageal reflux disease)    • HTN (hypertension)    • NSTEMI (non-ST elevated myocardial infarction) (CMS/HCC)     [place] Dr. Mayorga   • Obesity (BMI 35.0-39.9 without comorbidity)    • Osteomyelitis, chronic (CMS/HCC)     L3-L5   • Primary generalized (osteo)arthritis    • Spinal stenosis of lumbar region    • Thoracic spinal stenosis    • Type 2 diabetes mellitus (CMS/HCC)        Past Surgical History:   Procedure Laterality Date   • Amputation toe,mt-p jt  04/09/2014    Right third toe   • Amputation toe,mt-p jt Right 02/18/2018    Dr. Palomo Rt second   • Back surgery  26 total    Back surgeries with fusions   • Back surgery  02/11/2019    Revision lumbar laminectomy L1-2, L2-3, Posterior pedicle screw instrumentation of T10-L3/[facility] STL 2/11/19    • Back surgery  02/18/2019    Revision lumbar laminectomy L1-2, L2-3 and dural repair/[facility] STL 2/18/19   • Back surgery  02/20/2020    Thoracic laminectomy with partial medial facetectomy and foraminotomy of T8-9, T9-10/[facility] GRF 2/20/20   • Back surgery  11/30/2020    REVISION THORACIC 10-LUMBAR 3 POSTERIOR INSTRUMENTATION & FUSION WITH EXPLORATION OF FUSION BED  & L2 AND L3 OSTEOTOMY/Dr. Clement   • Cardiac catherization  07/12/2022   • Colonoscopy remove lesions by snare  01/30/2019    Rpt 5 yrs,adenoma x1,hemorrhoids,.   • Coronary angioplasty with stent placement  10/31/2017    PTCA/GUADALUPE X 3 to Mid Cx lesion, Ostium of 2nd Marginal, Mid RCA   • Echo 
m-mode/2d/doppler (routine)  2017    S/P PTCA with GUADALUPE X 3 --- LVEF - 70%  Normal wall motion   • Esophagogastroduodenoscopy transoral flex w/bx single or mult  2019    Gastritis,neg h pylori,.    • Fracture surgery      Left wrist fracture repair   • Incision and drainage foot Right 2013    Right foot   • Intrathecal pump implantation  2014    Pumps X 2 - formation of granulomas   • Laminec/facetect/foramin,lumbar  2013    L1-2, L2-3 Lumbar Lami - Dr. Boyer   • Laminec/facetect/foramin,lumbar  2016    Revision bilateral lumbar myriam-laminectomy L2-3 with partial medial facetectomy and foraminotomy of the exiting nerve(s)./[facility] [place] Dr Boyer/ 3/24/16   • Nephrectomy Left     JORGE A/Dr Jensen: due to bleeding afte posterior spinal surgery    • Past surgical history      Intrathecal pump and neurostimulation implants. both removed. all 3   • Repair of dural csf leak or pseudomeningocele w laminectomy  2016    Repair of dural tear with fascial graft  of L2-3/[facility] [place] Dr Boyer/ 16   • Tonsillectomy      @ age 7       Family History:  Family History   Problem Relation Age of Onset   • COPD Mother          74, copd    • High cholesterol Father          65,  of unknow cause.in sleep. schizophrenia. ptsd.   • Hypertension Father    • Other Father         PTSD   • Patient is unaware of any medical problems Sister    • Patient is unaware of any medical problems Sister    • Heart Brother         Age 49 lots of heart dzz.   • Heart disease Brother    • Patient is unaware of any medical problems Brother    • Systemic Lupus Erythematosus Brother         Diverticulitis   • Diabetes Paternal Grandmother    • Stroke Paternal Grandfather    • Heart disease Paternal Grandfather    • Patient is unaware of any medical problems Daughter    • Patient is unaware of any medical problems Son    • Patient is unaware of any medical problems Son        Social History:  Social 
History     Tobacco Use   • Smoking status: Former Smoker     Packs/day: 0.50     Years: 24.00     Pack years: 12.00     Types: Cigarettes     Start date: 4/1/1980     Quit date: 1/26/2012     Years since quitting: 10.6   • Smokeless tobacco: Never Used   Substance Use Topics   • Alcohol use: Yes     Comment: Quit 2000; Socially       REVIEW OF SYSTEMS     Review of Systems   Constitutional: Negative for activity change, appetite change, fatigue and unexpected weight change.   Respiratory: Negative for apnea, chest tightness and shortness of breath.    Cardiovascular: Negative for chest pain, palpitations and leg swelling.   Musculoskeletal: Negative for myalgias.   Skin: Negative for pallor.   Neurological: Positive for weakness and numbness. Negative for dizziness, syncope, light-headedness and headaches.        Severe back pain   Hematological: Does not bruise/bleed easily.   All other systems reviewed and are negative.      PHYSICAL EXAM     Physical Exam  Constitutional:       General: He is not in acute distress.     Appearance: Normal appearance. He is well-developed. He is not diaphoretic.   HENT:      Head: Normocephalic and atraumatic.      Mouth/Throat:      Pharynx: No oropharyngeal exudate.      Neck: Normal range of motion and neck supple.   Eyes:      General: No scleral icterus.        Right eye: No discharge.         Left eye: No discharge.      Conjunctiva/sclera: Conjunctivae normal.      Right eye: Right conjunctiva is not injected.      Left eye: Left conjunctiva is not injected.      Pupils: Pupils are equal, round, and reactive to light.   Neck:      Thyroid: No thyroid mass or thyromegaly.      Vascular: Normal carotid pulses. No carotid bruit, hepatojugular reflux or JVD.      Trachea: Trachea normal. No tracheal deviation.   Cardiovascular:      Rate and Rhythm: Normal rate and regular rhythm.  No extrasystoles are present.     Pulses: Normal pulses.      Heart sounds: Normal heart sounds, 
S1 normal and S2 normal. Heart sounds not distant. No murmur heard.    No friction rub. No gallop. No S3 or S4 sounds.   Pulmonary:      Effort: Pulmonary effort is normal. No respiratory distress.      Breath sounds: Normal breath sounds. No stridor. No wheezing or rales.   Chest:      Chest wall: No tenderness.   Abdominal:      General: There is no distension or abdominal bruit.      Palpations: Abdomen is soft. There is no mass or pulsatile mass.      Tenderness: There is no abdominal tenderness. There is no guarding or rebound.   Musculoskeletal:         General: No tenderness. Normal range of motion.   Skin:     General: Skin is warm and dry.      Coloration: Skin is not pale.      Findings: No abrasion, bruising, ecchymosis, lesion or rash.   Neurological:      Mental Status: He is alert and oriented to person, place, and time.   Psychiatric:         Behavior: Behavior is cooperative.         Thought Content: Thought content normal.         Judgment: Judgment normal.       Coronary Angiogram 8/12/2022:  PTCA and stenting of mid circumflex with a 3.0 mm x 18 mm drug-eluting stent.  PTCA and stenting of distal LAD with a 2.5 mm by 18 mm drug-eluting stent, and 2.5 mm by 8 mm drug-eluting stent proximally in the mid LAD, and proximal LAD stent 3.5 mm x 38 mm resolute douglas drug-eluting stent injection of the right coronary artery which is patent    Echocardiogram 7/13/2022:  Normal left ventricular systolic function with ejection fraction of 55 %.  Normal left ventricular wall thickness.  Grade I left ventricular diastolic dysfunction.  Mild mitral valve regurgitation.  Mild tricuspid valve regurgitation.  No pericardial effusion.  No significant change since the prior study 11/1/2017.    Coronary Angiogram 7/12/2022:  STEMI: Distal RCA treated with GUADALUPE 2.21rsf32qv, Proximal RCA treated with GUADALUPE 3.5mmx12     Renal and Duplex 1/5/2021:  Status post left nephrectomy. No evidence of hemodynamic significant right 
renal artery stenosis    Echo 11/01/2017  Left ventricular ejection fraction, 70 %.  No regional wall motion abnormalities.  Normal right ventricular size and systolic function.  No significant valve abnormalities.  Normal pericardium without effusion.    Coronary Angiogram 10/31/2017  Procedure: Left heart cardiac catheterization, PTCA and stenting of mid RCA with a 3.5 mm x 23 mm Xience Alpine drug-eluting stent.  PTCA and stenting of bifurcating lesion of the ostium of the OM 2 and distal left circumflex.  Stenting done in mid circumflex with stent going across the distal circumflex with a 3.0 mm x 23 mm Xience Alpine drug-eluting stent.  PTCA and stenting of the distal circumflex distal to the takeoff of the OM 2 with a 2.75 mm x 23 mm Xience Alpine drug-eluting stent.    Recent Labs   Lab 08/13/22  0308 08/12/22  0657 07/14/22  0303 07/13/22  2202 07/13/22  1532 07/13/22  0918 07/13/22  0316 07/12/22  1747 04/25/22  1608 03/08/22  0522 03/07/22  0443 03/06/22  1442 02/02/22  1258 02/02/22  1258 11/30/21  0834 11/30/21  0834   HGB 11.9* 13.2  --   --   --   --   --  15.2 15.7  --  14.8 15.7   < >  --   --   --    BUN  --  12 9  --   --   --  8 9 12  --  16 14  --  12   < >  --    Creatinine  --  0.75 0.70  --   --   --  0.72 0.77 0.74  --  0.68 0.77  --  0.84  --  0.78   Hemoglobin A1C  --   --   --   --   --   --   --   --   --   --   --   --   --  10.5*  --   --    C-Reactive Protein  --   --   --   --   --   --   --   --  2.8*  --   --  2.0*  --   --   --   --    Potassium  --  3.6 3.6 4.1 3.5 3.5 3.1* 3.8 3.9   < > 3.9 4.5  --  4.6   < >  --    Cholesterol  --  152  --   --   --   --   --   --   --   --   --   --   --   --   --  251*   HDL  --  40  --   --   --   --   --   --   --   --   --   --   --   --   --  39*   Cholesterol/ HDL Ratio  --  3.8  --   --   --   --   --   --   --   --   --   --   --   --   --  6.4*   Triglycerides  --  206*  --   --   --   --   --   --   --   --   --   --   --   --   --  
334*   CALCLDL  --  71  --   --   --   --   --   --   --   --   --   --   --   --   --  145*   GOT/AST  --   --   --   --   --   --   --  79* 21  --  10  --   --   --   --   --    GPT  --   --   --   --   --   --   --  29 38  --  26  --   --   --   --   --    INR  --   --   --   --   --   --   --  1.0  --   --   --   --   --   --   --   --     < > = values in this interval not displayed.       ASSESSMENT/PLAN       ASSESSMENT AND PLAN:    Chauncey Burroughs presents with the following active problems:    PROBLEMS:    · STEMI 7/12/22: dRCA treated with GUADALUPE 2.63idu86qw, pRCA  treated with GUADALUPE 3.5mmx12  · Coronary Artery Disease: Coronary Angiogram 8/12/22: mCX GUADALUPE  3.9hts51uf, dLAD GUADALUPE 2.8iuu62oq, mLAD GUADALUPE 2.5mnz2oe,  pLAD GUADALUPE 3.8nab33rn  · NSTEMI 10/31/2017: 2.75 x 23 mm GUADALUPE mCX, 3.0 x 23 mm GUADALUPE  OM2, 3.5 x 23 mm GUADALUPE mRCA  · Hypertension  · Hyperlipidemia  · S/P Nephrectomy  · Diabetes Mellitus    RECOMMENDATIONS:    Coronary Artery Disease  Hx of NSTEMI 10/31/2017, Echo 11/01/17 unremarkable, LVEF 70%  STEMI 7/12/2022 dRCA treated with GUADALUPE 2.53ugs97qa, pRCA treated with GUADALUPE 3.5mmx12  Planned Intervention on 8/12/2022: mCX GUADALUPE 3.1wlp52rl, dLAD GUADALUPE 2.7rcv44yg, mLAD GUADALUPE 2.0ucy9dy, pLAD GUADALUPE 3.0mxi00ug  Currently chest pain free   Continue current medications  Patient not advised to hold DAPT for surgery due to recent stenting.     Hypertension  Vitals:    09/02/22 1445   BP: 118/78   Pulse: 90   Resp: 16     Creatinine (mg/dL)   Date Value   08/12/2022 0.75   Continue current regimen    Hyperlipidemia  LDL (mg/dL)   Date Value   08/12/2022 71   Continue current regimen    FOLLOW UP: Chauncey Dunneo is expected to follow up in 6 months    Recommendations Summary:    1. Continue Current Medications  2. Patient not advised to hold DAPT for surgery due to recent stenting    On 9/2/2022, IPriscila scribed the services personally performed by Khang Mayorga MD.        This entire documentation recorded accurately and completely reflects 
No complaints
the service(s) performed and clinical decisions made. I attest that I have personally formulated the clinical plan, reviewed, edited the note, and remain entirely responsible for its content.    Note: In patients with acute coronary syndrome (ACS) (non-ST elevation [NSTE]-ACS or ST elevation myocardial infarction [STEMI]) treated with DAPT after BMS or GUADALUPE implantation, P2Y12 inhibitor therapy (clopidogrel, prasugrel, or ticagrelor) should be given for at least 12 months (Class I).    Summary:  Chauncey Burroughs is a 65 year old male presenting with  the above listed problems.  No other new concerning clinical signs or symptoms. Chauncey Burroughs remains clinically stable. Today's exam finding are noted. Reviewed medications, last ejection fraction,( 55%), low-density lipoprotein,( 71 mg/dL) serum creatinine,( 0.75 mg/dL) and other relevant tests. Advised this patient to continue current medications.  Encouraged compliance with prescribed therapies and lifestyle changes. Chauncey Burroughs's  health concerns and questions were addressed. Future recommendations and any required tests were explained, and cardiology were appointments scheduled. Suggested follow up in 6 months, earlier if needed.       Khang Mayorga MD, FACC, FACP, FSCAI.  Interventional Cardiology  [facility] [place]  Pager: [phone]  [email or web address]  3:00 PM, 9/2/2022  Rosibel [place] : [phone]   Rosibel Chandler : [phone]  Rosibel Johnson : [phone]  [email or web address]                  
No complaints

## 2022-09-06 ENCOUNTER — EMERGENCY (EMERGENCY)
Facility: HOSPITAL | Age: 52
LOS: 1 days | Discharge: HOME | End: 2022-09-06
Admitting: STUDENT IN AN ORGANIZED HEALTH CARE EDUCATION/TRAINING PROGRAM

## 2022-09-06 VITALS
DIASTOLIC BLOOD PRESSURE: 81 MMHG | OXYGEN SATURATION: 97 % | SYSTOLIC BLOOD PRESSURE: 121 MMHG | HEART RATE: 84 BPM | HEIGHT: 64 IN | RESPIRATION RATE: 18 BRPM | TEMPERATURE: 99 F

## 2022-09-06 VITALS
SYSTOLIC BLOOD PRESSURE: 122 MMHG | DIASTOLIC BLOOD PRESSURE: 74 MMHG | TEMPERATURE: 98 F | HEART RATE: 84 BPM | RESPIRATION RATE: 18 BRPM | OXYGEN SATURATION: 95 %

## 2022-09-06 PROBLEM — F32.9 MAJOR DEPRESSIVE DISORDER, SINGLE EPISODE, UNSPECIFIED: Chronic | Status: ACTIVE | Noted: 2022-08-01

## 2022-09-06 LAB — GLUCOSE BLDC GLUCOMTR-MCNC: 92 MG/DL — SIGNIFICANT CHANGE UP (ref 70–99)

## 2022-09-06 PROCEDURE — 99284 EMERGENCY DEPT VISIT MOD MDM: CPT

## 2022-09-06 NOTE — ED PROVIDER NOTE - IV ALTEPLASE DOOR HIDDEN
"[facility]  Palliative Care Home Visit    Visit Date: 6/23/2020  Encounter Provider: Lesley Tamez NP  PCP:  Crystal Hahn MD    Subjective:      Patient ID: Glenda Gimenez is a 89 y.o. female.    Consult Requested By:  Dr. James Yao  Reason for Consult:  Palliative Care  Visit time: 0900 am - 10:15 am    Chief Complaint: Follow up, Insomnia and Gastroesophageal Reflux    Glenda is a 89 year old female with a PMHX of hypothyroidism, hypertension, primary hyperparathyroidism, COPD, chronic diastolic CHF, obesity and recent sepsis pneumonia.     Glenda is being seen today for a Palliative Care follow up visit. She is found today doing very well today and is AAO x4. She reports that she has had her oxygen DME returned because she was not using it. It was ordered for her back in March 2020 after discharge from a hospitalization for sepsis pneumonia. She has no respiratory complaints at this time.    She continues with a decreased appetite, except for sweets, since her hospitalization in March but has not lost anymore weight from the 17 lbs lost initially. She is making herself eat 2-3 small meals per day. Encouraged patient to weight weekly and keep a log. BMI is currently 30.4. She states "nothing tastes right". She has a previous serum albumin of 2.4. Encouraged to eat higher protein meals instead of sweets.     Glenda reports that she has been having some GERD-like symptoms over the last few weeks. She has been on Omeprazole in the past but stopped taking it. There is a full rx bottle in the home. Advised patient to resume rx but to avoid taking daily for greater than 7 days and to avoid food triggers.    Patient also complains of insomnia. She states that she only gets about 5-6 hours of sleep each night and is wondering what she could take for that. She denies excessive daytime sleepiness. Will order Trazodone. Discussed sleep hygiene.      VSS. Denies fever, chest pain, shortness of breath, " "nausea, vomiting, diarrhea. Denies any acute issues, concerns or complaints to address on today's visit. Reports taking all medications as prescribed. No other needs identified at this time. Risks of environmental exposure to coronavirus discussed including: social distancing, hand hygiene, and limiting departures from the home for necessities only.  Reports understanding and willingness to comply.           Goals of Care:  Glenda has stated in a previous visit that she is a DNR but today reports that she would like to be a Full code and have comfort care. She would like to continue living at home independently for as long as possible. She states that [facility] placed 20 years ago. She states that her daughter Ankita Gimenez is her HPOA. Patient is unable to find copies of documents. New documents provided, discussed and reviewed with patient today. She will go over them with her daughter and present them at our next meeting in 4 weeks.      Instructions:  1. Palliative Care follow-up in 4 weeks.  2. Continue all medications.  3. Fall precautions at all times.  4. If symptoms worsen, call 911 or go to ED. Call Palliative care NP.  5. Weigh self weekly, keep log. Eat 3 small heart healthy meals per day. Consider adding 1 can of Ensure a day for supplementation.  6. Limit Risks of environmental exposure to coronavirus/COVID-19 as discussed including: social distancing, hand hygiene, and limiting departures from the home for necessities only.   7. For insomnia, ordered:  -     traZODone (DESYREL) 50 MG tablet; Take 1 tablet (50 mg total) by mouth every evening.   Possible side effects discussed.  8. Resume Omeprazole prescription in home, Avoid daily use > 7 days.   Avoid spicy foods, red gravies. Avoid reclining after eating      Review of Systems   Constitutional: Positive for "things not tasting right and I must make myself eat". " Negative for weight loss-weight has stabilized since hospitalization. Negative for fever.   HENT: Negative.    Eyes: Negative.    Respiratory: Denies cough. Positive for shortness of breath (mild and occasional with exertion). Negative for chest tightness and wheezing.    Cardiovascular: Negative.    Gastrointestinal: Positive for heartburn lately, not taking Omeprazole in months.    Endocrine: Negative.    Genitourinary: Negative.    Musculoskeletal: Negative.    Skin: Negative.    Allergic/Immunologic: Negative.    Neurological: Positive for weakness (mild). Negative for dizziness, tremors and syncope.   Hematological: Bruises/bleeds easily.   Psychiatric/Behavioral: Negative.          Assessments:  · Environmental: clean, adequate lighting and temperature, no foul odors  · Functional Status: independent  · Safety:  lives alone, wants to start driving again, discussed COVID-19 pandemic and importance of staying home  · Nutritional: adequate  access to food, Gurpreet has sent 10 meals to home since recent hospitalization, patient reports poor appetite but is making self eat 2 meals daily and some snacks, no weight loss since last visit 1 month ago  · Home Health/DME/Supplies: No Home Health, rollator       Symptom Assessment (ESAS 0-10 scale)     ESAS 0 1 2 3 4 5 6 7 8 9 10   Pain x             Dyspnea x             Anxiety x             Nausea x             Depression  x             Anorexia x             Fatigue    x          Insomnia       x       Restlessness  x             Agitation x                  Constipation    no  Bowel Management Plan (BMP): no  Diarrhea        no  Comments: reports BM once a day     Performance Status: PPS Score 60  Karnofsky Score:  60  EGOC:  2    History:  Past Medical History:   Diagnosis Date    Anticoagulant long-term use     Dislocation of right shoulder joint     Hypertension     Shoulder disorder     right    Thyroid disease      Family History   Problem Relation Age of  Onset    Breast cancer Mother     Cancer Brother         lung cancer    Cancer Maternal Aunt         unknown cancer    Cancer Maternal Uncle         pancreatic cancer    Heart disease Neg Hx      Past Surgical History:   Procedure Laterality Date    HYSTERECTOMY      PARTIAL HYSTERECTOMY      ARTUR, ovaries in place, uterine prolapse     Review of patient's allergies indicates:   Allergen Reactions    Atenolol      Other reaction(s): itch    Betamethasone sodium phosphate      Other reaction(s): Flushing (skin)    Cortisone      Other reaction(s): blurry vision  Other reaction(s): Rash    Lisinopril      Other reaction(s): COUGH    Naproxen      Other reaction(s): body shut down    Triamterene-hydrochlorothiazid      Other reaction(s): itch       Medications:    Current Outpatient Medications:     aspirin (ECOTRIN) 81 MG EC tablet, Take 81 mg by mouth once daily., Disp: , Rfl:     carvedilol (COREG) 3.125 MG tablet, Take 1.5625 mg by mouth 2 (two) times daily. , Disp: , Rfl: 1    furosemide (LASIX) 20 MG tablet, Take 20 mg by mouth once daily. , Disp: , Rfl: 2    isosorbide mononitrate (IMDUR) 30 MG 24 hr tablet, Take 30 mg by mouth once daily., Disp: , Rfl:     levothyroxine (SYNTHROID) 88 MCG tablet, TAKE 1 TABLET BY MOUTH EVERY DAY (Patient taking differently: Take 88 mcg by mouth before breakfast. ), Disp: 90 tablet, Rfl: 3    olmesartan (BENICAR) 5 MG Tab, Take 5 mg by mouth once daily. , Disp: , Rfl: 1    albuterol-ipratropium (DUO-NEB) 2.5 mg-0.5 mg/3 mL nebulizer solution, Take 3 mLs by nebulization every 4 (four) hours as needed for Wheezing or Shortness of Breath. Rescue (Patient not taking: Reported on 4/9/2020), Disp: 120 Box, Rfl: 11    omeprazole (PRILOSEC) 20 MG capsule, Take 20 mg by mouth once daily., Disp: , Rfl:     potassium chloride SA (K-DUR,KLOR-CON) 20 MEQ tablet, TAKE 1 TABLET(20 MEQ) BY MOUTH TWICE DAILY (Patient taking differently: Take 20 mEq by mouth once daily. ),  Disp: 180 tablet, Rfl: 3    traZODone (DESYREL) 50 MG tablet, Take 1 tablet (50 mg total) by mouth every evening., Disp: 30 tablet, Rfl: 0    24h Oral Morphine Equivalents (OME):  N/A    Objective:     Physical Exam:  Vitals:    06/23/20 0938   BP: 116/68   Pulse: 83   Resp: 16   Temp: 98.9 °F (37.2 °C)   TempSrc: Temporal   SpO2: 98%   Weight: 79.4 kg (175 lb)   PainSc: 0-No pain     Body mass index is 30.04 kg/m².    Physical Exam   Constitutional: She is oriented to person, place, and time. She appears well-developed and well-nourished.   HENT:   Head: Normocephalic and atraumatic.   Right Ear: External ear normal.   Left Ear: External ear normal.   Nose: Nose normal.   Mouth/Throat: Oropharynx is clear and moist. Some missing front teeth from a fall in Nov 2019  Eyes: Pupils are equal, round, and reactive to light. Conjunctivae and EOM are normal.   Neck: Normal range of motion. Neck supple.   Cardiovascular: Normal rate, regular rhythm, normal heart sounds and intact distal pulses.   Pulmonary/Chest: Effort normal. No stridor. No respiratory distress. She has no wheezes. She has no rales. She exhibits no tenderness.  No longer using oxygen or nebulizer.  Abdominal: Soft. Bowel sounds are normal.   Musculoskeletal: Normal range of motion. She exhibits no tenderness or deformity. Left ankle with 1+ non-pitting edema-pt reports chronic  Neurological: She is alert and oriented to person, place, and time.   Skin: Skin is warm and dry. Capillary refill takes 2 to 3 seconds.   Psychiatric: She has a normal mood and affect. Her behavior is normal. Judgment and thought content normal.        Labs:  CBC:   WBC   Date Value Ref Range Status   03/06/2020 9.32 3.90 - 12.70 K/uL Final       Hgb   Date Value Ref Range Status   07/20/2013 12.5 12.0 - 16.0 g/dl Final     Hemoglobin   Date Value Ref Range Status   03/06/2020 11.7 (L) 12.0 - 16.0 g/dL Final       Hematocrit   Date Value Ref Range Status   03/06/2020 36.8 (L)  37.0 - 48.5 % Final       Mean Corpuscular Volume   Date Value Ref Range Status   03/06/2020 100 (H) 82 - 98 fL Final       Platelets   Date Value Ref Range Status   03/06/2020 376 (H) 150 - 350 K/uL Final       LFT:   Lab Results   Component Value Date     (H) 03/06/2020    ALKPHOS 243 (H) 03/06/2020    BILITOT 0.5 03/06/2020       Albumin:   Albumin   Date Value Ref Range Status   03/06/2020 2.4 (L) 3.5 - 5.2 g/dL Final     Protein:   Total Protein   Date Value Ref Range Status   03/06/2020 7.4 6.0 - 8.4 g/dL Final       Radiology:  I have reviewed all pertinent imaging results/findings within the past 24 hours.    Psychosocial/Cultural/Spiritual:   · F- J[place] and Belief:  Evangelical              · I - Importance: very important  · C - Community: [facility]  · A - Address in Care: no needs presently    Assessment:     1. Palliative care encounter    2. Chronic obstructive pulmonary disease, unspecified COPD type    3. History of recent pneumonia    4. Chronic diastolic CHF (congestive heart failure)    5. Advanced age    6. Insomnia, unspecified type    7. Primary hyperparathyroidism    8. Thyroid disease    9. Gastroesophageal reflux disease, esophagitis presence not specified    10. Protein-calorie malnutrition, moderate        Plan:   Ethical / Legal: Advance Care Planning   · Surrogate decision maker:  Name Ankita Gimenez, Relationship: daughter  · Code Status: Reports full code today  · [place]:  none  · Other advance directive:  HPOA, living will on file at hospital placed 20 years ago-patient states she does not have a copy, new forms left with patient to review with daughter, Capacity to make medical decisions:  intact, Conflict none     ·        Glenda was seen today for insomnia and gastroesophageal reflux.    Diagnoses and all orders for this visit:    Palliative care encounter    Chronic obstructive pulmonary disease, unspecified COPD type  -     Ambulatory referral/consult to [facility] -  Medical & Palliative    History of recent pneumonia    Chronic diastolic CHF (congestive heart failure)    Advanced age    Insomnia, unspecified type    Primary hyperparathyroidism    Thyroid disease    Gastroesophageal reflux disease, esophagitis presence not specified    Protein-calorie malnutrition, moderate      Time allowed for questions, all questions answered. [facility] contact information left with patient today for any future concerns.    Were controlled substances prescribed?  No    50 min of 75 min visit spent in chart review, face to face discussion of goals of care,  symptom assessment, coordination of care and emotional support. Discussed insomnia/ordered medication, discussed GERD and instructed patient to resume Omeprazole. Discussed protein calorie malnutrition and ways to improve protein intake.    20 min of 75 min visit spent discussing palliative care, advance directives (new forms given and reviewed with patient), [place], goals of care, potential hospice needs and hospice benefits.    Follow Up Appointments:   Future Appointments   Date Time Provider Department Center   8/28/2020  3:30 PM MD BHARGAVI Ayala   Verbal consent obtained from patient on visit today.    Signature:  Lesley Tamez NP     Attestation:   Screening criteria to assess the level of the patient's risk for infection with COVID-19 as recommended by the CDC at the time of the above documented home visit concluded appropriateness to proceed. Universal precautions were maintained at all times, including provider wearing a mask and gloves during entire visit and use of 60% alcohol gel hand  immediately prior to entry and upon departure of patient's home as well as cleaning of equipment used in home visit with antibacterial/germicidal disposable wipes.       show

## 2022-09-06 NOTE — ED ADULT NURSE NOTE - NSIMPLEMENTINTERV_GEN_ALL_ED
Implemented All Fall Risk Interventions:  [place] to call system. Call bell, personal items and telephone within reach. Instruct patient to call for assistance. Room bathroom lighting operational. Non-slip footwear when patient is off stretcher. Physically safe environment: no spills, clutter or unnecessary equipment. Stretcher in lowest position, wheels locked, appropriate side rails in place. Provide visual cue, wrist band, yellow gown, etc. Monitor gait and stability. Monitor for mental status changes and reorient to person, place, and time. Review medications for side effects contributing to fall risk. Reinforce activity limits and safety measures with patient and family.

## 2022-09-06 NOTE — ED PROVIDER NOTE - PATIENT PORTAL LINK FT
You can access the FollowMyHealth Patient Portal offered by [facility] by registering at the following website: [email or web address]. By joining [facility]’s FollowMyHealth portal, you will also be able to view your health information using other applications (apps) compatible with our system.

## 2022-09-06 NOTE — ED PROVIDER NOTE - CLINICAL SUMMARY MEDICAL DECISION MAKING FREE TEXT BOX
Patient is BIB EMS for altered mental status. Pt has had previous visits to this ED for substance abuse. History and ROS limited due to altered state. No evidence of head or extremity trauma. No vital sign derangements. Abdomen not distended. Will observe to clinical sobriety.

## 2022-09-06 NOTE — ED PROVIDER NOTE - OBJECTIVE STATEMENT
50 y/o F with PMH of depression and polysubstance abuse is BIB EMS for AMS s/p drug intoxication. Pt admits to using heroin earlier today. Pt offers no medical complaints at this time. Unable to obtain remainder of HPI due to her current status.

## 2022-09-06 NOTE — ED ADULT NURSE NOTE - OBJECTIVE STATEMENT
Pt presenting s/p narcan admin at housing works. Admits to "1 bag of heroin". Pt aox3, alert and oriented, no distress. Denies ETOH. Pt placed on CM with o2 monitoring. Saturation 98% on RA

## 2022-09-06 NOTE — ED ADULT TRIAGE NOTE - CHIEF COMPLAINT QUOTE
pt found unresponsive in housing works bathroom. given 8mg narcan pta and is answering questions on arrival.

## 2022-09-06 NOTE — ED PROVIDER NOTE - PHYSICAL EXAMINATION
General: no signs of trauma, somnolent, in no apparent distress.  Head: AT, NC  HEENT: Airway patent  Eyes: clear bilaterally, pupils equal, round and reactive to light.  Cardiac: Normal rate, regular rhythm.  Heart sounds S1, S2.  No murmurs, rubs or gallops.  Respiratory: Breath sounds clear and equal bilaterally.  Abdomen soft, non-tender, no guarding.  MSK: Moving all extremities x 4. No signs of trauma to the extremities.  Neuro: somnolent, follows commands, ataxia, slurred speech. Responds to tactile stimuli.  Skin: normal color.

## 2022-09-09 DIAGNOSIS — R41.82 ALTERED MENTAL STATUS, UNSPECIFIED: ICD-10-CM

## 2022-09-09 DIAGNOSIS — F11.129 OPIOID ABUSE WITH INTOXICATION, UNSPECIFIED: ICD-10-CM

## 2022-09-09 DIAGNOSIS — F19.10 OTHER PSYCHOACTIVE SUBSTANCE ABUSE, UNCOMPLICATED: ICD-10-CM

## 2022-09-09 DIAGNOSIS — F32.1 MAJOR DEPRESSIVE DISORDER, SINGLE EPISODE, MODERATE: ICD-10-CM

## 2022-09-15 NOTE — ED BEHAVIORAL HEALTH ASSESSMENT NOTE - PRIOR MEDICATION SIDE EFFECTS OR ADVERSE REACTIONS
Post-Care Instructions: I reviewed with the patient in detail post-care instructions. Patient is to wear sunprotection, and avoid picking at any of the treated lesions. Pt may apply Vaseline to crusted or scabbing areas. Show Topical Anesthesia Variable?: Yes Medical Necessity Information: It is in your best interest to select a reason for this procedure from the list below. All of these items fulfill various CMS LCD requirements except the new and changing color options. Consent: The patient's consent was obtained including but not limited to risks of crusting, scabbing, blistering, scarring, darker or lighter pigmentary change, recurrence, incomplete removal and infection. Detail Level: Detailed Medical Necessity Clause: This procedure was medically necessary because the lesions that were treated were: Render Note In Bullet Format When Appropriate: No Spray Paint Text: The liquid nitrogen was applied to the skin utilizing a spray paint frosting technique. None known

## 2022-09-24 ENCOUNTER — EMERGENCY (EMERGENCY)
Facility: HOSPITAL | Age: 52
LOS: 1 days | Discharge: PSYCHIATRIC OR SUBSTANCE USE FACILITY | End: 2022-09-24
Attending: EMERGENCY MEDICINE
Payer: MEDICAID

## 2022-09-24 VITALS
RESPIRATION RATE: 14 BRPM | DIASTOLIC BLOOD PRESSURE: 84 MMHG | OXYGEN SATURATION: 100 % | TEMPERATURE: 98 F | HEART RATE: 69 BPM | SYSTOLIC BLOOD PRESSURE: 136 MMHG | HEIGHT: 64 IN

## 2022-09-24 LAB
ALBUMIN SERPL ELPH-MCNC: 4.6 G/DL — SIGNIFICANT CHANGE UP (ref 3.3–5)
ALP SERPL-CCNC: 82 U/L — SIGNIFICANT CHANGE UP (ref 40–120)
ALT FLD-CCNC: 12 U/L — SIGNIFICANT CHANGE UP (ref 10–45)
AMPHET UR-MCNC: NEGATIVE — SIGNIFICANT CHANGE UP
ANION GAP SERPL CALC-SCNC: 10 MMOL/L — SIGNIFICANT CHANGE UP (ref 5–17)
APAP SERPL-MCNC: <15 UG/ML — SIGNIFICANT CHANGE UP (ref 10–30)
APPEARANCE UR: CLEAR — SIGNIFICANT CHANGE UP
AST SERPL-CCNC: 18 U/L — SIGNIFICANT CHANGE UP (ref 10–40)
BARBITURATES UR SCN-MCNC: NEGATIVE — SIGNIFICANT CHANGE UP
BASE EXCESS BLDV CALC-SCNC: -0.5 MMOL/L — SIGNIFICANT CHANGE UP (ref -2–3)
BASE EXCESS BLDV CALC-SCNC: 0.1 MMOL/L — SIGNIFICANT CHANGE UP (ref -2–3)
BASOPHILS # BLD AUTO: 0.06 K/UL — SIGNIFICANT CHANGE UP (ref 0–0.2)
BASOPHILS NFR BLD AUTO: 0.9 % — SIGNIFICANT CHANGE UP (ref 0–2)
BENZODIAZ UR-MCNC: POSITIVE
BILIRUB SERPL-MCNC: 0.6 MG/DL — SIGNIFICANT CHANGE UP (ref 0.2–1.2)
BILIRUB UR-MCNC: NEGATIVE — SIGNIFICANT CHANGE UP
BUN SERPL-MCNC: 18 MG/DL — SIGNIFICANT CHANGE UP (ref 7–23)
CA-I SERPL-SCNC: 1.19 MMOL/L — SIGNIFICANT CHANGE UP (ref 1.15–1.33)
CA-I SERPL-SCNC: 1.22 MMOL/L — SIGNIFICANT CHANGE UP (ref 1.15–1.33)
CALCIUM SERPL-MCNC: 9.2 MG/DL — SIGNIFICANT CHANGE UP (ref 8.4–10.5)
CHLORIDE BLDV-SCNC: 103 MMOL/L — SIGNIFICANT CHANGE UP (ref 96–108)
CHLORIDE BLDV-SCNC: 104 MMOL/L — SIGNIFICANT CHANGE UP (ref 96–108)
CHLORIDE SERPL-SCNC: 104 MMOL/L — SIGNIFICANT CHANGE UP (ref 96–108)
CO2 BLDV-SCNC: 31 MMOL/L — HIGH (ref 22–26)
CO2 BLDV-SCNC: 31 MMOL/L — HIGH (ref 22–26)
CO2 SERPL-SCNC: 24 MMOL/L — SIGNIFICANT CHANGE UP (ref 22–31)
COCAINE METAB.OTHER UR-MCNC: POSITIVE
COLOR SPEC: SIGNIFICANT CHANGE UP
CREAT SERPL-MCNC: 0.75 MG/DL — SIGNIFICANT CHANGE UP (ref 0.5–1.3)
DIFF PNL FLD: NEGATIVE — SIGNIFICANT CHANGE UP
EGFR: 98 ML/MIN/1.73M2 — SIGNIFICANT CHANGE UP
EOSINOPHIL # BLD AUTO: 0.02 K/UL — SIGNIFICANT CHANGE UP (ref 0–0.5)
EOSINOPHIL NFR BLD AUTO: 0.3 % — SIGNIFICANT CHANGE UP (ref 0–6)
ETHANOL SERPL-MCNC: <10 MG/DL — SIGNIFICANT CHANGE UP (ref 0–10)
GAS PNL BLDV: 135 MMOL/L — LOW (ref 136–145)
GAS PNL BLDV: 137 MMOL/L — SIGNIFICANT CHANGE UP (ref 136–145)
GAS PNL BLDV: SIGNIFICANT CHANGE UP
GLUCOSE BLDV-MCNC: 81 MG/DL — SIGNIFICANT CHANGE UP (ref 70–99)
GLUCOSE BLDV-MCNC: 91 MG/DL — SIGNIFICANT CHANGE UP (ref 70–99)
GLUCOSE SERPL-MCNC: 90 MG/DL — SIGNIFICANT CHANGE UP (ref 70–99)
GLUCOSE UR QL: NEGATIVE — SIGNIFICANT CHANGE UP
HCG SERPL-ACNC: <2 MIU/ML — SIGNIFICANT CHANGE UP
HCO3 BLDV-SCNC: 29 MMOL/L — SIGNIFICANT CHANGE UP (ref 22–29)
HCO3 BLDV-SCNC: 29 MMOL/L — SIGNIFICANT CHANGE UP (ref 22–29)
HCT VFR BLD CALC: 43.2 % — SIGNIFICANT CHANGE UP (ref 34.5–45)
HCT VFR BLDA CALC: 39 % — SIGNIFICANT CHANGE UP (ref 34.5–46.5)
HCT VFR BLDA CALC: 43 % — SIGNIFICANT CHANGE UP (ref 34.5–46.5)
HGB BLD CALC-MCNC: 13 G/DL — SIGNIFICANT CHANGE UP (ref 11.7–16.1)
HGB BLD CALC-MCNC: 14.2 G/DL — SIGNIFICANT CHANGE UP (ref 11.7–16.1)
HGB BLD-MCNC: 13.8 G/DL — SIGNIFICANT CHANGE UP (ref 11.5–15.5)
IMM GRANULOCYTES NFR BLD AUTO: 0.3 % — SIGNIFICANT CHANGE UP (ref 0–0.9)
KETONES UR-MCNC: SIGNIFICANT CHANGE UP
LACTATE BLDV-MCNC: 0.5 MMOL/L — SIGNIFICANT CHANGE UP (ref 0.5–2)
LACTATE BLDV-MCNC: 0.8 MMOL/L — SIGNIFICANT CHANGE UP (ref 0.5–2)
LEUKOCYTE ESTERASE UR-ACNC: NEGATIVE — SIGNIFICANT CHANGE UP
LYMPHOCYTES # BLD AUTO: 2.58 K/UL — SIGNIFICANT CHANGE UP (ref 1–3.3)
LYMPHOCYTES # BLD AUTO: 37.1 % — SIGNIFICANT CHANGE UP (ref 13–44)
MCHC RBC-ENTMCNC: 29.4 PG — SIGNIFICANT CHANGE UP (ref 27–34)
MCHC RBC-ENTMCNC: 31.9 GM/DL — LOW (ref 32–36)
MCV RBC AUTO: 92.1 FL — SIGNIFICANT CHANGE UP (ref 80–100)
METHADONE UR-MCNC: POSITIVE
MONOCYTES # BLD AUTO: 0.32 K/UL — SIGNIFICANT CHANGE UP (ref 0–0.9)
MONOCYTES NFR BLD AUTO: 4.6 % — SIGNIFICANT CHANGE UP (ref 2–14)
NEUTROPHILS # BLD AUTO: 3.96 K/UL — SIGNIFICANT CHANGE UP (ref 1.8–7.4)
NEUTROPHILS NFR BLD AUTO: 56.8 % — SIGNIFICANT CHANGE UP (ref 43–77)
NITRITE UR-MCNC: NEGATIVE — SIGNIFICANT CHANGE UP
NRBC # BLD: 0 /100 WBCS — SIGNIFICANT CHANGE UP (ref 0–0)
OPIATES UR-MCNC: NEGATIVE — SIGNIFICANT CHANGE UP
OXYCODONE UR-MCNC: NEGATIVE — SIGNIFICANT CHANGE UP
PCO2 BLDV: 68 MMHG — HIGH (ref 39–42)
PCO2 BLDV: 69 MMHG — HIGH (ref 39–42)
PCP SPEC-MCNC: SIGNIFICANT CHANGE UP
PCP UR-MCNC: NEGATIVE — SIGNIFICANT CHANGE UP
PH BLDV: 7.23 — LOW (ref 7.32–7.43)
PH BLDV: 7.24 — LOW (ref 7.32–7.43)
PH UR: 5.5 — SIGNIFICANT CHANGE UP (ref 5–8)
PLATELET # BLD AUTO: 222 K/UL — SIGNIFICANT CHANGE UP (ref 150–400)
PO2 BLDV: 20 MMHG — LOW (ref 25–45)
PO2 BLDV: 43 MMHG — SIGNIFICANT CHANGE UP (ref 25–45)
POTASSIUM BLDV-SCNC: 5 MMOL/L — SIGNIFICANT CHANGE UP (ref 3.5–5.1)
POTASSIUM BLDV-SCNC: 5.1 MMOL/L — SIGNIFICANT CHANGE UP (ref 3.5–5.1)
POTASSIUM SERPL-MCNC: 4.6 MMOL/L — SIGNIFICANT CHANGE UP (ref 3.5–5.3)
POTASSIUM SERPL-SCNC: 4.6 MMOL/L — SIGNIFICANT CHANGE UP (ref 3.5–5.3)
PROT SERPL-MCNC: 7.3 G/DL — SIGNIFICANT CHANGE UP (ref 6–8.3)
PROT UR-MCNC: NEGATIVE — SIGNIFICANT CHANGE UP
RBC # BLD: 4.69 M/UL — SIGNIFICANT CHANGE UP (ref 3.8–5.2)
RBC # FLD: 14.1 % — SIGNIFICANT CHANGE UP (ref 10.3–14.5)
SALICYLATES SERPL-MCNC: <2 MG/DL — LOW (ref 15–30)
SAO2 % BLDV: 22.2 % — LOW (ref 67–88)
SAO2 % BLDV: 77.2 % — SIGNIFICANT CHANGE UP (ref 67–88)
SODIUM SERPL-SCNC: 138 MMOL/L — SIGNIFICANT CHANGE UP (ref 135–145)
SP GR SPEC: 1.02 — SIGNIFICANT CHANGE UP (ref 1.01–1.02)
THC UR QL: NEGATIVE — SIGNIFICANT CHANGE UP
UROBILINOGEN FLD QL: NEGATIVE — SIGNIFICANT CHANGE UP
WBC # BLD: 6.96 K/UL — SIGNIFICANT CHANGE UP (ref 3.8–10.5)
WBC # FLD AUTO: 6.96 K/UL — SIGNIFICANT CHANGE UP (ref 3.8–10.5)

## 2022-09-24 PROCEDURE — 71045 X-RAY EXAM CHEST 1 VIEW: CPT | Mod: 26

## 2022-09-24 PROCEDURE — 99291 CRITICAL CARE FIRST HOUR: CPT

## 2022-09-24 RX ORDER — SODIUM CHLORIDE 9 MG/ML
1000 INJECTION INTRAMUSCULAR; INTRAVENOUS; SUBCUTANEOUS ONCE
Refills: 0 | Status: COMPLETED | OUTPATIENT
Start: 2022-09-24 | End: 2022-09-24

## 2022-09-24 RX ORDER — NALOXONE HYDROCHLORIDE 4 MG/.1ML
0.4 SPRAY NASAL ONCE
Refills: 0 | Status: COMPLETED | OUTPATIENT
Start: 2022-09-24 | End: 2022-09-24

## 2022-09-24 RX ADMIN — SODIUM CHLORIDE 1000 MILLILITER(S): 9 INJECTION INTRAMUSCULAR; INTRAVENOUS; SUBCUTANEOUS at 20:22

## 2022-09-24 RX ADMIN — NALOXONE HYDROCHLORIDE 0.4 MILLIGRAM(S): 4 SPRAY NASAL at 20:11

## 2022-09-24 NOTE — ED PROVIDER NOTE - WR ORDER NAME 1
Relevant Problems   No relevant active problems       Anesthetic History   No history of anesthetic complications            Review of Systems / Medical History  Patient summary reviewed, nursing notes reviewed and pertinent labs reviewed    Pulmonary            Asthma        Neuro/Psych       CVA  TIA and headaches    Comments: Neuropathy (G62.9)    Chronic back pain (M54.9, G89.29)    Cervical spondylosis  Type 2 diabetes mellitus with peripheral neuropathy ([facility] [address] Cardiovascular    Hypertension      CHF    Hyperlipidemia    Exercise tolerance: <4 METS     GI/Hepatic/Renal         Renal disease: ESRD       Endo/Other    Diabetes    Morbid obesity, arthritis and cancer    Comments: Lymphoma ([facility] [address]  Lymphadenopathy     Other Findings              Physical Exam    Airway  Mallampati: III  TM Distance: 4 - 6 cm  Neck ROM: decreased range of motion, short neck   Mouth opening: Diminished (comment)     Cardiovascular  Regular rate and rhythm,  S1 and S2 normal,  no murmur, click, rub, or gallop  Rhythm: regular  Rate: normal         Dental    Dentition: Edentulous     Pulmonary      Decreased breath sounds: bilateral           Abdominal  GI exam deferred       Other Findings            Anesthetic Plan    ASA: 4  Anesthesia type: regional and general - backup - supraclavicular block          Induction: Intravenous  Anesthetic plan and risks discussed with: Patient Xray Chest 1 View- PORTABLE-Urgent

## 2022-09-24 NOTE — ED PROVIDER NOTE - CLINICAL SUMMARY MEDICAL DECISION MAKING FREE TEXT BOX
50yo F w/ history of anxiety BIBEMS after being responded [place] train station after taking methadone; patient intermittently apneic on arrival when not stimulated. Patient given 0.4mg IV narcan with response. Patient has no external signs of trauma. Will continue to monitor patient with end tital, provide 4L o2 NC and assess for any metabolic abnormalities. Patient to be medically cleared and then consult psych

## 2022-09-24 NOTE — ED PROVIDER NOTE - ATTENDING CONTRIBUTION TO CARE
Attending (Dayanara Burgos M.D.): I have personally seen and examined this patient. I have performed a substantive portion of the visit including all aspects of the medical decision making. Resident and/or ACP note reviewed. I agree on the plan of care except where noted.    I have personally provided 30 minutes of critical care concurrently with the resident(s) and/or ACP(s), excluding time spent on separate procedures.

## 2022-09-24 NOTE — ED PROVIDER NOTE - NSFOLLOWUPINSTRUCTIONS_ED_ALL_ED_FT
Discharge instructions:    - Please follow up with your Primary Care Doctor within the next 24-72 hours    - Take any prescribed medications as instructed:       SEEK IMMEDIATE MEDICAL CARE IF YOU HAVE ANY OF THE FOLLOWING SYMPTOMS: thoughts about hurting killing yourself, thoughts about hurting or killing somebody else, hallucinations, or worsening depression.

## 2022-09-24 NOTE — ED ADULT NURSE NOTE - OBJECTIVE STATEMENT
50 yo female presents to ED by EMS [place] train station 52 yo female with pmh anxiety presents to ED by EMS found at train station "minimally responsive." Per EMS, pt takes daily klonopin. Upon assessment, pt drowsy but responsive to verbal stimuli, pinpoint pupils noted, hypoxic to 80s RA, placed on 10L NRB sating 100%, placed on ETCO2 50 yo female with pmh anxiety presents to ED by EMS found at train station "minimally responsive." Per EMS, pt takes daily klonopin. Upon assessment, pt drowsy but responsive to verbal stimuli, pinpoint pupils noted, hypoxic to 80s RA, placed on 10L NRB sating 100%, placed on ETCO2, IV placed. 50 yo female with pmh anxiety presents to ED by EMS found at train station "minimally responsive." Per EMS, pt takes daily Klonopin, did not give narcan in field. Upon assessment, pt drowsy but responsive to verbal stimuli, pinpoint pupils noted, hypoxic to 80s RA, placed on 10L NRB sating 100%, placed on ETCO2 and CM, IV placed. Pt intermittently apneic when not stimulated but responds when spoken to, per MD uBrgos at bedside, narcan administered with response and improvement. Pt reports "I took 60mg of methadone, its an old prescription, I received sad news yesterday, I didn't want to die I just wanted to forget the news." Pt denies SI/HI. 1:1 initiated and at bedside for psych eval. Pt placed in hospital gown; Security called to wand pt and secure belongings. Pt safety measures in place and comfort provided.   Break coverage RN endorsed in report to primary RN at pt bedside.

## 2022-09-24 NOTE — ED PROVIDER NOTE - SHIFT CHANGE DETAILS
Attending MD Lin: 51F with intentional OD on Rx'ed Methadone, also takes Klonopin 2mg TID, has received home dose, given 5mg Haldol po, QTc wnl, 2 PCed, paperwork complete

## 2022-09-24 NOTE — ED PROVIDER NOTE - OBJECTIVE STATEMENT
52yo F w/ history of anxiety BIBEMS after being responded minimally responsive at a train station. Patient noted to have pinpoint pupils and hypoxic to 85%. Patient placed on 10L nonrebreather with correction to 100%. NO narcan given. FS in the 90s. Patient notes to have taken 60mg of methadone. States was previous old prescription and no longer takes it regularly. States she has "not being doing well" since receiving bad news yesterday but denies any SI/HI and simply wanted to "forget the news".

## 2022-09-24 NOTE — ED PROVIDER NOTE - PHYSICAL EXAMINATION
GENERAL: well appearing in no acute distress, non-toxic appearing  HEAD: normocephalic, atraumatic  HENT:   EYES: normal conjunctiva, 1+ pupils; equal, round and reactive b/l  CARDIAC: regular rate and rhythm, normal S1S2, no appreciable murmurs, 2+ pulses in UE/LE b/l  PULM: normal breath sounds, clear to ascultation bilaterally, no rales, rhonchi, wheezing  GI: abdomen nondistended, soft, nontender, no guarding, rebound tenderness  NEURO: following commands  MSK: no peripheral edema  SKIN: well-perfused, extremities warm, no visible rashes

## 2022-09-24 NOTE — ED PROVIDER NOTE - PROGRESS NOTE DETAILS
Attending (Aniket Gilliland D.O.):  Patient signed out to me. Hemodynamically stable. Arousable. Here after being found minimally responsive, placed on 10L nrb. Given 0.4mg narcan with response by ED team. VBG with pco2 60s. Report of taking metadone 60mg and klonopin 2mg. Utox here also showing cocaine. Rpt vbg still with elevated pco2 from 9p. Will rpt vbg, appears to have etco2 ranging 20s on telemtry. Pending clinical sobriety for reevaluation. Mendel, PGY3: Patient reassessed; aox3. No thoughts of SI/HI but is noting severe anxiety. Deferring to see psych at this time.  Patient takes klonipin 1mg twice a day; last dose was yesterday at 9am. Will give 1mg for anxiety and continue to monitor Mendel, PGY3: Patient reassessed; VSS w/ end tidal ~15-20. Still appears mildly groggy. Will continue to monitor and likely dc Nura Shabazz MD (Attending Physician): I received sign out from Dr. Gilliland    .    HDS, arousable but still groggy. Mendel, PGY3: Patient reassessed; VSS w/ end tidal ~15-20. Still appears mildly groggy. Will continue to monitor and likely dc    Attending (Aniket Gilliland D.O.):  Patient arousable but still groggy. etco2 20s. plan for reassessment upon clinical sobriety. if still anxious requiring bzd dosing, will need psych consultation for anticipated 2pc 2/2 severity of functional impairment. signed out. Leeanna - pt signed out to me at 7pm. pt is lying calming in her stretcher, no acute distress. Had requested Klonopin but explained to pt she would not get more doses of this med at this time. offered  po haldol instead which pt accepted Leeanna - pt reassessed. sleeping comfortably Attending MD Lin: Patient evaluated, requested Klonopin.  Sleeping in stretcher.  No complaints.  NAD, appropriate.  Will sign out to AM team. Shree, PGY-3  Patient signed out to me pending psych admission, 2PC completed. BIBEMS after found minimally responsive at a train station after taking 60mg of methadone. Awake, alert and oriented, calm and cooperative Attending note (Jamshid): 50y/o F h/o anxiety BIBEMS with AMS for apparent methadone overdose, now medically cleared and to be admitted involuntarily (2PC) to psychiatric facility for further evaluation/stabilization. Prior notes and  notes reviewed; page placed to psychiatric consultants for further recommendations regarding any standing/prn medications.  Patient is resting comfortably in stretcher, sleeping but awakens to soft vocal query and is conversant but states she wants to go back to sleep. Will continue to monitor; medically cleared pending psych bed availability. Attending note (Jamshid): per signout, patient due for clonazepam at 1pm; patient requesting medication now; will offer haldol, benadryl po; and have ordered 1pm dose of clonazepam. Psych paged previously and paged again, requesting assistance with standing/prn psychiatric medication recommendations. Jared Guillaume MD: Patient has been cooperative without any acute complaints during the course of the shift.  Patient given another dose of her Klonopin.  As per  patient will not receive a bed today and hopefully will try to find placement tomorrow. Mendel, pGY3: Patient reassessed; complaining of anxiety. Requesting

## 2022-09-24 NOTE — ED ADULT TRIAGE NOTE - MEANS OF ARRIVAL
Pt c/o abd spasm. Pt states "I had my lynne catheter removed yesterday and no I have pain" stretcher

## 2022-09-25 LAB
BASE EXCESS BLDV CALC-SCNC: 0 MMOL/L — SIGNIFICANT CHANGE UP (ref -2–3)
CA-I SERPL-SCNC: 1.17 MMOL/L — SIGNIFICANT CHANGE UP (ref 1.15–1.33)
CHLORIDE BLDV-SCNC: 102 MMOL/L — SIGNIFICANT CHANGE UP (ref 96–108)
CO2 BLDV-SCNC: 29 MMOL/L — HIGH (ref 22–26)
GAS PNL BLDV: 133 MMOL/L — LOW (ref 136–145)
GAS PNL BLDV: SIGNIFICANT CHANGE UP
GAS PNL BLDV: SIGNIFICANT CHANGE UP
GLUCOSE BLDV-MCNC: 85 MG/DL — SIGNIFICANT CHANGE UP (ref 70–99)
HCO3 BLDV-SCNC: 27 MMOL/L — SIGNIFICANT CHANGE UP (ref 22–29)
HCT VFR BLDA CALC: 41 % — SIGNIFICANT CHANGE UP (ref 34.5–46.5)
HGB BLD CALC-MCNC: 13.6 G/DL — SIGNIFICANT CHANGE UP (ref 11.7–16.1)
LACTATE BLDV-MCNC: 1.3 MMOL/L — SIGNIFICANT CHANGE UP (ref 0.5–2)
PCO2 BLDV: 54 MMHG — HIGH (ref 39–42)
PH BLDV: 7.31 — LOW (ref 7.32–7.43)
PO2 BLDV: 36 MMHG — SIGNIFICANT CHANGE UP (ref 25–45)
POTASSIUM BLDV-SCNC: 3.6 MMOL/L — SIGNIFICANT CHANGE UP (ref 3.5–5.1)
SAO2 % BLDV: 60.3 % — LOW (ref 67–88)
SARS-COV-2 RNA SPEC QL NAA+PROBE: SIGNIFICANT CHANGE UP

## 2022-09-25 PROCEDURE — 90792 PSYCH DIAG EVAL W/MED SRVCS: CPT

## 2022-09-25 RX ORDER — DIPHENHYDRAMINE HCL 50 MG
50 CAPSULE ORAL ONCE
Refills: 0 | Status: COMPLETED | OUTPATIENT
Start: 2022-09-25 | End: 2022-09-25

## 2022-09-25 RX ORDER — HALOPERIDOL DECANOATE 100 MG/ML
5 INJECTION INTRAMUSCULAR ONCE
Refills: 0 | Status: COMPLETED | OUTPATIENT
Start: 2022-09-25 | End: 2022-09-25

## 2022-09-25 RX ORDER — ACETAMINOPHEN 500 MG
975 TABLET ORAL ONCE
Refills: 0 | Status: COMPLETED | OUTPATIENT
Start: 2022-09-25 | End: 2022-09-25

## 2022-09-25 RX ORDER — CLONAZEPAM 1 MG
1 TABLET ORAL ONCE
Refills: 0 | Status: DISCONTINUED | OUTPATIENT
Start: 2022-09-25 | End: 2022-09-25

## 2022-09-25 RX ORDER — IBUPROFEN 200 MG
600 TABLET ORAL ONCE
Refills: 0 | Status: COMPLETED | OUTPATIENT
Start: 2022-09-25 | End: 2022-09-25

## 2022-09-25 RX ORDER — SODIUM CHLORIDE 9 MG/ML
1000 INJECTION INTRAMUSCULAR; INTRAVENOUS; SUBCUTANEOUS ONCE
Refills: 0 | Status: COMPLETED | OUTPATIENT
Start: 2022-09-25 | End: 2022-09-25

## 2022-09-25 RX ADMIN — Medication 50 MILLIGRAM(S): at 16:50

## 2022-09-25 RX ADMIN — Medication 975 MILLIGRAM(S): at 15:50

## 2022-09-25 RX ADMIN — Medication 1 MILLIGRAM(S): at 16:50

## 2022-09-25 RX ADMIN — Medication 600 MILLIGRAM(S): at 20:10

## 2022-09-25 RX ADMIN — Medication 1 MILLIGRAM(S): at 12:19

## 2022-09-25 RX ADMIN — HALOPERIDOL DECANOATE 5 MILLIGRAM(S): 100 INJECTION INTRAMUSCULAR at 20:10

## 2022-09-25 RX ADMIN — Medication 1 MILLIGRAM(S): at 02:51

## 2022-09-25 RX ADMIN — SODIUM CHLORIDE 1000 MILLILITER(S): 9 INJECTION INTRAMUSCULAR; INTRAVENOUS; SUBCUTANEOUS at 11:41

## 2022-09-25 NOTE — ED BEHAVIORAL HEALTH ASSESSMENT NOTE - SUMMARY
Pt is 50 y/o F domiciled with mother with PPHx of polysubstance use, opioid use disorder (previously on methadone), benzo dependence, anxiety, depression presenting to ED for intentional opioid overdose. Pt required narcan yesterday with nonrebreather mask, on 2L NC overnight. Pt has hx of multiple psych hospitalizations including last for 26 days at [facility] discharged on 5/31/22 for SI w/ plan to cut herself. Patient with 2 admissions to [facility] this year from (3/28-4/6, 4/16-5/13). Currently endorsing SI with plan to cut herself. UTox +benzo, cocaine, methadone. Pt is 52 y/o F domiciled with mother with PPHx of polysubstance use, opioid use disorder (previously on methadone), benzo dependence, anxiety, depression, previous suicide attempts presenting to ED for intentional opioid overdose. Pt required narcan yesterday with nonrebreather mask, on 2L NC overnight. Pt has hx of multiple psych hospitalizations including last for 26 days at [facility] discharged on 5/31/22 for SI w/ plan to cut herself. Patient with 2 admissions to [facility] this year from (3/28-4/6, 4/16-5/13). Currently endorsing SI with plan to cut herself. UTox +benzo, cocaine, methadone.    At this time, patient meets criteria for inpatient psychiatric hospitalization due to concern for self-harm.
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
## 2022-09-25 NOTE — ED BEHAVIORAL HEALTH ASSESSMENT NOTE - DIFFERENTIAL
Mood disorder, THIAGO, MDD, opioid use disorder, polysubstance use disorder, Cluster B personality disorder

## 2022-09-25 NOTE — ED BEHAVIORAL HEALTH ASSESSMENT NOTE - NS ED BHA ED COURSE PSYCHIATRIC MEDICATION GIVEN
Voluntary Intramuscular (indication, name, dose, time) Oral Medication (indication, name, dose, time)
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
## 2022-09-25 NOTE — CHART NOTE - NSCHARTNOTEFT_GEN_A_CORE
As per M.D & psychiatrist, patient medically cleared for d/c to involuntary inpatient psychiatric unit.   LCSW called all in network psychiatric facilities and alexa out of network facilities, no bed available.  Telepsy sent indicated need for a bed.  Team made aware.  LCSW will remain available.
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
## 2022-09-25 NOTE — ED BEHAVIORAL HEALTH ASSESSMENT NOTE - PAST PSYCHOTROPIC MEDICATION
Per previous inpatient hospitalization note:  Lexapro 20mg daily, Remeron 30mg PO qHS, Klonopin 1mg BID, Suboxone 4mg-1mg sublingual TID

## 2022-09-25 NOTE — ED BEHAVIORAL HEALTH ASSESSMENT NOTE - NSBHATTESTCOMMENTATTENDFT_PSY_A_CORE
Pt is 50 y/o F domiciled with mother with PPHx of polysubstance use, opioid use disorder (previously on methadone), benzo dependence, anxiety, depression, previous suicide attempts presenting to ED for intentional opioid overdose. Pt required narcan yesterday with nonrebreather mask, on 2L NC overnight. Pt has hx of multiple psych hospitalizations including last for 26 days at [facility] discharged on 5/31/22 for SI w/ plan to cut herself. Patient with 2 admissions to [facility] this year from (3/28-4/6, 4/16-5/13). Currently endorsing SI with plan to cut herself. UTox +benzo, cocaine, methadone.  Patient meets criteria for inpatient psychiatric hospitalization because of her suicide attempt and continued risk and 2 PC papers were completed.

## 2022-09-26 RX ORDER — IBUPROFEN 200 MG
400 TABLET ORAL ONCE
Refills: 0 | Status: COMPLETED | OUTPATIENT
Start: 2022-09-26 | End: 2022-09-26

## 2022-09-26 RX ORDER — HALOPERIDOL DECANOATE 100 MG/ML
5 INJECTION INTRAMUSCULAR ONCE
Refills: 0 | Status: COMPLETED | OUTPATIENT
Start: 2022-09-26 | End: 2022-09-26

## 2022-09-26 RX ORDER — DIPHENHYDRAMINE HCL 50 MG
50 CAPSULE ORAL ONCE
Refills: 0 | Status: COMPLETED | OUTPATIENT
Start: 2022-09-26 | End: 2022-09-26

## 2022-09-26 RX ORDER — CLONAZEPAM 1 MG
1 TABLET ORAL ONCE
Refills: 0 | Status: DISCONTINUED | OUTPATIENT
Start: 2022-09-26 | End: 2022-09-26

## 2022-09-26 RX ORDER — ACETAMINOPHEN 500 MG
650 TABLET ORAL ONCE
Refills: 0 | Status: COMPLETED | OUTPATIENT
Start: 2022-09-26 | End: 2022-09-26

## 2022-09-26 RX ADMIN — Medication 1 MILLIGRAM(S): at 06:22

## 2022-09-26 RX ADMIN — Medication 1 MILLIGRAM(S): at 12:14

## 2022-09-26 RX ADMIN — Medication 650 MILLIGRAM(S): at 06:22

## 2022-09-26 RX ADMIN — HALOPERIDOL DECANOATE 5 MILLIGRAM(S): 100 INJECTION INTRAMUSCULAR at 12:14

## 2022-09-26 RX ADMIN — Medication 1 MILLIGRAM(S): at 19:19

## 2022-09-26 RX ADMIN — Medication 400 MILLIGRAM(S): at 18:23

## 2022-09-26 RX ADMIN — Medication 50 MILLIGRAM(S): at 12:14

## 2022-09-26 NOTE — ED BEHAVIORAL HEALTH NOTE - BEHAVIORAL HEALTH NOTE
=============  Re-assessment Note  =============  SOURCE:  RN and [facility] ED documentation.  BEHAVIOR: RN described patient to be currently resting comfortably, remains in good behavioral control, is not actively expressing SI/HI/AVH, was able to eat dinner prior to falling asleep. RN stated patient was requesting more medication to help feel less agitated.   TREATMENT:  Per chart, patient received Klonopin 1MG PO and Benadryl 50MG PO as part of PM routine medication regiment, and Haldol 5MG PO due to continuing to feel agitated after receiving Klonopin and Benadryl.   VISITORS: None

## 2022-09-26 NOTE — ED BEHAVIORAL HEALTH NOTE - BEHAVIORAL HEALTH NOTE
Patient seen at bedside notes she would prefer a vegetarian meal. Asks provider for more klonopin. Denies any SI/HI/AVH and has no plan or intent of self-harm. Is agreeable to ongoing psychiatric care and transfer to inpatient psychiatry, although unable to corroborate events leading up to admission when asked. No tactile hallucinations, no pruritis, and patient is not in any acute distress on exam.    PLAN:  -Consider symptom-triggered CIWA for benzodiazepine withdrawal, w/ ativan given some history of benzodiazepine dependance ( poor historian on amount used)  -EKG for Qtc monitoring  -2PC status, pending transfer to inpatient psychiatry

## 2022-09-26 NOTE — ED ADULT NURSE REASSESSMENT NOTE - NS ED NURSE REASSESS COMMENT FT1
pt currently awake, calm, cooperative with care plan, eating provided snacks and drinks, resting comfortably, 1:1 at bedside for pt safety
pt report received from break coverage RN ozzie in green, pt awake, calm, cooperative in stretcher. pt medicated with narcan, 1:1 initiated for patient safety for apparent overdose.
pt wanded by security for pt safety, pt belongings collected and placed in belongings bag by security, placed in lock box. pt valuables collected and placed in valuables envelope, placed in lock box behind rep desk. receipts of valuables envelope placed in pt chart.
Received report from DOMINIQUE Good. Pt presented to the ED, found unresponsive per EMS with suicidal ideation. Patient notes mild pain to bilateral hands secondary to arthritis. Denies SI and HI at present.
Pt updated on plan of care, pt requesting Klonopin. Given vegetarian food tray.
Report taken from RN Isamar, pt introduced to oncoming RN. Pt A&Ox3, airway patent with spontaneous breathing. Resting comfortably while conversing with RN. No complaints at this time.

## 2022-09-27 ENCOUNTER — INPATIENT (INPATIENT)
Facility: HOSPITAL | Age: 52
LOS: 12 days | Discharge: HOME | End: 2022-10-10
Attending: PSYCHIATRY & NEUROLOGY | Admitting: PSYCHIATRY & NEUROLOGY

## 2022-09-27 VITALS
DIASTOLIC BLOOD PRESSURE: 79 MMHG | SYSTOLIC BLOOD PRESSURE: 121 MMHG | RESPIRATION RATE: 18 BRPM | OXYGEN SATURATION: 100 % | HEART RATE: 78 BPM | TEMPERATURE: 98 F

## 2022-09-27 VITALS
DIASTOLIC BLOOD PRESSURE: 65 MMHG | HEART RATE: 80 BPM | HEIGHT: 64 IN | WEIGHT: 115.08 LBS | RESPIRATION RATE: 18 BRPM | TEMPERATURE: 98 F | SYSTOLIC BLOOD PRESSURE: 91 MMHG

## 2022-09-27 DIAGNOSIS — F32.9 MAJOR DEPRESSIVE DISORDER, SINGLE EPISODE, UNSPECIFIED: ICD-10-CM

## 2022-09-27 PROCEDURE — 80307 DRUG TEST PRSMV CHEM ANLYZR: CPT

## 2022-09-27 PROCEDURE — 84132 ASSAY OF SERUM POTASSIUM: CPT

## 2022-09-27 PROCEDURE — 83605 ASSAY OF LACTIC ACID: CPT

## 2022-09-27 PROCEDURE — 93005 ELECTROCARDIOGRAM TRACING: CPT

## 2022-09-27 PROCEDURE — 82330 ASSAY OF CALCIUM: CPT

## 2022-09-27 PROCEDURE — 85018 HEMOGLOBIN: CPT

## 2022-09-27 PROCEDURE — 84702 CHORIONIC GONADOTROPIN TEST: CPT

## 2022-09-27 PROCEDURE — 36415 COLL VENOUS BLD VENIPUNCTURE: CPT

## 2022-09-27 PROCEDURE — 85025 COMPLETE CBC W/AUTO DIFF WBC: CPT

## 2022-09-27 PROCEDURE — 82435 ASSAY OF BLOOD CHLORIDE: CPT

## 2022-09-27 PROCEDURE — 96374 THER/PROPH/DIAG INJ IV PUSH: CPT

## 2022-09-27 PROCEDURE — U0003: CPT

## 2022-09-27 PROCEDURE — U0005: CPT

## 2022-09-27 PROCEDURE — 82947 ASSAY GLUCOSE BLOOD QUANT: CPT

## 2022-09-27 PROCEDURE — 80053 COMPREHEN METABOLIC PANEL: CPT

## 2022-09-27 PROCEDURE — 99222 1ST HOSP IP/OBS MODERATE 55: CPT | Mod: GC

## 2022-09-27 PROCEDURE — 84295 ASSAY OF SERUM SODIUM: CPT

## 2022-09-27 PROCEDURE — 81003 URINALYSIS AUTO W/O SCOPE: CPT

## 2022-09-27 PROCEDURE — 99291 CRITICAL CARE FIRST HOUR: CPT | Mod: 25

## 2022-09-27 PROCEDURE — 85014 HEMATOCRIT: CPT

## 2022-09-27 PROCEDURE — 82803 BLOOD GASES ANY COMBINATION: CPT

## 2022-09-27 PROCEDURE — 71045 X-RAY EXAM CHEST 1 VIEW: CPT

## 2022-09-27 PROCEDURE — 82962 GLUCOSE BLOOD TEST: CPT

## 2022-09-27 RX ORDER — CLONAZEPAM 1 MG
1 TABLET ORAL ONCE
Refills: 0 | Status: DISCONTINUED | OUTPATIENT
Start: 2022-09-27 | End: 2022-09-27

## 2022-09-27 RX ORDER — OLANZAPINE 15 MG/1
5 TABLET, FILM COATED ORAL EVERY 6 HOURS
Refills: 0 | Status: DISCONTINUED | OUTPATIENT
Start: 2022-09-27 | End: 2022-10-10

## 2022-09-27 RX ORDER — DIPHENHYDRAMINE HCL 50 MG
50 CAPSULE ORAL EVERY 6 HOURS
Refills: 0 | Status: DISCONTINUED | OUTPATIENT
Start: 2022-09-27 | End: 2022-10-10

## 2022-09-27 RX ORDER — DIPHENHYDRAMINE HCL 50 MG
50 CAPSULE ORAL ONCE
Refills: 0 | Status: DISCONTINUED | OUTPATIENT
Start: 2022-09-27 | End: 2022-10-10

## 2022-09-27 RX ORDER — HALOPERIDOL DECANOATE 100 MG/ML
5 INJECTION INTRAMUSCULAR ONCE
Refills: 0 | Status: COMPLETED | OUTPATIENT
Start: 2022-09-27 | End: 2022-09-27

## 2022-09-27 RX ORDER — CLONAZEPAM 1 MG
0.75 TABLET ORAL ONCE
Refills: 0 | Status: DISCONTINUED | OUTPATIENT
Start: 2022-09-27 | End: 2022-09-27

## 2022-09-27 RX ORDER — ARIPIPRAZOLE 15 MG/1
10 TABLET ORAL DAILY
Refills: 0 | Status: DISCONTINUED | OUTPATIENT
Start: 2022-09-27 | End: 2022-10-03

## 2022-09-27 RX ORDER — HYDROXYZINE HCL 10 MG
25 TABLET ORAL EVERY 4 HOURS
Refills: 0 | Status: DISCONTINUED | OUTPATIENT
Start: 2022-09-27 | End: 2022-09-29

## 2022-09-27 RX ORDER — ESCITALOPRAM OXALATE 10 MG/1
20 TABLET, FILM COATED ORAL DAILY
Refills: 0 | Status: DISCONTINUED | OUTPATIENT
Start: 2022-09-27 | End: 2022-10-10

## 2022-09-27 RX ORDER — OLANZAPINE 15 MG/1
5 TABLET, FILM COATED ORAL ONCE
Refills: 0 | Status: DISCONTINUED | OUTPATIENT
Start: 2022-09-27 | End: 2022-10-10

## 2022-09-27 RX ORDER — NICOTINE POLACRILEX 2 MG
2 GUM BUCCAL
Refills: 0 | Status: DISCONTINUED | OUTPATIENT
Start: 2022-09-27 | End: 2022-10-10

## 2022-09-27 RX ADMIN — Medication 1 MILLIGRAM(S): at 05:10

## 2022-09-27 RX ADMIN — HALOPERIDOL DECANOATE 5 MILLIGRAM(S): 100 INJECTION INTRAMUSCULAR at 13:10

## 2022-09-27 RX ADMIN — Medication 50 MILLIGRAM(S): at 20:12

## 2022-09-27 RX ADMIN — OLANZAPINE 5 MILLIGRAM(S): 15 TABLET, FILM COATED ORAL at 20:51

## 2022-09-27 RX ADMIN — Medication 25 MILLIGRAM(S): at 20:52

## 2022-09-27 RX ADMIN — Medication 1 MILLIGRAM(S): at 10:37

## 2022-09-27 RX ADMIN — Medication 0.75 MILLIGRAM(S): at 21:30

## 2022-09-27 RX ADMIN — Medication 1 MILLIGRAM(S): at 16:07

## 2022-09-27 NOTE — BH PATIENT PROFILE - IS PATIENT PREGNANT?
no
My signature below certifies that the above stated patient is homebound and upon completion of the Face-To-Face encounter, has the need for intermittent skilled nursing, physical therapy and/or speech or occupational therapy services in their home for their current diagnosis as outlined in their initial plan of care. These services will continue to be monitored by myself or another physician.

## 2022-09-27 NOTE — BEHAVIORAL HEALTH ASSESSMENT NOTE - NS ED BHA AXIS I PRIMARY CODE FT
[facility] Call Center    Phone Message    May a detailed message be left on voicemail: yes     Reason for Call: Other: Disability center wants to know if patient's last visit was to treat vascular disorders of the intestine.   Please call back.    Thank you    Action Taken: Message routed to:  [facility] ([facility]): Vascular    Travel Screening: Not Applicable                                                                       F32.2

## 2022-09-27 NOTE — BH PATIENT PROFILE - HOME MEDICATIONS
buprenorphine-naloxone 2 mg-0.5 mg sublingual film , 2  sublingual 4 times a day MDD:8  KlonoPIN 1 mg oral tablet , 1 tab(s) orally 2 times a day MDD:2  mirtazapine 30 mg oral tablet , 1 tab(s) orally once a day (at bedtime)  escitalopram 20 mg oral tablet , 1 tab(s) orally once a day in the morning
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
## 2022-09-27 NOTE — ED ADULT NURSE REASSESSMENT NOTE - DESCRIPTION
Pt received Klonopin 1 mg po st 1037H for anxiety. Pt remains in the ED pending bed availability for inpt psych
Pt is very intrusive and very preoccupied with taking Benzo Klonopin medication, pt is very intrusive.
Pt received Haldol 5 mg po for anxiety, pt gestured to hurt herself when needs regarding PRN medications was not met right away. pt walked in front of the Nurses station demanding to be medicated with klonopin.  Pt was redirected several times to her bed abs wait for staff
Pt continues to test limits, she is very anxious and demanding for medications. Pt received klonopin 1 mg  po for anxiety at 1310 hrs prior to her transfer to [facility]. Pt property was returned and transported by EMS
pt remains on bed and in good behavioral control. Pt is to be transferred to [place] [id number], report given to RN. Ambulance will arrive at 1400H

## 2022-09-27 NOTE — ED ADULT NURSE REASSESSMENT NOTE - STATUS
awaiting bed, no change
report  given to [facility] to DOMINIQUE Finnegan/awaiting transfer, no change
awaiting transfer, no change

## 2022-09-27 NOTE — BH INPATIENT PSYCHIATRY ASSESSMENT NOTE - NSBHMETABOLIC_PSY_ALL_CORE_FT
BMI: BMI (kg/m2): 24 (09-24-22 @ 19:50)  HbA1c:   Glucose: POCT Blood Glucose.: 85 mg/dL (09-24-22 @ 19:56)    BP: --  Lipid Panel:  BMI: BMI (kg/m2): 19.7 (09-27-22 @ 16:36)  HbA1c:   Glucose: POCT Blood Glucose.: 85 mg/dL (09-24-22 @ 19:56)    BP: 91/65 (09-27-22 @ 16:36) (91/65 - 91/65)  Lipid Panel:

## 2022-09-27 NOTE — BH INPATIENT PSYCHIATRY ASSESSMENT NOTE - CURRENT MEDICATION
MEDICATIONS  (STANDING):    MEDICATIONS  (PRN):   MEDICATIONS  (STANDING):  ARIPiprazole 10 milliGRAM(s) Oral daily  escitalopram 20 milliGRAM(s) Oral daily    MEDICATIONS  (PRN):  diphenhydrAMINE 50 milliGRAM(s) Oral every 6 hours PRN EPS/agitation/anxiety  diphenhydrAMINE Injectable 50 milliGRAM(s) IntraMuscular once PRN Agitation  hydrOXYzine hydrochloride 25 milliGRAM(s) Oral every 4 hours PRN Anxiety  nicotine  Polacrilex Gum 2 milliGRAM(s) Oral every 2 hours PRN breakthrough cravings  OLANZapine 5 milliGRAM(s) Oral every 6 hours PRN Agitation and Anxiety  OLANZapine Injectable 5 milliGRAM(s) IntraMuscular once PRN Agitation and anxiety

## 2022-09-27 NOTE — BH INPATIENT PSYCHIATRY ASSESSMENT NOTE - NSBHASSESSSUMMFT_PSY_ALL_CORE
Pt is 50 y/o F domiciled with mother with PPHx of polysubstance use, opioid use disorder (previously on methadone), benzo dependence, anxiety, depression, previous suicide attempts presenting to ED for intentional opioid overdose. Pt required narcan yesterday with nonrebreather mask, on 2L NC overnight. Pt has hx of multiple psych hospitalizations including last for 26 days at [facility] discharged on 5/31/22 for SI w/ plan to cut herself. Patient with 2 admissions to [facility] this year from (3/28-4/6, 4/16-5/13). In ED endorsing SI with plan to cut herself. UTox +benzo, cocaine, methadone.    Differential diagnosis includes mood disorder secondary to substance use/withdrawal, MDD, mood disorder, cluster B personality traits.     Today, the patient continues to be irritable, oddly related, anxious, and illogical and disorganized. She denies SI, but cannot meaninfully explain the circumstance of the overdose. Will need to continue obtaining further history from patient and collateral. Asked for both methadone and benzos during interview, and terminated the interview when denied.     At this time, patient meets criteria for inpatient psychiatric hospitalization due to concern for self-harm.      Plan  1. Legal: Admitted on 9.27 status  2. Safety: No reported SI/SIB/HI/VI currently on unit; continue routine observation.   	- PRN Zyprexa 5 mg PO/IM q6 hours  	- PRN Benadryl 50 mg PO/IM q6 hours  	- PRN Atarax 25 mg PO q4 hours  3. Psychiatric:   	- Abilify 10 mg qHS for anxiety, agitation and mood stabilization  	- Nicotine gum 2 mg q2 hours, patient denied nicotine patch   	- Did not prescribe benzo due to history of abuse   4. Therapy: individual therapy focused on group & milieu therapy; consider DBT when patient able to meaninfully participate   5. Medical: No acute medical needs identified  6. Disposition: Defer to primary team

## 2022-09-27 NOTE — BH INPATIENT PSYCHIATRY ASSESSMENT NOTE - NSBHATTESTCOMMENTATTENDFT_PSY_A_CORE
Pt is 50 y/o F domiciled with mother with PPHx of polysubstance use, opioid use disorder (previously on methadone), benzo dependence, anxiety, depression, previous suicide attempts presenting to ED for intentional opioid overdose. Pt required narcan yesterday with nonrebreather mask, on 2L NC overnight. Pt has hx of multiple psych hospitalizations including last for 26 days at [facility] discharged on 5/31/22 for SI w/ plan to cut herself. Patient with 2 admissions to [facility] this year from (3/28-4/6, 4/16-5/13). Currently endorsing SI with plan to cut herself. UTox +benzo, cocaine, methadone.  Patient meets criteria for inpatient psychiatric hospitalization because of her suicide attempt and continued risk and 2 PC papers were completed. Pt is 52 y/o F domiciled with mother with PPHx of polysubstance use, opioid use disorder (previously on methadone), benzo dependence, anxiety, depression, previous suicide attempts presenting to ED for intentional opioid overdose. Pt required narcan yesterday with nonrebreather mask, on 2L NC overnight. Pt has hx of multiple psych hospitalizations including last for 26 days at [facility] discharged on 5/31/22 for SI w/ plan to cut herself. Patient with 2 admissions to [facility] this year from (3/28-4/6, 4/16-5/13). Currently endorsing SI with plan to cut herself. UTox +benzo, cocaine, methadone.  Patient meets criteria for inpatient psychiatric hospitalization because of her suicide attempt and continued risk and 2 PC papers were completed.  Pt argumenative with staff and irritable with resident MD. Did not conclude interview.  A&P drafted by resident MD with spoc attending.  Attending also saw pt- pt says recent BZD use and wished 1 mg kllonopin. Write refused 1 mg and placed order for 1x 0.75 mg. Does appear anxious and antagonistic towards RN and angry roommate sleeping but with light on in room.
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
## 2022-09-27 NOTE — BH PATIENT PROFILE - STATED REASON FOR ADMISSION
"I've been spiraling and then I got some news about my family but I don't want to share what it is. I had an accidental overdose."
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
## 2022-09-27 NOTE — BH INPATIENT PSYCHIATRY ASSESSMENT NOTE - DETAILS
per HPI Patient endorses feeling unsafe and she might "harm [herself]" if discharged from the hospital by cutting herself due to the "bad news" she received yesterday. Refused to elaborate further.

## 2022-09-27 NOTE — ED BEHAVIORAL HEALTH NOTE - BEHAVIORAL HEALTH NOTE
Patient somewhat hypersomnolent, continues to await bed in the interm. Limited insight into need for psychiatric hospitalization.     Does not maintain eye contact, hypersomnolent on exam. No psychomotor agitation or retardation noted.  Steady gait observed. Insight is poor. Judgment is poor.     PLAN:  -Consider symptom-triggered CIWA for benzodiazepine withdrawal, w/ ativan given some history of benzodiazepine dependance ( poor historian on amount used)  -c/w Home medications w/ no changes   -EKG for Qtc monitoring  -2PC status, pending transfer to inpatient psychiatry.

## 2022-09-27 NOTE — BH PATIENT PROFILE - NSSBIRTDRGUSEDOTHTHAN_GEN_A_CORE
pt with hx polysubstance use, opioid use disorder (previously on methadone), benzodiazepine dependence/Yes
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
## 2022-09-27 NOTE — CHART NOTE - NSCHARTNOTEFT_GEN_A_CORE
Screening Medical Evaluation    Patient Admitted from: [facility] admitting diagnosis: - Abilify 10 mg qHS for anxiety, agitation and mood stabilization       PAST MEDICAL & SURGICAL HISTORY:  Substance use disorder      Major depression      No significant past surgical history            Allergies    No Known Allergies    Intolerances          Social History:       FAMILY HISTORY:        MEDICATIONS  (STANDING):  ARIPiprazole 10 milliGRAM(s) Oral daily  escitalopram 20 milliGRAM(s) Oral daily    MEDICATIONS  (PRN):  diphenhydrAMINE 50 milliGRAM(s) Oral every 6 hours PRN EPS/agitation/anxiety  diphenhydrAMINE Injectable 50 milliGRAM(s) IntraMuscular once PRN Agitation  hydrOXYzine hydrochloride 25 milliGRAM(s) Oral every 4 hours PRN Anxiety  nicotine  Polacrilex Gum 2 milliGRAM(s) Oral every 2 hours PRN breakthrough cravings  OLANZapine 5 milliGRAM(s) Oral every 6 hours PRN Agitation and Anxiety  OLANZapine Injectable 5 milliGRAM(s) IntraMuscular once PRN Agitation and anxiety        Vital Signs Last 24 Hrs  T(C): 36.9 (27 Sep 2022 16:36), Max: 36.9 (27 Sep 2022 16:36)  T(F): 98.5 (27 Sep 2022 16:36), Max: 98.5 (27 Sep 2022 16:36)  HR: 80 (27 Sep 2022 16:36) (72 - 84)  BP: 91/65 (27 Sep 2022 16:36) (91/65 - 121/79)  BP(mean): 76 (26 Sep 2022 19:26) (76 - 76)  RR: 18 (27 Sep 2022 16:36) (15 - 18)  SpO2: 100% (27 Sep 2022 16:29) (95% - 100%)      CAPILLARY BLOOD GLUCOSE            PHYSICAL EXAM:  GENERAL: NAD, well-developed  HEAD:  Atraumatic, Normocephalic  EYES: EOMI, conjunctiva and sclera clear  NECK: Supple, No JVD  CHEST/LUNG: Clear to auscultation bilaterally; No wheeze  HEART: Regular rate and rhythm; No murmurs, rubs, or gallops  ABDOMEN: Bowel sounds present, Soft, Nontender, Nondistended.   EXTREMITIES:  2+ Peripheral Pulses, No clubbing, cyanosis, or edema  PSYCH: AAOx3  NEUROLOGY: non-focal  SKIN: No rashes or lesions observed on exposed skin     LABS: Pending                     RADIOLOGY & ADDITIONAL TESTS:      Assessment and Plan:  51F admitted to [facility] for Major depressive disorder. PMHx of substance abuse disorder, Nicotine addiction. PPHx of polysubstance use, opioid use disorder (per 9/27  note previously on methadone), benzo dependence, anxiety, depression, previous suicide attempts. Pt seen for medical screening evaluation. Patient has no acute complaints at this time. Patient denies fever, chills, headache, dizziness, lightheadedness, N/V, SOB, cough, congestion, chest pain, abdominal pain, dysuria, hematuria, diarrhea, constipation. Physical exam unremarkable, VSS. Labs pending. 9/24 EKG NSR @ 80b/ min, QT/ QTC= 382/ 440.     1.) Nicotine addiction: Nicotine gum 2 mg q2 hours PRN. Aspiration precautions.   2.) Polysubstance use: Plan per 9/27  note. Did not prescribe benzo due to history of abuse, but PRN Zyprexa 5 mg PO/IM q6 hours, PRN Benadryl 50 mg PO/IM q6 hours, PRN Atarax 25 mg PO q4 hours. Fall precautions  3.) Major depressive disorder: Plan per primary team, pt started on Abilify 10 mg qHS for anxiety, agitation and mood stabilization. Continue PRN's Screening Medical Evaluation    Patient Admitted from: [facility] admitting diagnosis: - Abilify 10 mg qHS for anxiety, agitation and mood stabilization       PAST MEDICAL & SURGICAL HISTORY:  Substance use disorder      Major depression      No significant past surgical history          Allergies    No Known Allergies    Intolerances          Social History:       FAMILY HISTORY:        MEDICATIONS  (STANDING):  ARIPiprazole 10 milliGRAM(s) Oral daily  escitalopram 20 milliGRAM(s) Oral daily    MEDICATIONS  (PRN):  diphenhydrAMINE 50 milliGRAM(s) Oral every 6 hours PRN EPS/agitation/anxiety  diphenhydrAMINE Injectable 50 milliGRAM(s) IntraMuscular once PRN Agitation  hydrOXYzine hydrochloride 25 milliGRAM(s) Oral every 4 hours PRN Anxiety  nicotine  Polacrilex Gum 2 milliGRAM(s) Oral every 2 hours PRN breakthrough cravings  OLANZapine 5 milliGRAM(s) Oral every 6 hours PRN Agitation and Anxiety  OLANZapine Injectable 5 milliGRAM(s) IntraMuscular once PRN Agitation and anxiety        Vital Signs Last 24 Hrs  T(C): 36.9 (27 Sep 2022 16:36), Max: 36.9 (27 Sep 2022 16:36)  T(F): 98.5 (27 Sep 2022 16:36), Max: 98.5 (27 Sep 2022 16:36)  HR: 80 (27 Sep 2022 16:36) (72 - 84)  BP: 91/65 (27 Sep 2022 16:36) (91/65 - 121/79)  BP(mean): 76 (26 Sep 2022 19:26) (76 - 76)  RR: 18 (27 Sep 2022 16:36) (15 - 18)  SpO2: 100% (27 Sep 2022 16:29) (95% - 100%)      CAPILLARY BLOOD GLUCOSE            PHYSICAL EXAM:  GENERAL: NAD, Agitated.   HEAD:  Atraumatic, Normocephalic  EYES: EOMI, conjunctiva and sclera clear  NECK: No JVD  CHEST/LUNG: Pt refused lung exam.   HEART: Pt refused Heart exam.    ABDOMEN: Nondistended. Pt refusing abdomen exam.  EXTREMITIES:  No b/l LE edema, observed. Pt refusing extremity exam.  PSYCH: Agitated and noncompliant.   NEUROLOGY: non-focal  SKIN: No rashes or lesions observed on exposed skin     LABS: Pending                     RADIOLOGY & ADDITIONAL TESTS:      Assessment and Plan:  51F admitted to [facility] for Major depressive disorder. PMHx of substance abuse disorder, Nicotine addiction. PPHx of polysubstance use, opioid use disorder (per 9/27  note previously on methadone), benzo dependence, anxiety, depression, previous suicide attempts. Pt seen for medical screening evaluation. Patient standing at the nursing desk asking for her meds & Klonopin, pt told by staff Klonopin not ordered, only Atarax, Benadryl and Abilify and  will see her 9/28 in AM. Pt says "if I don't get my meds, I am signing myself out," Pt says "will not take Abilify 9/28, because I'm not Bipolar." Pt holding a chocolate pudding and threatens to throw it against a wall if she can not get her meds. Pt given Atarax po and told again,  will see her in the AM. Pt refusing P.E. stating, "Will not participate until she gets her meds." Pt offers no acute medical complaints. Physical exam limited, pt agitated and noncompliant, further limited, physical exam unremarkable. VSS. Labs pending. 9/24 EKG NSR @ 80b/ min, QT/ QTC= 382/ 440.     1.) Agitation: Continue PRN's ordered by primary team: Benadryl 50 mg Q6*, Atarax 25mg Q4*, Zyprexa 5mg Q6*  2.) Nicotine addiction: Nicotine gum 2 mg q2 hours PRN. Aspiration precautions.   3.) Polysubstance use: Plan per 9/27  note. Did not prescribe benzo due to history of abuse, but PRN Zyprexa 5 mg PO/IM q6 hours, PRN Benadryl 50 mg PO/IM q6 hours, PRN Atarax 25 mg PO q4 hours. Fall precautions  4.) Major depressive disorder: Plan per primary team, pt started on Abilify 10 mg qHS for anxiety, agitation and mood stabilization. Continue PRN's Screening Medical Evaluation    Patient Admitted from: [facility] admitting diagnosis: - Abilify 10 mg qHS for anxiety, agitation and mood stabilization       PAST MEDICAL & SURGICAL HISTORY:  Substance use disorder      Major depression      No significant past surgical history          Allergies    No Known Allergies    Intolerances          Social History:       FAMILY HISTORY:        MEDICATIONS  (STANDING):  ARIPiprazole 10 milliGRAM(s) Oral daily  escitalopram 20 milliGRAM(s) Oral daily    MEDICATIONS  (PRN):  diphenhydrAMINE 50 milliGRAM(s) Oral every 6 hours PRN EPS/agitation/anxiety  diphenhydrAMINE Injectable 50 milliGRAM(s) IntraMuscular once PRN Agitation  hydrOXYzine hydrochloride 25 milliGRAM(s) Oral every 4 hours PRN Anxiety  nicotine  Polacrilex Gum 2 milliGRAM(s) Oral every 2 hours PRN breakthrough cravings  OLANZapine 5 milliGRAM(s) Oral every 6 hours PRN Agitation and Anxiety  OLANZapine Injectable 5 milliGRAM(s) IntraMuscular once PRN Agitation and anxiety        Vital Signs Last 24 Hrs  T(C): 36.9 (27 Sep 2022 16:36), Max: 36.9 (27 Sep 2022 16:36)  T(F): 98.5 (27 Sep 2022 16:36), Max: 98.5 (27 Sep 2022 16:36)  HR: 80 (27 Sep 2022 16:36) (72 - 84)  BP: 91/65 (27 Sep 2022 16:36) (91/65 - 121/79)  BP(mean): 76 (26 Sep 2022 19:26) (76 - 76)  RR: 18 (27 Sep 2022 16:36) (15 - 18)  SpO2: 100% (27 Sep 2022 16:29) (95% - 100%)      CAPILLARY BLOOD GLUCOSE            PHYSICAL EXAM:  GENERAL: NAD, Agitated.   HEAD:  Atraumatic, Normocephalic  EYES: EOMI, conjunctiva and sclera clear  NECK: No JVD  CHEST/LUNG: Pt refused lung exam.   HEART: Pt refused Heart exam.    ABDOMEN: Nondistended. Pt refusing abdomen exam.  EXTREMITIES:  No b/l LE edema observed. Pt refusing extremity exam.  PSYCH: Agitated and noncompliant with requests.   NEUROLOGY: non-focal  SKIN: No rashes or lesions observed on exposed skin     LABS: Pending                     RADIOLOGY & ADDITIONAL TESTS:      Assessment and Plan:  51F admitted to [facility] for Major depressive disorder. PMHx of substance abuse disorder, Nicotine addiction. PPHx of polysubstance use, opioid use disorder (per 9/27  note previously on methadone), benzo dependence, anxiety, depression, previous suicide attempts. Pt seen for medical screening evaluation. Patient standing at the nursing desk asking for her meds & Klonopin, pt told by staff Klonopin not ordered, only Atarax, Benadryl and Abilify and  will see her 9/28 in AM. Pt says "if I don't get my meds, I am signing myself out," Pt says "will not take Abilify 9/28, because I'm not Bipolar." Pt holding a chocolate pudding and threatens to throw it against a wall if she can not get her meds. Pt given Atarax po and told again,  will see her in the AM. Pt refusing P.E. stating, "Will not participate until she gets her meds." Pt offers no acute medical complaints. Physical exam limited, pt agitated and noncompliant, further limited, physical exam unremarkable. VSS. Labs pending. 9/24 EKG NSR @ 80b/ min, QT/ QTC= 382/ 440.     1.) Agitation: Continue PRN's ordered by primary team: Benadryl 50 mg Q6*, Atarax 25mg Q4*, Zyprexa 5mg Q6*  2.) Nicotine addiction: Nicotine gum 2 mg q2 hours PRN. Aspiration precautions.   3.) Polysubstance use: Plan per 9/27  note. Did not prescribe benzo due to history of abuse, but PRN Zyprexa 5 mg PO/IM q6 hours, PRN Benadryl 50 mg PO/IM q6 hours, PRN Atarax 25 mg PO q4 hours. Fall precautions  4.) Major depressive disorder: Plan per primary team, pt started on Abilify 10 mg qHS for anxiety, agitation and mood stabilization. Continue PRN's

## 2022-09-27 NOTE — ED BEHAVIORAL HEALTH NOTE - BEHAVIORAL HEALTH NOTE
=========  REASSESSMENT  =========  SOURCE:  RN Richa and secondhand ED documentation.  BEHAVIOR: RN described patient to be slightly agitated at beginning of shift, but was calm, pleasant, and cooperative after being provided a vegetarian meal, has been mostly sleeping. Patient has been requesting Klonopin. Pt last received Klonopin at 19:30. No acute issues/events.  TREATMENT:  Per chart and RN, patient did not require PRN medications.   VISITORS:  Per RN, no visitors at bedside.

## 2022-09-27 NOTE — ED ADULT NURSE REASSESSMENT NOTE - RESPONSE TO
You will need to go the the [facility] today for 1 unit of blood and 1 unit of platelets.    Follow up with Dr Preciado on 12/8/21.     If you develop any fevers, chills or new symptoms, please call the 24 hour on call phone number [phone]; and have the on call transplant physician paged.    haldol 5 mg po at 1330/response to care/treatments:

## 2022-09-28 DIAGNOSIS — F60.3 BORDERLINE PERSONALITY DISORDER: ICD-10-CM

## 2022-09-28 DIAGNOSIS — F41.1 GENERALIZED ANXIETY DISORDER: ICD-10-CM

## 2022-09-28 LAB — SARS-COV-2 RNA SPEC QL NAA+PROBE: SIGNIFICANT CHANGE UP

## 2022-09-28 RX ORDER — CLONAZEPAM 1 MG
1 TABLET ORAL DAILY
Refills: 0 | Status: DISCONTINUED | OUTPATIENT
Start: 2022-09-28 | End: 2022-10-03

## 2022-09-28 RX ORDER — BUPRENORPHINE AND NALOXONE 2; .5 MG/1; MG/1
1 TABLET SUBLINGUAL
Refills: 0 | Status: DISCONTINUED | OUTPATIENT
Start: 2022-09-28 | End: 2022-10-03

## 2022-09-28 RX ORDER — CLONAZEPAM 1 MG
0.75 TABLET ORAL AT BEDTIME
Refills: 0 | Status: DISCONTINUED | OUTPATIENT
Start: 2022-09-28 | End: 2022-10-03

## 2022-09-28 RX ADMIN — BUPRENORPHINE AND NALOXONE 1 TABLET(S): 2; .5 TABLET SUBLINGUAL at 12:26

## 2022-09-28 RX ADMIN — Medication 25 MILLIGRAM(S): at 16:37

## 2022-09-28 RX ADMIN — Medication 0.75 MILLIGRAM(S): at 20:14

## 2022-09-28 RX ADMIN — ARIPIPRAZOLE 10 MILLIGRAM(S): 15 TABLET ORAL at 08:06

## 2022-09-28 RX ADMIN — BUPRENORPHINE AND NALOXONE 1 TABLET(S): 2; .5 TABLET SUBLINGUAL at 20:14

## 2022-09-28 RX ADMIN — ESCITALOPRAM OXALATE 20 MILLIGRAM(S): 10 TABLET, FILM COATED ORAL at 08:07

## 2022-09-28 RX ADMIN — Medication 1 MILLIGRAM(S): at 11:07

## 2022-09-28 NOTE — BH INPATIENT PSYCHIATRY PROGRESS NOTE - PRN MEDS
MEDICATIONS  (PRN):  diphenhydrAMINE 50 milliGRAM(s) Oral every 6 hours PRN EPS/agitation/anxiety  diphenhydrAMINE Injectable 50 milliGRAM(s) IntraMuscular once PRN Agitation  hydrOXYzine hydrochloride 25 milliGRAM(s) Oral every 4 hours PRN Anxiety  nicotine  Polacrilex Gum 2 milliGRAM(s) Oral every 2 hours PRN breakthrough cravings  OLANZapine 5 milliGRAM(s) Oral every 6 hours PRN Agitation and Anxiety  OLANZapine Injectable 5 milliGRAM(s) IntraMuscular once PRN Agitation and anxiety
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
## 2022-09-28 NOTE — PSYCHIATRIC REHAB INITIAL EVALUATION - NSBHALCSUBCHOICE_PSY_ALL_CORE
This information could not be attained directly from patient due to patient terminating session prematurely. Per chart, patient has hx of polysubstance misuse (previously on methadone, benzo dependence). Also, patient has hx of SA via OD.

## 2022-09-28 NOTE — BH INPATIENT PSYCHIATRY PROGRESS NOTE - NSBHCHARTREVIEWVS_PSY_A_CORE FT
Vital Signs Last 24 Hrs  T(C): 36.7 (09-28-22 @ 06:29), Max: 36.9 (09-27-22 @ 16:36)  T(F): 98.1 (09-28-22 @ 06:29), Max: 98.5 (09-27-22 @ 16:36)  HR: 92 (09-27-22 @ 20:07) (78 - 92)  BP: 91/65 (09-27-22 @ 16:36) (91/65 - 121/79)  BP(mean): --  RR: 18 (09-27-22 @ 20:07) (18 - 18)  SpO2: 99% (09-27-22 @ 20:07) (99% - 100%)    Orthostatic VS  09-28-22 @ 08:26  Lying BP: --/-- HR: --  Sitting BP: 99/72 HR: 79  Standing BP: 96/70 HR: 81  Site: --  Mode: --  Orthostatic VS  09-27-22 @ 20:07  Lying BP: 90/60 HR: --  Sitting BP: --/-- HR: --  Standing BP: 92/62 HR: --  Site: --  Mode: --   Vital Signs Last 24 Hrs  T(C): 36.4 (09-29-22 @ 06:28), Max: 36.9 (09-28-22 @ 22:35)  T(F): 97.5 (09-29-22 @ 06:28), Max: 98.4 (09-28-22 @ 22:35)  HR: --  BP: --  BP(mean): --  RR: 16 (09-28-22 @ 20:08) (16 - 16)  SpO2: --    Orthostatic VS  09-29-22 @ 08:54  Lying BP: --/-- HR: --  Sitting BP: 100/65 HR: 65  Standing BP: 98/69 HR: 71  Site: --  Mode: --  Orthostatic VS  09-28-22 @ 20:08  Lying BP: --/-- HR: --  Sitting BP: 94/66 HR: 71  Standing BP: 98/63 HR: 70  Site: --  Mode: --  Orthostatic VS  09-28-22 @ 08:26  Lying BP: --/-- HR: --  Sitting BP: 99/72 HR: 79  Standing BP: 96/70 HR: 81  Site: --  Mode: --  Orthostatic VS  09-27-22 @ 20:07  Lying BP: 90/60 HR: --  Sitting BP: --/-- HR: --  Standing BP: 92/62 HR: --  Site: --  Mode: --

## 2022-09-28 NOTE — BH INPATIENT PSYCHIATRY PROGRESS NOTE - NSBHFUPINTERVALHXFT_PSY_A_CORE
Follow up for patient with anxiety, BPD, s/p overdose on methadone at home. Case reviewed with team, no acute events overnight. Vital signs stable at her baseline. Patient adherent with medication regimen. Currently denies AH/VH/HI/SIB/NSSIB. Reports that the OD was not a suicide attempt saying that she could never do that to her elderly mother. Denies wanting to die. Pt expressed that her goal for treatment is to start to feel better and not be so nervous all of the time. When she gets anxious it feels like her "she is crawling out of her skin". Pt appears nervous and suspicious, getting overwhelmed with pen movements and concerned about what is being written down. The patient has trouble sitting still.  Follow up for patient with anxiety, BPD, s/p overdose on methadone at home. Case reviewed with team, no acute events overnight. Vital signs stable at her baseline. Patient adherent with medication regimen. Currently denies AH/VH/HI/SIB/NSSIB. Pt continues to report anxiety and fixated on getting clonazepam during admission. She reports having a lot of psychosocial stressors at home including her boyfriend who  13 days ago, the fact that her mother is moving, and other things that she did not wish to elaborate on. Denies any urges to cut herself or kill herself. Pt became offended at the idea of her overdose being a suicide attempt saying that she could never do that to her elderly mother. Denies ever having a suicide attempt and that every time she has thought about it, she has stopped herself. Denies wanting to die. Pt expressed that her goal for treatment is to start to feel better and not be so nervous all of the time. When she gets anxious it feels like her "she is crawling out of her skin". Pt appears nervous and suspicious, getting overwhelmed with pen movements and concerned about what is being written down. The patient has trouble sitting still.

## 2022-09-28 NOTE — BH INPATIENT PSYCHIATRY PROGRESS NOTE - CURRENT MEDICATION
MEDICATIONS  (STANDING):  ARIPiprazole 10 milliGRAM(s) Oral daily  buprenorphine 2 mG/naloxone 0.5 mG SL  Tablet 1 Tablet(s) SubLingual <User Schedule>  clonazePAM  Tablet 1 milliGRAM(s) Oral daily  clonazePAM  Tablet 0.75 milliGRAM(s) Oral at bedtime  escitalopram 20 milliGRAM(s) Oral daily    MEDICATIONS  (PRN):  diphenhydrAMINE 50 milliGRAM(s) Oral every 6 hours PRN EPS/agitation/anxiety  diphenhydrAMINE Injectable 50 milliGRAM(s) IntraMuscular once PRN Agitation  hydrOXYzine hydrochloride 25 milliGRAM(s) Oral every 4 hours PRN Anxiety  nicotine  Polacrilex Gum 2 milliGRAM(s) Oral every 2 hours PRN breakthrough cravings  OLANZapine 5 milliGRAM(s) Oral every 6 hours PRN Agitation and Anxiety  OLANZapine Injectable 5 milliGRAM(s) IntraMuscular once PRN Agitation and anxiety

## 2022-09-28 NOTE — BH INPATIENT PSYCHIATRY PROGRESS NOTE - NSBHMSETHTPROC_PSY_A_CORE
Disorganized/Tangential/Perseverative/Illogical/Impaired reasoning Tangential/Perseverative/Impaired reasoning

## 2022-09-28 NOTE — BH INPATIENT PSYCHIATRY PROGRESS NOTE - NSBHASSESSSUMMFT_PSY_ALL_CORE
Pt is 52 y/o F domiciled with mother with PPHx of polysubstance use, opioid use disorder (previously on methadone), benzo dependence, anxiety, depression, previous suicide attempts presenting to ED for intentional opioid overdose. Pt required narcan yesterday with nonrebreather mask, on 2L NC overnight. Pt has hx of multiple psych hospitalizations including last for 26 days at [facility] discharged on 22 for SI w/ plan to cut herself. Patient with 2 admissions to [facility] this year from (3/28-, -). In ED endorsing SI with plan to cut herself. UTox +benzo, cocaine, methadone.    Differential diagnosis includes mood disorder secondary to substance use/withdrawal, MDD, mood disorder, cluster B personality traits.     Pt continues to report anxiety and fixated on getting clonazepam during admission. She reports having a lot of psychosocial stressors at home including her boyfriend who  13 days ago, the fact that her mother is moving, and other things that she did not wish to elaborate on. Denies any urges to cut herself or kill herself. Pt became offended at the idea of her overdose being a suicide attempt. Denies ever having a suicide attempt and that every time she has thought about it, she has stopped herself. She was hesitant at first but ultimately agreeable to starting a slow clonazepam taper. Also agreeable to trying Abilify and Lexapro for her anxiety and depression. Agrees to therapy to work on coping mechanisms to manage anxiety. Will restart buprenorphine for substance use disorder.      Plan  1. Legal: Admitted on  status  2. Safety: No reported SI/SIB/HI/VI currently on unit; continue routine observation.   	- PRN Zyprexa 5 mg PO/IM q6 hours  	- PRN Benadryl 50 mg PO/IM q6 hours  	- PRN Atarax 25 mg PO q4 hours  3. Psychiatric:   	ARIPiprazole 10 milliGRAM(s) Oral daily  	buprenorphine 2 mG/naloxone 0.5 mG SL  Tablet 1 Tablet(s) SubLingual TID  	clonazePAM  Tablet 1 milliGRAM(s) Oral daily  	clonazePAM  Tablet 0.75 milliGRAM(s) Oral at bedtime  	escitalopram 20 milliGRAM(s) Oral daily    4. Therapy: individual therapy focused on group & milieu therapy; consider DBT when patient able to meaningfully participate   5. Medical: No acute medical needs identified  6. Disposition: Defer to primary team         Pt is 50 y/o F domiciled with mother with PPHx of polysubstance use, opioid use disorder (previously on methadone), benzo dependence, anxiety, depression, previous suicide attempts presenting to ED for intentional opioid overdose. Pt required narcan yesterday with nonrebreather mask, on 2L NC overnight. Pt has hx of multiple psych hospitalizations including last for 26 days at [facility] discharged on 5/31/22 for SI w/ plan to cut herself. Patient with 2 admissions to [facility] this year from (3/28-4/6, 4/16-5/13). In ED endorsing SI with plan to cut herself. UTox +benzo, cocaine, methadone.    Differential diagnosis includes mood disorder secondary to substance use/withdrawal, MDD, mood disorder, cluster B personality traits.     Pt presents as anxious and irritable, constantly fidgeting, unable to sit still. Does not appear depressed on approach, however, with recent OD and stated psychosocial stressors of the recent death of her boyfriend and her mom moving. Patient quickly changed topics and did not want to talk about her stressors and was focused on her anxiety. Multiple times during interview she wanted to get up and leave if she felt it was not going her way. Fixated on getting clonazepam. Is an unreliable historian, denies past cyclical behaviors and intentions. She was hesitant at first but ultimately agreeable to starting a slow clonazepam taper. Also agreeable to trying Abilify and Lexapro for her anxiety and depression. Agrees to therapy to work on coping mechanisms to manage anxiety. Will restart buprenorphine for substance use disorder.      Plan  1. Legal: Admitted on 9.27 status  2. Safety: No reported SI/SIB/HI/VI currently on unit; continue routine observation.   	- PRN Zyprexa 5 mg PO/IM q6 hours  	- PRN Benadryl 50 mg PO/IM q6 hours  	- PRN Atarax 25 mg PO q4 hours  3. Psychiatric:   	ARIPiprazole 10 milliGRAM(s) Oral daily  	buprenorphine 2 mG/naloxone 0.5 mG SL  Tablet 1 Tablet(s) SubLingual TID  	clonazePAM  Tablet 1 milliGRAM(s) Oral daily  	clonazePAM  Tablet 0.75 milliGRAM(s) Oral at bedtime  	escitalopram 20 milliGRAM(s) Oral daily    4. Therapy: individual therapy focused on group & milieu therapy; consider DBT when patient able to meaningfully participate   5. Medical: No acute medical needs identified  6. Disposition: Defer to primary team         Pt is 50 y/o F domiciled with mother with PPHx of polysubstance use, opioid use disorder (previously on methadone), benzo dependence, anxiety, depression, previous suicide attempts presenting to ED for intentional opioid overdose. Pt required narcan yesterday with nonrebreather mask, on 2L NC overnight. Pt has hx of multiple psych hospitalizations including last for 26 days at [facility] discharged on 5/31/22 for SI w/ plan to cut herself. Patient with 2 admissions to [facility] this year from (3/28-4/6, 4/16-5/13). In ED endorsing SI with plan to cut herself. UTox +benzo, cocaine, methadone.    Differential diagnosis includes mood disorder secondary to substance use/withdrawal, MDD, mood disorder, cluster B personality traits.     Pt presents as anxious and irritable, constantly fidgeting, unable to sit still. Does not appear depressed on approach, however, reports Sx of depression in the context of stated psychosocial stressors, recent death of her boyfriend and her mom moving. Pt. denied OD on methadone and Klonopin. Patient was avoidant regarding depression and OD, preoccupied with addressing anxiety and ordering Klonopin. Multiple times during interview she wanted to get up and leave if she felt it was not going her way. Pt. is an unreliable historian, denies past suicide attempts and gestures despite multiple hospitalizations at [facility] for SA/SI. She was hesitant at first but ultimately agreeable to starting a slow clonazepam taper. Also agreeable to Abilify and Lexapro for her anxiety and depression. Agrees to therapy to work on coping mechanisms to manage anxiety. Will restart buprenorphine for opioid use disorder.       Plan  1. Legal: Admitted on 9.27 status  2. Safety: No reported SI/SIB/HI/VI currently on unit; continue routine observation.   	- PRN Zyprexa 5 mg PO/IM q6 hours  	- PRN Benadryl 50 mg PO/IM q6 hours  	- PRN Atarax 25 mg PO q4 hours  3. Psychiatric:   	ARIPiprazole 10 milliGRAM(s) Oral daily  	clonazePAM  Tablet 1 milliGRAM(s) Oral daily  	clonazePAM  Tablet 0.75 milliGRAM(s) Oral at bedtime  	escitalopram 20 milliGRAM(s) Oral daily  Substance: buprenorphine 2 mG/naloxone 0.5 mG SL  Tablet 1 Tablet(s) SubLingual TID    4. Therapy: individual therapy focused on group & milieu therapy; consider DBT when patient able to meaningfully participate   5. Medical: No complications post OD on methadone. Pt. denies pertinent medical Hx. Routine medical followup  6. Disposition: Pending IOP

## 2022-09-28 NOTE — BH SOCIAL WORK INITIAL PSYCHOSOCIAL EVALUATION - NSBHBARRIERS_PSY_ALL_CORE
Co-morbid personality/Financial difficulties/Lack of support/Lack of insight/Low motivation/Non-compliant with treatment

## 2022-09-28 NOTE — PSYCHIATRIC REHAB INITIAL EVALUATION - NSBHPRRECOMMEND_PSY_ALL_CORE
Writer met with patient to orient patient to the unit and introduce psychiatric rehabilitation staff and functions. Initially, patient was receptive and cooperative to engage with writer for interview session. However, patient terminated session prematurely due to feeling anxious with interview questions. Patient identified reason for admission as “to be in safe place to deal with all the bad news.” Patient was noted to emphasize that she isn’t here because of what she did, or she caused it. Patient disclosed that her boyfriend whom she dated for 11 years passed away 12 days ago and declined to answer writer’s questions regarding his death and her grief process. Also, patient refused to elaborate further on the other bad news and emphasized that it isn’t anything “illegal.” Then, patient asked about medication and ended the session prematurely, saying that she is a “fragile person” and questions were causing her to feel anxiety. SI, HI, AH, and VH could not be assessed. Throughout the session, patient appeared to be somewhat elevated as evidenced by mildly pressured speech and her physical movements that appeared to be rushing. Patient was guarded with personal data and appeared to be medication seeking. Psychiatric rehabilitation staff will assign a goal for patient based on patient’s personal hx and needs. Psychiatric rehabilitation team will continue to engage with patient daily to provide support and encourage patient to attend groups for symptom management and coping skills development.

## 2022-09-29 RX ORDER — HYDROXYZINE HCL 10 MG
50 TABLET ORAL EVERY 6 HOURS
Refills: 0 | Status: DISCONTINUED | OUTPATIENT
Start: 2022-09-29 | End: 2022-10-10

## 2022-09-29 RX ORDER — FLUOXETINE HCL 10 MG
10 CAPSULE ORAL DAILY
Refills: 0 | Status: DISCONTINUED | OUTPATIENT
Start: 2022-09-29 | End: 2022-09-29

## 2022-09-29 RX ADMIN — ARIPIPRAZOLE 10 MILLIGRAM(S): 15 TABLET ORAL at 08:07

## 2022-09-29 RX ADMIN — BUPRENORPHINE AND NALOXONE 1 TABLET(S): 2; .5 TABLET SUBLINGUAL at 08:07

## 2022-09-29 RX ADMIN — Medication 50 MILLIGRAM(S): at 20:20

## 2022-09-29 RX ADMIN — BUPRENORPHINE AND NALOXONE 1 TABLET(S): 2; .5 TABLET SUBLINGUAL at 20:20

## 2022-09-29 RX ADMIN — Medication 1 MILLIGRAM(S): at 08:06

## 2022-09-29 RX ADMIN — BUPRENORPHINE AND NALOXONE 1 TABLET(S): 2; .5 TABLET SUBLINGUAL at 12:26

## 2022-09-29 RX ADMIN — Medication 0.75 MILLIGRAM(S): at 20:21

## 2022-09-29 RX ADMIN — Medication 50 MILLIGRAM(S): at 20:21

## 2022-09-29 RX ADMIN — ESCITALOPRAM OXALATE 20 MILLIGRAM(S): 10 TABLET, FILM COATED ORAL at 08:06

## 2022-09-29 NOTE — BH INPATIENT PSYCHIATRY PROGRESS NOTE - NSBHCHARTREVIEWVS_PSY_A_CORE FT
Vital Signs Last 24 Hrs  T(C): 36.4 (09-29-22 @ 06:28), Max: 36.9 (09-28-22 @ 22:35)  T(F): 97.5 (09-29-22 @ 06:28), Max: 98.4 (09-28-22 @ 22:35)  HR: --  BP: --  BP(mean): --  RR: 16 (09-28-22 @ 20:08) (16 - 16)  SpO2: --    Orthostatic VS  09-29-22 @ 08:54  Lying BP: --/-- HR: --  Sitting BP: 100/65 HR: 65  Standing BP: 98/69 HR: 71  Site: --  Mode: --  Orthostatic VS  09-28-22 @ 20:08  Lying BP: --/-- HR: --  Sitting BP: 94/66 HR: 71  Standing BP: 98/63 HR: 70  Site: --  Mode: --  Orthostatic VS  09-28-22 @ 08:26  Lying BP: --/-- HR: --  Sitting BP: 99/72 HR: 79  Standing BP: 96/70 HR: 81  Site: --  Mode: --  Orthostatic VS  09-27-22 @ 20:07  Lying BP: 90/60 HR: --  Sitting BP: --/-- HR: --  Standing BP: 92/62 HR: --  Site: --  Mode: --

## 2022-09-29 NOTE — PHARMACOTHERAPY INTERVENTION NOTE - COMMENTS
spoke to NP Ruth Ann Kolb that fluoxetine and escitalopram are duplicates. She will discontinue ( wrong patient)

## 2022-09-29 NOTE — BH INPATIENT PSYCHIATRY PROGRESS NOTE - LEVEL OF CONSCIOUSNESS
Cardiac Surgery Consult Note      Clarification, this patient did NOT have an NSTEMI, as is reported in some areas.   Patient has angina d/t demand ischemia,     Felix Nix, DO     Alert

## 2022-09-29 NOTE — BH INPATIENT PSYCHIATRY PROGRESS NOTE - NSBHFUPINTERVALHXFT_PSY_A_CORE
Follow up for patient s/p OD on methadone. Case reviewed with team, no acute events overnight. Vital signs are stable. Adherent to medication regimen. Denies SI/NSSIB/AH/VH/HI. Denies medication side effects. Patient found lying in bed, reports she did not sleep well overnight due to noise from her roommate. Endorses feeling better with less anxiety.

## 2022-09-29 NOTE — BH INPATIENT PSYCHIATRY PROGRESS NOTE - PRN MEDS
MEDICATIONS  (PRN):  diphenhydrAMINE 50 milliGRAM(s) Oral every 6 hours PRN EPS/agitation/anxiety  diphenhydrAMINE Injectable 50 milliGRAM(s) IntraMuscular once PRN Agitation  hydrOXYzine hydrochloride 25 milliGRAM(s) Oral every 4 hours PRN Anxiety  nicotine  Polacrilex Gum 2 milliGRAM(s) Oral every 2 hours PRN breakthrough cravings  OLANZapine 5 milliGRAM(s) Oral every 6 hours PRN Agitation and Anxiety  OLANZapine Injectable 5 milliGRAM(s) IntraMuscular once PRN Agitation and anxiety   MEDICATIONS  (PRN):  diphenhydrAMINE 50 milliGRAM(s) Oral every 6 hours PRN EPS/agitation/anxiety  diphenhydrAMINE Injectable 50 milliGRAM(s) IntraMuscular once PRN Agitation  hydrOXYzine hydrochloride 50 milliGRAM(s) Oral every 6 hours PRN Anxiety  nicotine  Polacrilex Gum 2 milliGRAM(s) Oral every 2 hours PRN breakthrough cravings  OLANZapine 5 milliGRAM(s) Oral every 6 hours PRN Agitation and Anxiety  OLANZapine Injectable 5 milliGRAM(s) IntraMuscular once PRN Agitation and anxiety

## 2022-09-29 NOTE — BH INPATIENT PSYCHIATRY PROGRESS NOTE - OTHER
hyperverbal guarded in speech today psychomotor agitation guarded easily irritable if needs are not met immediately  guarded

## 2022-09-29 NOTE — BH INPATIENT PSYCHIATRY PROGRESS NOTE - NSBHASSESSSUMMFT_PSY_ALL_CORE
Pt is 52 y/o F domiciled with mother with PPHx of polysubstance use, opioid use disorder (previously on methadone), benzo dependence, anxiety, depression, previous suicide attempts presenting to ED for intentional opioid overdose. Pt required narcan yesterday with nonrebreather mask, on 2L NC overnight. Pt has hx of multiple psych hospitalizations including last for 26 days at [facility] discharged on 5/31/22 for SI w/ plan to cut herself. Patient with 2 admissions to [facility] this year from (3/28-4/6, 4/16-5/13). In ED endorsing SI with plan to cut herself. UTox +benzo, cocaine, methadone.    Differential diagnosis includes mood disorder secondary to substance use/withdrawal, MDD, mood disorder, cluster B personality traits.     Pt presents as less irritable and more guarded today. Potentially due to low quality sleep from environmental distractions preventing her from getting deep sleep. Psychomotor agitation still present but attenuating. Endorses going to group therapy sessions. Denies medication side effects and endorses feeling "good" overall. Plan to continue medications as prescribed. Need to link patient with outpatient drug rehab program.       Plan  1. Legal: Admitted on 9.27 status  2. Safety: No reported SI/SIB/HI/VI currently on unit; continue routine observation.   	- PRN Zyprexa 5 mg PO/IM q6 hours  	- PRN Benadryl 50 mg PO/IM q6 hours  	- PRN Atarax 25 mg PO q4 hours  3. Psychiatric:   	ARIPiprazole 10 milliGRAM(s) Oral daily  	buprenorphine 2 mG/naloxone 0.5 mG SL  Tablet 1 Tablet(s) SubLingual TID  	clonazePAM  Tablet 1 milliGRAM(s) Oral daily  	clonazePAM  Tablet 0.75 milliGRAM(s) Oral at bedtime  	escitalopram 20 milliGRAM(s) Oral daily    4. Therapy: individual therapy focused on group & milieu therapy; consider DBT when patient able to meaningfully participate   5. Medical: No acute medical needs identified          Pt is 50 y/o F domiciled with mother with PPHx of polysubstance use, opioid use disorder (previously on methadone), benzo dependence, anxiety, depression, previous suicide attempts presenting to ED for intentional opioid overdose. Pt required narcan yesterday with nonrebreather mask, on 2L NC overnight. Pt has hx of multiple psych hospitalizations including last for 26 days at [facility] discharged on 5/31/22 for SI w/ plan to cut herself. Patient with 2 admissions to [facility] this year from (3/28-4/6, 4/16-5/13). In ED endorsing SI with plan to cut herself. UTox +benzo, cocaine, methadone.    Differential diagnosis includes mood disorder secondary to substance use/withdrawal, MDD, mood disorder, cluster B personality traits.     Pt presents as less irritable and more guarded today. Potentially due to low quality sleep from environmental distractions preventing her from getting deep sleep. Psychomotor agitation still present but attenuating. Endorses going to group therapy sessions. Denies medication side effects and endorses feeling "good" overall. Plan to continue medications as prescribed. Due to history of polysubstance abuse and recent OD there is a need to link patient with outpatient drug rehab program before discharge.      Plan  1. Legal: Admitted on 9.27 status  2. Safety: No reported SI/SIB/HI/VI currently on unit; continue routine observation.   	- PRN Zyprexa 5 mg PO/IM q6 hours  	- PRN Benadryl 50 mg PO/IM q6 hours  	- PRN Atarax 25 mg PO q4 hours  3. Psychiatric:   	ARIPiprazole 10 milliGRAM(s) Oral daily  	buprenorphine 2 mG/naloxone 0.5 mG SL  Tablet 1 Tablet(s) SubLingual TID  	clonazePAM  Tablet 1 milliGRAM(s) Oral daily  	clonazePAM  Tablet 0.75 milliGRAM(s) Oral at bedtime  	escitalopram 20 milliGRAM(s) Oral daily    4. Therapy: individual therapy focused on group & milieu therapy; consider DBT when patient able to meaningfully participate   5. Medical: No acute medical needs identified          Pt is 50 y/o F domiciled with mother with PPHx of polysubstance use, opioid use disorder (previously on methadone), benzo dependence, anxiety, depression, previous suicide attempts presenting to ED for intentional opioid overdose. Pt required narcan yesterday with nonrebreather mask, on 2L NC overnight. Pt has hx of multiple psych hospitalizations including last for 26 days at [facility] discharged on 5/31/22 for SI w/ plan to cut herself. Patient with 2 admissions to [facility] this year from (3/28-4/6, 4/16-5/13). In ED endorsing SI with plan to cut herself. UTox +benzo, cocaine, methadone.    Differential diagnosis includes mood disorder secondary to substance use/withdrawal, MDD, mood disorder, cluster B personality traits.     Pt presents as less irritable and more guarded today. Potentially due to low quality sleep from environmental distractions preventing her from getting deep sleep. Psychomotor agitation still present but attenuating. Endorses going to group therapy sessions. Denies medication side effects and endorses feeling "good" overall. Plan to continue medications as prescribed. Due to history of polysubstance abuse, recent OD and Suboxone MAT there is a need to link patient with outpatient drug rehab program before discharge.      Plan  1. Legal: Admitted on 9.27 status  2. Safety: No reported SI/SIB/HI/VI currently on unit; continue routine observation.   	- PRN Zyprexa 5 mg PO/IM q6 hours  	- PRN Benadryl 50 mg PO/IM q6 hours  	- PRN Atarax 50 mg PO q6 hours  3. Psychiatric:   	ARIPiprazole 10 milliGRAM(s) Oral daily  	buprenorphine 2 mG/naloxone 0.5 mG SL  Tablet 1 Tablet(s) SubLingual TID  	clonazePAM  Tablet 1 milliGRAM(s) Oral daily  	clonazePAM  Tablet 0.75 milliGRAM(s) Oral at bedtime  	escitalopram 20 milliGRAM(s) Oral daily    4. Therapy: individual therapy focused on group & milieu therapy; consider DBT when patient able to meaningfully participate   5. Medical: No acute medical needs identified

## 2022-09-30 RX ADMIN — BUPRENORPHINE AND NALOXONE 1 TABLET(S): 2; .5 TABLET SUBLINGUAL at 20:44

## 2022-09-30 RX ADMIN — ARIPIPRAZOLE 10 MILLIGRAM(S): 15 TABLET ORAL at 08:03

## 2022-09-30 RX ADMIN — BUPRENORPHINE AND NALOXONE 1 TABLET(S): 2; .5 TABLET SUBLINGUAL at 08:03

## 2022-09-30 RX ADMIN — Medication 1 MILLIGRAM(S): at 08:03

## 2022-09-30 RX ADMIN — Medication 0.75 MILLIGRAM(S): at 20:44

## 2022-09-30 RX ADMIN — BUPRENORPHINE AND NALOXONE 1 TABLET(S): 2; .5 TABLET SUBLINGUAL at 12:48

## 2022-09-30 RX ADMIN — ESCITALOPRAM OXALATE 20 MILLIGRAM(S): 10 TABLET, FILM COATED ORAL at 08:03

## 2022-09-30 NOTE — BH INPATIENT PSYCHIATRY PROGRESS NOTE - PRN MEDS
MEDICATIONS  (PRN):  diphenhydrAMINE 50 milliGRAM(s) Oral every 6 hours PRN EPS/agitation/anxiety  diphenhydrAMINE Injectable 50 milliGRAM(s) IntraMuscular once PRN Agitation  hydrOXYzine hydrochloride 50 milliGRAM(s) Oral every 6 hours PRN Anxiety  nicotine  Polacrilex Gum 2 milliGRAM(s) Oral every 2 hours PRN breakthrough cravings  OLANZapine 5 milliGRAM(s) Oral every 6 hours PRN Agitation and Anxiety  OLANZapine Injectable 5 milliGRAM(s) IntraMuscular once PRN Agitation and anxiety

## 2022-09-30 NOTE — BH TREATMENT PLAN - NSTXDCOTHRGOAL_PSY_ALL_CORE
Pt will present with a decrease in symptoms evident by an overall improvement in functioning x5 days.

## 2022-09-30 NOTE — BH INPATIENT PSYCHIATRY PROGRESS NOTE - NSBHASSESSSUMMFT_PSY_ALL_CORE
Pt is 52 y/o F domiciled with mother with PPHx of polysubstance use, opioid use disorder (previously on methadone), benzo dependence, anxiety, depression, previous suicide attempts presenting to ED for intentional opioid overdose. Pt required narcan yesterday with nonrebreather mask, on 2L NC overnight. Pt has hx of multiple psych hospitalizations including last for 26 days at [facility] discharged on 5/31/22 for SI w/ plan to cut herself. Patient with 2 admissions to [facility] this year from (3/28-4/6, 4/16-5/13). In ED endorsing SI with plan to cut herself. UTox +benzo, cocaine, methadone.    Differential diagnosis includes mood disorder secondary to substance use/withdrawal, MDD, mood disorder, cluster B personality traits.     Pt presents as anxious and intrusive, irritable right before medication administration. Preoccupied with receiving medications and being discharged by Sunday. After explanation of the seriousness of her suicide attempt and the need to stabilize her medications and set her up with outpatient care that she needs to stay through the weekend, the patient understood. She is avoidant of her suicide attempt, does not acknowledge the event and changes subject quickly. Limited insight in her pervasive pattern of drug misuse and suicide attempts. Need to continue admission for safety.      Plan  1. Legal: Admitted on 9.27 status  2. Safety: No reported SI/SIB/HI/VI currently on unit; continue routine observation.   	- PRN Zyprexa 5 mg PO/IM q6 hours  	- PRN Benadryl 50 mg PO/IM q6 hours  	- PRN Atarax 50 mg PO q6 hours  3. Psychiatric:   	ARIPiprazole 10 milliGRAM(s) Oral daily  	buprenorphine 2 mG/naloxone 0.5 mG SL  Tablet 1 Tablet(s) SubLingual TID  	clonazePAM  Tablet 1 milliGRAM(s) Oral daily  	clonazePAM  Tablet 0.75 milliGRAM(s) Oral at bedtime  	escitalopram 20 milliGRAM(s) Oral daily    4. Therapy: individual therapy focused on group & milieu therapy; consider DBT when patient able to meaningfully participate             Pt is 50 y/o F domiciled with mother with PPHx of polysubstance use, opioid use disorder (previously on methadone), benzo dependence, anxiety, depression, previous suicide attempts presenting to ED for intentional opioid overdose. Pt required narcan with nonrebreather mask, on 2L NC overnight. Pt has hx of multiple psych hospitalizations including last for 26 days at [facility] discharged on 5/31/22 for SI w/ plan to cut herself. Patient with 2 admissions to [facility] this year from (3/28-4/6, 4/16-5/13). In ED endorsing SI with plan to cut herself. UTox +benzo, cocaine, methadone.    Differential diagnosis includes mood disorder secondary to substance use/withdrawal, MDD, mood disorder, cluster B personality traits.     Pt presents as anxious and intrusive, irritable right before medication administration. Preoccupied with receiving medications and being discharged by Sunday. After explanation of the seriousness of her suicide attempt and the need to stabilize her medications and set her up with outpatient care that she needs to stay through the weekend, the patient understood. She is avoidant of her suicide attempt, does not acknowledge the event and changes subject quickly. Limited insight in her pervasive pattern of drug misuse and suicide attempts. Need to continue admission for safety.      Plan  1. Legal: Admitted on 9.27 status  2. Safety: No reported SI/SIB/HI/VI currently on unit; continue routine observation.   	- PRN Zyprexa 5 mg PO/IM q6 hours  	- PRN Benadryl 50 mg PO/IM q6 hours  	- PRN Atarax 50 mg PO q6 hours  3. Psychiatric:   	ARIPiprazole 10 milliGRAM(s) Oral daily  	buprenorphine 2 mG/naloxone 0.5 mG SL  Tablet 1 Tablet(s) SubLingual TID  	clonazePAM  Tablet 1 milliGRAM(s) Oral daily  	clonazePAM  Tablet 0.75 milliGRAM(s) Oral at bedtime  	escitalopram 20 milliGRAM(s) Oral daily    4. Therapy: individual therapy focused on group & milieu therapy; consider DBT when patient able to meaningfully participate

## 2022-09-30 NOTE — BH INPATIENT PSYCHIATRY PROGRESS NOTE - CURRENT MEDICATION
MEDICATIONS  (STANDING):  ARIPiprazole 10 milliGRAM(s) Oral daily  buprenorphine 2 mG/naloxone 0.5 mG SL  Tablet 1 Tablet(s) SubLingual <User Schedule>  clonazePAM  Tablet 1 milliGRAM(s) Oral daily  clonazePAM  Tablet 0.75 milliGRAM(s) Oral at bedtime  escitalopram 20 milliGRAM(s) Oral daily    MEDICATIONS  (PRN):  diphenhydrAMINE 50 milliGRAM(s) Oral every 6 hours PRN EPS/agitation/anxiety  diphenhydrAMINE Injectable 50 milliGRAM(s) IntraMuscular once PRN Agitation  hydrOXYzine hydrochloride 50 milliGRAM(s) Oral every 6 hours PRN Anxiety  nicotine  Polacrilex Gum 2 milliGRAM(s) Oral every 2 hours PRN breakthrough cravings  OLANZapine 5 milliGRAM(s) Oral every 6 hours PRN Agitation and Anxiety  OLANZapine Injectable 5 milliGRAM(s) IntraMuscular once PRN Agitation and anxiety

## 2022-09-30 NOTE — BH INPATIENT PSYCHIATRY PROGRESS NOTE - NSBHFUPINTERVALHXFT_PSY_A_CORE
Follow up for patient status post suicide attempt with methadone. Case reviewed with team, no events reported overnight. Vital signs are stable. Adherent with medication regimen. Denies SI/NSSIB/AV/VH/HI. Denies side effects to medications. Endorses sleeping better overnight. Preoccupied with trying to be discharged from the hospital to go on a family vacation with a flight on Sunday. After explanation of reason for continued admission, patient understood. Continued psychomotor agitation and anxiety noted.

## 2022-09-30 NOTE — BH INPATIENT PSYCHIATRY PROGRESS NOTE - OTHER
guarded and goal directed  psychomotor agitation easily irritable if needs are not met immediately  intrusive

## 2022-10-01 PROCEDURE — 99232 SBSQ HOSP IP/OBS MODERATE 35: CPT

## 2022-10-01 RX ADMIN — Medication 1 MILLIGRAM(S): at 08:31

## 2022-10-01 RX ADMIN — BUPRENORPHINE AND NALOXONE 1 TABLET(S): 2; .5 TABLET SUBLINGUAL at 13:09

## 2022-10-01 RX ADMIN — Medication 0.75 MILLIGRAM(S): at 19:59

## 2022-10-01 RX ADMIN — ARIPIPRAZOLE 10 MILLIGRAM(S): 15 TABLET ORAL at 08:32

## 2022-10-01 RX ADMIN — ESCITALOPRAM OXALATE 20 MILLIGRAM(S): 10 TABLET, FILM COATED ORAL at 08:32

## 2022-10-01 RX ADMIN — BUPRENORPHINE AND NALOXONE 1 TABLET(S): 2; .5 TABLET SUBLINGUAL at 19:58

## 2022-10-01 RX ADMIN — BUPRENORPHINE AND NALOXONE 1 TABLET(S): 2; .5 TABLET SUBLINGUAL at 08:32

## 2022-10-01 NOTE — BH INPATIENT PSYCHIATRY PROGRESS NOTE - OTHER
psychomotor agitation guarded and goal directed  easily irritable if needs are not met immediately  intrusive

## 2022-10-01 NOTE — BH INPATIENT PSYCHIATRY PROGRESS NOTE - NSBHASSESSSUMMFT_PSY_ALL_CORE
Pt is 50 y/o F domiciled with mother with PPHx of polysubstance use, opioid use disorder (previously on methadone), benzo dependence, anxiety, depression, previous suicide attempts presenting to ED for intentional opioid overdose. Pt required narcan with nonrebreather mask, on 2L NC overnight. Pt has hx of multiple psych hospitalizations including last for 26 days at [facility] discharged on 5/31/22 for SI w/ plan to cut herself. Patient with 2 admissions to [facility] this year from (3/28-4/6, 4/16-5/13). In ED endorsing SI with plan to cut herself. UTox +benzo, cocaine, methadone.    Differential diagnosis includes mood disorder secondary to substance use/withdrawal, MDD, mood disorder, cluster B personality traits.     Pt presents as anxious and intrusive, irritable right before medication administration. Preoccupied with receiving medications and being discharged by Sunday. After explanation of the seriousness of her suicide attempt and the need to stabilize her medications and set her up with outpatient care that she needs to stay through the weekend, the patient understood. She is avoidant of her suicide attempt, does not acknowledge the event and changes subject quickly. Limited insight in her pervasive pattern of drug misuse and suicide attempts. Need to continue admission for safety.      Plan  1. Legal: Admitted on 9.27 status  2. Safety: No reported SI/SIB/HI/VI currently on unit; continue routine observation.   	- PRN Zyprexa 5 mg PO/IM q6 hours  	- PRN Benadryl 50 mg PO/IM q6 hours  	- PRN Atarax 50 mg PO q6 hours  3. Psychiatric:   	ARIPiprazole 10 milliGRAM(s) Oral daily  	buprenorphine 2 mG/naloxone 0.5 mG SL  Tablet 1 Tablet(s) SubLingual TID  	clonazePAM  Tablet 1 milliGRAM(s) Oral daily  	clonazePAM  Tablet 0.75 milliGRAM(s) Oral at bedtime  	escitalopram 20 milliGRAM(s) Oral daily    4. Therapy: individual therapy focused on group & milieu therapy; consider DBT when patient able to meaningfully participate

## 2022-10-01 NOTE — BH INPATIENT PSYCHIATRY PROGRESS NOTE - NSBHCHARTREVIEWVS_PSY_A_CORE FT
Vital Signs Last 24 Hrs  T(C): 36.4 (10-01-22 @ 06:16), Max: 36.4 (10-01-22 @ 06:16)  T(F): 97.6 (10-01-22 @ 06:16), Max: 97.6 (10-01-22 @ 06:16)  HR: --  BP: --  BP(mean): --  RR: 16 (09-30-22 @ 09:00) (16 - 16)  SpO2: --    Orthostatic VS  09-30-22 @ 08:45  Lying BP: --/-- HR: --  Sitting BP: 95/61 HR: 81  Standing BP: 98/68 HR: 81  Site: --  Mode: electronic  Orthostatic VS  09-29-22 @ 19:41  Lying BP: --/-- HR: --  Sitting BP: 105/76 HR: 65  Standing BP: 97/71 HR: 76  Site: --  Mode: --  Orthostatic VS  09-29-22 @ 08:54  Lying BP: --/-- HR: --  Sitting BP: 100/65 HR: 65  Standing BP: 98/69 HR: 71  Site: --  Mode: --

## 2022-10-01 NOTE — BH INPATIENT PSYCHIATRY PROGRESS NOTE - NSBHFUPINTERVALHXFT_PSY_A_CORE
Patient is followed up for s/p OD on methadone and substance use disorder.  Chart, medications and labs reviewed.  Patient is discussed with nursing staff. No significant overnight issues.  Compliant with her medications, no SE. In behavioral control, no prn for aggression. Per nursing pt has med-seeking behaviors.   Patient is observed in her room.  Appeared to be asleep, in NAD.   Per nursing pt denies SI/SIB/HI with manageable level of anxiety at this time. No AVH, mariaelena, delusions or paranoia. No reports of sleep disturbances or appetite concerns.  Patient offers no complaints.  No acute medical concerns, VSS.  Continue to monitor and provide therapeutic support.

## 2022-10-02 RX ADMIN — BUPRENORPHINE AND NALOXONE 1 TABLET(S): 2; .5 TABLET SUBLINGUAL at 08:07

## 2022-10-02 RX ADMIN — ESCITALOPRAM OXALATE 20 MILLIGRAM(S): 10 TABLET, FILM COATED ORAL at 08:07

## 2022-10-02 RX ADMIN — Medication 1 MILLIGRAM(S): at 08:09

## 2022-10-02 RX ADMIN — BUPRENORPHINE AND NALOXONE 1 TABLET(S): 2; .5 TABLET SUBLINGUAL at 12:39

## 2022-10-02 RX ADMIN — Medication 50 MILLIGRAM(S): at 15:07

## 2022-10-02 RX ADMIN — Medication 0.75 MILLIGRAM(S): at 19:47

## 2022-10-02 RX ADMIN — BUPRENORPHINE AND NALOXONE 1 TABLET(S): 2; .5 TABLET SUBLINGUAL at 19:47

## 2022-10-02 RX ADMIN — ARIPIPRAZOLE 10 MILLIGRAM(S): 15 TABLET ORAL at 08:07

## 2022-10-02 NOTE — BH INPATIENT PSYCHIATRY PROGRESS NOTE - NSBHASSESSSUMMFT_PSY_ALL_CORE
Pt is 50 y/o F domiciled with mother with PPHx of polysubstance use, opioid use disorder (previously on methadone), benzo dependence, anxiety, depression, previous suicide attempts presenting to ED for intentional opioid overdose. Pt required narcan with nonrebreather mask, on 2L NC overnight. Pt has hx of multiple psych hospitalizations including last for 26 days at [facility] discharged on 5/31/22 for SI w/ plan to cut herself. Patient with 2 admissions to [facility] this year from (3/28-4/6, 4/16-5/13). In ED endorsing SI with plan to cut herself. UTox +benzo, cocaine, methadone.    Differential diagnosis includes mood disorder secondary to substance use/withdrawal, MDD, mood disorder, cluster B personality traits.     Pt presents as anxious and intrusive, irritable right before medication administration. Preoccupied with receiving medications and being discharged by Sunday. After explanation of the seriousness of her suicide attempt and the need to stabilize her medications and set her up with outpatient care that she needs to stay through the weekend, the patient understood. She is avoidant of her suicide attempt, does not acknowledge the event and changes subject quickly. Limited insight in her pervasive pattern of drug misuse and suicide attempts. Need to continue admission for safety.      Plan  1. Legal: Admitted on 9.27 status  2. Safety: No reported SI/SIB/HI/VI currently on unit; continue routine observation.   	- PRN Zyprexa 5 mg PO/IM q6 hours  	- PRN Benadryl 50 mg PO/IM q6 hours  	- PRN Atarax 50 mg PO q6 hours  3. Psychiatric:   	ARIPiprazole 10 milliGRAM(s) Oral daily  	buprenorphine 2 mG/naloxone 0.5 mG SL  Tablet 1 Tablet(s) SubLingual TID  	clonazePAM  Tablet 1 milliGRAM(s) Oral daily  	clonazePAM  Tablet 0.75 milliGRAM(s) Oral at bedtime  	escitalopram 20 milliGRAM(s) Oral daily    4. Therapy: individual therapy focused on group & milieu therapy; consider DBT when patient able to meaningfully participate

## 2022-10-02 NOTE — BH INPATIENT PSYCHIATRY PROGRESS NOTE - NSBHCHARTREVIEWVS_PSY_A_CORE FT
Vital Signs Last 24 Hrs  T(C): 36.8 (10-01-22 @ 21:46), Max: 36.8 (10-01-22 @ 19:43)  T(F): 98.3 (10-01-22 @ 21:46), Max: 98.3 (10-01-22 @ 21:46)  HR: --  BP: --  BP(mean): --  RR: 18 (10-01-22 @ 08:31) (18 - 18)  SpO2: --    Orthostatic VS  10-01-22 @ 19:43  Lying BP: --/-- HR: --  Sitting BP: 100/74 HR: 70  Standing BP: 90/68 HR: 74  Site: --  Mode: --  Orthostatic VS  10-01-22 @ 08:31  Lying BP: --/-- HR: --  Sitting BP: 101/61 HR: 78  Standing BP: 90/60 HR: 93  Site: --  Mode: --  Orthostatic VS  09-30-22 @ 08:45  Lying BP: --/-- HR: --  Sitting BP: 95/61 HR: 81  Standing BP: 98/68 HR: 81  Site: --  Mode: electronic   Vital Signs Last 24 Hrs  T(C): 35.6 (10-02-22 @ 06:31), Max: 36.8 (10-01-22 @ 19:43)  T(F): 96 (10-02-22 @ 06:31), Max: 98.3 (10-01-22 @ 21:46)  HR: --  BP: --  BP(mean): --  RR: --  SpO2: --    Orthostatic VS  10-01-22 @ 19:43  Lying BP: --/-- HR: --  Sitting BP: 100/74 HR: 70  Standing BP: 90/68 HR: 74  Site: --  Mode: --  Orthostatic VS  10-01-22 @ 08:31  Lying BP: --/-- HR: --  Sitting BP: 101/61 HR: 78  Standing BP: 90/60 HR: 93  Site: --  Mode: --  Orthostatic VS  09-30-22 @ 08:45  Lying BP: --/-- HR: --  Sitting BP: 95/61 HR: 81  Standing BP: 98/68 HR: 81  Site: --  Mode: electronic

## 2022-10-02 NOTE — BH INPATIENT PSYCHIATRY PROGRESS NOTE - OTHER
intrusive psychomotor agitation easily irritable if needs are not met immediately  guarded and goal directed

## 2022-10-02 NOTE — BH INPATIENT PSYCHIATRY PROGRESS NOTE - NSBHFUPINTERVALHXFT_PSY_A_CORE
Patient is followed up for s/p OD on methadone and substance use disorder.  Chart, medications and labs reviewed.  Patient is discussed with nursing staff. No significant overnight issues.  Compliant with her medications, no SE. In behavioral control, no prn for aggression.   Patient is observed in dayroom, socially appropriate with peers. Patient describes mood as “a little sad” States she lost a friend 13 days ago, pt reports he .  Denies SI/HI/SIB, engages in safety plan.  Patient provided emotional support.   Patient reports manageable level of anxiety at this time. No AVH, mariaelena, delusions or paranoia. No reports of sleep disturbances or appetite concerns.  Patient offers no complaints.  No acute medical concerns, VSS.  Continue to monitor and provide therapeutic support.

## 2022-10-03 PROCEDURE — 99232 SBSQ HOSP IP/OBS MODERATE 35: CPT

## 2022-10-03 RX ORDER — CLONAZEPAM 1 MG
0.5 TABLET ORAL AT BEDTIME
Refills: 0 | Status: DISCONTINUED | OUTPATIENT
Start: 2022-10-03 | End: 2022-10-10

## 2022-10-03 RX ORDER — ARIPIPRAZOLE 15 MG/1
15 TABLET ORAL DAILY
Refills: 0 | Status: DISCONTINUED | OUTPATIENT
Start: 2022-10-03 | End: 2022-10-10

## 2022-10-03 RX ORDER — BUPRENORPHINE AND NALOXONE 2; .5 MG/1; MG/1
1 TABLET SUBLINGUAL
Refills: 0 | Status: DISCONTINUED | OUTPATIENT
Start: 2022-10-03 | End: 2022-10-05

## 2022-10-03 RX ORDER — CLONAZEPAM 1 MG
1 TABLET ORAL DAILY
Refills: 0 | Status: DISCONTINUED | OUTPATIENT
Start: 2022-10-03 | End: 2022-10-10

## 2022-10-03 RX ORDER — CLONAZEPAM 1 MG
0.25 TABLET ORAL
Refills: 0 | Status: DISCONTINUED | OUTPATIENT
Start: 2022-10-03 | End: 2022-10-03

## 2022-10-03 RX ORDER — CLONAZEPAM 1 MG
0.25 TABLET ORAL
Refills: 0 | Status: DISCONTINUED | OUTPATIENT
Start: 2022-10-03 | End: 2022-10-10

## 2022-10-03 RX ADMIN — BUPRENORPHINE AND NALOXONE 1 TABLET(S): 2; .5 TABLET SUBLINGUAL at 19:39

## 2022-10-03 RX ADMIN — Medication 0.25 MILLIGRAM(S): at 13:51

## 2022-10-03 RX ADMIN — ARIPIPRAZOLE 10 MILLIGRAM(S): 15 TABLET ORAL at 08:08

## 2022-10-03 RX ADMIN — Medication 1 MILLIGRAM(S): at 08:08

## 2022-10-03 RX ADMIN — BUPRENORPHINE AND NALOXONE 1 TABLET(S): 2; .5 TABLET SUBLINGUAL at 12:06

## 2022-10-03 RX ADMIN — BUPRENORPHINE AND NALOXONE 1 TABLET(S): 2; .5 TABLET SUBLINGUAL at 08:08

## 2022-10-03 RX ADMIN — Medication 0.5 MILLIGRAM(S): at 19:40

## 2022-10-03 RX ADMIN — ESCITALOPRAM OXALATE 20 MILLIGRAM(S): 10 TABLET, FILM COATED ORAL at 08:08

## 2022-10-03 NOTE — BH INPATIENT PSYCHIATRY PROGRESS NOTE - OTHER
intrusive, impulsive, superficial guarded and goal directed  psychomotor agitation easily irritable if needs are not met immediately

## 2022-10-03 NOTE — BH INPATIENT PSYCHIATRY PROGRESS NOTE - NSBHCHARTREVIEWVS_PSY_A_CORE FT
Vital Signs Last 24 Hrs  T(C): 36.7 (10-03-22 @ 22:30), Max: 36.7 (10-03-22 @ 22:30)  T(F): 98.1 (10-03-22 @ 22:30), Max: 98.1 (10-03-22 @ 22:30)  HR: --  BP: --  BP(mean): --  RR: --  SpO2: --    Orthostatic VS  10-03-22 @ 19:37  Lying BP: --/-- HR: --  Sitting BP: 91/57 HR: 83  Standing BP: 96/60 HR: 88  Site: --  Mode: --  Orthostatic VS  10-03-22 @ 08:38  Lying BP: --/-- HR: --  Sitting BP: 90/60 HR: 79  Standing BP: 91/61 HR: 90  Site: --  Mode: --  Orthostatic VS  10-02-22 @ 19:53  Lying BP: --/-- HR: --  Sitting BP: 111/63 HR: 70  Standing BP: 100/60 HR: 77  Site: --  Mode: electronic  Orthostatic VS  10-02-22 @ 08:36  Lying BP: --/-- HR: --  Sitting BP: 105/64 HR: 76  Standing BP: 99/68 HR: 88  Site: --  Mode: electronic

## 2022-10-03 NOTE — BH INPATIENT PSYCHIATRY PROGRESS NOTE - NSBHFUPINTERVALHXFT_PSY_A_CORE
Patient is followed up for s/p OD on methadone and substance use disorder.  Chart, medications and labs reviewed.  Patient is discussed with nursing staff. No significant overnight issues.  Compliant with her medications, no SE. In behavioral control, no prn for aggression. Vitals are stable.  Pt. reports she is not feeling ready to go home. She reports receiving news about her family recently that is affecting her mood. The pt stated "I'm suicidal." She denied actively wanting to harm. She reports passive SI. The pt was able to contract for safety. The writer advised the pt. engage in psychotherapy and she agreed. The pt. requested her Klonopin dose be switched to TID dosing despite being advised that the dose would not increase.  The pt. denied A/H, V/H, s/i/p, h/i/p and delusions.

## 2022-10-03 NOTE — BH INPATIENT PSYCHIATRY PROGRESS NOTE - NSBHASSESSSUMMFT_PSY_ALL_CORE
Pt is 52 y/o F domiciled with mother with PPHx of polysubstance use, opioid use disorder (previously on methadone), benzo dependence, anxiety, depression, previous suicide attempts presenting to ED for intentional opioid overdose. Pt required narcan with nonrebreather mask, on 2L NC overnight. Pt has hx of multiple psych hospitalizations including last for 26 days at [facility] discharged on 5/31/22 for SI w/ plan to cut herself. Patient with 2 admissions to [facility] this year from (3/28-4/6, 4/16-5/13). In ED endorsing SI with plan to cut herself. UTox +benzo, cocaine, methadone.    Differential diagnosis includes mood disorder secondary to substance use/withdrawal, MDD, mood disorder, cluster B personality traits.     Pt presents as anxious, intrusive and superficial. Pt. is medication seeking at times, requesting increase in Klonopin. She is now reporting passive SI in the context of psychosocial stressors. Pt. denied intent or plan, contracted for safety. Psychology requested to speak with patient. Will titrate Abilify. Klonopin adjusted to TID dosing per. pt. request.    Plan  1. Legal: Admitted on 9.27 status  2. Safety: No reported SI/SIB/HI/VI currently on unit; continue routine observation.   	- PRN Zyprexa 5 mg PO/IM q6 hours  	- PRN Benadryl 50 mg PO/IM q6 hours  	- PRN Atarax 50 mg PO q6 hours  3. Psychiatric:   	ARIPiprazole 15 milliGRAM(s) Oral daily  	buprenorphine 2 mG/naloxone 0.5 mG SL  Tablet 1 Tablet(s) SubLingual TID  	clonazePAM  Tablet 1 milliGRAM(s) Oral daily  	clonazePAM  Tablet 0.25mg 1300, 0.5mg at bedtime  	escitalopram 20 milliGRAM(s) Oral daily    4. Therapy: individual therapy focused on group & milieu therapy; consider DBT when patient able to meaningfully participate

## 2022-10-03 NOTE — BH INPATIENT PSYCHIATRY PROGRESS NOTE - CURRENT MEDICATION
MEDICATIONS  (STANDING):  ARIPiprazole 15 milliGRAM(s) Oral daily  buprenorphine 2 mG/naloxone 0.5 mG SL  Tablet 1 Tablet(s) SubLingual <User Schedule>  clonazePAM  Tablet 0.5 milliGRAM(s) Oral at bedtime  clonazePAM  Tablet 0.25 milliGRAM(s) Oral <User Schedule>  clonazePAM  Tablet 1 milliGRAM(s) Oral daily  escitalopram 20 milliGRAM(s) Oral daily    MEDICATIONS  (PRN):  diphenhydrAMINE 50 milliGRAM(s) Oral every 6 hours PRN EPS/agitation/anxiety  diphenhydrAMINE Injectable 50 milliGRAM(s) IntraMuscular once PRN Agitation  hydrOXYzine hydrochloride 50 milliGRAM(s) Oral every 6 hours PRN Anxiety  nicotine  Polacrilex Gum 2 milliGRAM(s) Oral every 2 hours PRN breakthrough cravings  OLANZapine 5 milliGRAM(s) Oral every 6 hours PRN Agitation and Anxiety  OLANZapine Injectable 5 milliGRAM(s) IntraMuscular once PRN Agitation and anxiety

## 2022-10-04 PROCEDURE — 99232 SBSQ HOSP IP/OBS MODERATE 35: CPT

## 2022-10-04 RX ORDER — POLYETHYLENE GLYCOL 3350 17 G/17G
17 POWDER, FOR SOLUTION ORAL
Refills: 0 | Status: DISCONTINUED | OUTPATIENT
Start: 2022-10-04 | End: 2022-10-10

## 2022-10-04 RX ADMIN — POLYETHYLENE GLYCOL 3350 17 GRAM(S): 17 POWDER, FOR SOLUTION ORAL at 12:13

## 2022-10-04 RX ADMIN — Medication 0.25 MILLIGRAM(S): at 12:14

## 2022-10-04 RX ADMIN — BUPRENORPHINE AND NALOXONE 1 TABLET(S): 2; .5 TABLET SUBLINGUAL at 12:14

## 2022-10-04 RX ADMIN — Medication 1 MILLIGRAM(S): at 08:03

## 2022-10-04 RX ADMIN — BUPRENORPHINE AND NALOXONE 1 TABLET(S): 2; .5 TABLET SUBLINGUAL at 08:03

## 2022-10-04 RX ADMIN — BUPRENORPHINE AND NALOXONE 1 TABLET(S): 2; .5 TABLET SUBLINGUAL at 19:51

## 2022-10-04 RX ADMIN — ESCITALOPRAM OXALATE 20 MILLIGRAM(S): 10 TABLET, FILM COATED ORAL at 08:03

## 2022-10-04 RX ADMIN — ARIPIPRAZOLE 15 MILLIGRAM(S): 15 TABLET ORAL at 08:03

## 2022-10-04 RX ADMIN — Medication 0.5 MILLIGRAM(S): at 19:50

## 2022-10-04 NOTE — BH INPATIENT PSYCHIATRY PROGRESS NOTE - NSBHCHARTREVIEWVS_PSY_A_CORE FT
Vital Signs Last 24 Hrs  T(C): 36.3 (10-04-22 @ 06:17), Max: 36.7 (10-03-22 @ 22:30)  T(F): 97.3 (10-04-22 @ 06:17), Max: 98.1 (10-03-22 @ 22:30)  HR: --  BP: --  BP(mean): --  RR: 16 (10-04-22 @ 08:50) (16 - 16)  SpO2: --    Orthostatic VS  10-04-22 @ 07:46  Lying BP: --/-- HR: --  Sitting BP: 90/65 HR: 71  Standing BP: 90/60 HR: 75  Site: --  Mode: electronic  Orthostatic VS  10-03-22 @ 19:37  Lying BP: --/-- HR: --  Sitting BP: 91/57 HR: 83  Standing BP: 96/60 HR: 88  Site: --  Mode: --  Orthostatic VS  10-03-22 @ 08:38  Lying BP: --/-- HR: --  Sitting BP: 90/60 HR: 79  Standing BP: 91/61 HR: 90  Site: --  Mode: --  Orthostatic VS  10-02-22 @ 19:53  Lying BP: --/-- HR: --  Sitting BP: 111/63 HR: 70  Standing BP: 100/60 HR: 77  Site: --  Mode: electronic

## 2022-10-04 NOTE — BH PSYCHOLOGY - CLINICIAN PSYCHOTHERAPY NOTE - TOKEN PULL-DIAGNOSIS
Primary Diagnosis:  Severe recurrent major depression without psychotic features [F33.2]      Suicide attempt [T14.91XA]        Problem Dx:   Borderline personality disorder [F60.3]      Generalized anxiety disorder [F41.1]

## 2022-10-04 NOTE — BH INPATIENT PSYCHIATRY PROGRESS NOTE - PRN MEDS
MEDICATIONS  (PRN):  diphenhydrAMINE 50 milliGRAM(s) Oral every 6 hours PRN EPS/agitation/anxiety  diphenhydrAMINE Injectable 50 milliGRAM(s) IntraMuscular once PRN Agitation  hydrOXYzine hydrochloride 50 milliGRAM(s) Oral every 6 hours PRN Anxiety  nicotine  Polacrilex Gum 2 milliGRAM(s) Oral every 2 hours PRN breakthrough cravings  OLANZapine 5 milliGRAM(s) Oral every 6 hours PRN Agitation and Anxiety  OLANZapine Injectable 5 milliGRAM(s) IntraMuscular once PRN Agitation and anxiety  polyethylene glycol 3350 17 Gram(s) Oral two times a day PRN constipation

## 2022-10-04 NOTE — BH INPATIENT PSYCHIATRY PROGRESS NOTE - NSBHFUPINTERVALHXFT_PSY_A_CORE
Patient is followed up for s/p OD on methadone and substance use disorder.  Chart, medications and labs reviewed.  Patient is discussed with nursing staff. No significant overnight issues.  Compliant with her medications, no SE. In behavioral control, no prn for aggression. Vitals are stable.  The pt. stated "I feel like I'm being harassed" by another patient on the unit. The pt. was advised that she should attempt to stay away from the patient and she agreed. She denied any physical harm or threats against her. The pt. stated she did not sleep well. She reports she is still feeling depressed. She denied active s/i/p, but endorsed passive SI. The pt. stated she fears discharge this week; she believes she will "play truth or dare with my life." The pt. agreed to remain hospitalized for safety, medication titration and psychotherapy. She reports "I enjoyed the counseling session", referring to session w/ psychologists.  The pt. denied A/H, V/H,  h/i/p and delusions.

## 2022-10-04 NOTE — BH INPATIENT PSYCHIATRY PROGRESS NOTE - NSBHASSESSSUMMFT_PSY_ALL_CORE
Pt is 52 y/o F domiciled with mother with PPHx of polysubstance use, opioid use disorder (previously on methadone), benzo dependence, anxiety, depression, previous suicide attempts presenting to ED for intentional opioid overdose. Pt required narcan with nonrebreather mask, on 2L NC overnight. Pt has hx of multiple psych hospitalizations including last for 26 days at [facility] discharged on 5/31/22 for SI w/ plan to cut herself. Patient with 2 admissions to [facility] this year from (3/28-4/6, 4/16-5/13). In ED endorsing SI with plan to cut herself. UTox +benzo, cocaine, methadone.    Differential diagnosis includes mood disorder secondary to substance use/withdrawal, MDD, mood disorder, cluster B personality traits.     Pt presents as anxious, intrusive at times. She was more forthcoming with Sx today. Pt. is medication seeking at times, but able to be redirected.  She is now reporting passive SI in the context of psychosocial stressors. Pt. denied intent or plan, contracted for safety. Pt. began therapy today. Will continue medications and titrate Lexapro tomorrow.     Plan  1. Legal: Admitted on 9.27 status  2. Safety: No reported SI/SIB/HI/VI currently on unit; continue routine observation.   	- PRN Zyprexa 5 mg PO/IM q6 hours  	- PRN Benadryl 50 mg PO/IM q6 hours  	- PRN Atarax 50 mg PO q6 hours  3. Psychiatric:   	ARIPiprazole 15 milliGRAM(s) Oral daily  	buprenorphine 2 mG/naloxone 0.5 mG SL  Tablet 1 Tablet(s) SubLingual TID  	clonazePAM  Tablet 1 milliGRAM(s) Oral daily  	clonazePAM  Tablet 0.25mg 1300, 0.5mg at bedtime  	escitalopram 20 milliGRAM(s) Oral daily    4. Therapy: individual therapy focused on group & milieu therapy; consider DBT when patient able to meaningfully participate

## 2022-10-04 NOTE — BH PSYCHOLOGY - CLINICIAN PSYCHOTHERAPY NOTE - NSBHPSYCHOLNARRATIVE_PSY_A_CORE FT
Writer and patient wore masks during the session due to COVID precautions. Pt was alert and appeared to have good hygiene and grooming. Pt endorsed passive SI, without plan/intent. Pt did not endorse homicidal ideation. Pt reported "down" mood and displayed anxious affect. Speech within normal limits. Thought processes linear and goal directed. Pt denied any auditory or visual hallucinations. Oriented x3. Parameters of treatment and limitations of confidentiality were discussed. Pt demonstrated fair insight and judgment.     Pt reported not feeling ready for discharge. Writer worked with pt to consider what factors/measures would indicate that she is ready; pt identified improved appetite, sleep and motivation. Pt provided overview of psychosocial stressors leading to overdose and hospitalization. Pt reported increased anxiety following discussion of these stressors. Writer worked with pt to consider what coping strategies may be effective to manage anxiety. Pt reported use of physical activity and  herself from others. Writer reflected observation of pt's tendency to withdraw, and encouraged pt to attend group therapy. Support, validation, and empathic listening provided.

## 2022-10-04 NOTE — BH INPATIENT PSYCHIATRY PROGRESS NOTE - CURRENT MEDICATION
MEDICATIONS  (STANDING):  ARIPiprazole 15 milliGRAM(s) Oral daily  buprenorphine 2 mG/naloxone 0.5 mG SL  Tablet 1 Tablet(s) SubLingual <User Schedule>  clonazePAM  Tablet 0.5 milliGRAM(s) Oral at bedtime  clonazePAM  Tablet 0.25 milliGRAM(s) Oral <User Schedule>  clonazePAM  Tablet 1 milliGRAM(s) Oral daily  escitalopram 20 milliGRAM(s) Oral daily    MEDICATIONS  (PRN):  diphenhydrAMINE 50 milliGRAM(s) Oral every 6 hours PRN EPS/agitation/anxiety  diphenhydrAMINE Injectable 50 milliGRAM(s) IntraMuscular once PRN Agitation  hydrOXYzine hydrochloride 50 milliGRAM(s) Oral every 6 hours PRN Anxiety  nicotine  Polacrilex Gum 2 milliGRAM(s) Oral every 2 hours PRN breakthrough cravings  OLANZapine 5 milliGRAM(s) Oral every 6 hours PRN Agitation and Anxiety  OLANZapine Injectable 5 milliGRAM(s) IntraMuscular once PRN Agitation and anxiety  polyethylene glycol 3350 17 Gram(s) Oral two times a day PRN constipation

## 2022-10-04 NOTE — BH INPATIENT PSYCHIATRY PROGRESS NOTE - OTHER
passive SI guarded and goal directed  easily irritable if needs are not met immediately  intrusive, impulsive, superficial psychomotor agitation

## 2022-10-05 PROCEDURE — 99232 SBSQ HOSP IP/OBS MODERATE 35: CPT

## 2022-10-05 RX ORDER — BUPRENORPHINE AND NALOXONE 2; .5 MG/1; MG/1
1 TABLET SUBLINGUAL ONCE
Refills: 0 | Status: DISCONTINUED | OUTPATIENT
Start: 2022-10-05 | End: 2022-10-05

## 2022-10-05 RX ORDER — BUPRENORPHINE AND NALOXONE 2; .5 MG/1; MG/1
2 TABLET SUBLINGUAL
Refills: 0 | Status: DISCONTINUED | OUTPATIENT
Start: 2022-10-05 | End: 2022-10-10

## 2022-10-05 RX ADMIN — Medication 1 MILLIGRAM(S): at 08:05

## 2022-10-05 RX ADMIN — Medication 0.25 MILLIGRAM(S): at 12:04

## 2022-10-05 RX ADMIN — BUPRENORPHINE AND NALOXONE 2 TABLET(S): 2; .5 TABLET SUBLINGUAL at 20:03

## 2022-10-05 RX ADMIN — BUPRENORPHINE AND NALOXONE 1 TABLET(S): 2; .5 TABLET SUBLINGUAL at 13:11

## 2022-10-05 RX ADMIN — ESCITALOPRAM OXALATE 20 MILLIGRAM(S): 10 TABLET, FILM COATED ORAL at 08:05

## 2022-10-05 RX ADMIN — Medication 0.5 MILLIGRAM(S): at 20:03

## 2022-10-05 RX ADMIN — ARIPIPRAZOLE 15 MILLIGRAM(S): 15 TABLET ORAL at 08:04

## 2022-10-05 RX ADMIN — BUPRENORPHINE AND NALOXONE 1 TABLET(S): 2; .5 TABLET SUBLINGUAL at 12:04

## 2022-10-05 RX ADMIN — BUPRENORPHINE AND NALOXONE 1 TABLET(S): 2; .5 TABLET SUBLINGUAL at 08:05

## 2022-10-05 NOTE — BH INPATIENT PSYCHIATRY PROGRESS NOTE - NSBHCHARTREVIEWVS_PSY_A_CORE FT
Vital Signs Last 24 Hrs  T(C): 36.2 (10-05-22 @ 14:52), Max: 36.9 (10-04-22 @ 19:32)  T(F): 97.1 (10-05-22 @ 14:52), Max: 98.5 (10-04-22 @ 19:32)  HR: --  BP: --  BP(mean): --  RR: 16 (10-05-22 @ 08:29) (16 - 16)  SpO2: --    Orthostatic VS  10-05-22 @ 08:43  Lying BP: --/-- HR: --  Sitting BP: 97/79 HR: 84  Standing BP: 90/69 HR: 84  Site: --  Mode: electronic  Orthostatic VS  10-04-22 @ 19:32  Lying BP: --/-- HR: --  Sitting BP: 100/61 HR: 72  Standing BP: 105/90 HR: 100  Site: --  Mode: --  Orthostatic VS  10-04-22 @ 07:46  Lying BP: --/-- HR: --  Sitting BP: 90/65 HR: 71  Standing BP: 90/60 HR: 75  Site: --  Mode: electronic  Orthostatic VS  10-03-22 @ 19:37  Lying BP: --/-- HR: --  Sitting BP: 91/57 HR: 83  Standing BP: 96/60 HR: 88  Site: --  Mode: --

## 2022-10-05 NOTE — BH TREATMENT PLAN - NSBHPRIMARYDX_PSY_ALL_CORE
Severe recurrent major depression without psychotic features    
Severe recurrent major depression without psychotic features

## 2022-10-05 NOTE — BH INPATIENT PSYCHIATRY PROGRESS NOTE - CURRENT MEDICATION
MEDICATIONS  (STANDING):  ARIPiprazole 15 milliGRAM(s) Oral daily  buprenorphine 2 mG/naloxone 0.5 mG SL  Tablet 2 Tablet(s) SubLingual <User Schedule>  clonazePAM  Tablet 0.5 milliGRAM(s) Oral at bedtime  clonazePAM  Tablet 0.25 milliGRAM(s) Oral <User Schedule>  clonazePAM  Tablet 1 milliGRAM(s) Oral daily  escitalopram 20 milliGRAM(s) Oral daily    MEDICATIONS  (PRN):  diphenhydrAMINE 50 milliGRAM(s) Oral every 6 hours PRN EPS/agitation/anxiety  diphenhydrAMINE Injectable 50 milliGRAM(s) IntraMuscular once PRN Agitation  hydrOXYzine hydrochloride 50 milliGRAM(s) Oral every 6 hours PRN Anxiety  nicotine  Polacrilex Gum 2 milliGRAM(s) Oral every 2 hours PRN breakthrough cravings  OLANZapine 5 milliGRAM(s) Oral every 6 hours PRN Agitation and Anxiety  OLANZapine Injectable 5 milliGRAM(s) IntraMuscular once PRN Agitation and anxiety  polyethylene glycol 3350 17 Gram(s) Oral two times a day PRN constipation

## 2022-10-05 NOTE — BH INPATIENT PSYCHIATRY PROGRESS NOTE - NSBHASSESSSUMMFT_PSY_ALL_CORE
Pt is 52 y/o F domiciled with mother with PPHx of polysubstance use, opioid use disorder (previously on methadone), benzo dependence, anxiety, depression, previous suicide attempts presenting to ED for intentional opioid overdose. Pt required narcan with nonrebreather mask, on 2L NC overnight. Pt has hx of multiple psych hospitalizations including last for 26 days at [facility] discharged on 5/31/22 for SI w/ plan to cut herself. Patient with 2 admissions to [facility] this year from (3/28-4/6, 4/16-5/13). In ED endorsing SI with plan to cut herself. UTox +benzo, cocaine, methadone.    Differential diagnosis includes mood disorder secondary to substance use/withdrawal, MDD, mood disorder, cluster B personality traits.     Pt w/ elevated mood today in response to preferred discharge planning and Suboxone titration. She is anxious, intrusive at times, especially around medication administration times. She denies passive and active S/i/p. Will hold Lexapro titration. Suboxone titrated to 4mg/1mg TID for frequency of opioid abuse in the community and high risk for OD.      Plan  1. Legal: Admitted on 9.27 status  2. Safety: No reported SI/SIB/HI/VI currently on unit; continue routine observation.   	- PRN Zyprexa 5 mg PO/IM q6 hours  	- PRN Benadryl 50 mg PO/IM q6 hours  	- PRN Atarax 50 mg PO q6 hours  3. Psychiatric:   	ARIPiprazole 15 milliGRAM(s) Oral daily  	buprenorphine 4mG/naloxone 1 mG SL  Tablet 1 Tablet(s) SubLingual TID  	clonazePAM  Tablet 1 milliGRAM(s) Oral daily  	clonazePAM  Tablet 0.25mg 1300, 0.5mg at bedtime  	escitalopram 20 milliGRAM(s) Oral daily    4. Therapy: individual therapy focused on group & milieu therapy; consider DBT when patient able to meaningfully participate

## 2022-10-05 NOTE — BH INPATIENT PSYCHIATRY PROGRESS NOTE - NSCGISEVERILLNESS_PSY_ALL_CORE
3 = Mildly ill – clearly established symptoms with minimal, if any, distress or difficulty in social and occupational function
4 = Moderately ill – overt symptoms causing noticeable, but modest, functional impairment or distress; symptom level may warrant medication
5 = Markedly ill - intrusive symptoms that distinctly impair social/occupational function or cause intrusive levels of distress
4 = Moderately ill – overt symptoms causing noticeable, but modest, functional impairment or distress; symptom level may warrant medication
3 = Mildly ill – clearly established symptoms with minimal, if any, distress or difficulty in social and occupational function
4 = Moderately ill – overt symptoms causing noticeable, but modest, functional impairment or distress; symptom level may warrant medication

## 2022-10-05 NOTE — BH TREATMENT PLAN - NSCMSPTSTRENGTHS_PSY_ALL_CORE
Able to adapt/Physically healthy/Supportive family
Able to adapt/Physically healthy/Supportive family

## 2022-10-05 NOTE — BH TREATMENT PLAN - NSTXSUICIDINTERRN_PSY_ALL_CORE
Assess pt for S/I/I/P and SIB, explore healthy coping skills and protective factors, provide support, maintain safety, encourage pt to participate in groups and unit activities, identify triggers, administer and educate on ordered medications
Assess pt for S/I/I/P and SIB, explore healthy coping skills and protective factors, provide support, maintain safety, encourage pt to participate in groups and unit activities, identify triggers, administer and educate on ordered medications

## 2022-10-05 NOTE — BH TREATMENT PLAN - NSPTSTATEDGOAL_PSY_ALL_CORE
Pt. would like to be maintained on Klonopin and Suboxone
Pt. would like to be maintained on Klonopin and Suboxone

## 2022-10-05 NOTE — BH TREATMENT PLAN - NSTXDCOTHRINTERSW_PSY_ALL_CORE
SW will provide support, insight, discharge planning, psychoeducation, and will maintain contact with identified supports.
SW will provide support, insight, discharge planning, psychoeducation, and will maintain contact with identified supports.

## 2022-10-05 NOTE — BH TREATMENT PLAN - NSTXSUICIDINTERPR_PSY_ALL_CORE
Over the next seven days, the psychiatric rehabilitation team will meet with patient to support transition to the hospital and utilize individual and group therapy to assist patient in reaching the established psych rehab goal until discharge.
Pt has made fair progress, as patient has stated that if she were to endorse SI, patient would call 911 and go to hospital. Writer provided support and encouraged patient to also utilize support. Pt was receptive.

## 2022-10-05 NOTE — BH TREATMENT PLAN - NSTXPATIENTPARTICIPATE_PSY_ALL_CORE
Patient participated in development of after care plan
Patient participated in identification of needs/problems/goals for treatment/Patient participated in defining interventions/Patient participated in development of after care plan

## 2022-10-05 NOTE — BH TREATMENT PLAN - NSTXPLANTHERAPYSESSIONSFT_PSY_ALL_CORE
10-05-22  --  Type of session: Individual  Level of patient participation: Engaged  Duration of participation: Less than 15 minutes  Therapy conducted by: Psych rehab  Therapy Summary: Upon approach, patient was on phone. Patient states that she is doing well and is adjusting to the new medication. Patient states that she is currently oriented towards discharge. Pt was encouraged to engage in mileu. Patient denies SI and is able to contract for safety.    Patient has not attended any psychiatric rehabilitation groups during the last seven days despite daily prompting and encouragement from staff.

## 2022-10-05 NOTE — BH INPATIENT PSYCHIATRY PROGRESS NOTE - NSBHFUPINTERVALHXFT_PSY_A_CORE
Patient is followed up for s/p OD on methadone and substance use disorder.  Chart, medications and labs reviewed.  Patient is discussed with nursing staff. No significant overnight issues.  Compliant with her medications, no SE. In behavioral control, no prn for aggression. Vitals are stable.  The pt. expressed concern about her discharge date and appointment. The pt. was reassured when told the appt. details. The pt. became excited and elevated. The writer inquired about withdrawal Sx. The pt. stated that she was experiencing anxiety and nausea. She inquired about increasing her Klonopin dose and the writer educated her on why this is not advised. The writer inquired about illicit opioid use in the community to reassess Suboxone dose titration. The pt. confirmed abusing fentanyl and heroin. She reports "binging" for 5 days at a time, and then using every other day. The pt. agreed to titration of Suboxone as indicated for severity of opioid abuse.  The pt. denied A/H, V/H, s/i/p, h/i/p and delusions.

## 2022-10-06 RX ADMIN — BUPRENORPHINE AND NALOXONE 2 TABLET(S): 2; .5 TABLET SUBLINGUAL at 12:08

## 2022-10-06 RX ADMIN — POLYETHYLENE GLYCOL 3350 17 GRAM(S): 17 POWDER, FOR SOLUTION ORAL at 12:11

## 2022-10-06 RX ADMIN — ARIPIPRAZOLE 15 MILLIGRAM(S): 15 TABLET ORAL at 08:06

## 2022-10-06 RX ADMIN — BUPRENORPHINE AND NALOXONE 2 TABLET(S): 2; .5 TABLET SUBLINGUAL at 20:43

## 2022-10-06 RX ADMIN — ESCITALOPRAM OXALATE 20 MILLIGRAM(S): 10 TABLET, FILM COATED ORAL at 08:06

## 2022-10-06 RX ADMIN — Medication 1 MILLIGRAM(S): at 08:06

## 2022-10-06 RX ADMIN — Medication 0.25 MILLIGRAM(S): at 12:08

## 2022-10-06 RX ADMIN — Medication 0.5 MILLIGRAM(S): at 20:43

## 2022-10-06 RX ADMIN — BUPRENORPHINE AND NALOXONE 2 TABLET(S): 2; .5 TABLET SUBLINGUAL at 08:07

## 2022-10-06 NOTE — BH INPATIENT PSYCHIATRY PROGRESS NOTE - NSBHCHARTREVIEWVS_PSY_A_CORE FT
Vital Signs Last 24 Hrs  T(C): 35.6 (10-06-22 @ 06:38), Max: 36.7 (10-05-22 @ 19:20)  T(F): 96.1 (10-06-22 @ 06:38), Max: 98 (10-05-22 @ 19:20)  HR: --  BP: --  BP(mean): --  RR: --  SpO2: --    Orthostatic VS  10-06-22 @ 07:17  Lying BP: --/-- HR: --  Sitting BP: 90/60 HR: 85  Standing BP: 91/70 HR: 90  Site: --  Mode: electronic  Orthostatic VS  10-05-22 @ 19:20  Lying BP: --/-- HR: --  Sitting BP: 105/68 HR: 64  Standing BP: 110/91 HR: 68  Site: --  Mode: --  Orthostatic VS  10-05-22 @ 08:43  Lying BP: --/-- HR: --  Sitting BP: 97/79 HR: 84  Standing BP: 90/69 HR: 84  Site: --  Mode: electronic  Orthostatic VS  10-04-22 @ 19:32  Lying BP: --/-- HR: --  Sitting BP: 100/61 HR: 72  Standing BP: 105/90 HR: 100  Site: --  Mode: --

## 2022-10-06 NOTE — BH INPATIENT PSYCHIATRY PROGRESS NOTE - NSBHFUPINTERVALHXFT_PSY_A_CORE
Follow up for patient s/p overdose on methadone. Case reviewed with team, no events reported overnight. Vitals are at baseline. Patient adherent to medication regimen. Denies suicidal ideations, urges for non-suicidal self injurious behavior, auditory/visual hallucinations. Patient found in bed, took convincing to interview. Endorses that she slept well but still feels tired. She feels slow to get up and get ready for the day. Denies medication side effects and is adjusting to the change in dosage. Patient encouraged to get up and she agreed saying that she is hungry for lunch.  Follow up for patient s/p overdose on methadone. Case reviewed with team, no events reported overnight. Vitals are at baseline. Patient adherent to medication regimen. Denies suicidal ideations, urges for non-suicidal self injurious behavior, auditory/visual hallucinations. Patient found in bed, took convincing to interview. Endorses that she slept well but still feels tired. She feels slow to get up and get ready for the day. Denies medication side effects and is adjusting to the change in dosage. Patient encouraged to get up and she agreed saying that she is hungry for lunch. Right before next Suboxone and Clonopin dose patient gained more energy, became excited and fidgety.  Follow up for patient s/p overdose on methadone. Case reviewed with team, no events reported overnight. Vitals are at baseline. Patient adherent to medication regimen. Denies suicidal ideations, urges for non-suicidal self injurious behavior, auditory/visual hallucinations. Patient found in bed, took convincing to interview. Endorses that she slept well but still feels tired. She feels slow to get up and get ready for the day. Denies medication side effects and is adjusting to the change in dosage. Patient encouraged to get up and she agreed saying that she is hungry for lunch. Right before next Suboxone and Klonopin dose patient gained more energy, became excited and fidgety.

## 2022-10-06 NOTE — BH INPATIENT PSYCHIATRY PROGRESS NOTE - NSBHASSESSSUMMFT_PSY_ALL_CORE
Pt is 52 y/o F domiciled with mother with PPHx of polysubstance use, opioid use disorder (previously on methadone), benzo dependence, anxiety, depression, previous suicide attempts presenting to ED for intentional opioid overdose. Pt required narcan with nonrebreather mask, on 2L NC overnight. Pt has hx of multiple psych hospitalizations including last for 26 days at [facility] discharged on 5/31/22 for SI w/ plan to cut herself. Patient with 2 admissions to [facility] this year from (3/28-4/6, 4/16-5/13). In ED endorsing SI with plan to cut herself. UTox +benzo, cocaine, methadone.    Differential diagnosis includes mood disorder secondary to substance use/withdrawal, MDD, mood disorder, cluster B personality traits.     Pt presented with labile energy levels. Originally not interested in interview or participation, but as the morning progressed patient became more energetic. Patient currently denies SI/NSSIB/AH/VH. Continues to endorse being ready for discharge. Did not require any as needed medications overnight. In coordination with her family patient feels she will be ready for discharge on Monday. Able to contract for safety and denies urges or thoughts of self harm or suicide and is feeling "better".       Plan  1. Legal: Admitted on 9.27 status  2. Safety: No reported SI/SIB/HI/VI currently on unit; continue routine observation.   	- PRN Zyprexa 5 mg PO/IM q6 hours  	- PRN Benadryl 50 mg PO/IM q6 hours  	- PRN Atarax 50 mg PO q6 hours  3. Psychiatric:   	ARIPiprazole 15 milliGRAM(s) Oral daily  	buprenorphine 4mG/naloxone 1 mG SL  Tablet 1 Tablet(s) SubLingual TID  	clonazePAM  Tablet 1 milliGRAM(s) Oral daily  	clonazePAM  Tablet 0.25mg 1300, 0.5mg at bedtime  	escitalopram 20 milliGRAM(s) Oral daily    4. Therapy: individual therapy focused on group & milieu therapy; consider DBT when patient able to meaningfully participate             Pt is 52 y/o F domiciled with mother with PPHx of polysubstance use, opioid use disorder (previously on methadone), benzo dependence, anxiety, depression, previous suicide attempts presenting to ED for intentional opioid overdose. Pt required narcan with nonrebreather mask, on 2L NC overnight. Pt has hx of multiple psych hospitalizations including last for 26 days at [facility] discharged on 5/31/22 for SI w/ plan to cut herself. Patient with 2 admissions to [facility] this year from (3/28-4/6, 4/16-5/13). In ED endorsing SI with plan to cut herself. UTox +benzo, cocaine, methadone.    Differential diagnosis includes mood disorder secondary to substance use/withdrawal, MDD, mood disorder, cluster B personality traits.     Pt. mood is reactive to and congruent with events on the unit (i.e medication administration times, meal times, etc.). Originally not interested in interview or participation, but as the morning progressed patient became more energetic. Patient currently denies SI/NSSIB/AH/VH. Continues to endorse being ready for discharge. Did not require PRN medications overnight. In coordination with her family patient feels she will be ready for discharge on Monday. Able to contract for safety and denies urges or thoughts of self harm or suicide and is feeling "better".       Plan  1. Legal: Admitted on 9.27 status  2. Safety: No reported SI/SIB/HI/VI currently on unit; continue routine observation.   	- PRN Zyprexa 5 mg PO/IM q6 hours  	- PRN Benadryl 50 mg PO/IM q6 hours  	- PRN Atarax 50 mg PO q6 hours  3. Psychiatric:   	ARIPiprazole 15 milliGRAM(s) Oral daily  	buprenorphine 4mG/naloxone 1 mG SL  Tablet 1 Tablet(s) SubLingual TID  	clonazePAM  Tablet 1 milliGRAM(s) Oral daily  	clonazePAM  Tablet 0.25mg 1300, 0.5mg at bedtime  	escitalopram 20 milliGRAM(s) Oral daily    4. Therapy: individual therapy focused on group & milieu therapy; consider DBT when patient able to meaningfully participate

## 2022-10-06 NOTE — BH INPATIENT PSYCHIATRY PROGRESS NOTE - OTHER
OK guarded and goal directed  superficial "better" superficial, intrusive psychomotor agitation impaired; easily irritable if needs are not met immediately

## 2022-10-07 PROCEDURE — 99231 SBSQ HOSP IP/OBS SF/LOW 25: CPT

## 2022-10-07 RX ORDER — CLONAZEPAM 1 MG
1 TABLET ORAL
Qty: 49 | Refills: 0
Start: 2022-10-07 | End: 2022-10-20

## 2022-10-07 RX ORDER — CLONAZEPAM 1 MG
1 TABLET ORAL
Qty: 14 | Refills: 0
Start: 2022-10-07 | End: 2022-10-20

## 2022-10-07 RX ORDER — ESCITALOPRAM OXALATE 10 MG/1
1 TABLET, FILM COATED ORAL
Qty: 30 | Refills: 0
Start: 2022-10-07 | End: 2022-11-05

## 2022-10-07 RX ORDER — CLONAZEPAM 1 MG
1 TABLET ORAL
Qty: 0 | Refills: 0 | DISCHARGE
Start: 2022-10-07

## 2022-10-07 RX ORDER — NICOTINE POLACRILEX 2 MG
1 GUM BUCCAL
Qty: 168 | Refills: 0
Start: 2022-10-07 | End: 2022-10-20

## 2022-10-07 RX ORDER — ARIPIPRAZOLE 15 MG/1
1 TABLET ORAL
Qty: 30 | Refills: 0
Start: 2022-10-07 | End: 2022-11-05

## 2022-10-07 RX ORDER — POLYETHYLENE GLYCOL 3350 17 G/17G
17 POWDER, FOR SOLUTION ORAL
Qty: 0 | Refills: 0 | DISCHARGE
Start: 2022-10-07

## 2022-10-07 RX ORDER — CLONAZEPAM 1 MG
0.5 TABLET ORAL
Qty: 21 | Refills: 0
Start: 2022-10-07 | End: 2022-10-20

## 2022-10-07 RX ADMIN — BUPRENORPHINE AND NALOXONE 2 TABLET(S): 2; .5 TABLET SUBLINGUAL at 12:02

## 2022-10-07 RX ADMIN — ARIPIPRAZOLE 15 MILLIGRAM(S): 15 TABLET ORAL at 08:07

## 2022-10-07 RX ADMIN — POLYETHYLENE GLYCOL 3350 17 GRAM(S): 17 POWDER, FOR SOLUTION ORAL at 10:43

## 2022-10-07 RX ADMIN — Medication 0.5 MILLIGRAM(S): at 20:26

## 2022-10-07 RX ADMIN — BUPRENORPHINE AND NALOXONE 2 TABLET(S): 2; .5 TABLET SUBLINGUAL at 20:26

## 2022-10-07 RX ADMIN — Medication 1 MILLIGRAM(S): at 08:07

## 2022-10-07 RX ADMIN — BUPRENORPHINE AND NALOXONE 2 TABLET(S): 2; .5 TABLET SUBLINGUAL at 08:07

## 2022-10-07 RX ADMIN — Medication 0.25 MILLIGRAM(S): at 12:03

## 2022-10-07 RX ADMIN — ESCITALOPRAM OXALATE 20 MILLIGRAM(S): 10 TABLET, FILM COATED ORAL at 08:08

## 2022-10-07 NOTE — BH INPATIENT PSYCHIATRY PROGRESS NOTE - NSBHCONSDANGERSELF_PSY_A_CORE
suicidal behavior/suicidal ideation with plan and means

## 2022-10-07 NOTE — BH INPATIENT PSYCHIATRY PROGRESS NOTE - NSBHFUPINTERVALHXFT_PSY_A_CORE
Follow up for patient s/p overdose on methadone. Case reviewed with team, no events reported overnight. Vitals are at baseline. Patient adherent to medication regimen. Denies suicidal ideations, urges for non-suicidal self injurious behavior, auditory/visual hallucinations. Patient found in bed, stating "my tummy hurts." The pt. reported constipation, but had a BM after taking laxative. She reports abdominal discomfort is improving.   The pt. denied A/H, V/H, s/i/p, h/i/p and delusions. She denies depressed, reports she is feeling more hopeful. She denied cravings or withdrawal Sx.

## 2022-10-07 NOTE — BH INPATIENT PSYCHIATRY PROGRESS NOTE - NSBHCHARTREVIEWVS_PSY_A_CORE FT
Vital Signs Last 24 Hrs  T(C): 35.6 (10-07-22 @ 06:05), Max: 35.6 (10-07-22 @ 06:05)  T(F): 96 (10-07-22 @ 06:05), Max: 96 (10-07-22 @ 06:05)  HR: --  BP: --  BP(mean): --  RR: --  SpO2: --    Orthostatic VS  10-07-22 @ 08:30  Lying BP: --/-- HR: --  Sitting BP: 136/67 HR: 83  Standing BP: 122/78 HR: 98  Site: --  Mode: --  Orthostatic VS  10-06-22 @ 07:17  Lying BP: --/-- HR: --  Sitting BP: 90/60 HR: 85  Standing BP: 91/70 HR: 90  Site: --  Mode: electronic  Orthostatic VS  10-05-22 @ 19:20  Lying BP: --/-- HR: --  Sitting BP: 105/68 HR: 64  Standing BP: 110/91 HR: 68  Site: --  Mode: --

## 2022-10-07 NOTE — BH INPATIENT PSYCHIATRY PROGRESS NOTE - NSBHASSESSSUMMFT_PSY_ALL_CORE
Pt is 50 y/o F domiciled with mother with PPHx of polysubstance use, opioid use disorder (previously on methadone), benzo dependence, anxiety, depression, previous suicide attempts presenting to ED for intentional opioid overdose. Pt required narcan with nonrebreather mask, on 2L NC overnight. Pt has hx of multiple psych hospitalizations including last for 26 days at [facility] discharged on 5/31/22 for SI w/ plan to cut herself. Patient with 2 admissions to [facility] this year from (3/28-4/6, 4/16-5/13). In ED endorsing SI with plan to cut herself. UTox +benzo, cocaine, methadone.    Differential diagnosis includes mood disorder secondary to substance use/withdrawal, MDD, mood disorder, cluster B personality traits.     Pt. mood is reactive to and congruent with events on the unit (i.e medication administration times, meal times, etc.).  Patient currently denies SI/NSSIB/AH/VH. Continues to endorse being ready for discharge. Did not require PRN medications overnight. In coordination with her family patient feels she will be ready for discharge on Monday. Able to contract for safety and denies urges or thoughts of self harm or suicide and is feeling "better".   Pt. w/ abdominal discomfort improving after having BM. Will continue to monitor.         Plan  1. Legal: Admitted on 9.27 status  2. Safety: No reported SI/SIB/HI/VI currently on unit; continue routine observation.   	- PRN Zyprexa 5 mg PO/IM q6 hours  	- PRN Benadryl 50 mg PO/IM q6 hours  	- PRN Atarax 50 mg PO q6 hours  3. Psychiatric:   	ARIPiprazole 15 milliGRAM(s) Oral daily  	buprenorphine 4mG/naloxone 1 mG SL  Tablet 1 Tablet(s) SubLingual TID  	clonazePAM  Tablet 1 milliGRAM(s) Oral daily  	clonazePAM  Tablet 0.25mg 1300, 0.5mg at bedtime  	escitalopram 20 milliGRAM(s) Oral daily    4. Therapy: individual therapy focused on group & milieu therapy; consider DBT when patient able to meaningfully participate

## 2022-10-08 RX ADMIN — Medication 0.25 MILLIGRAM(S): at 12:13

## 2022-10-08 RX ADMIN — Medication 1 MILLIGRAM(S): at 08:10

## 2022-10-08 RX ADMIN — ARIPIPRAZOLE 15 MILLIGRAM(S): 15 TABLET ORAL at 08:10

## 2022-10-08 RX ADMIN — Medication 0.5 MILLIGRAM(S): at 20:21

## 2022-10-08 RX ADMIN — BUPRENORPHINE AND NALOXONE 2 TABLET(S): 2; .5 TABLET SUBLINGUAL at 08:09

## 2022-10-08 RX ADMIN — BUPRENORPHINE AND NALOXONE 2 TABLET(S): 2; .5 TABLET SUBLINGUAL at 12:13

## 2022-10-08 RX ADMIN — BUPRENORPHINE AND NALOXONE 2 TABLET(S): 2; .5 TABLET SUBLINGUAL at 20:21

## 2022-10-08 RX ADMIN — POLYETHYLENE GLYCOL 3350 17 GRAM(S): 17 POWDER, FOR SOLUTION ORAL at 12:44

## 2022-10-08 RX ADMIN — ESCITALOPRAM OXALATE 20 MILLIGRAM(S): 10 TABLET, FILM COATED ORAL at 08:09

## 2022-10-09 RX ADMIN — Medication 1 MILLIGRAM(S): at 08:07

## 2022-10-09 RX ADMIN — Medication 0.5 MILLIGRAM(S): at 19:55

## 2022-10-09 RX ADMIN — BUPRENORPHINE AND NALOXONE 2 TABLET(S): 2; .5 TABLET SUBLINGUAL at 08:07

## 2022-10-09 RX ADMIN — ARIPIPRAZOLE 15 MILLIGRAM(S): 15 TABLET ORAL at 08:09

## 2022-10-09 RX ADMIN — BUPRENORPHINE AND NALOXONE 2 TABLET(S): 2; .5 TABLET SUBLINGUAL at 19:55

## 2022-10-09 RX ADMIN — BUPRENORPHINE AND NALOXONE 2 TABLET(S): 2; .5 TABLET SUBLINGUAL at 12:12

## 2022-10-09 RX ADMIN — ESCITALOPRAM OXALATE 20 MILLIGRAM(S): 10 TABLET, FILM COATED ORAL at 08:07

## 2022-10-09 RX ADMIN — Medication 0.25 MILLIGRAM(S): at 12:12

## 2022-10-10 VITALS — TEMPERATURE: 98 F

## 2022-10-10 PROCEDURE — 99239 HOSP IP/OBS DSCHRG MGMT >30: CPT

## 2022-10-10 RX ORDER — BUPRENORPHINE AND NALOXONE 2; .5 MG/1; MG/1
2 TABLET SUBLINGUAL
Qty: 90 | Refills: 0
Start: 2022-10-10 | End: 2022-10-24

## 2022-10-10 RX ORDER — BUPRENORPHINE AND NALOXONE 2; .5 MG/1; MG/1
2 TABLET SUBLINGUAL
Qty: 90 | Refills: 0
Start: 2022-10-10 | End: 2022-11-25

## 2022-10-10 RX ORDER — BUPRENORPHINE AND NALOXONE 2; .5 MG/1; MG/1
2 TABLET SUBLINGUAL
Qty: 0 | Refills: 0 | DISCHARGE

## 2022-10-10 RX ADMIN — ARIPIPRAZOLE 15 MILLIGRAM(S): 15 TABLET ORAL at 07:50

## 2022-10-10 RX ADMIN — ESCITALOPRAM OXALATE 20 MILLIGRAM(S): 10 TABLET, FILM COATED ORAL at 07:49

## 2022-10-10 RX ADMIN — Medication 1 MILLIGRAM(S): at 07:49

## 2022-10-10 RX ADMIN — BUPRENORPHINE AND NALOXONE 2 TABLET(S): 2; .5 TABLET SUBLINGUAL at 07:49

## 2022-10-10 RX ADMIN — POLYETHYLENE GLYCOL 3350 17 GRAM(S): 17 POWDER, FOR SOLUTION ORAL at 07:49

## 2022-10-10 NOTE — BH INPATIENT PSYCHIATRY DISCHARGE NOTE - NSBHSUICIDESTATUS_PSY_ALL_CORE
Pt. denies s/i/p. Risk factors include PTSD, depression, anxiety, suicide attempts, chronic SI, borderline personality disorder, chronic substance use, lack of therapeutic relationships, non-compliance

## 2022-10-10 NOTE — BH INPATIENT PSYCHIATRY PROGRESS NOTE - NSBHCONSBHPROVCNTCTNOFT_PSY_A_CORE
Primary team will contact tomorrow 
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
## 2022-10-10 NOTE — BH DISCHARGE NOTE NURSING/SOCIAL WORK/PSYCH REHAB - NSCDUDCCRISIS_PSY_A_CORE
[facility] Well  1 [phone] [facility]-WELL ([phone])  Text "WELL" to 78813  Website: www.[employer]/.[facility] 1 (628) 621-HOPE (2604) Website: www.safehorizon.org/.  [facility] – Crisis Care for Children, Adults and Families  [address] [place] [address]  Mobile Crisis Hotline – (152) 294-1927/.National Suicide Prevention Lifeline 5 (530) 956-1122/.  [facility]  1 (167) LIFENET (419-9545)/.  [facility]  (572) 353-7337/.  [facility] Behavioral Health Helpline / [facility] Mobile Crisis  (645) 137-FHBT (7490)/.  [facility] Response Crisis Hotline  (209) 795-3671  24 hour telephone crisis intervention and suicide prevention hotline concerned with all mental health issues/.  [facility]  [address] [place] [address] [phone]) [phone]   Hours:  Monday through Friday from 9 AM to 3 PM/988 Suicide and Crisis Lifeline
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
## 2022-10-10 NOTE — BH DISCHARGE NOTE NURSING/SOCIAL WORK/PSYCH REHAB - NSDCPRGOAL_PSY_ALL_CORE
Pt made some progress towards her discharge.  Pt currently denies SI, HI, AH, and VH. Pt stated calling 911 or go to hospital as her suicide prevention plan. Writer has discussed the benefit of distracting herself including talking to someone, pt then agreed to call her friend or sister to prevent suicide. Pt has demonstrated daily compliance with medication. Pt is meds seeking, intrusive, demanding, elevated mood, childlike behavior during this admission. During this hospitalization, pt showed less than 5% of group attendance. During the group session, pt was unable to tolerated group structure, distractive, and required lot of redirection. Pt was visible on the unit, intrusive, poor boundaries, and required redirection at times. Pt showed fair ADLs. Pt has participated in her safety plan.

## 2022-10-10 NOTE — BH INPATIENT PSYCHIATRY PROGRESS NOTE - NSBHMETABOLIC_PSY_ALL_CORE_FT
BMI: BMI (kg/m2): 19.7 (09-27-22 @ 16:36)  HbA1c:   Glucose: POCT Blood Glucose.: 85 mg/dL (09-24-22 @ 19:56)    BP: --  Lipid Panel: 
BMI: BMI (kg/m2): 19.7 (09-27-22 @ 16:36)  HbA1c:   Glucose: POCT Blood Glucose.: 85 mg/dL (09-24-22 @ 19:56)    BP: 91/65 (09-27-22 @ 16:36) (91/65 - 91/65)  Lipid Panel: 
BMI: BMI (kg/m2): 19.7 (09-27-22 @ 16:36)  HbA1c:   Glucose: POCT Blood Glucose.: 85 mg/dL (09-24-22 @ 19:56)    BP: --  Lipid Panel: 
BMI: BMI (kg/m2): 19.7 (09-27-22 @ 16:36)  HbA1c:   Glucose: POCT Blood Glucose.: 85 mg/dL (09-24-22 @ 19:56)    BP: 91/65 (09-27-22 @ 16:36) (91/65 - 91/65)  Lipid Panel: 
BMI: BMI (kg/m2): 19.7 (09-27-22 @ 16:36)  HbA1c:   Glucose: POCT Blood Glucose.: 85 mg/dL (09-24-22 @ 19:56)    BP: 91/65 (09-27-22 @ 16:36) (91/65 - 91/65)  Lipid Panel: 
BMI: BMI (kg/m2): 19.7 (09-27-22 @ 16:36)  HbA1c:   Glucose: POCT Blood Glucose.: 85 mg/dL (09-24-22 @ 19:56)    BP: --  Lipid Panel: 
BMI: BMI (kg/m2): 19.7 (09-27-22 @ 16:36)  HbA1c:   Glucose: POCT Blood Glucose.: 85 mg/dL (09-24-22 @ 19:56)    BP: --  Lipid Panel:

## 2022-10-10 NOTE — BH INPATIENT PSYCHIATRY PROGRESS NOTE - NSTXSUBMISGOAL_PSY_ALL_CORE
Will identify 2 physical complications of substance abuse

## 2022-10-10 NOTE — BH INPATIENT PSYCHIATRY PROGRESS NOTE - NSBHCONSULTIPREASON_PSY_A_CORE
danger to self; mental illness expected to respond to inpatient care
other reason
danger to self; mental illness expected to respond to inpatient care

## 2022-10-10 NOTE — BH DISCHARGE NOTE NURSING/SOCIAL WORK/PSYCH REHAB - PATIENT PORTAL LINK FT
You can access the FollowMyHealth Patient Portal offered by [facility] by registering at the following website: [email or web address]. By joining [facility]’s FollowMyHealth portal, you will also be able to view your health information using other applications (apps) compatible with our system.

## 2022-10-10 NOTE — BH INPATIENT PSYCHIATRY DISCHARGE NOTE - NSDCCPCAREPLAN_GEN_ALL_CORE_FT
PRINCIPAL DISCHARGE DIAGNOSIS  Diagnosis: Severe recurrent major depression without psychotic features  Assessment and Plan of Treatment:       SECONDARY DISCHARGE DIAGNOSES  Diagnosis: Borderline personality disorder  Assessment and Plan of Treatment:     Diagnosis: Generalized anxiety disorder  Assessment and Plan of Treatment:     Diagnosis: Opioid use disorder, severe, on maintenance therapy, dependence  Assessment and Plan of Treatment:

## 2022-10-10 NOTE — BH INPATIENT PSYCHIATRY PROGRESS NOTE - NSBHATTESTTYPEVISIT_PSY_A_CORE
On-site Attending supervising BARTOLO (99XXX codes)

## 2022-10-10 NOTE — BH INPATIENT PSYCHIATRY PROGRESS NOTE - NSBHMSEAFFCONG_PSY_A_CORE
Non-congruent
Congruent
Non-congruent

## 2022-10-10 NOTE — BH DISCHARGE NOTE NURSING/SOCIAL WORK/PSYCH REHAB - DISCHARGE INSTRUCTIONS AFTERCARE APPOINTMENTS
In order to check the location, date, or time of your aftercare appointment, please refer to your Discharge Instructions Document given to you upon leaving the hospital.  If you have lost the instructions please call [phone]

## 2022-10-10 NOTE — BH INPATIENT PSYCHIATRY PROGRESS NOTE - NSTXIMPULSGOAL_PSY_ALL_CORE
Will identify 1 thought that precedes an impulsive acts

## 2022-10-10 NOTE — BH INPATIENT PSYCHIATRY PROGRESS NOTE - NSTXSUICIDDATETRGT_PSY_ALL_CORE
05-Oct-2022
10-Oct-2022
12-Oct-2022
05-Oct-2022
12-Oct-2022
05-Oct-2022
12-Oct-2022

## 2022-10-10 NOTE — BH INPATIENT PSYCHIATRY PROGRESS NOTE - NSBHATTESTBILLINGAW_PSY_A_CORE
[id number]-Ywwyrwtxuc Inpatient care - low complexity - 15 minutes
[id number]-Opydqwmrxe Inpatient care - moderate complexity - 25 minutes
[id number]-Iyrcmwepah Inpatient care - low complexity - 15 minutes
85810-Zganvkhhtq Inpatient care - moderate complexity - 25 minutes
[id number]-Vnoutqkoyb Inpatient care - moderate complexity - 25 minutes
75943-Dnrcdbymfs Inpatient care - moderate complexity - 25 minutes
[id number]-Qwqcotocwm Inpatient care - moderate complexity - 25 minutes
[id number]-Lazuihrclk Inpatient care - moderate complexity - 25 minutes
[id number]-Wksmumlymm Inpatient care - moderate complexity - 25 minutes
[id number]-Mlxumtttod Inpatient care - moderate complexity - 25 minutes
88547-Dxeckkzlfr Inpatient care - low complexity - 15 minutes

## 2022-10-10 NOTE — BH INPATIENT PSYCHIATRY PROGRESS NOTE - CURRENT MEDICATION
MEDICATIONS  (STANDING):  ARIPiprazole 15 milliGRAM(s) Oral daily  buprenorphine 2 mG/naloxone 0.5 mG SL  Tablet 2 Tablet(s) SubLingual <User Schedule>  clonazePAM  Tablet 0.25 milliGRAM(s) Oral <User Schedule>  clonazePAM  Tablet 0.5 milliGRAM(s) Oral at bedtime  escitalopram 20 milliGRAM(s) Oral daily    MEDICATIONS  (PRN):  diphenhydrAMINE 50 milliGRAM(s) Oral every 6 hours PRN EPS/agitation/anxiety  diphenhydrAMINE Injectable 50 milliGRAM(s) IntraMuscular once PRN Agitation  hydrOXYzine hydrochloride 50 milliGRAM(s) Oral every 6 hours PRN Anxiety  nicotine  Polacrilex Gum 2 milliGRAM(s) Oral every 2 hours PRN breakthrough cravings  OLANZapine 5 milliGRAM(s) Oral every 6 hours PRN Agitation and Anxiety  OLANZapine Injectable 5 milliGRAM(s) IntraMuscular once PRN Agitation and anxiety  polyethylene glycol 3350 17 Gram(s) Oral two times a day PRN constipation

## 2022-10-10 NOTE — BH INPATIENT PSYCHIATRY PROGRESS NOTE - NSBHFUPINTERVALHXFT_PSY_A_CORE
Follow up for patient s/p overdose on methadone. Case reviewed with team, no events reported overnight. Vitals are at baseline. Patient adherent to medication regimen. Denies suicidal ideations, urges for non-suicidal self injurious behavior, auditory/visual hallucinations. Pt. appeared bright and energetic, anticipating discharge. Pt. reports she was going to "[employer] today after discharge. Pt. was advised of the importance of attending her outpatient appointment on Monday. Pt. reported she understood. The pt. stated that she would be unable to  Suboxone at outpatient pharmacy. The pt. was advised that [facility] only has 7 days worth of Suboxone remaining. The pt. accepted this instead of going to outpatient pharmacy.   She denies depression Sx, reports she is feeling more hopeful. She denied cravings or withdrawal Sx. Pt. was seen by the treatment to assess risk. The pt. is not an acute risk of harm to herself or others and is appropriate for discharge. The pt. was advised to call 911, visit the nearest ED or the crisis clinic if Sx worsen.

## 2022-10-10 NOTE — BH INPATIENT PSYCHIATRY DISCHARGE NOTE - NSBHSUBSTUSED_PSY_A_CORE
Amphetamines.../Benzodiazepines/Cocaine/Crack/Heroin/Opiates Simple / Intermediate / Complex Repair - Final Wound Length In Cm: 2.7

## 2022-10-10 NOTE — BH INPATIENT PSYCHIATRY PROGRESS NOTE - NSCGIIMPROVESX_PSY_ALL_CORE
3 = Minimally improved - slightly better with little or no clinically meaningful reduction of symptoms.  Represents very little change in basic clinical status, level of care, or functional capacity.
2 = Much improved - notably better with signficant reduction of symptoms; increase in the level of functioning but some symptoms remain

## 2022-10-10 NOTE — BH INPATIENT PSYCHIATRY PROGRESS NOTE - NSBHMSETHTCONTENT_PSY_A_CORE
Unremarkable
Preoccupations
Preoccupations
Preoccupations/Suicidality
Preoccupations
Preoccupations/Other
Preoccupations
Unremarkable
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
## 2022-10-10 NOTE — BH INPATIENT PSYCHIATRY PROGRESS NOTE - NSTXDCOTHRDATETRGT_PSY_ALL_CORE
05-Oct-2022
12-Oct-2022
05-Oct-2022
12-Oct-2022
05-Oct-2022
12-Oct-2022
12-Oct-2022
05-Oct-2022
05-Oct-2022

## 2022-10-10 NOTE — BH INPATIENT PSYCHIATRY PROGRESS NOTE - NSBHASSESSSUMMFT_PSY_ALL_CORE
Pt is 52 y/o F domiciled with mother with PPHx of polysubstance use, opioid use disorder (previously on methadone), benzo dependence, anxiety, depression, previous suicide attempts presenting to ED for intentional opioid overdose. Pt required narcan with nonrebreather mask, on 2L NC overnight. Pt has hx of multiple psych hospitalizations including last for 26 days at [facility] discharged on 5/31/22 for SI w/ plan to cut herself. Patient with 2 admissions to [facility] this year from (3/28-4/6, 4/16-5/13). In ED endorsing SI with plan to cut herself. UTox +benzo, cocaine, methadone.    Differential diagnosis includes mood disorder secondary to substance use/withdrawal, MDD, mood disorder, cluster B personality traits.     Pt. mood is reactive to and congruent with events  (i.e medication administration times, discharge planning, meal times, etc.).  Patient currently denies SI/NSSIB/AH/VH. Continues to endorse being ready for discharge. Did not require PRN medications overnight. She is no longer an acute risk and appropriate for discharge.  Pt. reports she is traveling with her family today. Pt. told that she must return to attend outpatient appointment on 10/17. The pt. was also told that she has 7 days worth of Suboxone and will require a refill on 10/17, as [facility] did not have additional Suboxone and the pt. stated she will not able to  her medications from her outpatient pharmacy today.  Pt. also advised her Klonopin Rx was filled prior to admission and is not due. She was instructed to continue Klonopin taper at 1mg daily and 0.5mg at bedtime.         Plan  Psychiatric:   	ARIPiprazole 15 milliGRAM(s) Oral daily  	buprenorphine 4mG/naloxone 1 mG SL  Tablet 1 Tablet(s) SubLingual TID  	clonazePAM  Tablet 1 milliGRAM(s) Oral daily  	clonazePAM  Tablet 0.5mg at bedtime  	escitalopram 20 milliGRAM(s) Oral daily    Dispo: [facility] ARS

## 2022-10-10 NOTE — BH INPATIENT PSYCHIATRY PROGRESS NOTE - NSBHMSEAFFQUAL_PSY_A_CORE
Irritable/Anxious
Elevated
Anxious
Anxious
Elevated
Anxious
Anxious
Elevated
Anxious
Elevated
Anxious

## 2022-10-10 NOTE — BH INPATIENT PSYCHIATRY PROGRESS NOTE - NSBHATTESTAPPBILLTIME_PSY_A_CORE
I attest my time as BARTOLO is greater than 50% of the total combined time spent on qualifying patient care activities. I have reviewed and verified the documentation.

## 2022-10-10 NOTE — BH INPATIENT PSYCHIATRY PROGRESS NOTE - NSTXSUICIDDATEEST_PSY_ALL_CORE
28-Sep-2022

## 2022-10-10 NOTE — BH INPATIENT PSYCHIATRY PROGRESS NOTE - NSBHFUPINTERVALCCFT_PSY_A_CORE
"My tummy hurts"
"I'm good"
"I'm going to [facility]"
Depression/Substance use
Depression/Substance use
"Can I be discharged?"
"I've had anxiety since birth"
Depression/Substance use
"I have a bad headache"
Depression/Substance use
Depression/Substance use

## 2022-10-10 NOTE — BH INPATIENT PSYCHIATRY DISCHARGE NOTE - NSDCMRMEDTOKEN_GEN_ALL_CORE_FT
ARIPiprazole 15 mg oral tablet: 1 tab(s) orally once a day  buprenorphine-naloxone 2 mg-0.5 mg sublingual film: 2 film(s) sublingual 3 times a day  clonazePAM 1 mg oral tablet: 1 tab(s) orally once a day in the morning  1/2 tab orally at bedtime   escitalopram 20 mg oral tablet: 1 tab(s) orally once a day  nicotine 2 mg oral transmucosal gum: 1 gum oral transmucosal every 2 hours  polyethylene glycol 3350 oral powder for reconstitution: 17 gram(s) orally 2 times a day, As needed, constipation   ARIPiprazole 15 mg oral tablet: 1 tab(s) orally once a day  buprenorphine-naloxone 2 mg-0.5 mg sublingual film: 2 film(s) sublingual 3 times a day MDD:12mg/day buprenorphine  clonazePAM 1 mg oral tablet: 1 tab(s) orally once a day in the morning  1/2 tab orally at bedtime   escitalopram 20 mg oral tablet: 1 tab(s) orally once a day  nicotine 2 mg oral transmucosal gum: 1 gum oral transmucosal every 2 hours  polyethylene glycol 3350 oral powder for reconstitution: 17 gram(s) orally 2 times a day, As needed, constipation

## 2022-10-10 NOTE — BH INPATIENT PSYCHIATRY PROGRESS NOTE - NSBHATTESTBILLONSITE_PSY_A_CORE
BARTOLO to bill
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
## 2022-10-10 NOTE — BH INPATIENT PSYCHIATRY PROGRESS NOTE - ADDITIONAL DETAILS / COMMENTS
Impulsive: Poor  Reliability: Poor 
  Reliability: Poor 
Impulsive: Poor  Reliability: Poor 

## 2022-10-10 NOTE — BH INPATIENT PSYCHIATRY DISCHARGE NOTE - NSBHMETABOLIC_PSY_ALL_CORE_FT
BMI: BMI (kg/m2): 19.7 (09-27-22 @ 16:36)  HbA1c:   Glucose: POCT Blood Glucose.: 85 mg/dL (09-24-22 @ 19:56)    BP: --  Lipid Panel:

## 2022-10-10 NOTE — BH INPATIENT PSYCHIATRY PROGRESS NOTE - NSBHCHARTREVIEWVS_PSY_A_CORE FT
Vital Signs Last 24 Hrs  T(C): 36.9 (10-10-22 @ 06:28), Max: 36.9 (10-10-22 @ 06:28)  T(F): 98.4 (10-10-22 @ 06:28), Max: 98.4 (10-10-22 @ 06:28)  HR: --  BP: --  BP(mean): --  RR: --  SpO2: --    Orthostatic VS  10-10-22 @ 08:28  Lying BP: --/-- HR: --  Sitting BP: 96/60 HR: 67  Standing BP: 95/62 HR: 74  Site: --  Mode: --  Orthostatic VS  10-09-22 @ 20:21  Lying BP: --/-- HR: --  Sitting BP: 105/88 HR: 69  Standing BP: 102/52 HR: 79  Site: --  Mode: --  Orthostatic VS  10-09-22 @ 07:58  Lying BP: --/-- HR: --  Sitting BP: 99/58 HR: 68  Standing BP: 92/63 HR: 77  Site: --  Mode: --  Orthostatic VS  10-08-22 @ 19:49  Lying BP: --/-- HR: --  Sitting BP: 100/63 HR: 70  Standing BP: 99/60 HR: 75  Site: upper left arm  Mode: electronic

## 2022-10-10 NOTE — BH INPATIENT PSYCHIATRY PROGRESS NOTE - NSTXDCOTHRDATEEST_PSY_ALL_CORE
28-Sep-2022
28-Sep-2022
05-Oct-2022
28-Sep-2022
05-Oct-2022
05-Oct-2022
28-Sep-2022
28-Sep-2022
05-Oct-2022
28-Sep-2022
28-Sep-2022

## 2022-10-10 NOTE — BH INPATIENT PSYCHIATRY PROGRESS NOTE - NSBHMSEKNOWHOW_PSY_ALL_CORE
Vocabulary

## 2022-10-29 ENCOUNTER — INPATIENT (INPATIENT)
Facility: HOSPITAL | Age: 52
LOS: 12 days | Discharge: HOME | End: 2022-11-11
Attending: PSYCHIATRY & NEUROLOGY | Admitting: PSYCHIATRY & NEUROLOGY

## 2022-10-29 VITALS
TEMPERATURE: 98 F | SYSTOLIC BLOOD PRESSURE: 101 MMHG | OXYGEN SATURATION: 100 % | HEIGHT: 64 IN | RESPIRATION RATE: 18 BRPM | DIASTOLIC BLOOD PRESSURE: 78 MMHG | HEART RATE: 88 BPM

## 2022-10-29 DIAGNOSIS — F41.9 ANXIETY DISORDER, UNSPECIFIED: ICD-10-CM

## 2022-10-29 DIAGNOSIS — F12.10 CANNABIS ABUSE, UNCOMPLICATED: ICD-10-CM

## 2022-10-29 DIAGNOSIS — F32.9 MAJOR DEPRESSIVE DISORDER, SINGLE EPISODE, UNSPECIFIED: ICD-10-CM

## 2022-10-29 DIAGNOSIS — F32.A DEPRESSION, UNSPECIFIED: ICD-10-CM

## 2022-10-29 DIAGNOSIS — F11.90 OPIOID USE, UNSPECIFIED, UNCOMPLICATED: ICD-10-CM

## 2022-10-29 LAB
ALBUMIN SERPL ELPH-MCNC: 4.4 G/DL — SIGNIFICANT CHANGE UP (ref 3.3–5)
ALP SERPL-CCNC: 72 U/L — SIGNIFICANT CHANGE UP (ref 40–120)
ALT FLD-CCNC: 6 U/L — SIGNIFICANT CHANGE UP (ref 4–33)
AMPHET UR-MCNC: NEGATIVE — SIGNIFICANT CHANGE UP
ANION GAP SERPL CALC-SCNC: 13 MMOL/L — SIGNIFICANT CHANGE UP (ref 7–14)
APAP SERPL-MCNC: <10 UG/ML — LOW (ref 15–25)
APPEARANCE UR: CLEAR — SIGNIFICANT CHANGE UP
AST SERPL-CCNC: 14 U/L — SIGNIFICANT CHANGE UP (ref 4–32)
BACTERIA # UR AUTO: NEGATIVE — SIGNIFICANT CHANGE UP
BARBITURATES UR SCN-MCNC: NEGATIVE — SIGNIFICANT CHANGE UP
BASOPHILS # BLD AUTO: 0.03 K/UL — SIGNIFICANT CHANGE UP (ref 0–0.2)
BASOPHILS NFR BLD AUTO: 0.6 % — SIGNIFICANT CHANGE UP (ref 0–2)
BENZODIAZ UR-MCNC: NEGATIVE — SIGNIFICANT CHANGE UP
BILIRUB SERPL-MCNC: 0.4 MG/DL — SIGNIFICANT CHANGE UP (ref 0.2–1.2)
BILIRUB UR-MCNC: NEGATIVE — SIGNIFICANT CHANGE UP
BUN SERPL-MCNC: 14 MG/DL — SIGNIFICANT CHANGE UP (ref 7–23)
CALCIUM SERPL-MCNC: 9.3 MG/DL — SIGNIFICANT CHANGE UP (ref 8.4–10.5)
CHLORIDE SERPL-SCNC: 109 MMOL/L — HIGH (ref 98–107)
CO2 SERPL-SCNC: 23 MMOL/L — SIGNIFICANT CHANGE UP (ref 22–31)
COCAINE METAB.OTHER UR-MCNC: NEGATIVE — SIGNIFICANT CHANGE UP
COLOR SPEC: SIGNIFICANT CHANGE UP
CREAT SERPL-MCNC: 0.48 MG/DL — LOW (ref 0.5–1.3)
CREATININE URINE RESULT, DAU: 33 MG/DL — SIGNIFICANT CHANGE UP
DIFF PNL FLD: NEGATIVE — SIGNIFICANT CHANGE UP
EGFR: 116 ML/MIN/1.73M2 — SIGNIFICANT CHANGE UP
EOSINOPHIL # BLD AUTO: 0.03 K/UL — SIGNIFICANT CHANGE UP (ref 0–0.5)
EOSINOPHIL NFR BLD AUTO: 0.6 % — SIGNIFICANT CHANGE UP (ref 0–6)
EPI CELLS # UR: 5 /HPF — SIGNIFICANT CHANGE UP (ref 0–5)
ETHANOL SERPL-MCNC: 12 MG/DL — HIGH
FLUAV AG NPH QL: SIGNIFICANT CHANGE UP
FLUBV AG NPH QL: SIGNIFICANT CHANGE UP
GLUCOSE SERPL-MCNC: 102 MG/DL — HIGH (ref 70–99)
GLUCOSE UR QL: NEGATIVE — SIGNIFICANT CHANGE UP
HCT VFR BLD CALC: 38.5 % — SIGNIFICANT CHANGE UP (ref 34.5–45)
HGB BLD-MCNC: 12.4 G/DL — SIGNIFICANT CHANGE UP (ref 11.5–15.5)
IANC: 3.24 K/UL — SIGNIFICANT CHANGE UP (ref 1.8–7.4)
IMM GRANULOCYTES NFR BLD AUTO: 0.2 % — SIGNIFICANT CHANGE UP (ref 0–0.9)
KETONES UR-MCNC: NEGATIVE — SIGNIFICANT CHANGE UP
LEUKOCYTE ESTERASE UR-ACNC: ABNORMAL
LYMPHOCYTES # BLD AUTO: 1.46 K/UL — SIGNIFICANT CHANGE UP (ref 1–3.3)
LYMPHOCYTES # BLD AUTO: 29 % — SIGNIFICANT CHANGE UP (ref 13–44)
MCHC RBC-ENTMCNC: 29.4 PG — SIGNIFICANT CHANGE UP (ref 27–34)
MCHC RBC-ENTMCNC: 32.2 GM/DL — SIGNIFICANT CHANGE UP (ref 32–36)
MCV RBC AUTO: 91.2 FL — SIGNIFICANT CHANGE UP (ref 80–100)
METHADONE UR-MCNC: NEGATIVE — SIGNIFICANT CHANGE UP
MONOCYTES # BLD AUTO: 0.27 K/UL — SIGNIFICANT CHANGE UP (ref 0–0.9)
MONOCYTES NFR BLD AUTO: 5.4 % — SIGNIFICANT CHANGE UP (ref 2–14)
NEUTROPHILS # BLD AUTO: 3.24 K/UL — SIGNIFICANT CHANGE UP (ref 1.8–7.4)
NEUTROPHILS NFR BLD AUTO: 64.2 % — SIGNIFICANT CHANGE UP (ref 43–77)
NITRITE UR-MCNC: NEGATIVE — SIGNIFICANT CHANGE UP
NRBC # BLD: 0 /100 WBCS — SIGNIFICANT CHANGE UP (ref 0–0)
NRBC # FLD: 0 K/UL — SIGNIFICANT CHANGE UP (ref 0–0)
OPIATES UR-MCNC: NEGATIVE — SIGNIFICANT CHANGE UP
OXYCODONE UR-MCNC: NEGATIVE — SIGNIFICANT CHANGE UP
PCP SPEC-MCNC: SIGNIFICANT CHANGE UP
PCP UR-MCNC: NEGATIVE — SIGNIFICANT CHANGE UP
PH UR: 6.5 — SIGNIFICANT CHANGE UP (ref 5–8)
PLATELET # BLD AUTO: 196 K/UL — SIGNIFICANT CHANGE UP (ref 150–400)
POTASSIUM SERPL-MCNC: 3.8 MMOL/L — SIGNIFICANT CHANGE UP (ref 3.5–5.3)
POTASSIUM SERPL-SCNC: 3.8 MMOL/L — SIGNIFICANT CHANGE UP (ref 3.5–5.3)
PROT SERPL-MCNC: 6.9 G/DL — SIGNIFICANT CHANGE UP (ref 6–8.3)
PROT UR-MCNC: NEGATIVE — SIGNIFICANT CHANGE UP
RBC # BLD: 4.22 M/UL — SIGNIFICANT CHANGE UP (ref 3.8–5.2)
RBC # FLD: 14.1 % — SIGNIFICANT CHANGE UP (ref 10.3–14.5)
RBC CASTS # UR COMP ASSIST: 1 /HPF — SIGNIFICANT CHANGE UP (ref 0–4)
RSV RNA NPH QL NAA+NON-PROBE: SIGNIFICANT CHANGE UP
SALICYLATES SERPL-MCNC: <0.3 MG/DL — LOW (ref 15–30)
SARS-COV-2 RNA SPEC QL NAA+PROBE: SIGNIFICANT CHANGE UP
SODIUM SERPL-SCNC: 145 MMOL/L — SIGNIFICANT CHANGE UP (ref 135–145)
SP GR SPEC: 1.01 — SIGNIFICANT CHANGE UP (ref 1.01–1.05)
THC UR QL: POSITIVE
TSH SERPL-MCNC: 1.16 UIU/ML — SIGNIFICANT CHANGE UP (ref 0.27–4.2)
UROBILINOGEN FLD QL: SIGNIFICANT CHANGE UP
WBC # BLD: 5.04 K/UL — SIGNIFICANT CHANGE UP (ref 3.8–10.5)
WBC # FLD AUTO: 5.04 K/UL — SIGNIFICANT CHANGE UP (ref 3.8–10.5)
WBC UR QL: 6 /HPF — HIGH (ref 0–5)

## 2022-10-29 PROCEDURE — 93010 ELECTROCARDIOGRAM REPORT: CPT

## 2022-10-29 PROCEDURE — 99285 EMERGENCY DEPT VISIT HI MDM: CPT

## 2022-10-29 RX ORDER — NICOTINE POLACRILEX 2 MG
2 GUM BUCCAL
Refills: 0 | Status: DISCONTINUED | OUTPATIENT
Start: 2022-10-30 | End: 2022-11-11

## 2022-10-29 RX ORDER — BUPRENORPHINE AND NALOXONE 2; .5 MG/1; MG/1
1 TABLET SUBLINGUAL ONCE
Refills: 0 | Status: DISCONTINUED | OUTPATIENT
Start: 2022-10-29 | End: 2022-10-29

## 2022-10-29 RX ORDER — CLONAZEPAM 1 MG
1 TABLET ORAL ONCE
Refills: 0 | Status: DISCONTINUED | OUTPATIENT
Start: 2022-10-29 | End: 2022-10-29

## 2022-10-29 RX ORDER — OLANZAPINE 15 MG/1
5 TABLET, FILM COATED ORAL ONCE
Refills: 0 | Status: DISCONTINUED | OUTPATIENT
Start: 2022-10-30 | End: 2022-11-11

## 2022-10-29 RX ORDER — ARIPIPRAZOLE 15 MG/1
15 TABLET ORAL DAILY
Refills: 0 | Status: DISCONTINUED | OUTPATIENT
Start: 2022-10-30 | End: 2022-11-01

## 2022-10-29 RX ORDER — OLANZAPINE 15 MG/1
5 TABLET, FILM COATED ORAL EVERY 6 HOURS
Refills: 0 | Status: DISCONTINUED | OUTPATIENT
Start: 2022-10-30 | End: 2022-11-11

## 2022-10-29 RX ORDER — CLONAZEPAM 1 MG
1 TABLET ORAL DAILY
Refills: 0 | Status: DISCONTINUED | OUTPATIENT
Start: 2022-10-30 | End: 2022-11-06

## 2022-10-29 RX ORDER — ALBUTEROL 90 UG/1
2 AEROSOL, METERED ORAL EVERY 6 HOURS
Refills: 0 | Status: DISCONTINUED | OUTPATIENT
Start: 2022-10-30 | End: 2022-11-11

## 2022-10-29 RX ORDER — HYDROXYZINE HCL 10 MG
50 TABLET ORAL EVERY 6 HOURS
Refills: 0 | Status: DISCONTINUED | OUTPATIENT
Start: 2022-10-30 | End: 2022-11-11

## 2022-10-29 RX ORDER — DIPHENHYDRAMINE HCL 50 MG
50 CAPSULE ORAL ONCE
Refills: 0 | Status: DISCONTINUED | OUTPATIENT
Start: 2022-10-30 | End: 2022-11-11

## 2022-10-29 RX ORDER — ESCITALOPRAM OXALATE 10 MG/1
20 TABLET, FILM COATED ORAL DAILY
Refills: 0 | Status: DISCONTINUED | OUTPATIENT
Start: 2022-10-30 | End: 2022-11-01

## 2022-10-29 RX ADMIN — BUPRENORPHINE AND NALOXONE 1 TABLET(S): 2; .5 TABLET SUBLINGUAL at 21:39

## 2022-10-29 RX ADMIN — Medication 1 MILLIGRAM(S): at 21:40

## 2022-10-29 NOTE — ED BEHAVIORAL HEALTH ASSESSMENT NOTE - PSYCHIATRIC ISSUES AND PLAN (INCLUDE STANDING AND PRN MEDICATION)
Lexapro 20mg daily; Abilify 15mg daily; Klonoipn 1mg daily; Atarax 50mg Po q6H PRN anxiety, Haldol 5mg/Benadryl 50mg PO/IM q6H PRN agitation/severe agitation Lexapro 20mg daily; Abilify 15mg daily; Klonopin 1mg daily; Atarax 50mg Po q6H PRN anxiety, Haldol 5mg/Benadryl 50mg PO/IM q6H PRN agitation/severe agitation

## 2022-10-29 NOTE — ED PROVIDER NOTE - ATTENDING CONTRIBUTION TO CARE
MD Rawls:  patient seen and evaluated with the resident.  I was present for key portions of the History & Physical, and I agree with the Impression & Plan.  MD Rawls:  50 yo F, c/o SI.  Endorses that she wants to cut self.    Context:  +hx opioid dependence.  last dose suboxone 5d ago.    Assoc Sx:  nausea/body aches, goose bumps. MD Rawls:  patient seen and evaluated with the resident.  I was present for key portions of the History & Physical, and I agree with the Impression & Plan.  MD Rawls:  50 yo F, c/o SI.  Endorses that she wants to cut self.    Context:  +hx opioid dependence.  last dose suboxone 5d ago.  Has Hx of cutting behavior  Assoc Sx:  nausea/body aches, goose bumps.  Patient states that she is feeling like cutting herself, and wants to die.   Better: n/a  Worse:  being w/o her suboxone 2/0.5mg and clonazepam 1mg has been making her feel worse  VS: wnl  Physical Exam: adult F, anxious, NCAT, PERRL, EOMI, neck supple, RRR, CTA B, Abd: s/nd/nt,   Ext: no edema, Neuro: AAOx3, ambulates w/o diff, strength 5/5 & symmetric throughout.  Psych:  +SI (states she wants to cut herself open and die), denies A/V hallucinations  Impression:  SI and opioid WD.  iSTOP performed and verified her dose suboxone 2/0.5mg as well as clonazepam 1mg.  Plan:  will administer clonazepam (to avoid benzo WD) + suboxone 2mg/0.5mg.

## 2022-10-29 NOTE — ED ADULT TRIAGE NOTE - CCCP TRG CHIEF CMPLNT
"Chief Complaint  Hospital Follow Up Visit and Urinary Tract Infection (burning with urination )    Subjective  Complains of being weak again, burning with urination, family states that she had some drainage and redness yellowish drainage from her hip incision and was recently started on Keflex per the orthopedic surgeon,        Bing Cruz presents to [facility] INTERNAL [facility]      Objective   Vital Signs  Vitals:    07/28/21 1506   BP: 141/81   Pulse: 88   Temp: 97.5 °F (36.4 °C)   SpO2: 97%   Weight: 64 kg (141 lb)   Height: 170.2 cm (67\")      Review of Systems more depressed,     Physical Exam  --- patient was seen she had no lower extremity edema she was sitting up she was alert and oriented her cardiac exam revealed a regular rhythm her lungs were clear anteriorly and laterally, she was pleasant cooperative  Result Review :   Lab Results   Component Value Date    PROBNP 1,648.0 07/20/2021    BNP 1385 12/05/2020    BNP 4183 (H) 10/12/2020    BNP 5163 (H) 09/09/2020     CMP    CMP 7/21/21 7/22/21 7/23/21   Glucose 93 138 (A) 101 (A)   BUN 16 11 17   Creatinine 0.69 0.64 0.79   eGFR Non African Am 80 87 68   Sodium 139 136 136   Potassium 3.6 4.5 3.7   Chloride 102 101 104   Calcium 8.5 8.7 8.7   Albumin 2.90 (A)     Total Bilirubin 1.1     Alkaline Phosphatase 68     AST (SGOT) 12     ALT (SGPT) 12     (A) Abnormal value            CBC w/diff    CBC w/Diff 7/21/21 7/22/21 7/23/21   WBC 8.63 8.21 9.80   RBC 3.70 (A) 3.86 3.59 (A)   Hemoglobin 12.2 12.7 11.6 (A)   Hematocrit 36.1 37.6 35.0   MCV 97.6 (A) 97.4 (A) 97.5 (A)   MCH 33.0 32.9 32.3   MCHC 33.8 33.8 33.1   RDW 13.1 12.5 12.6   Platelets 185 192 195   Neutrophil Rel % 60.5 85.4 (A) 74.6   Immature Granulocyte Rel % 1.4 (A) 1.5 (A) 0.8 (A)   Lymphocyte Rel % 24.2 9.7 (A) 16.7 (A)   Monocyte Rel % 8.1 3.3 (A) 7.6   Eosinophil Rel % 5.7 0.0 (A) 0.2 (A)   Basophil Rel % 0.1 0.1 0.1   (A) Abnormal value             Lipid Panel  "   Lipid Panel 9/9/20 12/5/20 7/15/21   Total Cholesterol   187   Total Cholesterol 153 162    Triglycerides 232 (A) 285 (A) 136   HDL Cholesterol 66 (A) 71 (A) 79 (A)   VLDL Cholesterol 46 (A) 57 (A) 23   LDL Cholesterol  41 (A) 34 (A) 85   LDL/HDL Ratio   1.02   (A) Abnormal value       Comments are available for some flowsheets but are not being displayed.            Lab Results   Component Value Date    TSH 2.790 07/21/2021    TSH 0.749 07/15/2021    TSH 3.850 05/13/2021      Lab Results   Component Value Date    FREET4 1.02 07/15/2021    FREET4 1.2 07/18/2020    FREET4 1.2 11/18/2019                          Assessment and Plan    Transitional care visit for closed l hip fx and orif, July 20, 2021, meds and dc reviewed, -- still depressed, all meds reviewed with daughters/pt ----    Acute blood loss anemia post op, July 2021,     Dysphagia, Status post barium swallow October 2020, showing narrowing at the distal esophagus, for EGD by Dr. Blonut February February 25, 2021,--CONT PROTONIX BID , STOP PEPCID , MAY 2021     Colon cancer diagnosed June 2020 initially found to be Anemia, was referred to hematology oncology,--- sent to gastroenterology---Dr. Blount--FOUND COLON CA JUNE 2020, REFERRED TO DR QUISPE--subsequent exploratory laparotomy and partial colectomy with 14 out of 14 lymph nodes negative, -------patient had prolonged hospital course, subsequent sent to rehab, now home--HAD LOW NA , DEVELOPED C DIFF COLITIS, IN HOSP, FTT WITH WGT LOSS IN REHAB --GAIT DYS. AND GEN WEAKNESS --Slowly improving, current medications reviewed----Patient had delirium in the hospital also, improved---Patient has follow-up for a 5 mm lung nodule found on CT scan in the left lower lobe and surgical changes With repeat CT scan in February 2021, and lab work per oncology Dr. Solorio    LUNG NODULE --Found June 2020, 5 millimeter left lower lobe--- on CT scan for abdominal pains---- FOUND PER HEM/ONC AND HAS F/U CT scan of  the chest February 4, 2021, and CT abdomen and pelvis for follow-up of colon cancer,    HYPONATREMIA, -- NOT SEEING DR ELLIOTT--- IN HOSP. JULY AND AUG 2020, ---- restart Lasix 20 mg daily and potassium 20 mEq twice a day, stay off hydrochlorothiazide for now due to hyponatremia, etc. SEPT 2020     htn, losartan 50 mg twice daily, in addition to metoprolol 100 mg twice daily    atrial fibrillation with rapid ventricular response, MARCH 23, 2017, --ON ELIQUIS 2.5 mg twice a day ,Metoprolol 100 mg twice a day,--, Cardizem extended release 120 mg daily,---------Cardioverted April 2019 Dr. Kay , NOW BACK IN [facility]     L HUMERAL FX DEC 2016, ORIF WITH BENSON --at [facility]  adverse reaction with rash to nitrofurantoin,, SEPT. 2016.  EXTENSIVE LLE DVT 11/15, , Continues on Eliquis 2.5 mg twice a day discussed potentially decreasing dose given age GFR, April 2020  MVR-MOD-, ON ECHO,--APRIL 2017, -- PER DR KAY.   Depression- lost son to stomach ca, nov 2018, and daughter had AVR nov 2018, - NO RX   Gastroesophageal reflux--Off PPIs due to C. difficile colitis July 2020 August 2020---Restart Protonix 40 mg twice a day until procedure is complete, January 27, 2021  Hyperlipidemia -/ Elevated triglycerides-----oFF LIPITOR   Osteopenia  Vitamin D deficiency- NL NOW, ON REPLACEMENT  Mild osteoarthrits of left hip      Follow Up   No follow-ups on file.  Patient was given instructions and counseling regarding her condition or for health maintenance advice. Please see specific information pulled into the AVS if appropriate.        SI/intoxicated.

## 2022-10-29 NOTE — ED BEHAVIORAL HEALTH ASSESSMENT NOTE - DETAILS
DEANGELO fisher/jos from [facility] 10/10 none available handoff given to JOLENE Thoughts of cutting secure email sent/weekend night afterhours handoff given to JOLENE Dr. Dickson

## 2022-10-29 NOTE — ED BEHAVIORAL HEALTH ASSESSMENT NOTE - SUMMARY
Patient is a 51-year-old woman, domiciled with mother in [place], PPH of polysubstance use, opioid use disorder (on methadone) benzo dependence, anxiety, depression, prior psychiatric hospitalizations including 4/6/22, and again recently discharged 10/10/2022 after OD; Pt did not attend follow up appointment and reports running out of meds, feeling suicidal.   Pt is calm and cooperative on exam, expresses herself linearly. She reports SI with plan to cut, endorsing this to family. Unable to obtain collateral. Pt does have a h/o suicide attempts, by OD, most recently earlier this month which necessitated narcan and ultimately psychiatric admission. Her hospital course was marked with irritability, prominent personality traits, but she did suffer from lability and anxiety, which may be related to her mood d/o and substance withdrawal. Pt does currently have withdrawal, per Med ED, who gave suboxone to pt. She is medically cleared at this time and is requesting voluntary admission. As pt is at an elevated risk of harm to self, she is appropriate for inpt care at this time. Patient is a 51-year-old woman, domiciled with mother in [place], PPH of polysubstance use, opioid use disorder (on methadone) benzo dependence, anxiety, depression, prior psychiatric hospitalizations including 4/6/22, 5/2022 and again recently discharged 10/10/2022 after OD; Pt did not attend follow up appointment and reports running out of meds, feeling suicidal.   Pt is calm and cooperative on exam, expresses herself linearly. She reports SI with plan to cut, endorsing this to family. Unable to obtain collateral. Pt does have a h/o suicide attempts, by OD, most recently earlier this month which necessitated narcan and ultimately psychiatric admission. Her hospital course was marked with irritability, prominent personality traits, but she did suffer from lability and anxiety, which may be related to her mood d/o and substance withdrawal. Pt does currently have withdrawal, per Med ED, who gave suboxone to pt. She is medically cleared at this time and is requesting voluntary admission. As pt is at an elevated risk of harm to self, she is appropriate for inpt care at this time.    Will restart Suboxone as one film daily, to be titrated. Pt has been off the medication since 10/17/2022 as she was only given a 7 day supply and no other refills on istop in all states eligible for search. Withdrawal sxs reported were yawning. Pt possibly had supply of klonopin from her pcp which was continued, will continue at 1mg daily, can be increased on unit.   There is no current acute usage of narcotics necessitating COWS and pt has been off meds for 13 days, no acute concern of withdrawal. Patient is a 51-year-old woman, domiciled with mother in [place], PPH of polysubstance use, opioid use disorder (on methadone) benzo dependence, anxiety, depression, prior psychiatric hospitalizations including 4/6/22, 5/2022 and again recently discharged 10/10/2022 after OD; Pt did not attend follow up appointment and reports running out of meds, feeling suicidal.   Pt is calm and cooperative on exam, expresses herself linearly. She reports SI with plan to cut, endorsing this to family. Unable to obtain collateral. Pt does have a h/o suicide attempts, by OD, most recently earlier this month which necessitated narcan and ultimately psychiatric admission. Her hospital course was marked with irritability, prominent personality traits, but she did suffer from lability and anxiety, which may be related to her mood d/o and substance withdrawal. Pt does currently have withdrawal, per Med ED, who gave suboxone and klonopin to pt. She is medically cleared at this time and is requesting voluntary admission. As pt is at an elevated risk of harm to self, she is appropriate for inpt care at this time.    Will restart Suboxone as one film TID, to be titrated. Pt has supposedly been off the medication since 10/17/2022 as she was only given a 7 day supply and no other refills on istop in all states eligible for search. Perhaps pt was using less of supply to stretch meds, it is unclear as ED provider documented pt had run out 5 days ago.  Withdrawal sxs reported were yawning to writer, but also GI sxs to ED provider. Also, Pt possibly had supply of klonopin from her pcp which was continued, will continue at 1mg daily, can be increased on unit.

## 2022-10-29 NOTE — ED BEHAVIORAL HEALTH ASSESSMENT NOTE - HPI (INCLUDE ILLNESS QUALITY, SEVERITY, DURATION, TIMING, CONTEXT, MODIFYING FACTORS, ASSOCIATED SIGNS AND SYMPTOMS)
Patient is a 51-year-old woman, domiciled with mother in [place], PPH of polysubstance use, opioid use disorder (on methadone) benzo dependence, anxiety, depression, prior psychiatric hospitalizations including 4/6/22, and again recently discharged 10/10/2022 after OD; Pt did not attend follow up appointments. She has a history of suicidal gestures and non-suicidal cutting behavior, malingering, no known PMH, who was BIB self stating that she ran out of medications and has been having Si with urges to cut.     Pt reports that after her discharged, she went to [place] with her mother. While there she walked a lot, took her meds. She ended up running out of her Suboxone, reports some withdrawal. She says that she did not run out of her klonopin b/c she made it stretch out. She states that she has a prescription waiting for her, but decided to come to the ED today for help as she could not wait. Pt reports that she just arrived back from the [place] today.   While there, she describes her mood as depressed, sleep/appetite were up and down. She had energy, felt restless while there, took long walks to expend it. She admits that she used MJ and alcohol while there, but denies any other drug use. She did not seek refills while there or tmt. She denies any manic sxs, or any psychotic sxs including hallucinations or delusions.  She reports that she has had thoughts an urge to cut, but has not. The last thought was "5 minutes ago". She reports that she wants to cut and cut until the end. She cites prior broken relationships as reasons she has been feeling depressed.     Attempted to call pts mother Sara [phone] and sister Anna Marie [phone]. No answer, sister is reportedly on a flight to [place]. Unable to leave message for mother.       iSTOP  demonstrated (ref# ) monthly Klonopin 1mg 60 tablet prescriptions from Dr. Saw Scruggs internist, as well as most recently discharged on Klonopin 1mg PO BID and Suboxone (given 10 days ago, 2 week supply). Patient is a 51-year-old woman, domiciled with mother in [place], PPH of polysubstance use, opioid use disorder (on methadone) benzo dependence, anxiety, depression, prior psychiatric hospitalizations including 4/6/22, and again recently discharged 10/10/2022 after OD; Pt did not attend follow up appointments. She has a history of suicidal gestures and non-suicidal cutting behavior, malingering, no known PMH, who was BIB self stating that she ran out of medications and has been having Si with urges to cut.     Pt reports that after her discharged, she went to [place] with her mother. While there she walked a lot, took her meds. She ended up running out of her Suboxone, reports some withdrawal. She says that she did not run out of her klonopin b/c she made it stretch out. She states that she has a prescription waiting for her, but decided to come to the ED today for help as she could not wait. Pt reports that she just arrived back from the [place] today.   While there, she describes her mood as depressed, sleep/appetite were up and down. She had energy, felt restless while there, took long walks to expend it. She admits that she used MJ and alcohol while there, but denies any other drug use. She did not seek refills while there or tmt. She denies any manic sxs, or any psychotic sxs including hallucinations or delusions.  She reports that she has had thoughts an urge to cut, but has not. The last thought was "5 minutes ago". She reports that she wants to cut and cut until the end. She cites prior broken relationships as reasons she has been feeling depressed.     Attempted to call pts mother Sara [phone] and sister Anna Marie [phone]. No answer, sister is reportedly on a flight to [place]. Unable to leave message for mother.       iSTOP  demonstrated (ref#[id number] ) Klonopin 1mg 60 tablet prescriptions from Dr. Saw Scruggs internist, filled 9/23 Klonoipn 1mg #60 tabs

## 2022-10-29 NOTE — ED ADULT TRIAGE NOTE - CHIEF COMPLAINT QUOTE
pt intoxicated last drank 2 hours ago states had 2 glasses of vodka with tonic. pt states has not had her Suboxone x 7 days and no clonopin x one day. pt states she is having bad thoughts feels SI. Pt also states has bad thought s against her boyfriend.

## 2022-10-29 NOTE — ED BEHAVIORAL HEALTH ASSESSMENT NOTE - NSBHSUBSTUSESTATEMENT_PSY_A_CORE
This was a shared visit with the KESHAWN. I reviewed and verified the documentation and independently performed the documented: .

## 2022-10-29 NOTE — ED BEHAVIORAL HEALTH ASSESSMENT NOTE - DIFFERENTIAL
Poly-substance withdrawal  Depressive D/O  Anxiety D/O  Malingering  Benzo use disorder  Opioid use disorder  R/o antisocial personality traits

## 2022-10-29 NOTE — ED PROVIDER NOTE - PHYSICAL EXAMINATION
Physical Exam: adult F, anxious-appearing  Head: NC/AT  Neck: trachea midline  Resp:  No distress  Ext: no deformities  Neuro:  A&Ox4 appears non focal  Skin:  Warm and dry as visualized, old healed abrasions on L volar FA  Psych:  Normal affect and mood  Ext: no edema, Neuro: AAOx3, ambulates w/o diff, strength 5/5 & symmetric throughout.  Psych:  +SI (states she wants to cut herself open and die), denies A/V hallucinations

## 2022-10-29 NOTE — ED BEHAVIORAL HEALTH ASSESSMENT NOTE - OTHER PAST PSYCHIATRIC HISTORY (INCLUDE DETAILS REGARDING ONSET, COURSE OF ILLNESS, INPATIENT/OUTPATIENT TREATMENT)
Multiple prior hospitalizations, multiple prior ER visits, substance use disorder, not currently in outpaitent treatment, has been on methadone in the past. Patient recently d/c from inpatient psych at [facility] on 10/10/22 on klonopin, lexapro, abilify, and suboxone. Patient given follow up info for outpt but did not follow up.

## 2022-10-29 NOTE — ED BEHAVIORAL HEALTH ASSESSMENT NOTE - DESCRIPTION
Asthma Vital Signs Last 24 Hrs  T(C): 36.6 (29 Oct 2022 21:10), Max: 36.8 (29 Oct 2022 19:36)  T(F): 97.8 (29 Oct 2022 21:10), Max: 98.2 (29 Oct 2022 19:36)  HR: 80 (29 Oct 2022 21:10) (80 - 88)  BP: 118/76 (29 Oct 2022 21:10) (101/78 - 118/76)  BP(mean): --  RR: 86 (29 Oct 2022 21:10) (18 - 86)  SpO2: 100% (29 Oct 2022 21:10) (100% - 100%)    Parameters below as of 29 Oct 2022 21:10  Patient On (Oxygen Delivery Method): room air Lives with mother in [place], unemployed

## 2022-10-29 NOTE — ED PROVIDER NOTE - CLINICAL SUMMARY MEDICAL DECISION MAKING FREE TEXT BOX
Impression:  SI and opioid WD.  iSTOP performed and verified her dose suboxone 2/0.5mg as well as clonazepam 1mg.  Plan:  will administer clonazepam (to avoid benzo WD) + suboxone 2mg/0.5mg.

## 2022-10-29 NOTE — ED PROVIDER NOTE - CARE PLAN
1 Principal Discharge DX:	Major depression, single episode  Secondary Diagnosis:	Suicidal ideations  Secondary Diagnosis:	Withdrawal from opioids

## 2022-10-29 NOTE — ED BEHAVIORAL HEALTH ASSESSMENT NOTE - RISK ASSESSMENT
US thyroid ordered for pt and has not been completed. Still want? Please advise. Chronic risk factors include substance use disorders, history of mood symptoms, history of self-harm. Acute risk factors include active SI, ongoing substance use. Mitigating factors include ability to seek care, ability to care for needs, desire for secondary gain. Overall patient is moderate chronic risk of suicide, currently at elevated risk given her suicidal statements and inability to safety plan. Moderate Acute Suicide Risk

## 2022-10-29 NOTE — ED BEHAVIORAL HEALTH ASSESSMENT NOTE - CURRENT MEDICATION
discharge meds: Abilify 15mg daily; Suboxone 2mg/0.5mg 2 films TID; klonopin 1mg daily and 0.5mg hs (pt was taper); Lexapro 20mg daily  Albuterol PRN

## 2022-10-29 NOTE — ED PROVIDER NOTE - OBJECTIVE STATEMENT
50 y/o F w/ pmhx of anxiety, depression, prior SI (requiring hospitalization) and hx of opoid abuse and on suboxone (last dose 5 days ago) p/w SI. When asked about a plan she said she wanted to "cut" herself. When asked about SI she said she wants to push those who harmed her "off a gabriella". Pt also c/o withdrawal 2/2 to last suboxone dose 5 days ago - c/o nasuea, body aches and "goose bumps".

## 2022-10-29 NOTE — ED PROVIDER NOTE - PROGRESS NOTE DETAILS
Patient received clonazepam and Suboxone; requesting more Suboxone.  Not in WD at this time.   Case d/w BH for further evaluation.  Medically cleared at this time.

## 2022-10-29 NOTE — ED ADULT NURSE NOTE - OBJECTIVE STATEMENT
Received pt in room 22. A&Ox4, ambulatory at baseline, reporting for intoxicated, stated last drank 2 hours ago 2 glasses of vodka tonic + 2 beers.  PMH anxiety, depressiong, SI, opoid abuse. states has not had her Suboxone x 6 days, no clonopin x one day. Endorsing SI, states has plan to "cut myself because I'm a cutter". states had moments of wanting to hurt boyfriend. endorsing nasuea, body aches and "goose bumps". states feelings of becoming agitated. VSS. RR even and unlabored. 20g placed in right AC. Labs sent. Awaiting further orders from provider.

## 2022-10-30 DIAGNOSIS — Z91.14 PATIENT'S OTHER NONCOMPLIANCE WITH MEDICATION REGIMEN: ICD-10-CM

## 2022-10-30 LAB
COVID-19 SPIKE DOMAIN AB INTERP: POSITIVE
COVID-19 SPIKE DOMAIN ANTIBODY RESULT: >250 U/ML — HIGH
SARS-COV-2 IGG+IGM SERPL QL IA: >250 U/ML — HIGH
SARS-COV-2 IGG+IGM SERPL QL IA: POSITIVE

## 2022-10-30 PROCEDURE — 99222 1ST HOSP IP/OBS MODERATE 55: CPT

## 2022-10-30 RX ORDER — CLONAZEPAM 1 MG
0.5 TABLET ORAL AT BEDTIME
Refills: 0 | Status: DISCONTINUED | OUTPATIENT
Start: 2022-10-30 | End: 2022-10-31

## 2022-10-30 RX ORDER — INFLUENZA VIRUS VACCINE 15; 15; 15; 15 UG/.5ML; UG/.5ML; UG/.5ML; UG/.5ML
0.5 SUSPENSION INTRAMUSCULAR ONCE
Refills: 0 | Status: DISCONTINUED | OUTPATIENT
Start: 2022-10-30 | End: 2022-11-11

## 2022-10-30 RX ORDER — BUPRENORPHINE AND NALOXONE 2; .5 MG/1; MG/1
1 TABLET SUBLINGUAL
Refills: 0 | Status: DISCONTINUED | OUTPATIENT
Start: 2022-10-30 | End: 2022-10-31

## 2022-10-30 RX ADMIN — BUPRENORPHINE AND NALOXONE 1 TABLET(S): 2; .5 TABLET SUBLINGUAL at 19:55

## 2022-10-30 RX ADMIN — ESCITALOPRAM OXALATE 20 MILLIGRAM(S): 10 TABLET, FILM COATED ORAL at 08:06

## 2022-10-30 RX ADMIN — OLANZAPINE 5 MILLIGRAM(S): 15 TABLET, FILM COATED ORAL at 02:26

## 2022-10-30 RX ADMIN — Medication 50 MILLIGRAM(S): at 02:26

## 2022-10-30 RX ADMIN — Medication 50 MILLIGRAM(S): at 20:56

## 2022-10-30 RX ADMIN — BUPRENORPHINE AND NALOXONE 1 TABLET(S): 2; .5 TABLET SUBLINGUAL at 08:06

## 2022-10-30 RX ADMIN — BUPRENORPHINE AND NALOXONE 1 TABLET(S): 2; .5 TABLET SUBLINGUAL at 13:49

## 2022-10-30 RX ADMIN — Medication 1 MILLIGRAM(S): at 08:06

## 2022-10-30 RX ADMIN — ARIPIPRAZOLE 15 MILLIGRAM(S): 15 TABLET ORAL at 08:06

## 2022-10-30 RX ADMIN — Medication 0.5 MILLIGRAM(S): at 19:55

## 2022-10-30 NOTE — BH PATIENT PROFILE - HOME MEDICATIONS
buprenorphine-naloxone 2 mg-0.5 mg sublingual film , 2 film(s) sublingual 3 times a day MDD:12mg/day buprenorphine  clonazePAM 1 mg oral tablet , 1 tab(s) orally once a day in the morning  1/2 tab orally at bedtime   nicotine 2 mg oral transmucosal gum , 1 gum oral transmucosal every 2 hours  ARIPiprazole 15 mg oral tablet , 1 tab(s) orally once a day  escitalopram 20 mg oral tablet , 1 tab(s) orally once a day

## 2022-10-30 NOTE — BH INPATIENT PSYCHIATRY ASSESSMENT NOTE - ATTENDING COMMENTS
Chart reviewed and agree with NP's evaluation, treatment and plan. Briefly, this is a  51-year-old single  woman with a history of multiple psychiatric admissions, high utilizer of ED, and recent discharge from [place] [address] on 10/10/22. Patient has  PPH of polysubstance use, opioid use disorder (on methadone) benzo dependence, anxiety, depression and now is admitted with a primary problem of worsening mood and decompensation in context of nonadherence with outpatient follow up and medications.  Patient admitted to [facility] on a 9.13 legal status.  Agree with restarting previous regimen. Pt qualifies for AOT and ACT referrals.

## 2022-10-30 NOTE — BH INPATIENT PSYCHIATRY ASSESSMENT NOTE - FAMILY HISTORY OF PSYCHIATRIC ILLNESS / SUICIDALITY
1. Have you been to the ER, urgent care clinic since your last visit? Hospitalized since your last visit? No    2. Have you seen or consulted any other health care providers outside of the [address] [place] [address] since your last visit? Include any pap smears or colon screening. Eddie Dorsey NP ENT    Chief Complaint   Patient presents with    LOW BACK PAIN     x few days     Mom stated she has tried heating pads. Administered Tylenol but ineffective. Pt experienced fever while at school yesterday 100.1 and 99.4 at home. None known

## 2022-10-30 NOTE — BH INPATIENT PSYCHIATRY ASSESSMENT NOTE - NSBHASSESSSUMMFT_PSY_ALL_CORE
51-year-old single  woman. Patient has history of multiple psychiatric admissions, high utilizer of ED.  Recent discharged from [place] [address] on 10/10/22. Patient has  PPH of polysubstance use, opioid use disorder (on methadone) benzo dependence, anxiety, depression.  Patient is hospitalized with a primary problem of worsening mood and decompensation in context of noncompliance with outpatient follow up and noncompliance with her medications.  Has frequent ED visits with similar complaints, history of numerous psychiatric inpatient hospitalizations.  Patient admitted to [facility] on a 9.13 legal status.    Plan:  >Legal: 9.13  >Obs: Routine, no current SI. no need for CO, patient not expected to pose risk to self or others in controlled inpatient setting  >Psychiatric Meds: Restart outpatient medication regimen: Abilify 15mg daily; Suboxone 2mg/0.5mg 2 films TID; klonopin 1mg daily and 0.5mg hs, Lexapro 20mg daily.  Observe for tolerability and efficacy. Patient had been poorly adherent prior to admission.   PRN medications:  Ativan 2mg oral Q6HR PRN for agitation and anxiety.  Haldol 5mg oral Q6HR PRN for agitation.   Benadryl 50mg oral Q6HR PRN for agitation.   Vistaril 50mg oral Q6HR PRN for anxiety.  Desyrel 50mg oral QHS PRN for insomnia.   >Labs: Admission labs reviewed, no acute findings. Utox Labs pending for tomorrow: A1c and Lipid panel. QT/QTc: Hold antipsychotics if QTc >500  >Medical:   No acute concerns. No consultations needed at this time. No indication for CIWA. Patient with consistently stable VS, no visible physical symptoms of withdrawal.   During the course of treatment, will collaborate with medical team to manage medical issues.  >Diet: Regular  >Social: milieu/structured therapy  >Treatment Interventions: Groups and Individual Therapy/CBT, Motivational counseling for substance abuse related issues.   >Dispo: Collateral and dispo planning pending further symptom and medication optimization            51-year-old single  woman. Patient has history of multiple psychiatric admissions, high utilizer of ED.  Recent discharged from [place] [address] on 10/10/22. Patient has  PPH of polysubstance use, opioid use disorder (on methadone) benzo dependence, anxiety, depression.  Patient is hospitalized with a primary problem of worsening mood and decompensation in context of noncompliance with outpatient follow up and noncompliance with her medications.  Has frequent ED visits with similar complaints, history of numerous psychiatric inpatient hospitalizations.  Patient admitted to [facility] on a 9.13 legal status.  This patient requires inpatient hospitalization due to symptoms of mental illness so severe that they significantly interfere with activities of daily living and presents a potential danger to self as a result of acute decompensation with active SI, thoughts to self-harm . She is requiring inpatient care at this time as a result, for psychiatric stabilization and safety.    Plan:  >Legal: 9.13  >Obs: Routine, no current SI. no need for CO, patient not expected to pose risk to self or others in controlled inpatient setting  >Psychiatric Meds: Restart outpatient medication regimen: Abilify 15mg daily; Suboxone 2mg/0.5mg 2 films TID; klonopin 1mg daily and 0.5mg hs, Lexapro 20mg daily.  Observe for tolerability and efficacy. Patient had been poorly adherent prior to admission.   PRN medications:  Ativan 2mg oral Q6HR PRN for agitation and anxiety.  Haldol 5mg oral Q6HR PRN for agitation.   Benadryl 50mg oral Q6HR PRN for agitation.   Vistaril 50mg oral Q6HR PRN for anxiety.  Desyrel 50mg oral QHS PRN for insomnia.   >Labs: Admission labs reviewed, no acute findings. Utox Labs pending for tomorrow: A1c and Lipid panel. QT/QTc: Hold antipsychotics if QTc >500  >Medical:   No acute concerns. No consultations needed at this time. No indication for CIWA. Patient with consistently stable VS, no visible physical symptoms of withdrawal.   During the course of treatment, will collaborate with medical team to manage medical issues.  >Diet: Regular  >Social: milieu/structured therapy  >Treatment Interventions: Groups and Individual Therapy/CBT, Motivational counseling for substance abuse related issues.   >Dispo: Collateral and dispo planning pending further symptom and medication optimization

## 2022-10-30 NOTE — BH PATIENT PROFILE - STATED REASON FOR ADMISSION
Pt reported that after discharge earlier this month she had gone to [place] and was there for a month with her mother, had no follow up care, ran out of medication her Suboxone and Klonopin, began feeling depressed with suicidal thought to hurt herself, so decided to take herself to the emergency room.

## 2022-10-30 NOTE — BH INPATIENT PSYCHIATRY ASSESSMENT NOTE - HPI (INCLUDE ILLNESS QUALITY, SEVERITY, DURATION, TIMING, CONTEXT, MODIFYING FACTORS, ASSOCIATED SIGNS AND SYMPTOMS)
neck swelling/fever Patient was seen and evaluated, chart reviewed. Case discussed with nursing team.  On service for this  51-year-old single  woman. Patient has history of multiple psychiatric admissions, high utilizer of ED.  Recent discharged from [place] [address] on 10/10/22. Patient has  PPH of polysubstance use, opioid use disorder (on methadone) benzo dependence, anxiety, depression.  Patient is hospitalized with a primary problem of worsening mood and decompensation in context of noncompliance with outpatient follow up and noncompliance with her medications.  Has frequent ED visits with similar complaints, history of numerous psychiatric inpatient hospitalizations.  Patient admitted to [facility] on a 9.13 legal status. I have reviewed the initial psychiatric assessment in the electronic medical record, including the history of present illness, past psychiatric history, family/social history (no pertinent changes), and exam, and have confirmed the salient findings dated 10/29/22.    As per chart review, transferring records indicated the following:  Patient is a 51-year-old woman, domiciled with mother in [place], PPH of polysubstance use, opioid use disorder (on methadone) benzo dependence, anxiety, depression, prior psychiatric hospitalizations including 4/6/22, and again recently discharged 10/10/2022 after OD; Pt did not attend follow up appointments. She has a history of suicidal gestures and non-suicidal cutting behavior, malingering, no known PMH, who was BIB self stating that she ran out of medications and has been having Si with urges to cut.   Pt reports that after her discharged, she went to [place] with her mother. While there she walked a lot, took her meds. She ended up running out of her Suboxone, reports some withdrawal. She says that she did not run out of her klonopin b/c she made it stretch out. She states that she has a prescription waiting for her, but decided to come to the ED today for help as she could not wait. Pt reports that she just arrived back from the [place] today.   While there, she describes her mood as depressed, sleep/appetite were up and down. She had energy, felt restless while there, took long walks to expend it. She admits that she used MJ and alcohol while there, but denies any other drug use. She did not seek refills while there or tmt. She denies any manic sxs, or any psychotic sxs including hallucinations or delusions.  She reports that she has had thoughts an urge to cut, but has not. The last thought was "5 minutes ago". She reports that she wants to cut and cut until the end. She cites prior broken relationships as reasons she has been feeling depressed. Attempted to call pts mother Sara [phone] and sister Anna Marie [phone]. No answer, sister is reportedly on a flight to [place]. Unable to leave message for mother. iSTOP  demonstrated (ref#[id number] ) Klonopin 1mg 60 tablet prescriptions from Dr. Saw Scruggs internist, filled 9/23 Klonoipn 1mg #60 tabs.    On unit: information received from: Chart review and patient interview   Patient was seen and evaluated, chart reviewed. Case discussed with nursing team.  On service for this  51-year-old single  woman. Patient has history of multiple psychiatric admissions, high utilizer of ED.  Recent discharged from [place] [address] on 10/10/22. Patient has  PPH of polysubstance use, opioid use disorder (on methadone) benzo dependence, anxiety, depression.  Patient is hospitalized with a primary problem of worsening mood and decompensation in context of noncompliance with outpatient follow up and noncompliance with her medications.  Has frequent ED visits with similar complaints, history of numerous psychiatric inpatient hospitalizations.  Patient admitted to [facility] on a 9.13 legal status. I have reviewed the initial psychiatric assessment in the electronic medical record, including the history of present illness, past psychiatric history, family/social history (no pertinent changes), and exam, and have confirmed the salient findings dated 10/29/22.    As per chart review, transferring records indicated the following:  Patient is a 51-year-old woman, domiciled with mother in [place], PPH of polysubstance use, opioid use disorder (on methadone) benzo dependence, anxiety, depression, prior psychiatric hospitalizations including 4/6/22, and again recently discharged 10/10/2022 after OD; Pt did not attend follow up appointments. She has a history of suicidal gestures and non-suicidal cutting behavior, malingering, no known PMH, who was BIB self stating that she ran out of medications and has been having Si with urges to cut.   Pt reports that after her discharged, she went to [place] with her mother. While there she walked a lot, took her meds. She ended up running out of her Suboxone, reports some withdrawal. She says that she did not run out of her klonopin b/c she made it stretch out. She states that she has a prescription waiting for her, but decided to come to the ED today for help as she could not wait. Pt reports that she just arrived back from the [place] today.   While there, she describes her mood as depressed, sleep/appetite were up and down. She had energy, felt restless while there, took long walks to expend it. She admits that she used MJ and alcohol while there, but denies any other drug use. She did not seek refills while there or tmt. She denies any manic sxs, or any psychotic sxs including hallucinations or delusions.  She reports that she has had thoughts an urge to cut, but has not. The last thought was "5 minutes ago". She reports that she wants to cut and cut until the end. She cites prior broken relationships as reasons she has been feeling depressed. Attempted to call pts mother Sara [phone] and sister Anna Marie [phone]. No answer, sister is reportedly on a flight to [place]. Unable to leave message for mother. iSTOP  demonstrated (ref#[id number] ) Klonopin 1mg 60 tablet prescriptions from Dr. Saw Scruggs internist, filled 9/23 Klonoipn 1mg #60 tabs.    On unit: information received from: Chart review and patient interview  As per chart review, transferring records indicated the following:  Discussed precipitants to warrant services.  Patient reports she was recently discharged from [facility].  Patient states she did not attend follow up appointments and she ran out of medications and has been having SI with urges to cut. Patient reports during session “I don’t feel good I wish I were dead” denied active plan. Patient states “I need help again”  Pt reports that after her discharged, she went to [place] with her mother. Reports taking her medications as prescribed (suspected taking more medications per day as pt states she ran out of medications). States she ran out of her Suboxone, and began having some withdrawal symptoms and decided to come into the ED. She says that she did not run out of her klonopin b/c she made it stretch out.   Since discharge patient reports low mood , feels depressed, sleep/appetite were up and down, felt restless. She admits that she has been having thoughts to cut, but has not. She reports prior broken relationships as reasons she has been feeling depressed. Patient describes low mood and persistent anxiety. She also reports symptoms of difficulty concentrating, perseverative thoughts, poor appetite, insomnia, feelings of sadness, anhedonia, hopelessness, feelings of restlessness.     At time of interview patient reports passive SI. No SIB/HI, no formulated plan or intent, and engages in safety planning.  She denies any current AVH, delusions, psychotic disorganization or paranoia. Denies history of aggression or violence. No access to firearm. Patient denies any symptoms suggestive of active mariaelena: (denied grandiosity/ racing thoughts/ increased goal directed activities or engaged in risk taking behavior/ no pressured speech/ no elevated mood/ denied any increased in energy level causing sleep disruption), no signs of catatonia. She denies obsessive, intrusive and persistent thoughts or compulsive, ritualistic acts are reported.   Patient denies any active legal issues, is not currently under any kind of court supervision.   In regards to substance use, she admits that she used MJ (1 joint daily X4 days) and alcohol (2-3 12 ounce beers daily)  since her discharge but denies any other drug use (admitting utox +THC ), BAL 12. No PMH.  iSTOP  demonstrated (ref#[id number] ) Klonopin 1mg 60 tablet prescriptions from Dr. Saw Scruggs internist, filled 9/23 Klonoipn 1mg #60 tabs

## 2022-10-30 NOTE — ED BEHAVIORAL HEALTH NOTE - BEHAVIORAL HEALTH NOTE
COVID Exposure Screen- Patient  1.	*Have you had a COVID-19 test in the last 90 days?  (x  ) Yes   (  ) No   (  ) Unknown- Reason: _____  IF YES PROCEED TO QUESTION #2. IF NO OR UNKNOWN, PLEASE SKIP TO QUESTION #3.  2.	Date of test(s) and result(s): ________  3.	*Have you tested positive for COVID-19 antibodies? (  ) Yes   ( x ) No   (  ) Unknown- Reason: _____  IF YES PROCEED TO QUESTION #4. IF NO or UNKNOWN, PLEASE SKIP TO QUESTION #5.  4.	Date of positive antibody test: ________  5.	*Have you received 2 doses of the COVID-19 vaccine? (  ) Yes   (  ) No   ( x ) Unknown- Reason: _____   IF YES PROCEED TO QUESTION #6. IF NO or UNKNOWN, PLEASE SKIP TO QUESTION #7.  6.	Date of second dose: ________  7.	*In the past 10 days, have you been around anyone with a positive COVID-19 test?* (  ) Yes   (  x) No   (  ) Unknown- Reason: ____  IF YES PROCEED TO QUESTION #8. IF NO or UNKNOWN, PLEASE SKIP TO QUESTION #13.  8.	Were you within 6 feet of them for at least 15 minutes? (  ) Yes   (  ) No   (  ) Unknown- Reason: _____  9.	Have you provided care for them? (  ) Yes   (  ) No   (  ) Unknown- Reason: ______  10.	Have you had direct physical contact with them (touched, hugged, or kissed them)? (  ) Yes   (  ) No    (  ) Unknown- Reason: _____  11.	Have you shared eating or drinking utensils with them? (  ) Yes   (  ) No    (  ) Unknown- Reason: ____  12.	Have they sneezed, coughed, or somehow gotten respiratory droplets on you? (  ) Yes   (  ) No    (  ) Unknown- Reason: ______  13.	*Have you been out of [place] within the past 10 days?* ( x ) Yes   (  ) No   (  ) Unknown- Reason: _____  IF YES PLEASE ANSWER THE FOLLOWING QUESTIONS:  14.	Which state/country have you been to? ___[place]___  15.	Were you there over 24 hours? (  s) Yes   (  ) No    (  ) Unknown- Reason: ______  16.	Date of return to [facility]: ___10/30/2022___     COVID Exposure Screen- collateral (i.e. third-party, chart review, belongings, etc; include EMS and ED staff)  1.	*Has the patient had a COVID-19 test in the last 90 days?  (  ) Yes   (  ) No   (  ) Unknown- Reason: _____  IF YES PROCEED TO QUESTION #2. IF NO OR UNKNOWN, PLEASE SKIP TO QUESTION #3.  2.	Date of test(s) and result(s): ________  3.	*Has the patient tested positive for COVID-19 antibodies? (  ) Yes   (  ) No   (  ) Unknown- Reason: _____  IF YES PROCEED TO QUESTION #4. IF NO or UNKNOWN, PLEASE SKIP TO QUESTION #5.  4.	Date of positive antibody test: ________  5.	*Has the patient received 2 doses of the COVID-19 vaccine? (  ) Yes   (  ) No   (  ) Unknown- Reason: _____  IF YES PROCEED TO QUESTION #6. IF NO or UNKNOWN, PLEASE SKIP TO QUESTION #7.  6.	 Date of second dose: ________  7.	*In the past 10 days, has the patient been around anyone with a positive COVID-19 test?* (  ) Yes   (  ) No   (  ) Unknown- Reason: __  IF YES PROCEED TO QUESTION #8. IF NO or UNKNOWN, PLEASE SKIP TO QUESTION #13.  8.	Was the patient within 6 feet of them for at least 15 minutes? (  ) Yes   (  ) No   (  ) Unknown- Reason: _____  9.	Did the patient provide care for them? (  ) Yes   (  ) No   (  ) Unknown- Reason: ______  10.	Did the patient have direct physical contact with them (touched, hugged, or kissed them)? (  ) Yes   (  ) No    (  ) Unknown- Reason: __  11.	Did the patient share eating or drinking utensils with them? (  ) Yes   (  ) No    (  ) Unknown- Reason: ____  12.	Did they sneeze, cough, or somehow get respiratory droplets on the patient? (  ) Yes   (  ) No    (  ) Unknown- Reason: ______  13.	*Has the patient been out of [place] within the past 10 days?* (  ) Yes   (  ) No   (  ) Unknown- Reason: _____  IF YES PLEASE ANSWER THE FOLLOWING QUESTIONS:  14.	Which state/country did they go to? ______  15.	Were they there over 24 hours? (  ) Yes   (  ) No    (  ) Unknown- Reason: ______  16.	Date of return to [facility]: ______

## 2022-10-30 NOTE — ED BEHAVIORAL HEALTH NOTE - BEHAVIORAL HEALTH NOTE
ASHOK Precsandra PULIDO spoke with Floridalma YIN and Tracee GALVEZ from [facility] phone # [phone]. Tracee GALVEZ informed ASHOK that new policy is someone will follow up with [facility] listed and provided the  Sadiq Escobar phone # [phone]. Reference # [id number]

## 2022-10-30 NOTE — BH INPATIENT PSYCHIATRY ASSESSMENT NOTE - NSBHCHARTREVIEWVS_PSY_A_CORE FT
Vital Signs Last 24 Hrs  T(C): 36.6 (10-30-22 @ 01:50), Max: 36.8 (10-29-22 @ 19:36)  T(F): 97.8 (10-30-22 @ 01:50), Max: 98.2 (10-29-22 @ 19:36)  HR: 80 (10-29-22 @ 21:10) (80 - 88)  BP: 118/76 (10-29-22 @ 21:10) (101/78 - 118/76)  BP(mean): --  RR: 18 (10-30-22 @ 01:50) (16 - 86)  SpO2: 100% (10-30-22 @ 01:50) (100% - 100%)    Orthostatic VS  10-30-22 @ 01:50  Lying BP: --/-- HR: --  Sitting BP: 122/68 HR: 86  Standing BP: 130/72 HR: 82  Site: --  Mode: --   Vital Signs Last 24 Hrs  T(C): 36.7 (10-30-22 @ 08:09), Max: 36.8 (10-29-22 @ 19:36)  T(F): 98 (10-30-22 @ 08:09), Max: 98.2 (10-29-22 @ 19:36)  HR: 80 (10-29-22 @ 21:10) (80 - 88)  BP: 118/76 (10-29-22 @ 21:10) (101/78 - 118/76)  BP(mean): --  RR: 18 (10-30-22 @ 01:50) (16 - 86)  SpO2: 100% (10-30-22 @ 01:50) (100% - 100%)    Orthostatic VS  10-30-22 @ 08:09  Lying BP: --/-- HR: --  Sitting BP: 91/62 HR: 75  Standing BP: 92/61 HR: 70  Site: --  Mode: electronic  Orthostatic VS  10-30-22 @ 01:50  Lying BP: --/-- HR: --  Sitting BP: 122/68 HR: 86  Standing BP: 130/72 HR: 82  Site: --  Mode: --

## 2022-10-30 NOTE — BH INPATIENT PSYCHIATRY ASSESSMENT NOTE - CURRENT MEDICATION
MEDICATIONS  (STANDING):  ARIPiprazole 15 milliGRAM(s) Oral daily  buprenorphine 2 mG/naloxone 0.5 mG SL  Tablet 1 Tablet(s) SubLingual <User Schedule>  clonazePAM  Tablet 1 milliGRAM(s) Oral daily  escitalopram 20 milliGRAM(s) Oral daily  influenza   Vaccine 0.5 milliLiter(s) IntraMuscular once    MEDICATIONS  (PRN):  ALBUTerol    90 MICROgram(s) HFA Inhaler 2 Puff(s) Inhalation every 6 hours PRN Shortness of Breath and/or Wheezing  diphenhydrAMINE Injectable 50 milliGRAM(s) IntraMuscular once PRN Combative behavior  hydrOXYzine hydrochloride 50 milliGRAM(s) Oral every 6 hours PRN Anxiety  nicotine  Polacrilex Gum 2 milliGRAM(s) Oral every 2 hours PRN nicotine withdrawal/craving  OLANZapine Disintegrating Tablet 5 milliGRAM(s) Oral every 6 hours PRN agitation  OLANZapine Injectable 5 milliGRAM(s) IntraMuscular once PRN assaultive behavior

## 2022-10-30 NOTE — PSYCHIATRIC REHAB INITIAL EVALUATION - NSBHPRRECOMMEND_PSY_ALL_CORE
Pt is a 50 y/o female, unemployed. Pt has hx of multiple psychiatric inpatient admissions and most recently discharged from [facility] [address] on 10/10/2022. Pt was admitted to [facility] [address] due to medication/treatment non-compliance, suicidal ideation. Pt stated she ran out of her medication while she was on vacation in [place], and she had some withdrawal symptoms. As per chart, pt did not follow up with her outpatient appointment, and she ran out of her suboxone, but she did not run out of her Klonopin because she made it stretch out. Pt stated she had SI prior to her admission, but was unable to elaborate detail. Pt was irritable and stated she answer all questions already and refused  assessment. The following information was obtained from medical record. Pt had suicidal ideation with urges to cut. Pt told doctor in ED that she was depressed, had poor sleep and poor appetite. Pt was using marijuana and alcohol prior to her admission.   Writer met with pt for initial assessment, introduced self and treatment team. Writer has oriented psychiatric rehabilitation department function and services. Writer also provided a copy of welcome letter and the unit schedule. Writer has reviewed unit programming and structure activities with pt. Pt was receptive. Writer also oriented and discussed hospital wide policies and protocols changes in response to covid-19. Psychiatric rehabilitation staff has been providing groups with requirement of wearing mask and social distancing during the group session. Pt was receptive. Writer also reviewed COVID-19 prevention tips including social distancing, wearing mask and hand hygiene.   During this assessment, pt was anxious, irritable, attention seeking, and meds seeking. Pt remains to have poor insight into her illness.

## 2022-10-30 NOTE — BH INPATIENT PSYCHIATRY ASSESSMENT NOTE - NSICDXBHSECONDARYDX_PSY_ALL_CORE
Opiate use   F11.90  Anxiety disorder, unspecified type   F41.9  Cannabis abuse   F12.10  Noncompliance with medications   Z91.14

## 2022-10-30 NOTE — BH INPATIENT PSYCHIATRY ASSESSMENT NOTE - OTHER PAST PSYCHIATRIC HISTORY (INCLUDE DETAILS REGARDING ONSET, COURSE OF ILLNESS, INPATIENT/OUTPATIENT TREATMENT)
Multiple prior hospitalizations, multiple prior ER visits, substance use disorder, not currently in outpaitent treatment, has been on methadone in the past. Patient recently d/c from inpatient psych at [facility] on 10/10/22 on klonopin, lexapro, abilify, and suboxone. Patient given follow up info for outpt but did not follow up.

## 2022-10-31 PROCEDURE — 99232 SBSQ HOSP IP/OBS MODERATE 35: CPT

## 2022-10-31 RX ORDER — CLONAZEPAM 1 MG
0.5 TABLET ORAL
Refills: 0 | Status: DISCONTINUED | OUTPATIENT
Start: 2022-10-31 | End: 2022-11-07

## 2022-10-31 RX ORDER — BUPRENORPHINE AND NALOXONE 2; .5 MG/1; MG/1
1 TABLET SUBLINGUAL
Refills: 0 | Status: DISCONTINUED | OUTPATIENT
Start: 2022-10-31 | End: 2022-10-31

## 2022-10-31 RX ORDER — BUPRENORPHINE AND NALOXONE 2; .5 MG/1; MG/1
2 TABLET SUBLINGUAL
Refills: 0 | Status: DISCONTINUED | OUTPATIENT
Start: 2022-10-31 | End: 2022-10-31

## 2022-10-31 RX ORDER — POLYETHYLENE GLYCOL 3350 17 G/17G
17 POWDER, FOR SOLUTION ORAL DAILY
Refills: 0 | Status: DISCONTINUED | OUTPATIENT
Start: 2022-10-31 | End: 2022-11-11

## 2022-10-31 RX ORDER — BUPRENORPHINE AND NALOXONE 2; .5 MG/1; MG/1
1 TABLET SUBLINGUAL
Refills: 0 | Status: DISCONTINUED | OUTPATIENT
Start: 2022-10-31 | End: 2022-11-01

## 2022-10-31 RX ORDER — BUPRENORPHINE AND NALOXONE 2; .5 MG/1; MG/1
2 TABLET SUBLINGUAL DAILY
Refills: 0 | Status: DISCONTINUED | OUTPATIENT
Start: 2022-10-31 | End: 2022-11-01

## 2022-10-31 RX ADMIN — ESCITALOPRAM OXALATE 20 MILLIGRAM(S): 10 TABLET, FILM COATED ORAL at 08:01

## 2022-10-31 RX ADMIN — OLANZAPINE 5 MILLIGRAM(S): 15 TABLET, FILM COATED ORAL at 19:44

## 2022-10-31 RX ADMIN — POLYETHYLENE GLYCOL 3350 17 GRAM(S): 17 POWDER, FOR SOLUTION ORAL at 17:34

## 2022-10-31 RX ADMIN — OLANZAPINE 5 MILLIGRAM(S): 15 TABLET, FILM COATED ORAL at 01:05

## 2022-10-31 RX ADMIN — BUPRENORPHINE AND NALOXONE 1 TABLET(S): 2; .5 TABLET SUBLINGUAL at 08:00

## 2022-10-31 RX ADMIN — ARIPIPRAZOLE 15 MILLIGRAM(S): 15 TABLET ORAL at 08:01

## 2022-10-31 RX ADMIN — BUPRENORPHINE AND NALOXONE 1 TABLET(S): 2; .5 TABLET SUBLINGUAL at 19:44

## 2022-10-31 RX ADMIN — Medication 50 MILLIGRAM(S): at 17:33

## 2022-10-31 RX ADMIN — Medication 0.5 MILLIGRAM(S): at 19:44

## 2022-10-31 RX ADMIN — Medication 0.5 MILLIGRAM(S): at 13:02

## 2022-10-31 RX ADMIN — Medication 1 MILLIGRAM(S): at 08:01

## 2022-10-31 RX ADMIN — BUPRENORPHINE AND NALOXONE 2 TABLET(S): 2; .5 TABLET SUBLINGUAL at 13:40

## 2022-10-31 NOTE — BH PSYCHOLOGY - GROUP THERAPY NOTE - TOKEN PULL-DIAGNOSIS
Primary Diagnosis:  MDD (major depressive disorder) [F32.9]        Problem Dx:   Noncompliance with medications [Z91.14]      Cannabis abuse [F12.10]      Anxiety disorder, unspecified type [F41.9]      Opiate use [F11.90]      Depressive disorder [F32.A]

## 2022-10-31 NOTE — BH INPATIENT PSYCHIATRY PROGRESS NOTE - OTHER
seems to be emotionally hyper-reactive  antsy and fidgety, states she tends to walk a lot to relieve her "extra energy".

## 2022-10-31 NOTE — BH PSYCHOLOGY - GROUP THERAPY NOTE - NSPSYCHOLGRPCOGPT_PSY_A_CORE FT
Pt attended Cognitive Behavioral Therapy Group. Acceptance and Commitment Therapy skills and concepts also utilized. The group started with a brief check-in during which patients shared one thing goal for the day. This activity generated insightful participation and pts responded well to prompt. Next, the group discussed a cartoon that portrayed the concept of cognitive defusion. Lastly, the group discussed strategies to stop overthinking. Pt shared that staying buys helps her to minimize negative thinking. Group leaders explained concepts, reinforced participation, and engaged patients in discussion.

## 2022-10-31 NOTE — BH INPATIENT PSYCHIATRY PROGRESS NOTE - PRN MEDS
MEDICATIONS  (PRN):  ALBUTerol    90 MICROgram(s) HFA Inhaler 2 Puff(s) Inhalation every 6 hours PRN Shortness of Breath and/or Wheezing  diphenhydrAMINE Injectable 50 milliGRAM(s) IntraMuscular once PRN Combative behavior  hydrOXYzine hydrochloride 50 milliGRAM(s) Oral every 6 hours PRN Anxiety  nicotine  Polacrilex Gum 2 milliGRAM(s) Oral every 2 hours PRN nicotine withdrawal/craving  OLANZapine Disintegrating Tablet 5 milliGRAM(s) Oral every 6 hours PRN agitation  OLANZapine Injectable 5 milliGRAM(s) IntraMuscular once PRN assaultive behavior   MEDICATIONS  (PRN):  ALBUTerol    90 MICROgram(s) HFA Inhaler 2 Puff(s) Inhalation every 6 hours PRN Shortness of Breath and/or Wheezing  diphenhydrAMINE Injectable 50 milliGRAM(s) IntraMuscular once PRN Combative behavior  hydrOXYzine hydrochloride 50 milliGRAM(s) Oral every 6 hours PRN Anxiety  nicotine  Polacrilex Gum 2 milliGRAM(s) Oral every 2 hours PRN nicotine withdrawal/craving  OLANZapine Disintegrating Tablet 5 milliGRAM(s) Oral every 6 hours PRN agitation  OLANZapine Injectable 5 milliGRAM(s) IntraMuscular once PRN assaultive behavior  polyethylene glycol 3350 17 Gram(s) Oral daily PRN constipation

## 2022-10-31 NOTE — BH PSYCHOLOGY - GROUP THERAPY NOTE - NSBHPSYCHOLPARTICIPCOMMENT_PSY_A_CORE FT
Pt was engaged in group discussion, responded to others appropriately, and meaningfully contributed to group discussion. Thought processes circumstantial and overinclusive at times, speech within normal limits, and oriented x3.

## 2022-10-31 NOTE — BH INPATIENT PSYCHIATRY PROGRESS NOTE - NSBHCHARTREVIEWVS_PSY_A_CORE FT
Vital Signs Last 24 Hrs  T(C): 36.7 (10-31-22 @ 06:20), Max: 36.8 (10-30-22 @ 14:21)  T(F): 98.1 (10-31-22 @ 06:20), Max: 98.2 (10-30-22 @ 14:21)  HR: --  BP: --  BP(mean): --  RR: --  SpO2: --    Orthostatic VS  10-30-22 @ 08:09  Lying BP: --/-- HR: --  Sitting BP: 91/62 HR: 75  Standing BP: 92/61 HR: 70  Site: --  Mode: electronic  Orthostatic VS  10-30-22 @ 01:50  Lying BP: --/-- HR: --  Sitting BP: 122/68 HR: 86  Standing BP: 130/72 HR: 82  Site: --  Mode: --   Vital Signs Last 24 Hrs  T(C): 36.7 (10-31-22 @ 06:20), Max: 36.8 (10-30-22 @ 14:21)  T(F): 98.1 (10-31-22 @ 06:20), Max: 98.2 (10-30-22 @ 14:21)  HR: --  BP: --  BP(mean): --  RR: --  SpO2: --    Orthostatic VS  10-31-22 @ 08:22  Lying BP: --/-- HR: --  Sitting BP: 110/58 HR: 58  Standing BP: 100/58 HR: 65  Site: --  Mode: --  Orthostatic VS  10-30-22 @ 08:09  Lying BP: --/-- HR: --  Sitting BP: 91/62 HR: 75  Standing BP: 92/61 HR: 70  Site: --  Mode: electronic  Orthostatic VS  10-30-22 @ 01:50  Lying BP: --/-- HR: --  Sitting BP: 122/68 HR: 86  Standing BP: 130/72 HR: 82  Site: --  Mode: --   Vital Signs Last 24 Hrs  T(C): 35.9 (10-31-22 @ 14:32), Max: 36.7 (10-31-22 @ 06:20)  T(F): 96.7 (10-31-22 @ 14:32), Max: 98.1 (10-31-22 @ 06:20)  HR: --  BP: --  BP(mean): --  RR: --  SpO2: --    Orthostatic VS  10-31-22 @ 08:22  Lying BP: --/-- HR: --  Sitting BP: 110/58 HR: 58  Standing BP: 100/58 HR: 65  Site: --  Mode: --  Orthostatic VS  10-30-22 @ 08:09  Lying BP: --/-- HR: --  Sitting BP: 91/62 HR: 75  Standing BP: 92/61 HR: 70  Site: --  Mode: electronic  Orthostatic VS  10-30-22 @ 01:50  Lying BP: --/-- HR: --  Sitting BP: 122/68 HR: 86  Standing BP: 130/72 HR: 82  Site: --  Mode: --   Vital Signs Last 24 Hrs  T(C): 36.6 (11-01-22 @ 06:03), Max: 36.6 (11-01-22 @ 06:03)  T(F): 97.8 (11-01-22 @ 06:03), Max: 97.8 (11-01-22 @ 06:03)  HR: --  BP: --  BP(mean): --  RR: 17 (10-31-22 @ 20:57) (17 - 17)  SpO2: --    Orthostatic VS  11-01-22 @ 08:20  Lying BP: --/-- HR: --  Sitting BP: 98/69 HR: 81  Standing BP: 95/70 HR: 85  Site: upper left arm  Mode: electronic  Orthostatic VS  10-31-22 @ 19:47  Lying BP: --/-- HR: --  Sitting BP: 108/88 HR: 89  Standing BP: 107/86 HR: 85  Site: --  Mode: --  Orthostatic VS  10-31-22 @ 08:22  Lying BP: --/-- HR: --  Sitting BP: 110/58 HR: 58  Standing BP: 100/58 HR: 65  Site: --  Mode: --

## 2022-10-31 NOTE — BH INPATIENT PSYCHIATRY PROGRESS NOTE - NSBHFUPINTERVALHXFT_PSY_A_CORE
52 y/o F with a PMHx of polysubstance use, opioid use disorder (on methadone), benzo dependance, anxiety, depression, and SIB is admitted to [facility] with a primary problem of worsening mood and decompensation in context of noncompliance with outpatient follow up and noncompliance with her medications. Patient has history of multiple psychiatric inpatient admissions, high utilizer of ED with similar complaints. Recently discharged from [facility] on 10/10/22. She has a history of suicidal gestures and non-suicidal cutting behavior, malingering, no known PMH, who was BIB self stating that she ran out of medications and has been having SI with urges to cut. Patient has not been following up with outpatient after most recent discharge.   Patient was seen and evaluated, chart reviewed. Case discussed with nursing team, no interval events reported. No aggressive events reported or PRNs needed for aggression. Patient reports no sleeping or appetite changes. Has been compliant with medications, no adverse effects reported. Patient is observed outside, found to be extremely talkative. Initial presentation shows her to be antsy and fidgety, constantly moving around. She has a pressured speech which is found to be circumstantial. She starts talking right away about various different topics and jumping from one to the other without finishing her thoughts about the previous topic. Patient will answer questions, but immediately goes off topic, tends to interrupt and seems to be internally stimulated. Patient admits to having previous SI such as having urges to cut, she states her last attempt was 1 week ago and used a knife to make a superficial cut on her arm. She reports that she did it as she wanted to feel better instantly and wanted to "get out of pain". Currently, patient reports having SI "sometimes", but meditation helps. Patient discussed various coping skills such as walking, deep breathing, eating healthy, and to preemptively stop herself from becoming "too angry or sad". Patient reports feeling better after receiving Suboxone. Enjoys going to group and is an active participant. Currently denies any HI/SIB, any specific plan or intent, AVH. Reports no acute medical complaints or pain. Continue to monitor and offer therapeutic support.

## 2022-10-31 NOTE — BH INPATIENT PSYCHIATRY PROGRESS NOTE - CURRENT MEDICATION
MEDICATIONS  (STANDING):  ARIPiprazole 15 milliGRAM(s) Oral daily  buprenorphine 2 mG/naloxone 0.5 mG SL  Tablet 1 Tablet(s) SubLingual <User Schedule>  clonazePAM  Tablet 1 milliGRAM(s) Oral daily  clonazePAM  Tablet 0.5 milliGRAM(s) Oral at bedtime  escitalopram 20 milliGRAM(s) Oral daily  influenza   Vaccine 0.5 milliLiter(s) IntraMuscular once    MEDICATIONS  (PRN):  ALBUTerol    90 MICROgram(s) HFA Inhaler 2 Puff(s) Inhalation every 6 hours PRN Shortness of Breath and/or Wheezing  diphenhydrAMINE Injectable 50 milliGRAM(s) IntraMuscular once PRN Combative behavior  hydrOXYzine hydrochloride 50 milliGRAM(s) Oral every 6 hours PRN Anxiety  nicotine  Polacrilex Gum 2 milliGRAM(s) Oral every 2 hours PRN nicotine withdrawal/craving  OLANZapine Disintegrating Tablet 5 milliGRAM(s) Oral every 6 hours PRN agitation  OLANZapine Injectable 5 milliGRAM(s) IntraMuscular once PRN assaultive behavior   MEDICATIONS  (STANDING):  ARIPiprazole 15 milliGRAM(s) Oral daily  buprenorphine 2 mG/naloxone 0.5 mG SL  Tablet 1 Tablet(s) SubLingual <User Schedule>  clonazePAM  Tablet 0.5 milliGRAM(s) Oral <User Schedule>  clonazePAM  Tablet 1 milliGRAM(s) Oral daily  escitalopram 20 milliGRAM(s) Oral daily  influenza   Vaccine 0.5 milliLiter(s) IntraMuscular once    MEDICATIONS  (PRN):  ALBUTerol    90 MICROgram(s) HFA Inhaler 2 Puff(s) Inhalation every 6 hours PRN Shortness of Breath and/or Wheezing  diphenhydrAMINE Injectable 50 milliGRAM(s) IntraMuscular once PRN Combative behavior  hydrOXYzine hydrochloride 50 milliGRAM(s) Oral every 6 hours PRN Anxiety  nicotine  Polacrilex Gum 2 milliGRAM(s) Oral every 2 hours PRN nicotine withdrawal/craving  OLANZapine Disintegrating Tablet 5 milliGRAM(s) Oral every 6 hours PRN agitation  OLANZapine Injectable 5 milliGRAM(s) IntraMuscular once PRN assaultive behavior   MEDICATIONS  (STANDING):  ARIPiprazole 15 milliGRAM(s) Oral daily  buprenorphine 2 mG/naloxone 0.5 mG SL  Tablet 2 Tablet(s) SubLingual <User Schedule>  clonazePAM  Tablet 0.5 milliGRAM(s) Oral <User Schedule>  clonazePAM  Tablet 1 milliGRAM(s) Oral daily  escitalopram 20 milliGRAM(s) Oral daily  influenza   Vaccine 0.5 milliLiter(s) IntraMuscular once    MEDICATIONS  (PRN):  ALBUTerol    90 MICROgram(s) HFA Inhaler 2 Puff(s) Inhalation every 6 hours PRN Shortness of Breath and/or Wheezing  diphenhydrAMINE Injectable 50 milliGRAM(s) IntraMuscular once PRN Combative behavior  hydrOXYzine hydrochloride 50 milliGRAM(s) Oral every 6 hours PRN Anxiety  nicotine  Polacrilex Gum 2 milliGRAM(s) Oral every 2 hours PRN nicotine withdrawal/craving  OLANZapine Disintegrating Tablet 5 milliGRAM(s) Oral every 6 hours PRN agitation  OLANZapine Injectable 5 milliGRAM(s) IntraMuscular once PRN assaultive behavior   MEDICATIONS  (STANDING):  ARIPiprazole 15 milliGRAM(s) Oral daily  buprenorphine 2 mG/naloxone 0.5 mG SL  Tablet 2 Tablet(s) SubLingual daily  buprenorphine 2 mG/naloxone 0.5 mG SL  Tablet 1 Tablet(s) SubLingual <User Schedule>  clonazePAM  Tablet 0.5 milliGRAM(s) Oral <User Schedule>  clonazePAM  Tablet 1 milliGRAM(s) Oral daily  escitalopram 20 milliGRAM(s) Oral daily  influenza   Vaccine 0.5 milliLiter(s) IntraMuscular once    MEDICATIONS  (PRN):  ALBUTerol    90 MICROgram(s) HFA Inhaler 2 Puff(s) Inhalation every 6 hours PRN Shortness of Breath and/or Wheezing  diphenhydrAMINE Injectable 50 milliGRAM(s) IntraMuscular once PRN Combative behavior  hydrOXYzine hydrochloride 50 milliGRAM(s) Oral every 6 hours PRN Anxiety  nicotine  Polacrilex Gum 2 milliGRAM(s) Oral every 2 hours PRN nicotine withdrawal/craving  OLANZapine Disintegrating Tablet 5 milliGRAM(s) Oral every 6 hours PRN agitation  OLANZapine Injectable 5 milliGRAM(s) IntraMuscular once PRN assaultive behavior   MEDICATIONS  (STANDING):  buprenorphine 2 mG/naloxone 0.5 mG SL  Tablet 2 Tablet(s) SubLingual <User Schedule>  clonazePAM  Tablet 1 milliGRAM(s) Oral daily  clonazePAM  Tablet 0.5 milliGRAM(s) Oral <User Schedule>  diVALproex  milliGRAM(s) Oral <User Schedule>  diVALproex  milliGRAM(s) Oral once  influenza   Vaccine 0.5 milliLiter(s) IntraMuscular once    MEDICATIONS  (PRN):  ALBUTerol    90 MICROgram(s) HFA Inhaler 2 Puff(s) Inhalation every 6 hours PRN Shortness of Breath and/or Wheezing  diphenhydrAMINE Injectable 50 milliGRAM(s) IntraMuscular once PRN Combative behavior  hydrOXYzine hydrochloride 50 milliGRAM(s) Oral every 6 hours PRN Anxiety  nicotine  Polacrilex Gum 2 milliGRAM(s) Oral every 2 hours PRN nicotine withdrawal/craving  OLANZapine Disintegrating Tablet 5 milliGRAM(s) Oral every 6 hours PRN agitation  OLANZapine Injectable 5 milliGRAM(s) IntraMuscular once PRN assaultive behavior  polyethylene glycol 3350 17 Gram(s) Oral daily PRN constipation

## 2022-10-31 NOTE — BH INPATIENT PSYCHIATRY PROGRESS NOTE - NSBHASSESSSUMMFT_PSY_ALL_CORE
51-year-old single  woman. Patient has history of multiple psychiatric admissions, high utilizer of ED.  Recent discharged from [facility] [address] on 10/10/22. Patient has  PPH of polysubstance use, opioid use disorder (on methadone) benzo dependence, anxiety, depression.  Patient is hospitalized with a primary problem of worsening mood and decompensation in context of noncompliance with outpatient follow up and noncompliance with her medications.  Has frequent ED visits with similar complaints, history of numerous psychiatric inpatient hospitalizations.  Patient admitted to [facility] on a 9.13 legal status.  This patient requires inpatient hospitalization due to symptoms of mental illness so severe that they significantly interfere with activities of daily living and presents a potential danger to self as a result of acute decompensation with active SI, thoughts to self-harm . She is requiring inpatient care at this time as a result, for psychiatric stabilization and safety.    Plan:  >Legal: 9.13  >Obs: Routine, no current SI. no need for CO, patient not expected to pose risk to self or others in controlled inpatient setting  >Psychiatric Meds: Restart outpatient medication regimen: Abilify 15mg daily; Suboxone 2mg/0.5mg 2 films TID; klonopin 1mg daily and 0.5mg hs, Lexapro 20mg daily.  Observe for tolerability and efficacy. Patient had been poorly adherent prior to admission.   PRN medications:  Ativan 2mg oral Q6HR PRN for agitation and anxiety.  Haldol 5mg oral Q6HR PRN for agitation.   Benadryl 50mg oral Q6HR PRN for agitation.   Vistaril 50mg oral Q6HR PRN for anxiety.  Desyrel 50mg oral QHS PRN for insomnia.   >Labs: Admission labs reviewed, no acute findings. Utox +THC. QT/QTc: 410/448ms. Hold antipsychotics if QTc >500  >Medical:   No acute concerns. No consultations needed at this time. Patient with consistently stable VS, no visible physical symptoms of withdrawal.   During the course of treatment, will collaborate with medical team to manage medical issues.  >Diet: Regular  >Social: milieu/structured therapy  >Treatment Interventions: Groups and Individual Therapy/CBT, Motivational counseling for substance abuse related issues.   >Dispo: Collateral and dispo planning pending further symptom and medication optimization
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
## 2022-10-31 NOTE — BH INPATIENT PSYCHIATRY PROGRESS NOTE - NSBHMETABOLIC_PSY_ALL_CORE_FT
BMI: BMI (kg/m2): 21.3 (10-30-22 @ 01:50)  HbA1c:   Glucose: POCT Blood Glucose.: 85 mg/dL (09-24-22 @ 19:56)    BP: 118/76 (10-29-22 @ 21:10) (101/78 - 118/76)  Lipid Panel:

## 2022-11-01 PROCEDURE — 99232 SBSQ HOSP IP/OBS MODERATE 35: CPT

## 2022-11-01 RX ORDER — BUPRENORPHINE AND NALOXONE 2; .5 MG/1; MG/1
2 TABLET SUBLINGUAL
Refills: 0 | Status: DISCONTINUED | OUTPATIENT
Start: 2022-11-01 | End: 2022-11-08

## 2022-11-01 RX ORDER — DIVALPROEX SODIUM 500 MG/1
250 TABLET, DELAYED RELEASE ORAL ONCE
Refills: 0 | Status: COMPLETED | OUTPATIENT
Start: 2022-11-01 | End: 2022-11-01

## 2022-11-01 RX ORDER — DIVALPROEX SODIUM 500 MG/1
250 TABLET, DELAYED RELEASE ORAL
Refills: 0 | Status: DISCONTINUED | OUTPATIENT
Start: 2022-11-01 | End: 2022-11-03

## 2022-11-01 RX ORDER — ARIPIPRAZOLE 15 MG/1
20 TABLET ORAL DAILY
Refills: 0 | Status: DISCONTINUED | OUTPATIENT
Start: 2022-11-02 | End: 2022-11-11

## 2022-11-01 RX ADMIN — OLANZAPINE 5 MILLIGRAM(S): 15 TABLET, FILM COATED ORAL at 19:57

## 2022-11-01 RX ADMIN — Medication 0.5 MILLIGRAM(S): at 13:02

## 2022-11-01 RX ADMIN — Medication 50 MILLIGRAM(S): at 05:48

## 2022-11-01 RX ADMIN — Medication 1 MILLIGRAM(S): at 07:59

## 2022-11-01 RX ADMIN — Medication 50 MILLIGRAM(S): at 15:54

## 2022-11-01 RX ADMIN — DIVALPROEX SODIUM 250 MILLIGRAM(S): 500 TABLET, DELAYED RELEASE ORAL at 13:02

## 2022-11-01 RX ADMIN — ESCITALOPRAM OXALATE 20 MILLIGRAM(S): 10 TABLET, FILM COATED ORAL at 07:59

## 2022-11-01 RX ADMIN — POLYETHYLENE GLYCOL 3350 17 GRAM(S): 17 POWDER, FOR SOLUTION ORAL at 14:33

## 2022-11-01 RX ADMIN — BUPRENORPHINE AND NALOXONE 2 TABLET(S): 2; .5 TABLET SUBLINGUAL at 19:57

## 2022-11-01 RX ADMIN — BUPRENORPHINE AND NALOXONE 2 TABLET(S): 2; .5 TABLET SUBLINGUAL at 13:02

## 2022-11-01 RX ADMIN — Medication 0.5 MILLIGRAM(S): at 19:57

## 2022-11-01 RX ADMIN — ARIPIPRAZOLE 15 MILLIGRAM(S): 15 TABLET ORAL at 07:59

## 2022-11-01 RX ADMIN — DIVALPROEX SODIUM 250 MILLIGRAM(S): 500 TABLET, DELAYED RELEASE ORAL at 19:57

## 2022-11-01 RX ADMIN — BUPRENORPHINE AND NALOXONE 2 TABLET(S): 2; .5 TABLET SUBLINGUAL at 07:59

## 2022-11-01 NOTE — BH INPATIENT PSYCHIATRY PROGRESS NOTE - NSBHCHARTREVIEWVS_PSY_A_CORE FT
Vital Signs Last 24 Hrs  T(C): 36.6 (11-01-22 @ 06:03), Max: 36.6 (11-01-22 @ 06:03)  T(F): 97.8 (11-01-22 @ 06:03), Max: 97.8 (11-01-22 @ 06:03)  HR: --  BP: --  BP(mean): --  RR: 17 (10-31-22 @ 20:57) (17 - 17)  SpO2: --    Orthostatic VS  10-31-22 @ 19:47  Lying BP: --/-- HR: --  Sitting BP: 108/88 HR: 89  Standing BP: 107/86 HR: 85  Site: --  Mode: --  Orthostatic VS  10-31-22 @ 08:22  Lying BP: --/-- HR: --  Sitting BP: 110/58 HR: 58  Standing BP: 100/58 HR: 65  Site: --  Mode: --  Orthostatic VS  10-30-22 @ 08:09  Lying BP: --/-- HR: --  Sitting BP: 91/62 HR: 75  Standing BP: 92/61 HR: 70  Site: --  Mode: electronic   Vital Signs Last 24 Hrs  T(C): 36.6 (11-01-22 @ 06:03), Max: 36.6 (11-01-22 @ 06:03)  T(F): 97.8 (11-01-22 @ 06:03), Max: 97.8 (11-01-22 @ 06:03)  HR: --  BP: --  BP(mean): --  RR: 17 (10-31-22 @ 20:57) (17 - 17)  SpO2: --    Orthostatic VS  11-01-22 @ 08:20  Lying BP: --/-- HR: --  Sitting BP: 98/69 HR: 81  Standing BP: 95/70 HR: 85  Site: upper left arm  Mode: electronic  Orthostatic VS  10-31-22 @ 19:47  Lying BP: --/-- HR: --  Sitting BP: 108/88 HR: 89  Standing BP: 107/86 HR: 85  Site: --  Mode: --  Orthostatic VS  10-31-22 @ 08:22  Lying BP: --/-- HR: --  Sitting BP: 110/58 HR: 58  Standing BP: 100/58 HR: 65  Site: --  Mode: --

## 2022-11-01 NOTE — BH INPATIENT PSYCHIATRY PROGRESS NOTE - NSBHFUPINTERVALHXFT_PSY_A_CORE
50 y/o F with a PMHx of polysubstance use, opioid use disorder (on methadone), benzo dependance, anxiety, depression, and SIB is admitted to [facility] with a primary problem of worsening mood and decompensation in context of noncompliance with outpatient follow up and noncompliance with her medications. Patient has history of multiple psychiatric inpatient admissions, high utilizer of ED with similar complaints. Recently discharged from [facility]4 on 10/10/22. She has a history of suicidal gestures and non-suicidal cutting behavior, malingering, no known PMH, who was BIB self stating that she ran out of medications and has been having SI with urges to cut. Patient has not been following up with outpatient after most recent discharge.   Patient was seen and evaluated, chart reviewed. Case discussed with nursing team, no interval events reported. No aggressive events reported or PRNs needed for aggression. Patient reports no sleeping or appetite changes. Has been compliant with medications, no adverse effects reported.  Patient is observed in her room laying down. Was initially hesitant to meet for interview as she states she is "really tired", then agreed if it would "take less than 5 minutes". Patient's speech is less pressured than yesterday, her thoughts are less tangential and seems to be less internally stimulated. Patient's mood seems be less elevated today. She reports having trouble with sleep, woke up twice throughout the night and has been up since 6am. States there was no specific reason or trigger, she claims "it just happens sometimes". Admits that she is nervous about starting Depakote again as she states it has not worked for her in the past. Patient states that she has "lot of energy" throughout the day which she claims is due to her being "healthy and motivated". Overall she states she feels "pretty good". Reports no urges to cut or engage in any self-harming behavior. Currently denies any HI/SIB, any specific plan or intent, AVH. Reports no acute medical complaints or pain. Continue to monitor and offer therapeutic support. 50 y/o F with a PMHx of polysubstance use, opioid use disorder (on methadone), benzo dependance, anxiety, depression, and SIB is admitted to [facility] with a primary problem of worsening mood and decompensation in context of noncompliance with outpatient follow up and noncompliance with her medications. Patient has history of multiple psychiatric inpatient admissions, high utilizer of ED with similar complaints. Recently discharged from [facility] on 10/10/22. She has a history of suicidal gestures and non-suicidal cutting behavior, malingering, no known PMH, who was BIB self stating that she ran out of medications and has been having SI with urges to cut. Patient has not been following up with outpatient after most recent discharge.   Patient was seen and evaluated, chart reviewed. Case discussed with nursing team, no interval events reported. No aggressive events reported or PRNs needed for aggression. Patient reports no sleeping or appetite changes. Has been compliant with medications, no adverse effects reported.  Patient is observed in her room laying down. Was initially hesitant to meet for interview as she states she is "really tired", then agreed if it would "take less than 5 minutes". Patient's speech is less pressured than yesterday, her thoughts are less tangential and seems to be less internally stimulated. Patient's mood remains manic,, elevated. She reports having trouble with sleep, woke up twice throughout the night and has been up since 6am. States there was no specific reason or trigger, she claims "it just happens sometimes". Admits that she is nervous about starting Depakote again as she states it has not worked for her in the past. Patient states that she has "lot of energy" throughout the day which she claims is due to her being "healthy and motivated". Overall she states she feels "pretty good". Reports no urges to cut or engage in any self-harming behavior. Currently denies any HI/SIB, any specific plan or intent, AVH. Reports no acute medical complaints or pain. Continue to monitor and offer therapeutic support.

## 2022-11-01 NOTE — BH INPATIENT PSYCHIATRY PROGRESS NOTE - PRN MEDS
MEDICATIONS  (PRN):  ALBUTerol    90 MICROgram(s) HFA Inhaler 2 Puff(s) Inhalation every 6 hours PRN Shortness of Breath and/or Wheezing  diphenhydrAMINE Injectable 50 milliGRAM(s) IntraMuscular once PRN Combative behavior  hydrOXYzine hydrochloride 50 milliGRAM(s) Oral every 6 hours PRN Anxiety  nicotine  Polacrilex Gum 2 milliGRAM(s) Oral every 2 hours PRN nicotine withdrawal/craving  OLANZapine Disintegrating Tablet 5 milliGRAM(s) Oral every 6 hours PRN agitation  OLANZapine Injectable 5 milliGRAM(s) IntraMuscular once PRN assaultive behavior  polyethylene glycol 3350 17 Gram(s) Oral daily PRN constipation

## 2022-11-01 NOTE — BH INPATIENT PSYCHIATRY PROGRESS NOTE - CURRENT MEDICATION
MEDICATIONS  (STANDING):  ARIPiprazole 15 milliGRAM(s) Oral daily  buprenorphine 2 mG/naloxone 0.5 mG SL  Tablet 2 Tablet(s) SubLingual daily  buprenorphine 2 mG/naloxone 0.5 mG SL  Tablet 1 Tablet(s) SubLingual <User Schedule>  clonazePAM  Tablet 0.5 milliGRAM(s) Oral <User Schedule>  clonazePAM  Tablet 1 milliGRAM(s) Oral daily  escitalopram 20 milliGRAM(s) Oral daily  influenza   Vaccine 0.5 milliLiter(s) IntraMuscular once    MEDICATIONS  (PRN):  ALBUTerol    90 MICROgram(s) HFA Inhaler 2 Puff(s) Inhalation every 6 hours PRN Shortness of Breath and/or Wheezing  diphenhydrAMINE Injectable 50 milliGRAM(s) IntraMuscular once PRN Combative behavior  hydrOXYzine hydrochloride 50 milliGRAM(s) Oral every 6 hours PRN Anxiety  nicotine  Polacrilex Gum 2 milliGRAM(s) Oral every 2 hours PRN nicotine withdrawal/craving  OLANZapine Disintegrating Tablet 5 milliGRAM(s) Oral every 6 hours PRN agitation  OLANZapine Injectable 5 milliGRAM(s) IntraMuscular once PRN assaultive behavior  polyethylene glycol 3350 17 Gram(s) Oral daily PRN constipation   MEDICATIONS  (STANDING):  buprenorphine 2 mG/naloxone 0.5 mG SL  Tablet 2 Tablet(s) SubLingual <User Schedule>  clonazePAM  Tablet 1 milliGRAM(s) Oral daily  clonazePAM  Tablet 0.5 milliGRAM(s) Oral <User Schedule>  diVALproex  milliGRAM(s) Oral <User Schedule>  influenza   Vaccine 0.5 milliLiter(s) IntraMuscular once    MEDICATIONS  (PRN):  ALBUTerol    90 MICROgram(s) HFA Inhaler 2 Puff(s) Inhalation every 6 hours PRN Shortness of Breath and/or Wheezing  diphenhydrAMINE Injectable 50 milliGRAM(s) IntraMuscular once PRN Combative behavior  hydrOXYzine hydrochloride 50 milliGRAM(s) Oral every 6 hours PRN Anxiety  nicotine  Polacrilex Gum 2 milliGRAM(s) Oral every 2 hours PRN nicotine withdrawal/craving  OLANZapine Disintegrating Tablet 5 milliGRAM(s) Oral every 6 hours PRN agitation  OLANZapine Injectable 5 milliGRAM(s) IntraMuscular once PRN assaultive behavior  polyethylene glycol 3350 17 Gram(s) Oral daily PRN constipation

## 2022-11-01 NOTE — BH INPATIENT PSYCHIATRY PROGRESS NOTE - NSBHASSESSSUMMFT_PSY_ALL_CORE
51-year-old single  woman. Patient has history of multiple psychiatric admissions, high utilizer of ED.  Recent discharged from [place] [address] on 10/10/22. Patient has  PPH of polysubstance use, opioid use disorder (on methadone) benzo dependence, anxiety, depression.  Patient is hospitalized with a primary problem of worsening mood and decompensation in context of noncompliance with outpatient follow up and noncompliance with her medications.  Has frequent ED visits with similar complaints, history of numerous psychiatric inpatient hospitalizations.  Patient admitted to [facility] on a 9.13 legal status.  This patient requires inpatient hospitalization due to symptoms of mental illness so severe that they significantly interfere with activities of daily living and presents a potential danger to self as a result of acute decompensation with active SI, thoughts to self-harm . She is requiring inpatient care at this time as a result, for psychiatric stabilization and safety.    Plan:  >Legal: 9.13  >Obs: Routine, no current SI. no need for CO, patient not expected to pose risk to self or others in controlled inpatient setting  >Psychiatric Meds: Restart outpatient medication regimen: Abilify 15mg daily; Suboxone 2mg/0.5mg 2 films daily, Sboxone 2mg /0.5mg at 1300 and 2000 ; klonopin 1mg daily and 0.5mg BID, Lexapro 20mg daily.  Observe for tolerability and efficacy. Patient had been poorly adherent prior to admission.   PRN medications:  Ativan 2mg oral Q6HR PRN for agitation and anxiety.  Haldol 5mg oral Q6HR PRN for agitation.   Benadryl 50mg oral Q6HR PRN for agitation.   Vistaril 50mg oral Q6HR PRN for anxiety.  Desyrel 50mg oral QHS PRN for insomnia.   >Labs:  labs reviewed, no acute findings. Utox +THC. QT/QTc: 410/448ms. Hold antipsychotics if QTc >500  >Medical:   No acute concerns. No consultations needed at this time. Patient with consistently stable VS, no visible physical symptoms of withdrawal.   During the course of treatment, will collaborate with medical team to manage medical issues.  >Diet: Regular  >Social: milieu/structured therapy  >Treatment Interventions: Groups and Individual Therapy/CBT, Motivational counseling for substance abuse related issues.   >Dispo: Collateral and dispo planning pending further symptom and medication optimization

## 2022-11-02 PROCEDURE — 99232 SBSQ HOSP IP/OBS MODERATE 35: CPT

## 2022-11-02 RX ADMIN — ARIPIPRAZOLE 20 MILLIGRAM(S): 15 TABLET ORAL at 08:01

## 2022-11-02 RX ADMIN — Medication 1 MILLIGRAM(S): at 08:01

## 2022-11-02 RX ADMIN — Medication 50 MILLIGRAM(S): at 03:47

## 2022-11-02 RX ADMIN — BUPRENORPHINE AND NALOXONE 2 TABLET(S): 2; .5 TABLET SUBLINGUAL at 08:02

## 2022-11-02 RX ADMIN — Medication 0.5 MILLIGRAM(S): at 12:01

## 2022-11-02 RX ADMIN — DIVALPROEX SODIUM 250 MILLIGRAM(S): 500 TABLET, DELAYED RELEASE ORAL at 08:01

## 2022-11-02 RX ADMIN — Medication 0.5 MILLIGRAM(S): at 19:48

## 2022-11-02 RX ADMIN — BUPRENORPHINE AND NALOXONE 2 TABLET(S): 2; .5 TABLET SUBLINGUAL at 12:01

## 2022-11-02 RX ADMIN — POLYETHYLENE GLYCOL 3350 17 GRAM(S): 17 POWDER, FOR SOLUTION ORAL at 08:53

## 2022-11-02 RX ADMIN — BUPRENORPHINE AND NALOXONE 2 TABLET(S): 2; .5 TABLET SUBLINGUAL at 19:47

## 2022-11-02 NOTE — BH INPATIENT PSYCHIATRY PROGRESS NOTE - CURRENT MEDICATION
MEDICATIONS  (STANDING):  ARIPiprazole 20 milliGRAM(s) Oral daily  buprenorphine 2 mG/naloxone 0.5 mG SL  Tablet 2 Tablet(s) SubLingual <User Schedule>  clonazePAM  Tablet 1 milliGRAM(s) Oral daily  clonazePAM  Tablet 0.5 milliGRAM(s) Oral <User Schedule>  diVALproex  milliGRAM(s) Oral <User Schedule>  influenza   Vaccine 0.5 milliLiter(s) IntraMuscular once    MEDICATIONS  (PRN):  ALBUTerol    90 MICROgram(s) HFA Inhaler 2 Puff(s) Inhalation every 6 hours PRN Shortness of Breath and/or Wheezing  diphenhydrAMINE Injectable 50 milliGRAM(s) IntraMuscular once PRN Combative behavior  hydrOXYzine hydrochloride 50 milliGRAM(s) Oral every 6 hours PRN Anxiety  nicotine  Polacrilex Gum 2 milliGRAM(s) Oral every 2 hours PRN nicotine withdrawal/craving  OLANZapine Disintegrating Tablet 5 milliGRAM(s) Oral every 6 hours PRN agitation  OLANZapine Injectable 5 milliGRAM(s) IntraMuscular once PRN assaultive behavior  polyethylene glycol 3350 17 Gram(s) Oral daily PRN constipation

## 2022-11-02 NOTE — BH INPATIENT PSYCHIATRY PROGRESS NOTE - NSBHCHARTREVIEWVS_PSY_A_CORE FT
Vital Signs Last 24 Hrs  T(C): 36.7 (11-02-22 @ 06:38), Max: 36.7 (11-02-22 @ 06:38)  T(F): 98.1 (11-02-22 @ 06:38), Max: 98.1 (11-02-22 @ 06:38)  HR: --  BP: --  BP(mean): --  RR: 17 (11-01-22 @ 20:44) (17 - 17)  SpO2: --    Orthostatic VS  11-01-22 @ 19:45  Lying BP: --/-- HR: --  Sitting BP: 105/65 HR: 65  Standing BP: 99/66 HR: 73  Site: --  Mode: --  Orthostatic VS  11-01-22 @ 08:20  Lying BP: --/-- HR: --  Sitting BP: 98/69 HR: 81  Standing BP: 95/70 HR: 85  Site: upper left arm  Mode: electronic  Orthostatic VS  10-31-22 @ 19:47  Lying BP: --/-- HR: --  Sitting BP: 108/88 HR: 89  Standing BP: 107/86 HR: 85  Site: --  Mode: --  Orthostatic VS  10-31-22 @ 08:22  Lying BP: --/-- HR: --  Sitting BP: 110/58 HR: 58  Standing BP: 100/58 HR: 65  Site: --  Mode: --   Vital Signs Last 24 Hrs  T(C): 36.7 (11-02-22 @ 06:38), Max: 36.7 (11-02-22 @ 06:38)  T(F): 98.1 (11-02-22 @ 06:38), Max: 98.1 (11-02-22 @ 06:38)  HR: --  BP: --  BP(mean): --  RR: 17 (11-01-22 @ 20:44) (17 - 17)  SpO2: --    Orthostatic VS  11-02-22 @ 08:15  Lying BP: --/-- HR: --  Sitting BP: 112/71 HR: 67  Standing BP: 113/80 HR: 73  Site: upper left arm  Mode: electronic  Orthostatic VS  11-01-22 @ 19:45  Lying BP: --/-- HR: --  Sitting BP: 105/65 HR: 65  Standing BP: 99/66 HR: 73  Site: --  Mode: --  Orthostatic VS  11-01-22 @ 08:20  Lying BP: --/-- HR: --  Sitting BP: 98/69 HR: 81  Standing BP: 95/70 HR: 85  Site: upper left arm  Mode: electronic  Orthostatic VS  10-31-22 @ 19:47  Lying BP: --/-- HR: --  Sitting BP: 108/88 HR: 89  Standing BP: 107/86 HR: 85  Site: --  Mode: --

## 2022-11-02 NOTE — BH INPATIENT PSYCHIATRY PROGRESS NOTE - OTHER
antsy and fidgety, states she tends to walk a lot to relieve her "extra energy". seems to be emotionally hyper-reactive

## 2022-11-02 NOTE — BH INPATIENT PSYCHIATRY PROGRESS NOTE - NSTXDEPRESINTERMD_PSY_ALL_CORE
Patient was seen to be more social and interactive with other patients, playing ping-pong outside and talking more.

## 2022-11-02 NOTE — BH INPATIENT PSYCHIATRY PROGRESS NOTE - NSBHMETABOLIC_PSY_ALL_CORE_FT
BMI: BMI (kg/m2): 21.3 (10-30-22 @ 01:50)  HbA1c:   Glucose: POCT Blood Glucose.: 85 mg/dL (09-24-22 @ 19:56)    BP: --  Lipid Panel:

## 2022-11-02 NOTE — BH INPATIENT PSYCHIATRY PROGRESS NOTE - NSBHFUPINTERVALHXFT_PSY_A_CORE
52 y/o F with a PMHx of polysubstance use, opioid use disorder (on methadone), benzo dependance, anxiety, depression, and SIB is admitted to [facility] with a primary problem of worsening mood and decompensation in context of noncompliance with outpatient follow up and noncompliance with her medications. Patient has history of multiple psychiatric inpatient admissions, high utilizer of ED with similar complaints. Recently discharged from [facility]4 on 10/10/22. She has a history of suicidal gestures and non-suicidal cutting behavior, malingering, no known PMH, who was BIB self stating that she ran out of medications and has been having SI with urges to cut. Patient has not been following up with outpatient after most recent discharge.   Patient was seen and evaluated, chart reviewed. Case discussed with nursing team, no interval events reported. No aggressive events reported or PRNs needed for aggression. Patient reports no sleeping or appetite changes. Has been compliant with medications, no adverse effects reported. No sleeping or appetite changes noted. Patient received Zyprexa and Aderex last night and is requesting multiple PRNs as per nursing staff. Patient was started on Depakote yesterday, has been discontinued on Lexapro, and Abilify has been increased to 20mg. VPA level is scheduled to be taken next week with possible discharge early-mid next week. Patient states that she would like to be discharged before her birthday, [birth year].  Patient is observed outside interacting with other patients. Patient is antsy and states she is "jumping around" because she has a lot of energy. States she feels "excellent" and is "getting better". She feels the Depakote is working for her as she states before she would occasionally experience the sensation of crawling out of her skin, but hasn't since taking the medication. Reports no urges to cut or engage in any self-harming behavior. Currently denies any HI/SIB, any specific plan or intent, AVH. Reports no acute medical complaints or pain. Continue to monitor and offer therapeutic support. 50 y/o F with a PMHx of polysubstance use, opioid use disorder (on methadone), benzo dependance, anxiety, depression, and SIB is admitted to [facility] with a primary problem of worsening mood and decompensation in context of noncompliance with outpatient follow up and noncompliance with her medications. Patient has history of multiple psychiatric inpatient admissions, high utilizer of ED with similar complaints. Recently discharged from [facility]4 on 10/10/22. She has a history of suicidal gestures and non-suicidal cutting behavior, malingering, no known PMH, who was BIB self stating that she ran out of medications and has been having SI with urges to cut. Patient has not been following up with outpatient after most recent discharge.   Patient was seen and evaluated, chart reviewed. Case discussed with nursing team, no interval events reported. No aggressive events reported or PRNs needed for aggression. Patient reports no sleeping or appetite changes. Has been compliant with medications, no adverse effects reported. No sleeping or appetite changes noted. Patient received Zyprexa and Atarax last night and is requesting multiple PRNs as per nursing staff. Patient was started on Depakote 250mg BID yesterday, has been discontinued on Lexapro, and Abilify has been increased to 20mg. VPA level is scheduled to be taken next week with possible discharge early-mid next week. Patient states that she would like to be discharged before her birthday, [birth year].  Patient is observed outside interacting with other patients. Patient is antsy and states she is "jumping around" because she has a lot of energy. States she feels "excellent" and is "getting better". She feels the Depakote is working for her as she states before she would occasionally experience the sensation of crawling out of her skin, but hasn't since taking the medication. Reports no urges to cut or engage in any self-harming behavior. Currently denies any HI/SIB, any specific plan or intent, AVH. Reports no acute medical complaints or pain. Continue to monitor and offer therapeutic support.

## 2022-11-03 PROCEDURE — 99232 SBSQ HOSP IP/OBS MODERATE 35: CPT

## 2022-11-03 RX ORDER — DIVALPROEX SODIUM 500 MG/1
500 TABLET, DELAYED RELEASE ORAL
Refills: 0 | Status: DISCONTINUED | OUTPATIENT
Start: 2022-11-03 | End: 2022-11-11

## 2022-11-03 RX ADMIN — Medication 50 MILLIGRAM(S): at 10:42

## 2022-11-03 RX ADMIN — Medication 1 MILLIGRAM(S): at 08:06

## 2022-11-03 RX ADMIN — DIVALPROEX SODIUM 500 MILLIGRAM(S): 500 TABLET, DELAYED RELEASE ORAL at 19:34

## 2022-11-03 RX ADMIN — ARIPIPRAZOLE 20 MILLIGRAM(S): 15 TABLET ORAL at 08:06

## 2022-11-03 RX ADMIN — Medication 50 MILLIGRAM(S): at 03:20

## 2022-11-03 RX ADMIN — Medication 0.5 MILLIGRAM(S): at 13:01

## 2022-11-03 RX ADMIN — BUPRENORPHINE AND NALOXONE 2 TABLET(S): 2; .5 TABLET SUBLINGUAL at 08:06

## 2022-11-03 RX ADMIN — BUPRENORPHINE AND NALOXONE 2 TABLET(S): 2; .5 TABLET SUBLINGUAL at 13:01

## 2022-11-03 RX ADMIN — DIVALPROEX SODIUM 500 MILLIGRAM(S): 500 TABLET, DELAYED RELEASE ORAL at 08:06

## 2022-11-03 RX ADMIN — BUPRENORPHINE AND NALOXONE 2 TABLET(S): 2; .5 TABLET SUBLINGUAL at 19:34

## 2022-11-03 RX ADMIN — Medication 0.5 MILLIGRAM(S): at 19:34

## 2022-11-03 RX ADMIN — Medication 50 MILLIGRAM(S): at 17:45

## 2022-11-03 NOTE — BH INPATIENT PSYCHIATRY PROGRESS NOTE - NSBHASSESSSUMMFT_PSY_ALL_CORE
51-year-old single  woman. Patient has history of multiple psychiatric admissions, high utilizer of ED.  Recent discharged from [place] [address] on 10/10/22. Patient has  PPH of polysubstance use, opioid use disorder (on methadone) benzo dependence, anxiety, depression.  Patient is hospitalized with a primary problem of worsening mood and decompensation in context of noncompliance with outpatient follow up and noncompliance with her medications.  Has frequent ED visits with similar complaints, history of numerous psychiatric inpatient hospitalizations.  Patient admitted to [facility] on a 9.13 legal status.  This patient requires inpatient hospitalization due to symptoms of mental illness so severe that they significantly interfere with activities of daily living and presents a potential danger to self as a result of acute decompensation with active SI, thoughts to self-harm . She is requiring inpatient care at this time as a result, for psychiatric stabilization and safety.    Plan:  >Legal: 9.13  >Obs: Routine, no current SI. no need for CO, patient not expected to pose risk to self or others in controlled inpatient setting  >Psychiatric Meds: Restart outpatient medication regimen: Abilify 15mg daily; Suboxone 2mg/0.5mg 2 films daily, Sboxone 2mg /0.5mg at 1300 and 2000 ; klonopin 1mg daily and 0.5mg BID, Lexapro 20mg daily.  Observe for tolerability and efficacy. Patient had been poorly adherent prior to admission.   PRN medications:  Ativan 2mg oral Q6HR PRN for agitation and anxiety.  Haldol 5mg oral Q6HR PRN for agitation.   Benadryl 50mg oral Q6HR PRN for agitation.   Vistaril 50mg oral Q6HR PRN for anxiety.  Desyrel 50mg oral QHS PRN for insomnia.   >Labs:  labs reviewed, no acute findings. Utox +THC. QT/QTc: 410/448ms. Hold antipsychotics if QTc >500  >Medical:   No acute concerns. No consultations needed at this time. Patient with consistently stable VS, no visible physical symptoms of withdrawal.   During the course of treatment, will collaborate with medical team to manage medical issues.  >Diet: Regular  >Social: milieu/structured therapy  >Treatment Interventions: Groups and Individual Therapy/CBT, Motivational counseling for substance abuse related issues.   >Dispo: Collateral and dispo planning pending further symptom and medication optimization            51-year-old single  woman. Patient has history of multiple psychiatric admissions, high utilizer of ED.  Recent discharged from [place] [address] on 10/10/22. Patient has  PPH of polysubstance use, opioid use disorder (on methadone) benzo dependence, anxiety, depression.  Patient is hospitalized with a primary problem of worsening mood and decompensation in context of noncompliance with outpatient follow up and noncompliance with her medications.  Has frequent ED visits with similar complaints, history of numerous psychiatric inpatient hospitalizations.  Patient admitted to [facility] on a 9.13 legal status.  This patient requires inpatient hospitalization due to symptoms of mental illness so severe that they significantly interfere with activities of daily living and presents a potential danger to self as a result of acute decompensation with active SI, thoughts to self-harm . She is requiring inpatient care at this time as a result, for psychiatric stabilization and safety.    Plan:  >Legal: 9.13  >Obs: Routine, no current SI. no need for CO, patient not expected to pose risk to self or others in controlled inpatient setting  >Psychiatric Meds: Increase Abilify 20mg daily; Depakote 500mg BID, Suboxone 2mg/0.5mg 2 films  TID,  klonopin 1mg daily and 0.5mg BID, dc Lexapro 20mg daily.  Observe for tolerability and efficacy. Patient had been poorly adherent prior to admission.   PRN medications:  Ativan 2mg oral Q6HR PRN for agitation and anxiety.  Haldol 5mg oral Q6HR PRN for agitation.   Benadryl 50mg oral Q6HR PRN for agitation.   Vistaril 50mg oral Q6HR PRN for anxiety.  Desyrel 50mg oral QHS PRN for insomnia.   >Labs:  labs reviewed, no acute findings. Utox +THC. QT/QTc: 410/448ms. Hold antipsychotics if QTc >500  >Medical:   No acute concerns. No consultations needed at this time. Patient with consistently stable VS, no visible physical symptoms of withdrawal.   During the course of treatment, will collaborate with medical team to manage medical issues.  >Diet: Regular  >Social: milieu/structured therapy  >Treatment Interventions: Groups and Individual Therapy/CBT, Motivational counseling for substance abuse related issues.   >Dispo: Collateral and dispo planning pending further symptom and medication optimization

## 2022-11-03 NOTE — BH TREATMENT PLAN - NSTXCAREGIVERPARTICIPATE_PSY_P_CORE
No, patient unwilling to involve family/caregiver
overtly functional
Family/Caregiver participated in identification of needs/problems/goals for treatment/Family/Caregiver participated in defining interventions/Family/Caregiver participated in development of after care plan

## 2022-11-03 NOTE — BH INPATIENT PSYCHIATRY PROGRESS NOTE - OTHER
antsy and fidgety, states she tends to walk a lot to relieve her "extra energy". seems to be emotionally hyper-reactive  will jump from one topic to another.

## 2022-11-03 NOTE — BH INPATIENT PSYCHIATRY PROGRESS NOTE - NSBHFUPINTERVALHXFT_PSY_A_CORE
sudden onset 50 y/o F with a PMHx of polysubstance use, opioid use disorder (on methadone), benzo dependance, anxiety, depression, and SIB is admitted to [facility] with a primary problem of worsening mood and decompensation in context of noncompliance with outpatient follow up and noncompliance with her medications. Patient has history of multiple psychiatric inpatient admissions, high utilizer of ED with similar complaints. Recently discharged from [facility] ML4 on 10/10/22. She has a history of suicidal gestures and non-suicidal cutting behavior, malingering, no known PMH, who was BIB self stating that she ran out of medications and has been having SI with urges to cut. Patient has not been following up with outpatient after most recent discharge.   Patient was seen and evaluated, chart reviewed. Case discussed with nursing team, no interval events reported. No aggressive events reported or PRNs needed for aggression. Patient reports no sleeping or appetite changes. Has been compliant with medications, no adverse effects reported. No sleeping or appetite changes noted. Patient received Atarax last night and is requesting multiple PRNs as per nursing staff, thought to be med-seeking. Patient was started on Depakote yesterday, dose has been increased to 1000mg total. Pt has been discontinued on Lexapro, and Abilify has been increased to 20mg. VPA level is scheduled to be taken next week with possible discharge early-mid next week. Patient states that she would like to be discharged before her birthday, [birth year].  Patient is observed outside. Patient is antsy and states she is "jumping around" because she has a lot of energy. Reports she is "getting better" and drawing more, going to group. Patient appears to be more grounded, speech was less pressured today and thought process was more linear. Patient was rushing to complete interview as she states she "needs to move around". Patient appears to be slightly less demanding and persistent with requests. Reports no urges to cut or engage in any self-harming behavior. Currently denies any HI/SIB, any specific plan or intent, AVH. Reports no acute medical complaints or pain. Continue to monitor and offer therapeutic support. 52 y/o F with a PMHx of polysubstance use, opioid use disorder (on methadone), benzo dependance, anxiety, depression, and SIB is admitted to [facility] with a primary problem of worsening mood and decompensation in context of noncompliance with outpatient follow up and noncompliance with her medications. Patient has history of multiple psychiatric inpatient admissions, high utilizer of ED with similar complaints. Recently discharged from [facility] ML4 on 10/10/22. She has a history of suicidal gestures and non-suicidal cutting behavior, malingering, no known PMH, who was BIB self stating that she ran out of medications and has been having SI with urges to cut. Patient has not been following up with outpatient after most recent discharge.   Patient was seen and evaluated, chart reviewed. Case discussed with nursing team, no interval events reported. No aggressive events reported or PRNs needed for aggression. Patient reports no sleeping or appetite changes. Has been compliant with medications, no adverse effects reported. No sleeping or appetite changes noted. Patient received Atarax last night and is requesting multiple PRNs as per nursing staff, thought to be med-seeking. Patient was started on Depakote  dose has been increased to 500mg BID. Pt has been discontinued on Lexapro, and Abilify has been increased to 20mg. VPA level is scheduled to be taken next week with possible discharge mid next week. Patient states that she would like to be discharged before her birthday, [birth year].  Patient is observed outside. Patient is fidgity and states she is "jumping around" because she has a lot of energy. Reports she is "getting better" and drawing more, going to group. Patient appears to be more grounded, speech was less pressured today and thought process was more linear. Patient was rushing to complete interview as she states she "needs to move around". Patient appears to be slightly less intrusive or demanding and persistent with requests. Reports no urges to cut or engage in any self-harming behavior. Currently denies any HI/SIB, any specific plan or intent, AVH. Reports tooth pain, requesting dental consult. Continue to monitor and offer therapeutic support.

## 2022-11-03 NOTE — BH INPATIENT PSYCHIATRY PROGRESS NOTE - NSTXDEPRESINTERMD_PSY_ALL_CORE
Patient was seen to be more social and interactive with other patients, playing ping-pong outside and talking more.  Patient was seen to be more social and interactive with other patients, playing ping-pong outside and talking more. States she has been drawing more and going to group.

## 2022-11-03 NOTE — BH INPATIENT PSYCHIATRY PROGRESS NOTE - NSBHCHARTREVIEWVS_PSY_A_CORE FT
Vital Signs Last 24 Hrs  T(C): 36.6 (11-03-22 @ 06:21), Max: 37.1 (11-02-22 @ 19:26)  T(F): 97.9 (11-03-22 @ 06:21), Max: 98.7 (11-02-22 @ 19:26)  HR: --  BP: --  BP(mean): --  RR: --  SpO2: --    Orthostatic VS  11-02-22 @ 19:26  Lying BP: --/-- HR: --  Sitting BP: 115/75 HR: 78  Standing BP: 100/66 HR: 87  Site: --  Mode: --  Orthostatic VS  11-02-22 @ 08:15  Lying BP: --/-- HR: --  Sitting BP: 112/71 HR: 67  Standing BP: 113/80 HR: 73  Site: upper left arm  Mode: electronic  Orthostatic VS  11-01-22 @ 19:45  Lying BP: --/-- HR: --  Sitting BP: 105/65 HR: 65  Standing BP: 99/66 HR: 73  Site: --  Mode: --  Orthostatic VS  11-01-22 @ 08:20  Lying BP: --/-- HR: --  Sitting BP: 98/69 HR: 81  Standing BP: 95/70 HR: 85  Site: upper left arm  Mode: electronic   Vital Signs Last 24 Hrs  T(C): 36.6 (11-03-22 @ 06:21), Max: 37.1 (11-02-22 @ 19:26)  T(F): 97.9 (11-03-22 @ 06:21), Max: 98.7 (11-02-22 @ 19:26)  HR: --  BP: --  BP(mean): --  RR: --  SpO2: --    Orthostatic VS  11-03-22 @ 07:50  Lying BP: --/-- HR: --  Sitting BP: 101/60 HR: 78  Standing BP: 98/63 HR: 88  Site: --  Mode: --  Orthostatic VS  11-02-22 @ 19:26  Lying BP: --/-- HR: --  Sitting BP: 115/75 HR: 78  Standing BP: 100/66 HR: 87  Site: --  Mode: --  Orthostatic VS  11-02-22 @ 08:15  Lying BP: --/-- HR: --  Sitting BP: 112/71 HR: 67  Standing BP: 113/80 HR: 73  Site: upper left arm  Mode: electronic  Orthostatic VS  11-01-22 @ 19:45  Lying BP: --/-- HR: --  Sitting BP: 105/65 HR: 65  Standing BP: 99/66 HR: 73  Site: --  Mode: --

## 2022-11-04 PROCEDURE — 99232 SBSQ HOSP IP/OBS MODERATE 35: CPT

## 2022-11-04 RX ADMIN — Medication 50 MILLIGRAM(S): at 03:08

## 2022-11-04 RX ADMIN — Medication 1 MILLIGRAM(S): at 08:02

## 2022-11-04 RX ADMIN — DIVALPROEX SODIUM 500 MILLIGRAM(S): 500 TABLET, DELAYED RELEASE ORAL at 19:55

## 2022-11-04 RX ADMIN — ARIPIPRAZOLE 20 MILLIGRAM(S): 15 TABLET ORAL at 08:02

## 2022-11-04 RX ADMIN — BUPRENORPHINE AND NALOXONE 2 TABLET(S): 2; .5 TABLET SUBLINGUAL at 08:02

## 2022-11-04 RX ADMIN — Medication 0.5 MILLIGRAM(S): at 19:55

## 2022-11-04 RX ADMIN — BUPRENORPHINE AND NALOXONE 2 TABLET(S): 2; .5 TABLET SUBLINGUAL at 19:54

## 2022-11-04 RX ADMIN — Medication 0.5 MILLIGRAM(S): at 13:05

## 2022-11-04 RX ADMIN — BUPRENORPHINE AND NALOXONE 2 TABLET(S): 2; .5 TABLET SUBLINGUAL at 13:05

## 2022-11-04 RX ADMIN — POLYETHYLENE GLYCOL 3350 17 GRAM(S): 17 POWDER, FOR SOLUTION ORAL at 13:45

## 2022-11-04 RX ADMIN — Medication 50 MILLIGRAM(S): at 18:14

## 2022-11-04 RX ADMIN — DIVALPROEX SODIUM 500 MILLIGRAM(S): 500 TABLET, DELAYED RELEASE ORAL at 08:01

## 2022-11-04 NOTE — BH TREATMENT PLAN - NSTXANXINTERPR_PSY_ALL_CORE
Pt would benefit from identify 2-3 coping skills to manage anxiety by day seven. Psychiatric rehabilitation staff will encourage pt to attend daily symptom management group and engage in individual therapy session to achieve her goal.

## 2022-11-04 NOTE — BH INPATIENT PSYCHIATRY PROGRESS NOTE - NSBHFUPINTERVALHXFT_PSY_A_CORE
52 y/o F with a PMHx of polysubstance use, opioid use disorder (on methadone), benzo dependance, anxiety, depression, and SIB is admitted to [facility] with a primary problem of worsening mood and decompensation in context of noncompliance with outpatient follow up and noncompliance with her medications. Patient has history of multiple psychiatric inpatient admissions, high utilizer of ED with similar complaints. Recently discharged from [facility] ML4 on 10/10/22. She has a history of suicidal gestures and non-suicidal cutting behavior, malingering, no known PMH, who was BIB self stating that she ran out of medications and has been having SI with urges to cut. Patient has not been following up with outpatient after most recent discharge.   Patient was seen and evaluated, chart reviewed. Case discussed with nursing team, no interval events reported. No aggressive events reported or PRNs needed for aggression. Patient has been compliant with medications, no adverse effects reported. No sleeping or appetite changes noted. Pt requests multiple PRNs as per nursing staff, thought to be med-seeking. Patient was started on Depakote dose has been increased to 500mg BID. Pt has been discontinued on Lexapro, and Abilify has been increased to 20mg. VPA level is scheduled to be taken next week with possible discharge mid next week. Patient states that she would like to be discharged before her birthday, [birth year].  Patient reports she slept good and is excited about losing 1lb, has a goal to lose 5lb before the holidays. Focused on portion control, going outside more, and actively participating in group therapy. Patient appears to be less disorganized, speech is less pressured and thoughts are more logical. Hyperactivity is decreased and patient seems to be less internally stimulated. Patient states she feels like a "mature women", feels happier after receiving her makeup, and feels like "there is hope at the end of the tunnel". Reports that she feels Depakote is working well for her as she feels she is better able to "sit in her skin". Reports no urges to cut or self-harm. Denies SI/SIB/HI, any plan or intent, AVH. Reports no acute medical complaints or pain.

## 2022-11-04 NOTE — BH TREATMENT PLAN - NSTXDEPRESINTERRN_PSY_ALL_CORE
Assess mood.  Offer PRN as needed.  Educate patient on utilizing coping skills to help manage stressors.

## 2022-11-04 NOTE — BH TREATMENT PLAN - NSTXDEPRESINTERMD_PSY_ALL_CORE
Patient was seen to be more social and interactive with other patients, playing ping-pong outside and talking more. States she has been drawing more and going to group.

## 2022-11-04 NOTE — BH TREATMENT PLAN - NSTXDCOTHRGOAL_PSY_ALL_CORE
Pt will show a reduction of symptoms and engage meaningfully with writer to identify a safe discharge plan. Pt will comply with recommended tx plan and medications for 5 days, identify 2 benefits for adhering to tx

## 2022-11-04 NOTE — BH INPATIENT PSYCHIATRY PROGRESS NOTE - OTHER
seems to be emotionally hyper-reactive  will jump from one topic to another. antsy and fidgety, states she tends to walk a lot to relieve her "extra energy". seems to be emotionally hyper-reactive.

## 2022-11-04 NOTE — BH INPATIENT PSYCHIATRY PROGRESS NOTE - NSBHCHARTREVIEWVS_PSY_A_CORE FT
Vital Signs Last 24 Hrs  T(C): 36.4 (11-04-22 @ 06:27), Max: 36.8 (11-03-22 @ 19:43)  T(F): 97.5 (11-04-22 @ 06:27), Max: 98.2 (11-03-22 @ 19:43)  HR: --  BP: --  BP(mean): --  RR: 16 (11-03-22 @ 07:50) (16 - 16)  SpO2: --    Orthostatic VS  11-03-22 @ 19:43  Lying BP: --/-- HR: --  Sitting BP: 110/70 HR: 73  Standing BP: 111/72 HR: 79  Site: --  Mode: --  Orthostatic VS  11-03-22 @ 07:50  Lying BP: --/-- HR: --  Sitting BP: 101/60 HR: 78  Standing BP: 98/63 HR: 88  Site: --  Mode: --  Orthostatic VS  11-02-22 @ 19:26  Lying BP: --/-- HR: --  Sitting BP: 115/75 HR: 78  Standing BP: 100/66 HR: 87  Site: --  Mode: --  Orthostatic VS  11-02-22 @ 08:15  Lying BP: --/-- HR: --  Sitting BP: 112/71 HR: 67  Standing BP: 113/80 HR: 73  Site: upper left arm  Mode: electronic   Vital Signs Last 24 Hrs  T(C): 36.4 (11-04-22 @ 06:27), Max: 36.8 (11-03-22 @ 19:43)  T(F): 97.5 (11-04-22 @ 06:27), Max: 98.2 (11-03-22 @ 19:43)  HR: --  BP: --  BP(mean): --  RR: --  SpO2: --    Orthostatic VS  11-04-22 @ 08:12  Lying BP: --/-- HR: --  Sitting BP: 94/65 HR: 90  Standing BP: 96/64 HR: 100  Site: upper left arm  Mode: electronic  Orthostatic VS  11-03-22 @ 19:43  Lying BP: --/-- HR: --  Sitting BP: 110/70 HR: 73  Standing BP: 111/72 HR: 79  Site: --  Mode: --  Orthostatic VS  11-03-22 @ 07:50  Lying BP: --/-- HR: --  Sitting BP: 101/60 HR: 78  Standing BP: 98/63 HR: 88  Site: --  Mode: --  Orthostatic VS  11-02-22 @ 19:26  Lying BP: --/-- HR: --  Sitting BP: 115/75 HR: 78  Standing BP: 100/66 HR: 87  Site: --  Mode: --

## 2022-11-04 NOTE — BH INPATIENT PSYCHIATRY PROGRESS NOTE - NSBHASSESSSUMMFT_PSY_ALL_CORE
51-year-old single  woman. Patient has history of multiple psychiatric admissions, high utilizer of ED.  Recent discharged from [place] [address] on 10/10/22. Patient has  PPH of polysubstance use, opioid use disorder (on methadone) benzo dependence, anxiety, depression.  Patient is hospitalized with a primary problem of worsening mood and decompensation in context of noncompliance with outpatient follow up and noncompliance with her medications.  Has frequent ED visits with similar complaints, history of numerous psychiatric inpatient hospitalizations.  Patient admitted to [facility] on a 9.13 legal status.  This patient requires inpatient hospitalization due to symptoms of mental illness so severe that they significantly interfere with activities of daily living and presents a potential danger to self as a result of acute decompensation with active SI, thoughts to self-harm . She is requiring inpatient care at this time as a result, for psychiatric stabilization and safety.    Plan:  >Legal: 9.13  >Obs: Routine, no current SI. no need for CO, patient not expected to pose risk to self or others in controlled inpatient setting  >Psychiatric Meds:  Abilify 20mg daily; Depakote 500mg BID, Suboxone 2mg/0.5mg 2 films  TID,  klonopin 1mg daily and 0.5mg BID, dc Lexapro 20mg daily.  Observe for tolerability and efficacy. Patient had been poorly adherent prior to admission.   PRN medications:  Ativan 2mg oral Q6HR PRN for agitation and anxiety.  Haldol 5mg oral Q6HR PRN for agitation.   Benadryl 50mg oral Q6HR PRN for agitation.   Vistaril 50mg oral Q6HR PRN for anxiety.  Desyrel 50mg oral QHS PRN for insomnia.   >Labs:  labs reviewed, no acute findings. Utox +THC. QT/QTc: 410/448ms. Hold antipsychotics if QTc >500  >Medical:   No acute concerns. No consultations needed at this time. Patient with consistently stable VS, no visible physical symptoms of withdrawal.   During the course of treatment, will collaborate with medical team to manage medical issues.  >Diet: Regular  >Social: milieu/structured therapy  >Treatment Interventions: Groups and Individual Therapy/CBT, Motivational counseling for substance abuse related issues. Consider ASHLEY  >Dispo: Collateral and dispo planning pending further symptom and medication optimization

## 2022-11-04 NOTE — SOCIAL WORK POST DISCHARGE FOLLOW UP NOTE - NSBHSWFOLLOWUP_PSY_ALL_CORE_FT
[facility] was notified that the pt did not attend her [facility] A[facility] 10/17 intake appointment and was re-hospitalized. At the time of dc the treatment team determined the patient was not a risk to themselves or others. This case is closed.

## 2022-11-05 RX ORDER — QUETIAPINE FUMARATE 200 MG/1
50 TABLET, FILM COATED ORAL ONCE
Refills: 0 | Status: COMPLETED | OUTPATIENT
Start: 2022-11-05 | End: 2022-11-05

## 2022-11-05 RX ADMIN — Medication 0.5 MILLIGRAM(S): at 19:41

## 2022-11-05 RX ADMIN — BUPRENORPHINE AND NALOXONE 2 TABLET(S): 2; .5 TABLET SUBLINGUAL at 12:22

## 2022-11-05 RX ADMIN — ARIPIPRAZOLE 20 MILLIGRAM(S): 15 TABLET ORAL at 08:03

## 2022-11-05 RX ADMIN — Medication 50 MILLIGRAM(S): at 00:44

## 2022-11-05 RX ADMIN — Medication 50 MILLIGRAM(S): at 19:42

## 2022-11-05 RX ADMIN — Medication 1 MILLIGRAM(S): at 08:03

## 2022-11-05 RX ADMIN — DIVALPROEX SODIUM 500 MILLIGRAM(S): 500 TABLET, DELAYED RELEASE ORAL at 19:41

## 2022-11-05 RX ADMIN — POLYETHYLENE GLYCOL 3350 17 GRAM(S): 17 POWDER, FOR SOLUTION ORAL at 05:26

## 2022-11-05 RX ADMIN — QUETIAPINE FUMARATE 50 MILLIGRAM(S): 200 TABLET, FILM COATED ORAL at 13:47

## 2022-11-05 RX ADMIN — DIVALPROEX SODIUM 500 MILLIGRAM(S): 500 TABLET, DELAYED RELEASE ORAL at 08:03

## 2022-11-05 RX ADMIN — BUPRENORPHINE AND NALOXONE 2 TABLET(S): 2; .5 TABLET SUBLINGUAL at 08:03

## 2022-11-05 RX ADMIN — BUPRENORPHINE AND NALOXONE 2 TABLET(S): 2; .5 TABLET SUBLINGUAL at 19:41

## 2022-11-05 RX ADMIN — Medication 0.5 MILLIGRAM(S): at 13:38

## 2022-11-06 RX ADMIN — Medication 0.5 MILLIGRAM(S): at 19:58

## 2022-11-06 RX ADMIN — Medication 1 MILLIGRAM(S): at 08:02

## 2022-11-06 RX ADMIN — DIVALPROEX SODIUM 500 MILLIGRAM(S): 500 TABLET, DELAYED RELEASE ORAL at 19:59

## 2022-11-06 RX ADMIN — BUPRENORPHINE AND NALOXONE 2 TABLET(S): 2; .5 TABLET SUBLINGUAL at 08:01

## 2022-11-06 RX ADMIN — ARIPIPRAZOLE 20 MILLIGRAM(S): 15 TABLET ORAL at 08:02

## 2022-11-06 RX ADMIN — BUPRENORPHINE AND NALOXONE 2 TABLET(S): 2; .5 TABLET SUBLINGUAL at 12:59

## 2022-11-06 RX ADMIN — BUPRENORPHINE AND NALOXONE 2 TABLET(S): 2; .5 TABLET SUBLINGUAL at 19:58

## 2022-11-06 RX ADMIN — Medication 50 MILLIGRAM(S): at 19:59

## 2022-11-06 RX ADMIN — DIVALPROEX SODIUM 500 MILLIGRAM(S): 500 TABLET, DELAYED RELEASE ORAL at 08:02

## 2022-11-06 RX ADMIN — OLANZAPINE 5 MILLIGRAM(S): 15 TABLET, FILM COATED ORAL at 16:07

## 2022-11-06 RX ADMIN — Medication 0.5 MILLIGRAM(S): at 12:58

## 2022-11-06 RX ADMIN — OLANZAPINE 5 MILLIGRAM(S): 15 TABLET, FILM COATED ORAL at 01:10

## 2022-11-07 PROCEDURE — 99232 SBSQ HOSP IP/OBS MODERATE 35: CPT

## 2022-11-07 RX ORDER — CLONAZEPAM 1 MG
1 TABLET ORAL DAILY
Refills: 0 | Status: DISCONTINUED | OUTPATIENT
Start: 2022-11-07 | End: 2022-11-11

## 2022-11-07 RX ORDER — CLONAZEPAM 1 MG
0.5 TABLET ORAL
Refills: 0 | Status: DISCONTINUED | OUTPATIENT
Start: 2022-11-07 | End: 2022-11-11

## 2022-11-07 RX ADMIN — Medication 2 MILLIGRAM(S): at 07:44

## 2022-11-07 RX ADMIN — Medication 1 MILLIGRAM(S): at 08:06

## 2022-11-07 RX ADMIN — POLYETHYLENE GLYCOL 3350 17 GRAM(S): 17 POWDER, FOR SOLUTION ORAL at 07:44

## 2022-11-07 RX ADMIN — DIVALPROEX SODIUM 500 MILLIGRAM(S): 500 TABLET, DELAYED RELEASE ORAL at 19:48

## 2022-11-07 RX ADMIN — BUPRENORPHINE AND NALOXONE 2 TABLET(S): 2; .5 TABLET SUBLINGUAL at 19:48

## 2022-11-07 RX ADMIN — BUPRENORPHINE AND NALOXONE 2 TABLET(S): 2; .5 TABLET SUBLINGUAL at 07:44

## 2022-11-07 RX ADMIN — Medication 0.5 MILLIGRAM(S): at 12:13

## 2022-11-07 RX ADMIN — BUPRENORPHINE AND NALOXONE 2 TABLET(S): 2; .5 TABLET SUBLINGUAL at 12:11

## 2022-11-07 RX ADMIN — DIVALPROEX SODIUM 500 MILLIGRAM(S): 500 TABLET, DELAYED RELEASE ORAL at 07:45

## 2022-11-07 RX ADMIN — ARIPIPRAZOLE 20 MILLIGRAM(S): 15 TABLET ORAL at 07:44

## 2022-11-07 RX ADMIN — Medication 0.5 MILLIGRAM(S): at 19:48

## 2022-11-07 NOTE — BH INPATIENT PSYCHIATRY PROGRESS NOTE - NSBHASSESSSUMMFT_PSY_ALL_CORE
51-year-old single  woman. Patient has history of multiple psychiatric admissions, high utilizer of ED.  Recent discharged from [place] [id number] on 10/10/22. Patient has  PPH of polysubstance use, opioid use disorder (on methadone) benzo dependence, anxiety, depression.  Patient is hospitalized with a primary problem of worsening mood and decompensation in context of noncompliance with outpatient follow up and noncompliance with her medications.  Has frequent ED visits with similar complaints, history of numerous psychiatric inpatient hospitalizations.  Patient admitted to [facility] on a 9.13 legal status.  This patient requires inpatient hospitalization due to symptoms of mental illness so severe that they significantly interfere with activities of daily living and presents a potential danger to self as a result of acute decompensation with active SI, thoughts to self-harm . She is requiring inpatient care at this time as a result, for psychiatric stabilization and safety.    Plan:  >Legal: 9.13  >Obs: Routine, no current SI. no need for CO, patient not expected to pose risk to self or others in controlled inpatient setting  >Psychiatric Meds:  Abilify 20mg daily; Depakote 500mg BID, Suboxone 2mg/0.5mg 2 films  TID,  klonopin 1mg daily and 0.5mg BID, dc Lexapro 20mg daily.  Observe for tolerability and efficacy. Patient had been poorly adherent prior to admission.   PRN medications:  Ativan 2mg oral Q6HR PRN for agitation and anxiety.  Haldol 5mg oral Q6HR PRN for agitation.   Benadryl 50mg oral Q6HR PRN for agitation.   Vistaril 50mg oral Q6HR PRN for anxiety.  Desyrel 50mg oral QHS PRN for insomnia.   >Labs:  labs reviewed, no acute findings. Utox +THC. QT/QTc: 410/448ms. Hold antipsychotics if QTc >500  >Medical:   No acute concerns. No consultations needed at this time. Patient with consistently stable VS, no visible physical symptoms of withdrawal.   During the course of treatment, will collaborate with medical team to manage medical issues.  >Diet: Regular  >Social: milieu/structured therapy  >Treatment Interventions: Groups and Individual Therapy/CBT, Motivational counseling for substance abuse related issues. Consider ASHLEY  >Dispo: Collateral and dispo planning pending further symptom and medication optimization. DC 11/10            51-year-old single  woman. Patient has history of multiple psychiatric admissions, high utilizer of ED.  Recent discharged from [place] [address] on 10/10/22. Patient has  PPH of polysubstance use, opioid use disorder (on methadone) benzo dependence, anxiety, depression.  Patient is hospitalized with a primary problem of worsening mood and decompensation in context of noncompliance with outpatient follow up and noncompliance with her medications.  Has frequent ED visits with similar complaints, history of numerous psychiatric inpatient hospitalizations.  Patient admitted to [facility] on a 9.13 legal status.  This patient requires inpatient hospitalization due to symptoms of mental illness so severe that they significantly interfere with activities of daily living and presents a potential danger to self as a result of acute decompensation with active SI, thoughts to self-harm . She is requiring inpatient care at this time as a result, for psychiatric stabilization and safety.    Plan:  >Legal: 9.13  >Obs: Routine, no current SI. no need for CO, patient not expected to pose risk to self or others in controlled inpatient setting  >Psychiatric Meds:  Abilify 20mg daily; Depakote 500mg BID, Suboxone 2mg/0.5mg 2 films  TID,  klonopin 1mg daily and 0.5mg BID, dc Lexapro 20mg daily.  Observe for tolerability and efficacy. Patient had been poorly adherent prior to admission.   PRN medications:  Ativan 2mg oral Q6HR PRN for agitation and anxiety.  Haldol 5mg oral Q6HR PRN for agitation.   Benadryl 50mg oral Q6HR PRN for agitation.   Vistaril 50mg oral Q6HR PRN for anxiety.  Desyrel 50mg oral QHS PRN for insomnia.   >Labs:  labs reviewed, no acute findings. Utox +THC. QT/QTc: 410/448ms. Hold antipsychotics if QTc >500  >Medical:   No acute concerns. No consultations needed at this time. Patient with consistently stable VS, no visible physical symptoms of withdrawal.   During the course of treatment, will collaborate with medical team to manage medical issues.  >Diet: Regular  >Social: milieu/structured therapy  >Treatment Interventions: Groups and Individual Therapy/CBT, Motivational counseling for substance abuse related issues. Consider ASHLEY  >Dispo: Collateral and dispo planning pending further symptom and medication optimization. DC 11/11
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
## 2022-11-07 NOTE — BH INPATIENT PSYCHIATRY PROGRESS NOTE - NSBHFUPINTERVALHXFT_PSY_A_CORE
52 y/o F with a PMHx of polysubstance use, opioid use disorder (on methadone), benzo dependance, anxiety, depression, and SIB is admitted to [facility] with a primary problem of worsening mood and decompensation in context of noncompliance with outpatient follow up and noncompliance with her medications. Patient has history of multiple psychiatric inpatient admissions, high utilizer of ED with similar complaints. Recently discharged from [facility] ML4 on 10/10/22. She has a history of suicidal gestures and non-suicidal cutting behavior, malingering, no known PMH, who was BIB self stating that she ran out of medications and has been having SI with urges to cut. Patient has not been following up with outpatient after most recent discharge.   Patient was seen and evaluated, chart reviewed. Case discussed with nursing team, no interval events reported. No aggressive events reported or PRNs needed for aggression. Patient has been compliant with medications, no adverse effects reported. No sleeping or appetite changes noted. Pt requests multiple PRNs as per nursing staff, thought to be med-seeking and continues to be demanding. Patient is taking Atarax for anxiety. Patient was started on Depakote dose has been increased to 500mg BID. Pt has been discontinued on Lexapro, and Abilify has been increased to 20mg. VPA level is scheduled to be taken this week with possible discharge this week on Friday, 11/11.   Patient is observed in her room. Patient presents to be restless, constantly moving. She reports feeling "sad" last night because she was thinking about her brother who passed away 6 years ago. States that she had a close relationship with him and was thinking about "nice memories". She also states she has been thinking about her ex-boyfriends, father, and mother as well. She states at times she will have conversations in her head with her ex-boyfriend of 10 years who passed away 2 months ago. Patient reports waking up "alert". States she is "excited" to be discharged and thinks about going to the beach and spending time with her friends once she leaves. Speech continues to be pressured and tangential at times with patient still making numerous demands. Reports no urges to cut or self-harm. Denies SI/SIB/HI, any plan or intent, AVH. Reports no acute medical complaints or pain.

## 2022-11-07 NOTE — BH INPATIENT PSYCHIATRY DISCHARGE NOTE - NSDCMRMEDTOKEN_GEN_ALL_CORE_FT
ARIPiprazole 15 mg oral tablet: 1 tab(s) orally once a day  buprenorphine-naloxone 2 mg-0.5 mg sublingual film: 2 film(s) sublingual 3 times a day MDD:12mg/day buprenorphine  clonazePAM 1 mg oral tablet: 1 tab(s) orally once a day in the morning  1/2 tab orally at bedtime   escitalopram 20 mg oral tablet: 1 tab(s) orally once a day  nicotine 2 mg oral transmucosal gum: 1 gum oral transmucosal every 2 hours   albuterol 90 mcg/inh inhalation aerosol: 2 puff(s) inhaled every 6 hours, As needed, Shortness of Breath and/or Wheezing  ARIPiprazole 20 mg oral tablet: 1 tab(s) orally once a day  buprenorphine-naloxone 2 mg-0.5 mg sublingual film: 2 film(s) sublingual 3 times a day MDD:12mg/day buprenorphine  clonazePAM 0.5 mg oral tablet: 1 tab(s) orally 2 times a day at 1300 and 2000 MDD:1mg  clonazePAM 1 mg oral tablet: 1 tab(s) orally once a day in the morning MDD:1mg  divalproex sodium 500 mg oral tablet, extended release: 1 tab(s) orally 2 times a day    albuterol 90 mcg/inh inhalation aerosol: 2 puff(s) inhaled every 6 hours, As needed, Shortness of Breath and/or Wheezing  ARIPiprazole 20 mg oral tablet: 1 tab(s) orally once a day  buprenorphine-naloxone 2 mg-0.5 mg sublingual tablet: 2 film(s) sublingual 2 times a day MDD:4mg-1mg  clonazePAM 0.5 mg oral tablet: 1 tab(s) orally 2 times a day at 1300 and 2000 MDD:1mg  clonazePAM 1 mg oral tablet: 1 tab(s) orally once a day in the morning MDD:1mg  divalproex sodium 500 mg oral tablet, extended release: 1 tab(s) orally 2 times a day    albuterol 90 mcg/inh inhalation aerosol: 2 puff(s) inhaled every 6 hours, As needed, Shortness of Breath and/or Wheezing  ARIPiprazole 20 mg oral tablet: 1 tab(s) orally once a day  buprenorphine-naloxone 4 mg-1 mg sublingual film: 1 film(s) sublingually 3 times a day MDD:12mg buprenorphine  clonazePAM 0.5 mg oral tablet: 1 tab(s) orally 2 times a day at 1300 and 2000 MDD:1mg  clonazePAM 1 mg oral tablet: 1 tab(s) orally once a day in the morning MDD:1mg  divalproex sodium 500 mg oral tablet, extended release: 1 tab(s) orally 2 times a day

## 2022-11-07 NOTE — BH INPATIENT PSYCHIATRY PROGRESS NOTE - NSBHCHARTREVIEWVS_PSY_A_CORE FT
Vital Signs Last 24 Hrs  T(C): 36.8 (11-07-22 @ 06:32), Max: 37 (11-06-22 @ 19:39)  T(F): 98.3 (11-07-22 @ 06:32), Max: 98.6 (11-06-22 @ 19:39)  HR: --  BP: 115/70 (11-06-22 @ 09:43) (115/70 - 115/70)  BP(mean): --  RR: 17 (11-06-22 @ 20:14) (17 - 17)  SpO2: --    Orthostatic VS  11-06-22 @ 19:39  Lying BP: --/-- HR: --  Sitting BP: 130/90 HR: 72  Standing BP: 105/82 HR: 74  Site: --  Mode: --  Orthostatic VS  11-06-22 @ 07:59  Lying BP: --/-- HR: --  Sitting BP: 128/67 HR: 86  Standing BP: 110/57 HR: 79  Site: --  Mode: --  Orthostatic VS  11-05-22 @ 19:27  Lying BP: --/-- HR: --  Sitting BP: 96/64 HR: 72  Standing BP: 91/61 HR: 81  Site: upper left arm  Mode: electronic   Vital Signs Last 24 Hrs  T(C): 36.8 (11-07-22 @ 06:32), Max: 37 (11-06-22 @ 19:39)  T(F): 98.3 (11-07-22 @ 06:32), Max: 98.6 (11-06-22 @ 19:39)  HR: --  BP: --  BP(mean): --  RR: 17 (11-06-22 @ 20:14) (17 - 17)  SpO2: --    Orthostatic VS  11-07-22 @ 07:34  Lying BP: --/-- HR: --  Sitting BP: 100/70 HR: 83  Standing BP: 90/60 HR: 93  Site: --  Mode: electronic  Orthostatic VS  11-06-22 @ 19:39  Lying BP: --/-- HR: --  Sitting BP: 130/90 HR: 72  Standing BP: 105/82 HR: 74  Site: --  Mode: --  Orthostatic VS  11-06-22 @ 07:59  Lying BP: --/-- HR: --  Sitting BP: 128/67 HR: 86  Standing BP: 110/57 HR: 79  Site: --  Mode: --  Orthostatic VS  11-05-22 @ 19:27  Lying BP: --/-- HR: --  Sitting BP: 96/64 HR: 72  Standing BP: 91/61 HR: 81  Site: upper left arm  Mode: electronic

## 2022-11-07 NOTE — BH INPATIENT PSYCHIATRY DISCHARGE NOTE - NSBHSUICIDESTATUS_PSY_ALL_CORE
On safety assessment on day of discharge, patient has the following risk factors which are nonmodifiable which include: gender, age, chronic mental illness, remote suicide gestures and SIB_via cutting wrist.   Modifiable risk factors: psychosis, treatment non-adherence, substance abuse, impulsivity, inadequate social support. Currently there are no modifiable risk factors that could further be addressed in the inpatient hospital setting as patient denies any depressive sxs, patient is not suicidal with no intent or plan, has improvement in sleep and energy, medication compliant, improvement in manic sxs (such as impulsivity, irritability, intrusiveness, psychomotor agitation).  Protective factors include: denies SIIP, denies HIIP, future oriented, hopeful, willingness to try medication, not depressed, no access to weapons, engaged in treatment, stable residence, support of family, engages in work, close outpatient follow up appointment.   Patient is at chronic higher risk than the general population for suicide because of risk factors as above, however, they can be mitigated by adjusting medications, medication compliance, and continued therapy.  At the time of discharge, patient not an acute danger to self or others.

## 2022-11-07 NOTE — BH INPATIENT PSYCHIATRY DISCHARGE NOTE - ATTENDING DISCHARGE PHYSICAL EXAMINATION:
Patient is calm and cooperative, continues to have limited insight and judgment, encouraged to not travel as she had done between last hospitalization and this one which had disrupted follow-up, denies SI/HI, continued chronic limited distress tolerance, denying SI/HI, continued hypomanic symptoms though no longer achieves manic threshold, compliant with medication and finding beneficial, denying significant side effects and reporting intention to continue outpatient, participated in safety plan formation, continued chronic risk though acute risk no longer likely to benefit from continued inpatient psychiatric hospitalization, appropriate for discharge with outpatient follow-up at [facility] as arranged.

## 2022-11-07 NOTE — BH INPATIENT PSYCHIATRY DISCHARGE NOTE - HPI (INCLUDE ILLNESS QUALITY, SEVERITY, DURATION, TIMING, CONTEXT, MODIFYING FACTORS, ASSOCIATED SIGNS AND SYMPTOMS)
Patient was seen and evaluated, chart reviewed. Case discussed with nursing team.  On service for this  51-year-old single  woman. Patient has history of multiple psychiatric admissions, high utilizer of ED.  Recent discharged from [place] [address] on 10/10/22. Patient has  PPH of polysubstance use, opioid use disorder (on methadone) benzo dependence, anxiety, depression.  Patient is hospitalized with a primary problem of worsening mood and decompensation in context of noncompliance with outpatient follow up and noncompliance with her medications.  Has frequent ED visits with similar complaints, history of numerous psychiatric inpatient hospitalizations.  Patient admitted to [facility] on a 9.13 legal status. I have reviewed the initial psychiatric assessment in the electronic medical record, including the history of present illness, past psychiatric history, family/social history (no pertinent changes), and exam, and have confirmed the salient findings dated 10/29/22.    As per chart review, transferring records indicated the following:  Patient is a 51-year-old woman, domiciled with mother in [place], PPH of polysubstance use, opioid use disorder (on methadone) benzo dependence, anxiety, depression, prior psychiatric hospitalizations including 4/6/22, and again recently discharged 10/10/2022 after OD; Pt did not attend follow up appointments. She has a history of suicidal gestures and non-suicidal cutting behavior, malingering, no known PMH, who was BIB self stating that she ran out of medications and has been having Si with urges to cut.   Pt reports that after her discharged, she went to [place] with her mother. While there she walked a lot, took her meds. She ended up running out of her Suboxone, reports some withdrawal. She says that she did not run out of her klonopin b/c she made it stretch out. She states that she has a prescription waiting for her, but decided to come to the ED today for help as she could not wait. Pt reports that she just arrived back from the [facility] today.   While there, she describes her mood as depressed, sleep/appetite were up and down. She had energy, felt restless while there, took long walks to expend it. She admits that she used MJ and alcohol while there, but denies any other drug use. She did not seek refills while there or tmt. She denies any manic sxs, or any psychotic sxs including hallucinations or delusions.  She reports that she has had thoughts an urge to cut, but has not. The last thought was "5 minutes ago". She reports that she wants to cut and cut until the end. She cites prior broken relationships as reasons she has been feeling depressed. Attempted to call pts mother Sara [phone] and sister Anna Marie [phone]. No answer, sister is reportedly on a flight to [place]. Unable to leave message for mother. iSTOP  demonstrated (ref#[id number] ) Klonopin 1mg 60 tablet prescriptions from Dr. Saw Scruggs internist, filled 9/23 Klonoipn 1mg #60 tabs.    On unit: information received from: Chart review and patient interview  As per chart review, transferring records indicated the following:  Discussed precipitants to warrant services.  Patient reports she was recently discharged from [facility].  Patient states she did not attend follow up appointments and she ran out of medications and has been having SI with urges to cut. Patient reports during session “I don’t feel good I wish I were dead” denied active plan. Patient states “I need help again”  Pt reports that after her discharged, she went to [place] with her mother. Reports taking her medications as prescribed (suspected taking more medications per day as pt states she ran out of medications). States she ran out of her Suboxone, and began having some withdrawal symptoms and decided to come into the ED. She says that she did not run out of her klonopin b/c she made it stretch out.   Since discharge patient reports low mood , feels depressed, sleep/appetite were up and down, felt restless. She admits that she has been having thoughts to cut, but has not. She reports prior broken relationships as reasons she has been feeling depressed. Patient describes low mood and persistent anxiety. She also reports symptoms of difficulty concentrating, perseverative thoughts, poor appetite, insomnia, feelings of sadness, anhedonia, hopelessness, feelings of restlessness.     At time of interview patient reports passive SI. No SIB/HI, no formulated plan or intent, and engages in safety planning.  She denies any current AVH, delusions, psychotic disorganization or paranoia. Denies history of aggression or violence. No access to firearm. Patient denies any symptoms suggestive of active mariaelena: (denied grandiosity/ racing thoughts/ increased goal directed activities or engaged in risk taking behavior/ no pressured speech/ no elevated mood/ denied any increased in energy level causing sleep disruption), no signs of catatonia. She denies obsessive, intrusive and persistent thoughts or compulsive, ritualistic acts are reported.   Patient denies any active legal issues, is not currently under any kind of court supervision.   In regards to substance use, she admits that she used MJ (1 joint daily X4 days) and alcohol (2-3 12 ounce beers daily)  since her discharge but denies any other drug use (admitting utox +THC ), BAL 12. No PMH.  iSTOP  demonstrated (ref#[id number] ) Klonopin 1mg 60 tablet prescriptions from Dr. Saw Scruggs internist, filled 9/23 Klonoipn 1mg #60 tabs

## 2022-11-07 NOTE — BH INPATIENT PSYCHIATRY DISCHARGE NOTE - HOSPITAL COURSE
Patient is 50 y/o F domiciled with mother with PPHx of polysubstance use, opioid use disorder (previously on methadone), benzo dependence, anxiety, depression presenting to ED after recently being dc from [facility] in the past week.  Patient has hx of multiple psych hospitalizations including last month at [facility] on ML4, discharged on 10/10/22.  Patient with 2 admissions to [facility] this year from (3/28-4/6, 4/16-5/13). Patient presented to ED  endorsing SI with plan to cut herself because she ran out of her medications while vacationing in [place].  Admitting utox +THC.   On the unit the patient was intrusive, impulsive with labile mood. She was anxious and irritable, constantly fidgeting. She reported Sx of depression in the context of psychosocial stressors, stating she ran out of her medications, and recent death of boyfriend (which patient has said in past admission since 2020). She was an unreliable historian.  Patient's Lexapro was discontinued (suspected that Lexapro was activating patient) was switched to Abilify, titrated to 20mg daily, adjunct with Depakote titrated to 500mg BID. Patient's outpatient suboxone for ANTHONY and klonopin  for anxiety was continued. No reported side effects.  Patient denied SI/SIB/HI,   denied past suicide attempts and gestures despite multiple hospitalizations at [facility] for SA/SI. Slowly w/ medication titration, the patient was less intrusive with staff and better able to control impulse (despite sporadic bouts of irritability, but much better behavioral control than in prior admissions). She would attended groups and milieu therapy on occasions.  The pt. began to report improving mood, sleep and denied s/i/p. The pt. requested a discharge date prior to her birthday.   All medications given with good effect and tolerability. Symptoms gradually improved over the course of hospitalization.  There were no behavioral problems on the unit.  Patient  did not require emergency intramuscular medications or seclusion/restraints, and did not self-harm on the unit. Medically, during this hospitalization the pt. remained stable. Improved level of functioning was noted with appropriate interaction with others, with manageable level of anxiety. Patient reported improved sleep and appetite.   Patient provided with motivational counseling to abstain from illicit substances, as these can directly worsen her mood and symptoms.  The patient was provided with motivational counseling to tackle stressors and enhance coping skills.  Patient was also given literature on “Personal safety plan.”   Patient was provided with extensive psycho-education regarding importance of compliance with medications.  Risk and benefits discussed.  Patient able to identify protective factors, patient is future-oriented.  Patient no longer required inpatient treatment and care. Patient is not judged to be an acute danger to self or others at this time. Appeared ready and stable to be discharged to lower level of care.  By day of discharge, the patient reported feeling significantly better and optimistic that outpatient treatment at [facility] would be of beneficial to her.   There were no other acute risk factors that could be mitigated by further inpatient hospitalization.   On the day of discharge, the patient’s mood and affect are normal/euthymic. Patient denies depressed mood and anxiety. Patient is not hopeless. Sleep and appetite are also normal (at baseline).  Pt’s motor performance and productivity of speech are WNL. Pt denies symptoms of psychosis including AH/VH with no apparent delusions (or is at baseline/not preoccupied with delusions).  Patient denies S/H/I/I/P.   Patient has made clinically meaningful progress during this hospitalization and has clearly benefited from medications and psychotherapy. On day of discharge, the patient has improved significantly and no longer requires inpatient treatment and care. Pt will be discharged and follow-up with outpatient care. PROCEDURES AND TREATMENT:  >Individual psychotherapy/CBT modality and group therapy  >Milieu therapy and supportive therapy  >Psychopharmacologic management.  >Motivational counseling.   Patient labs were all WNL. Labs include---CBC/Chemistry/LFT/A1C/Lipid. Panel/TSH/CPK/HBA1-C/U-A/U-Tox/EKG/COVID/  EKG: NSR    QT/QTC: 410/448ms  Covid at discharge: nondetectable   Consultation: NA  Medical Issues: NA    Imaging: NA    Medications   Psychiatric: Depakote 500mg BID, Ariprazole 20 mg oral tablet: buprenorphine-naloxone 2 mg-0.5 mg sublingual film: 2 film(s) sublingual 3 times a day for 7 days. clonazePAM 1 mg oral tablet: 1 tab(s) orally once a day in the morning.  Medical: NA  Problem Pertinent Review of Symptoms/Associated Signs and Symptoms: Patient is visible on the unit and is calm, cooperative, pleasant, AAOX3 upon approach. Immediate risk was minimized by inpatient admission to a safe environment with appropriate supervision and limited access to lethal means. On suicide risk assessment at the time of discharge, patient is at elevated chronic risk due the following factors: history of psychiatric illness, history on noncompliance, hx of multiple inpatient psychiatric admissions, and recent inpatient psychiatric discharge.    Protective factors include patient denying any anxiety, panic attacks, global insomnia, severe anhedonia.  Patient denies any suicidal/homicidal ideations, intent, or plan at the time of discharge.  Psycho education was provided to the patient prior to discharge. The patient was educated about the proper and safe use of medications as well as the risks and benefits of over and under dosing. Discussed pitfalls of treatment failure and reinforced taking medication as directed. Discussed not stopping medications when he feels better, and processed discontinuation symptoms. Patient was informed of risk/benefit of medication, alternative treatment and no treatment.  Patient was educated about side effects of his medications, including EPS, TD.  Patient verbalized understanding and in accord with aftercare recommendations. Given the mitigation of aforementioned acute risk factors and considerable protective factors, patient's acute risk for self-harm has been minimized and is currently low.   Discharge Pan:  -Risk, benefits and alternatives discussed with patient. Patient verbalized understanding and in accord with aftercare recommendations.   - Patient given 4 weeks supply of medications. Risk, benefits and alternatives discussed with patient.   -Patient instructed to call 911 or go to nearest ER in case of emergency.   -Patient given Suicide Prevention Lifeline number 4-307-209-6226 and provided instructions on its use  -Patient will follow up with outpatient appointment at [facility]                       Patient is 52 y/o F domiciled with mother with PPHx of polysubstance use, opioid use disorder (previously on methadone), benzo dependence, anxiety, depression presenting to ED after recently being dc from [facility] in the past week.  Patient has hx of multiple psych hospitalizations including last month at [facility] on ML4, discharged on 10/10/22.  Patient with 2 admissions to [facility] this year from (3/28-4/6, 4/16-5/13). Patient presented to ED  endorsing SI with plan to cut herself because she ran out of her medications while vacationing in [place].  Admitting utox +THC.   On the unit the patient was intrusive, impulsive with labile mood. She was anxious and irritable, constantly fidgeting. She reported Sx of depression in the context of psychosocial stressors, stating she ran out of her medications, and recent death of boyfriend (which patient has said in past admission since 2020). She was an unreliable historian.  Patient's Lexapro was discontinued (suspected that Lexapro was activating patient) was switched to Abilify, titrated to 20mg daily, adjunct with Depakote titrated to 500mg BID. Patient's outpatient suboxone for ANTHONY and klonopin  for anxiety was continued. No reported side effects.  Patient denied SI/SIB/HI,   denied past suicide attempts and gestures despite multiple hospitalizations at [facility] for SA/SI. Slowly w/ medication titration, the patient was less intrusive with staff and better able to control impulse (despite sporadic bouts of irritability, but much better behavioral control than in prior admissions). She would attended groups and milieu therapy on occasions.  The pt. began to report improving mood, sleep and denied s/i/p. The pt. requested a discharge date prior to her birthday.   All medications given with good effect and tolerability. Symptoms gradually improved over the course of hospitalization.  There were no behavioral problems on the unit.  Patient  did not require emergency intramuscular medications or seclusion/restraints, and did not self-harm on the unit. Medically, during this hospitalization the pt. remained stable. Improved level of functioning was noted with appropriate interaction with others, with manageable level of anxiety. Patient reported improved sleep and appetite.   Patient provided with motivational counseling to abstain from illicit substances, as these can directly worsen her mood and symptoms.  The patient was provided with motivational counseling to tackle stressors and enhance coping skills.  Patient was also given literature on “Personal safety plan.”   Patient was provided with extensive psycho-education regarding importance of compliance with medications.  Risk and benefits discussed.  Patient able to identify protective factors, patient is future-oriented.  Patient no longer required inpatient treatment and care. Patient is not judged to be an acute danger to self or others at this time. Appeared ready and stable to be discharged to lower level of care.  By day of discharge, the patient reported feeling significantly better and optimistic that outpatient treatment at [facility] would be of beneficial to her.   There were no other acute risk factors that could be mitigated by further inpatient hospitalization.   On the day of discharge, the patient’s mood and affect are normal/euthymic. Patient denies depressed mood and anxiety. Patient is not hopeless. Sleep and appetite are also normal (at baseline).  Pt’s motor performance and productivity of speech are WNL. Pt denies symptoms of psychosis including AH/VH with no apparent delusions (or is at baseline/not preoccupied with delusions).  Patient denies S/H/I/I/P.   Patient has made clinically meaningful progress during this hospitalization and has clearly benefited from medications and psychotherapy. On day of discharge, the patient has improved significantly and no longer requires inpatient treatment and care. Pt will be discharged and follow-up with outpatient care. PROCEDURES AND TREATMENT:  >Individual psychotherapy/CBT modality and group therapy  >Milieu therapy and supportive therapy  >Psychopharmacologic management.  >Motivational counseling.   Patient labs were all WNL. Labs include---CBC/Chemistry/LFT/A1C/Lipid. Panel/TSH/CPK/HBA1-C/U-A/U-Tox/EKG/COVID/  EKG: NSR    QT/QTC: 410/448ms  Valproic acid level: 59.60   Ammonia level 46  Covid at discharge: nondetectable   Consultation: NA  Medical Issues: NA    Imaging: NA    Medications   Psychiatric: Depakote 500mg BID, Ariprazole 20 mg oral tablet: buprenorphine-naloxone 2 mg-0.5 mg sublingual film: 2 film(s) sublingual 3 times a day for 7 days. clonazePAM 1 mg oral tablet: 1 tab(s) orally once a day in the morning.  Medical: NA  Problem Pertinent Review of Symptoms/Associated Signs and Symptoms: Patient is visible on the unit and is calm, cooperative, pleasant, AAOX3 upon approach. Immediate risk was minimized by inpatient admission to a safe environment with appropriate supervision and limited access to lethal means. On suicide risk assessment at the time of discharge, patient is at elevated chronic risk due the following factors: history of psychiatric illness, history on noncompliance, hx of multiple inpatient psychiatric admissions, and recent inpatient psychiatric discharge.    Protective factors include patient denying any anxiety, panic attacks, global insomnia, severe anhedonia.  Patient denies any suicidal/homicidal ideations, intent, or plan at the time of discharge.  Psycho education was provided to the patient prior to discharge. The patient was educated about the proper and safe use of medications as well as the risks and benefits of over and under dosing. Discussed pitfalls of treatment failure and reinforced taking medication as directed. Discussed not stopping medications when he feels better, and processed discontinuation symptoms. Patient was informed of risk/benefit of medication, alternative treatment and no treatment.  Patient was educated about side effects of his medications, including EPS, TD.  Patient verbalized understanding and in accord with aftercare recommendations. Given the mitigation of aforementioned acute risk factors and considerable protective factors, patient's acute risk for self-harm has been minimized and is currently low.   Discharge Pan:  -Risk, benefits and alternatives discussed with patient. Patient verbalized understanding and in accord with aftercare recommendations.   - Patient given 4 weeks supply of medications. Risk, benefits and alternatives discussed with patient.   -Patient instructed to call 911 or go to nearest ER in case of emergency.   -Patient given Suicide Prevention Lifeline number 1-512-696-2856 and provided instructions on its use  -Patient will follow up with outpatient appointment at [facility]

## 2022-11-07 NOTE — BH INPATIENT PSYCHIATRY DISCHARGE NOTE - NSDCPROCEDURES_PSY_ALL_CORE
12/4/2018  2:05 PM    Allen Naqvi is a 10 day old male. Chief complaint(s): Patient presents with: Well Child    HPI:     Allen Naqvi primary complaint is regarding [address].      Allen Naqvi is a 10 day old male baby is here for his initial well baby check HENT: Positive for sneezing. Negative for mouth sores and rhinorrhea. Eyes: Negative for discharge and redness. Respiratory: Negative for cough and wheezing. Cardiovascular: Negative for leg swelling, fatigue with feeds and cyanosis.    Patito Esquivel Spirometry : -     Radiology: No results found.      ASSESSMENT/PLAN:   Assessment   Encounter for routine child health examination without abnormal findings  (primary encounter diagnosis)  Cross-eye  Metatarsus adductus, congenital  passional      Well chi There were no significant procedures or tests performed during this admission.

## 2022-11-07 NOTE — BH INPATIENT PSYCHIATRY DISCHARGE NOTE - REASON FOR ADMISSION
51-year-old single  woman. Patient has history of multiple psychiatric admissions, high utilizer of ED.  Recent discharged from [place] [address] on 10/10/22. Patient has  PPH of polysubstance use, opioid use disorder (on methadone) benzo dependence, anxiety, depression.  Patient is hospitalized with a primary problem of worsening mood and decompensation in context of noncompliance with outpatient follow up and noncompliance with her medications.  Has frequent ED visits with similar complaints, history of numerous psychiatric inpatient hospitalizations.  Patient admitted to [facility] on a 9.13 legal status.

## 2022-11-07 NOTE — BH INPATIENT PSYCHIATRY PROGRESS NOTE - OTHER
seems to be emotionally hyper-reactive.  antsy and fidgety, states she tends to walk a lot to relieve her "extra energy". will jump from one topic to another.

## 2022-11-07 NOTE — BH INPATIENT PSYCHIATRY DISCHARGE NOTE - NSDCCPCAREPLAN_GEN_ALL_CORE_FT
PRINCIPAL DISCHARGE DIAGNOSIS  Diagnosis: Major depression, single episode  Assessment and Plan of Treatment:       SECONDARY DISCHARGE DIAGNOSES  Diagnosis: Suicidal ideations  Assessment and Plan of Treatment:     Diagnosis: Withdrawal from opioids  Assessment and Plan of Treatment:

## 2022-11-07 NOTE — BH INPATIENT PSYCHIATRY DISCHARGE NOTE - NSBHMETABOLIC_PSY_ALL_CORE_FT
BMI: BMI (kg/m2): 21.3 (10-30-22 @ 01:50)  HbA1c:   Glucose: POCT Blood Glucose.: 85 mg/dL (09-24-22 @ 19:56)    BP: 115/70 (11-06-22 @ 09:43) (115/70 - 115/70)  Lipid Panel:

## 2022-11-07 NOTE — BH INPATIENT PSYCHIATRY DISCHARGE NOTE - NSBHDCHANDOFFFT_PSY_ALL_CORE
Medication list and discharge summary included discussion of hospital course, treatment, and medications, with phone number left for call back provided to outpatient Psychiatrist at …  Writer's contact information was provided for any further questions or concerns to contact Lalitha Adair NP at [phone].  Medication list and discharge summary included discussion of hospital course, treatment, and medications, with phone number left for call back provided to outpatient [facility] -[phone]. Writer's contact information was provided for any further questions or concerns to contact Lalitha Adair NP at [phone].

## 2022-11-07 NOTE — BH INPATIENT PSYCHIATRY DISCHARGE NOTE - NSDCHC_MEDRECSTATUS_GEN_ALL_CORE
Admission Reconciliation is Completed  Discharge Reconciliation is Not Complete Admission Reconciliation is Completed  Discharge Reconciliation is Completed No pallor, no cervical/supraclavicular/inguinal adenopathy.  No splenomegaly

## 2022-11-08 LAB
AMMONIA BLD-MCNC: 46 UMOL/L — SIGNIFICANT CHANGE UP (ref 11–55)
VALPROATE SERPL-MCNC: 59.6 UG/ML — SIGNIFICANT CHANGE UP (ref 50–100)

## 2022-11-08 PROCEDURE — 99232 SBSQ HOSP IP/OBS MODERATE 35: CPT

## 2022-11-08 RX ADMIN — BUPRENORPHINE AND NALOXONE 2 TABLET(S): 2; .5 TABLET SUBLINGUAL at 08:01

## 2022-11-08 RX ADMIN — Medication 0.5 MILLIGRAM(S): at 19:49

## 2022-11-08 RX ADMIN — DIVALPROEX SODIUM 500 MILLIGRAM(S): 500 TABLET, DELAYED RELEASE ORAL at 19:49

## 2022-11-08 RX ADMIN — Medication 1 MILLIGRAM(S): at 08:01

## 2022-11-08 RX ADMIN — ARIPIPRAZOLE 20 MILLIGRAM(S): 15 TABLET ORAL at 08:00

## 2022-11-08 RX ADMIN — BUPRENORPHINE AND NALOXONE 2 TABLET(S): 2; .5 TABLET SUBLINGUAL at 12:09

## 2022-11-08 RX ADMIN — BUPRENORPHINE AND NALOXONE 2 TABLET(S): 2; .5 TABLET SUBLINGUAL at 19:48

## 2022-11-08 RX ADMIN — Medication 0.5 MILLIGRAM(S): at 12:08

## 2022-11-08 NOTE — BH INPATIENT PSYCHIATRY PROGRESS NOTE - NSBHCHARTREVIEWVS_PSY_A_CORE FT
Vital Signs Last 24 Hrs  T(C): 36.4 (11-08-22 @ 06:36), Max: 36.9 (11-07-22 @ 14:38)  T(F): 97.5 (11-08-22 @ 06:36), Max: 98.4 (11-07-22 @ 14:38)  HR: --  BP: --  BP(mean): --  RR: --  SpO2: --    Orthostatic VS  11-08-22 @ 06:36  Lying BP: --/-- HR: --  Sitting BP: 114/70 HR: 74  Standing BP: 112/72 HR: 86  Site: --  Mode: --  Orthostatic VS  11-07-22 @ 19:18  Lying BP: --/-- HR: --  Sitting BP: 111/76 HR: 77  Standing BP: 103/73 HR: 87  Site: --  Mode: --  Orthostatic VS  11-07-22 @ 07:34  Lying BP: --/-- HR: --  Sitting BP: 100/70 HR: 83  Standing BP: 90/60 HR: 93  Site: --  Mode: electronic  Orthostatic VS  11-06-22 @ 19:39  Lying BP: --/-- HR: --  Sitting BP: 130/90 HR: 72  Standing BP: 105/82 HR: 74  Site: --  Mode: --  Orthostatic VS  11-06-22 @ 07:59  Lying BP: --/-- HR: --  Sitting BP: 128/67 HR: 86  Standing BP: 110/57 HR: 79  Site: --  Mode: --   Vital Signs Last 24 Hrs  T(C): 36.4 (11-08-22 @ 06:36), Max: 36.9 (11-07-22 @ 14:38)  T(F): 97.5 (11-08-22 @ 06:36), Max: 98.4 (11-07-22 @ 14:38)  HR: --  BP: --  BP(mean): --  RR: 17 (11-08-22 @ 08:05) (17 - 17)  SpO2: --    Orthostatic VS  11-08-22 @ 06:36  Lying BP: --/-- HR: --  Sitting BP: 114/70 HR: 74  Standing BP: 112/72 HR: 86  Site: --  Mode: --  Orthostatic VS  11-07-22 @ 19:18  Lying BP: --/-- HR: --  Sitting BP: 111/76 HR: 77  Standing BP: 103/73 HR: 87  Site: --  Mode: --  Orthostatic VS  11-07-22 @ 07:34  Lying BP: --/-- HR: --  Sitting BP: 100/70 HR: 83  Standing BP: 90/60 HR: 93  Site: --  Mode: electronic  Orthostatic VS  11-06-22 @ 19:39  Lying BP: --/-- HR: --  Sitting BP: 130/90 HR: 72  Standing BP: 105/82 HR: 74  Site: --  Mode: --

## 2022-11-08 NOTE — BH INPATIENT PSYCHIATRY PROGRESS NOTE - OTHER
seems to be emotionally hyper-reactive.  will jump from one topic to another. antsy and fidgety, states she tends to walk a lot to relieve her "extra energy".

## 2022-11-08 NOTE — BH INPATIENT PSYCHIATRY PROGRESS NOTE - CURRENT MEDICATION
MEDICATIONS  (STANDING):  ARIPiprazole 20 milliGRAM(s) Oral daily  buprenorphine 2 mG/naloxone 0.5 mG SL  Tablet 2 Tablet(s) SubLingual <User Schedule>  clonazePAM  Tablet 0.5 milliGRAM(s) Oral <User Schedule>  clonazePAM  Tablet 1 milliGRAM(s) Oral daily  diVALproex  milliGRAM(s) Oral <User Schedule>  influenza   Vaccine 0.5 milliLiter(s) IntraMuscular once    MEDICATIONS  (PRN):  ALBUTerol    90 MICROgram(s) HFA Inhaler 2 Puff(s) Inhalation every 6 hours PRN Shortness of Breath and/or Wheezing  diphenhydrAMINE Injectable 50 milliGRAM(s) IntraMuscular once PRN Combative behavior  hydrOXYzine hydrochloride 50 milliGRAM(s) Oral every 6 hours PRN Anxiety  nicotine  Polacrilex Gum 2 milliGRAM(s) Oral every 2 hours PRN nicotine withdrawal/craving  OLANZapine Disintegrating Tablet 5 milliGRAM(s) Oral every 6 hours PRN agitation  OLANZapine Injectable 5 milliGRAM(s) IntraMuscular once PRN assaultive behavior  polyethylene glycol 3350 17 Gram(s) Oral daily PRN constipation

## 2022-11-08 NOTE — BH INPATIENT PSYCHIATRY PROGRESS NOTE - NSBHASSESSSUMMFT_PSY_ALL_CORE
51-year-old single  woman. Patient has history of multiple psychiatric admissions, high utilizer of ED.  Recent discharged from [place] [address] on 10/10/22. Patient has  PPH of polysubstance use, opioid use disorder (on methadone) benzo dependence, anxiety, depression.  Patient is hospitalized with a primary problem of worsening mood and decompensation in context of noncompliance with outpatient follow up and noncompliance with her medications.  Has frequent ED visits with similar complaints, history of numerous psychiatric inpatient hospitalizations.  Patient admitted to [facility] on a 9.13 legal status.  This patient requires inpatient hospitalization due to symptoms of mental illness so severe that they significantly interfere with activities of daily living and presents a potential danger to self as a result of acute decompensation with active SI, thoughts to self-harm . She is requiring inpatient care at this time as a result, for psychiatric stabilization and safety.    Plan:  >Legal: 9.13  >Obs: Routine, no current SI. no need for CO, patient not expected to pose risk to self or others in controlled inpatient setting  >Psychiatric Meds:  Abilify 20mg daily; Depakote 500mg BID, Suboxone 2mg/0.5mg 2 films  TID,  klonopin 1mg daily and 0.5mg BID, dc Lexapro 20mg daily.  Observe for tolerability and efficacy. Patient had been poorly adherent prior to admission.   PRN medications:  Ativan 2mg oral Q6HR PRN for agitation and anxiety.  Haldol 5mg oral Q6HR PRN for agitation.   Benadryl 50mg oral Q6HR PRN for agitation.   Vistaril 50mg oral Q6HR PRN for anxiety.  Desyrel 50mg oral QHS PRN for insomnia.   >Labs:  labs reviewed, no acute findings. Utox +THC. QT/QTc: 410/448ms. Hold antipsychotics if QTc >500  >Medical:   No acute concerns. No consultations needed at this time. Patient with consistently stable VS, no visible physical symptoms of withdrawal.   During the course of treatment, will collaborate with medical team to manage medical issues.  >Diet: Regular  >Social: milieu/structured therapy  >Treatment Interventions: Groups and Individual Therapy/CBT, Motivational counseling for substance abuse related issues. Consider ASHLEY  >Dispo: Collateral and dispo planning pending further symptom and medication optimization. DC 11/11

## 2022-11-08 NOTE — BH INPATIENT PSYCHIATRY PROGRESS NOTE - NSBHFUPINTERVALHXFT_PSY_A_CORE
Patient was seen and evaluated, chart reviewed. Case discussed with nursing team, no interval events reported. No aggressive events reported or PRNs needed for aggression. Patient has been compliant with medications, no adverse effects reported. No sleeping or appetite changes noted. Pt requests multiple PRNs as per nursing staff, thought to be med-seeking and continues to be demanding. Patient is taking Atarax for anxiety. Patient was started on Depakote dose has been increased to 500mg BID, refused her morning dose this morning stating "my mother said to stop it". Pt back/forth about stopping VPA.  Medication teaching provided and patient is encouraged to remain compliant.  Pt has been discontinued on Lexapro, and Abilify has been increased to 20mg. Patient inquired about dc, indicisive, going back and forth  on readiness to leave on Friday.    Patient presents to be slightly more restless than yesterday,  is activated more today, constantly moving.  Speech continues to be pressured and tangential at times with patient still making numerous demands. Reports no urges to cut or self-harm. Denies SI/SIB/HI, any plan or intent, AVH. Reports no acute medical complaints or pain.

## 2022-11-09 PROCEDURE — 99232 SBSQ HOSP IP/OBS MODERATE 35: CPT

## 2022-11-09 RX ORDER — ACETAMINOPHEN 500 MG
650 TABLET ORAL EVERY 6 HOURS
Refills: 0 | Status: DISCONTINUED | OUTPATIENT
Start: 2022-11-09 | End: 2022-11-11

## 2022-11-09 RX ORDER — BUPRENORPHINE AND NALOXONE 2; .5 MG/1; MG/1
2 TABLET SUBLINGUAL ONCE
Refills: 0 | Status: DISCONTINUED | OUTPATIENT
Start: 2022-11-09 | End: 2022-11-09

## 2022-11-09 RX ORDER — BUPRENORPHINE AND NALOXONE 2; .5 MG/1; MG/1
2 TABLET SUBLINGUAL
Refills: 0 | Status: DISCONTINUED | OUTPATIENT
Start: 2022-11-09 | End: 2022-11-11

## 2022-11-09 RX ADMIN — DIVALPROEX SODIUM 500 MILLIGRAM(S): 500 TABLET, DELAYED RELEASE ORAL at 08:05

## 2022-11-09 RX ADMIN — Medication 0.5 MILLIGRAM(S): at 12:09

## 2022-11-09 RX ADMIN — Medication 1 MILLIGRAM(S): at 08:05

## 2022-11-09 RX ADMIN — BUPRENORPHINE AND NALOXONE 2 TABLET(S): 2; .5 TABLET SUBLINGUAL at 20:07

## 2022-11-09 RX ADMIN — BUPRENORPHINE AND NALOXONE 2 TABLET(S): 2; .5 TABLET SUBLINGUAL at 08:34

## 2022-11-09 RX ADMIN — Medication 650 MILLIGRAM(S): at 13:52

## 2022-11-09 RX ADMIN — Medication 50 MILLIGRAM(S): at 20:08

## 2022-11-09 RX ADMIN — BUPRENORPHINE AND NALOXONE 2 TABLET(S): 2; .5 TABLET SUBLINGUAL at 12:08

## 2022-11-09 RX ADMIN — Medication 50 MILLIGRAM(S): at 01:30

## 2022-11-09 RX ADMIN — ARIPIPRAZOLE 20 MILLIGRAM(S): 15 TABLET ORAL at 08:05

## 2022-11-09 RX ADMIN — DIVALPROEX SODIUM 500 MILLIGRAM(S): 500 TABLET, DELAYED RELEASE ORAL at 20:08

## 2022-11-09 RX ADMIN — Medication 0.5 MILLIGRAM(S): at 20:08

## 2022-11-09 RX ADMIN — POLYETHYLENE GLYCOL 3350 17 GRAM(S): 17 POWDER, FOR SOLUTION ORAL at 10:37

## 2022-11-09 NOTE — BH INPATIENT PSYCHIATRY PROGRESS NOTE - NSBHMETABOLIC_PSY_ALL_CORE_FT
BMI: BMI (kg/m2): 21.3 (10-30-22 @ 01:50)  HbA1c:   Glucose: POCT Blood Glucose.: 85 mg/dL (09-24-22 @ 19:56)    BP: 115/70 (11-06-22 @ 09:43) (115/70 - 115/70)  Lipid Panel:  BMI: BMI (kg/m2): 21.3 (10-30-22 @ 01:50)  HbA1c:   Glucose: POCT Blood Glucose.: 85 mg/dL (09-24-22 @ 19:56)    BP: --  Lipid Panel:

## 2022-11-09 NOTE — BH INPATIENT PSYCHIATRY PROGRESS NOTE - NSBHFUPINTERVALHXFT_PSY_A_CORE
Patient was seen and evaluated, chart reviewed. Case discussed with nursing team, no interval events reported. No aggressive events reported or PRNs needed for aggression. Patient has been compliant with medications, no adverse effects reported. No sleeping or appetite changes noted. Pt requests multiple PRNs as per nursing staff, thought to be med-seeking and continues to be demanding. Patient is taking Atarax for anxiety. Patient was started on Depakote dose has been increased to 500mg BID, refused her morning dose yesterday, but took it today as per nursing staff. Medication teaching provided and patient is encouraged to remain compliant.  Pt has been discontinued on Lexapro, and Abilify has been increased to 20mg. Patient inquired about dc, appears to be hesitant about her readiness to leave on Friday, asked team if they feel she is ready to go. Encouraged patient to keep up with her outpatient appointments and medication compliance as those will lead to a successful discharge and lessen the risk of a relapse.  Patient presents to restless and activated, constantly moving.  Speech continues to be pressured and tangential at times with patient still making numerous demands. Patient has flight of ideas and thoughts, tends to interrupt speaker constantly. Discussed having safety plans and protective measures with patient after discharge. Patient states that she will have a "good support system" as she will be with her family and that she can contact one of them or a hotline if she is having any SI or feels unstable. Reports no urges to cut or self-harm. Denies SI/SIB/HI, any plan or intent, AVH. Reports no acute medical complaints or pain.

## 2022-11-09 NOTE — BH INPATIENT PSYCHIATRY PROGRESS NOTE - NSTXSUICIDINTERMD_PSY_ALL_CORE
Patient reports having no urges to self harm or SI since yesterday Patient reports having no urges to self harm or SI since last week

## 2022-11-09 NOTE — BH INPATIENT PSYCHIATRY PROGRESS NOTE - NSBHCHARTREVIEWVS_PSY_A_CORE FT
Vital Signs Last 24 Hrs  T(C): 35.8 (11-09-22 @ 06:34), Max: 36.8 (11-08-22 @ 21:40)  T(F): 96.5 (11-09-22 @ 06:34), Max: 98.3 (11-08-22 @ 21:40)  HR: --  BP: --  BP(mean): --  RR: 17 (11-08-22 @ 08:05) (17 - 17)  SpO2: --    Orthostatic VS  11-09-22 @ 06:34  Lying BP: --/-- HR: --  Sitting BP: 125/77 HR: 80  Standing BP: 109/80 HR: 83  Site: --  Mode: --  Orthostatic VS  11-08-22 @ 19:49  Lying BP: --/-- HR: --  Sitting BP: 117/79 HR: 78  Standing BP: 103/73 HR: 84  Site: upper left arm  Mode: electronic  Orthostatic VS  11-08-22 @ 06:36  Lying BP: --/-- HR: --  Sitting BP: 114/70 HR: 74  Standing BP: 112/72 HR: 86  Site: --  Mode: --  Orthostatic VS  11-07-22 @ 19:18  Lying BP: --/-- HR: --  Sitting BP: 111/76 HR: 77  Standing BP: 103/73 HR: 87  Site: --  Mode: --  Orthostatic VS  11-07-22 @ 07:34  Lying BP: --/-- HR: --  Sitting BP: 100/70 HR: 83  Standing BP: 90/60 HR: 93  Site: --  Mode: electronic   Vital Signs Last 24 Hrs  T(C): 35.8 (11-09-22 @ 06:34), Max: 36.8 (11-08-22 @ 21:40)  T(F): 96.5 (11-09-22 @ 06:34), Max: 98.3 (11-08-22 @ 21:40)  HR: --  BP: --  BP(mean): --  RR: --  SpO2: --    Orthostatic VS  11-09-22 @ 06:34  Lying BP: --/-- HR: --  Sitting BP: 125/77 HR: 80  Standing BP: 109/80 HR: 83  Site: --  Mode: --  Orthostatic VS  11-08-22 @ 19:49  Lying BP: --/-- HR: --  Sitting BP: 117/79 HR: 78  Standing BP: 103/73 HR: 84  Site: upper left arm  Mode: electronic  Orthostatic VS  11-08-22 @ 06:36  Lying BP: --/-- HR: --  Sitting BP: 114/70 HR: 74  Standing BP: 112/72 HR: 86  Site: --  Mode: --  Orthostatic VS  11-07-22 @ 19:18  Lying BP: --/-- HR: --  Sitting BP: 111/76 HR: 77  Standing BP: 103/73 HR: 87  Site: --  Mode: --   Vital Signs Last 24 Hrs  T(C): 37 (11-09-22 @ 14:08), Max: 37 (11-09-22 @ 14:08)  T(F): 98.6 (11-09-22 @ 14:08), Max: 98.6 (11-09-22 @ 14:08)  HR: --  BP: --  BP(mean): --  RR: --  SpO2: --    Orthostatic VS  11-09-22 @ 06:34  Lying BP: --/-- HR: --  Sitting BP: 125/77 HR: 80  Standing BP: 109/80 HR: 83  Site: --  Mode: --  Orthostatic VS  11-08-22 @ 19:49  Lying BP: --/-- HR: --  Sitting BP: 117/79 HR: 78  Standing BP: 103/73 HR: 84  Site: upper left arm  Mode: electronic  Orthostatic VS  11-08-22 @ 06:36  Lying BP: --/-- HR: --  Sitting BP: 114/70 HR: 74  Standing BP: 112/72 HR: 86  Site: --  Mode: --  Orthostatic VS  11-07-22 @ 19:18  Lying BP: --/-- HR: --  Sitting BP: 111/76 HR: 77  Standing BP: 103/73 HR: 87  Site: --  Mode: --

## 2022-11-09 NOTE — BH INPATIENT PSYCHIATRY PROGRESS NOTE - PRN MEDS
MEDICATIONS  (PRN):  ALBUTerol    90 MICROgram(s) HFA Inhaler 2 Puff(s) Inhalation every 6 hours PRN Shortness of Breath and/or Wheezing  diphenhydrAMINE Injectable 50 milliGRAM(s) IntraMuscular once PRN Combative behavior  hydrOXYzine hydrochloride 50 milliGRAM(s) Oral every 6 hours PRN Anxiety  nicotine  Polacrilex Gum 2 milliGRAM(s) Oral every 2 hours PRN nicotine withdrawal/craving  OLANZapine Disintegrating Tablet 5 milliGRAM(s) Oral every 6 hours PRN agitation  OLANZapine Injectable 5 milliGRAM(s) IntraMuscular once PRN assaultive behavior  polyethylene glycol 3350 17 Gram(s) Oral daily PRN constipation   MEDICATIONS  (PRN):  acetaminophen     Tablet .. 650 milliGRAM(s) Oral every 6 hours PRN Moderate Pain (4 - 6)  ALBUTerol    90 MICROgram(s) HFA Inhaler 2 Puff(s) Inhalation every 6 hours PRN Shortness of Breath and/or Wheezing  diphenhydrAMINE Injectable 50 milliGRAM(s) IntraMuscular once PRN Combative behavior  hydrOXYzine hydrochloride 50 milliGRAM(s) Oral every 6 hours PRN Anxiety  nicotine  Polacrilex Gum 2 milliGRAM(s) Oral every 2 hours PRN nicotine withdrawal/craving  OLANZapine Disintegrating Tablet 5 milliGRAM(s) Oral every 6 hours PRN agitation  OLANZapine Injectable 5 milliGRAM(s) IntraMuscular once PRN assaultive behavior  polyethylene glycol 3350 17 Gram(s) Oral daily PRN constipation

## 2022-11-09 NOTE — BH INPATIENT PSYCHIATRY PROGRESS NOTE - CURRENT MEDICATION
MEDICATIONS  (STANDING):  ARIPiprazole 20 milliGRAM(s) Oral daily  clonazePAM  Tablet 0.5 milliGRAM(s) Oral <User Schedule>  clonazePAM  Tablet 1 milliGRAM(s) Oral daily  diVALproex  milliGRAM(s) Oral <User Schedule>  influenza   Vaccine 0.5 milliLiter(s) IntraMuscular once    MEDICATIONS  (PRN):  ALBUTerol    90 MICROgram(s) HFA Inhaler 2 Puff(s) Inhalation every 6 hours PRN Shortness of Breath and/or Wheezing  diphenhydrAMINE Injectable 50 milliGRAM(s) IntraMuscular once PRN Combative behavior  hydrOXYzine hydrochloride 50 milliGRAM(s) Oral every 6 hours PRN Anxiety  nicotine  Polacrilex Gum 2 milliGRAM(s) Oral every 2 hours PRN nicotine withdrawal/craving  OLANZapine Disintegrating Tablet 5 milliGRAM(s) Oral every 6 hours PRN agitation  OLANZapine Injectable 5 milliGRAM(s) IntraMuscular once PRN assaultive behavior  polyethylene glycol 3350 17 Gram(s) Oral daily PRN constipation   MEDICATIONS  (STANDING):  ARIPiprazole 20 milliGRAM(s) Oral daily  buprenorphine 2 mG/naloxone 0.5 mG SL  Tablet 2 Tablet(s) SubLingual <User Schedule>  clonazePAM  Tablet 0.5 milliGRAM(s) Oral <User Schedule>  clonazePAM  Tablet 1 milliGRAM(s) Oral daily  diVALproex  milliGRAM(s) Oral <User Schedule>  influenza   Vaccine 0.5 milliLiter(s) IntraMuscular once    MEDICATIONS  (PRN):  ALBUTerol    90 MICROgram(s) HFA Inhaler 2 Puff(s) Inhalation every 6 hours PRN Shortness of Breath and/or Wheezing  diphenhydrAMINE Injectable 50 milliGRAM(s) IntraMuscular once PRN Combative behavior  hydrOXYzine hydrochloride 50 milliGRAM(s) Oral every 6 hours PRN Anxiety  nicotine  Polacrilex Gum 2 milliGRAM(s) Oral every 2 hours PRN nicotine withdrawal/craving  OLANZapine Disintegrating Tablet 5 milliGRAM(s) Oral every 6 hours PRN agitation  OLANZapine Injectable 5 milliGRAM(s) IntraMuscular once PRN assaultive behavior  polyethylene glycol 3350 17 Gram(s) Oral daily PRN constipation   MEDICATIONS  (STANDING):  ARIPiprazole 20 milliGRAM(s) Oral daily  buprenorphine 2 mG/naloxone 0.5 mG SL  Tablet 2 Tablet(s) SubLingual <User Schedule>  clonazePAM  Tablet 0.5 milliGRAM(s) Oral <User Schedule>  clonazePAM  Tablet 1 milliGRAM(s) Oral daily  diVALproex  milliGRAM(s) Oral <User Schedule>  influenza   Vaccine 0.5 milliLiter(s) IntraMuscular once    MEDICATIONS  (PRN):  acetaminophen     Tablet .. 650 milliGRAM(s) Oral every 6 hours PRN Moderate Pain (4 - 6)  ALBUTerol    90 MICROgram(s) HFA Inhaler 2 Puff(s) Inhalation every 6 hours PRN Shortness of Breath and/or Wheezing  diphenhydrAMINE Injectable 50 milliGRAM(s) IntraMuscular once PRN Combative behavior  hydrOXYzine hydrochloride 50 milliGRAM(s) Oral every 6 hours PRN Anxiety  nicotine  Polacrilex Gum 2 milliGRAM(s) Oral every 2 hours PRN nicotine withdrawal/craving  OLANZapine Disintegrating Tablet 5 milliGRAM(s) Oral every 6 hours PRN agitation  OLANZapine Injectable 5 milliGRAM(s) IntraMuscular once PRN assaultive behavior  polyethylene glycol 3350 17 Gram(s) Oral daily PRN constipation

## 2022-11-09 NOTE — BH INPATIENT PSYCHIATRY PROGRESS NOTE - NSBHASSESSSUMMFT_PSY_ALL_CORE
51-year-old single  woman. Patient has history of multiple psychiatric admissions, high utilizer of ED.  Recent discharged from [place] [address] on 10/10/22. Patient has  PPH of polysubstance use, opioid use disorder (on methadone) benzo dependence, anxiety, depression.  Patient is hospitalized with a primary problem of worsening mood and decompensation in context of noncompliance with outpatient follow up and noncompliance with her medications.  Has frequent ED visits with similar complaints, history of numerous psychiatric inpatient hospitalizations.  Patient admitted to [facility] on a 9.13 legal status.  This patient requires inpatient hospitalization due to symptoms of mental illness so severe that they significantly interfere with activities of daily living and presents a potential danger to self as a result of acute decompensation with active SI, thoughts to self-harm . She is requiring inpatient care at this time as a result, for psychiatric stabilization and safety.    Plan:  >Legal: 9.13  >Obs: Routine, no current SI. no need for CO, patient not expected to pose risk to self or others in controlled inpatient setting  >Psychiatric Meds:  Abilify 20mg daily; Depakote 500mg BID, Suboxone 2mg/0.5mg 2 films  TID,  klonopin 1mg daily and 0.5mg BID, dc Lexapro 20mg daily.  Observe for tolerability and efficacy. Patient had been poorly adherent prior to admission.   PRN medications:  Ativan 2mg oral Q6HR PRN for agitation and anxiety.  Haldol 5mg oral Q6HR PRN for agitation.   Benadryl 50mg oral Q6HR PRN for agitation.   Vistaril 50mg oral Q6HR PRN for anxiety.  Desyrel 50mg oral QHS PRN for insomnia.   >Labs:  labs reviewed, no acute findings. Utox +THC. QT/QTc: 410/448ms. Hold antipsychotics if QTc >500  >Medical:   No acute concerns. No consultations needed at this time. Patient with consistently stable VS, no visible physical symptoms of withdrawal.   During the course of treatment, will collaborate with medical team to manage medical issues.  >Diet: Regular  >Social: milieu/structured therapy  >Treatment Interventions: Groups and Individual Therapy/CBT, Motivational counseling for substance abuse related issues. Consider A[facility]  >Dispo: Collateral and dispo planning pending further symptom and medication optimization. DC 11/11

## 2022-11-10 PROCEDURE — 99232 SBSQ HOSP IP/OBS MODERATE 35: CPT

## 2022-11-10 RX ORDER — ALBUTEROL 90 UG/1
2 AEROSOL, METERED ORAL
Qty: 8 | Refills: 0
Start: 2022-11-10 | End: 2022-11-10

## 2022-11-10 RX ORDER — CLONAZEPAM 1 MG
1 TABLET ORAL
Qty: 28 | Refills: 0
Start: 2022-11-10 | End: 2022-11-23

## 2022-11-10 RX ORDER — ARIPIPRAZOLE 15 MG/1
1 TABLET ORAL
Qty: 30 | Refills: 0
Start: 2022-11-10 | End: 2022-12-09

## 2022-11-10 RX ORDER — DIVALPROEX SODIUM 500 MG/1
1 TABLET, DELAYED RELEASE ORAL
Qty: 60 | Refills: 0
Start: 2022-11-10 | End: 2022-12-09

## 2022-11-10 RX ORDER — CLONAZEPAM 1 MG
1 TABLET ORAL
Qty: 14 | Refills: 0
Start: 2022-11-10 | End: 2022-11-23

## 2022-11-10 RX ADMIN — DIVALPROEX SODIUM 500 MILLIGRAM(S): 500 TABLET, DELAYED RELEASE ORAL at 08:03

## 2022-11-10 RX ADMIN — Medication 650 MILLIGRAM(S): at 21:30

## 2022-11-10 RX ADMIN — Medication 650 MILLIGRAM(S): at 20:11

## 2022-11-10 RX ADMIN — BUPRENORPHINE AND NALOXONE 2 TABLET(S): 2; .5 TABLET SUBLINGUAL at 20:11

## 2022-11-10 RX ADMIN — DIVALPROEX SODIUM 500 MILLIGRAM(S): 500 TABLET, DELAYED RELEASE ORAL at 21:01

## 2022-11-10 RX ADMIN — Medication 0.5 MILLIGRAM(S): at 12:13

## 2022-11-10 RX ADMIN — ARIPIPRAZOLE 20 MILLIGRAM(S): 15 TABLET ORAL at 08:03

## 2022-11-10 RX ADMIN — POLYETHYLENE GLYCOL 3350 17 GRAM(S): 17 POWDER, FOR SOLUTION ORAL at 09:09

## 2022-11-10 RX ADMIN — BUPRENORPHINE AND NALOXONE 2 TABLET(S): 2; .5 TABLET SUBLINGUAL at 12:13

## 2022-11-10 RX ADMIN — Medication 1 MILLIGRAM(S): at 08:03

## 2022-11-10 RX ADMIN — BUPRENORPHINE AND NALOXONE 2 TABLET(S): 2; .5 TABLET SUBLINGUAL at 08:03

## 2022-11-10 RX ADMIN — Medication 0.5 MILLIGRAM(S): at 20:10

## 2022-11-10 NOTE — BH INPATIENT PSYCHIATRY PROGRESS NOTE - NSBHCHARTREVIEWVS_PSY_A_CORE FT
Vital Signs Last 24 Hrs  T(C): 36.1 (11-10-22 @ 05:47), Max: 37 (11-09-22 @ 14:08)  T(F): 97 (11-10-22 @ 05:47), Max: 98.6 (11-09-22 @ 14:08)  HR: --  BP: --  BP(mean): --  RR: 17 (11-09-22 @ 20:43) (17 - 17)  SpO2: --    Orthostatic VS  11-09-22 @ 19:29  Lying BP: --/-- HR: --  Sitting BP: 106/90 HR: 84  Standing BP: 107/77 HR: 81  Site: --  Mode: --  Orthostatic VS  11-09-22 @ 06:34  Lying BP: --/-- HR: --  Sitting BP: 125/77 HR: 80  Standing BP: 109/80 HR: 83  Site: --  Mode: --  Orthostatic VS  11-08-22 @ 19:49  Lying BP: --/-- HR: --  Sitting BP: 117/79 HR: 78  Standing BP: 103/73 HR: 84  Site: upper left arm  Mode: electronic   Vital Signs Last 24 Hrs  T(C): 36.1 (11-10-22 @ 05:47), Max: 37 (11-09-22 @ 14:08)  T(F): 97 (11-10-22 @ 05:47), Max: 98.6 (11-09-22 @ 14:08)  HR: --  BP: --  BP(mean): --  RR: 17 (11-09-22 @ 20:43) (17 - 17)  SpO2: --    Orthostatic VS  11-10-22 @ 08:47  Lying BP: --/-- HR: --  Sitting BP: 106/69 HR: 79  Standing BP: 102/64 HR: 88  Site: --  Mode: --  Orthostatic VS  11-09-22 @ 19:29  Lying BP: --/-- HR: --  Sitting BP: 106/90 HR: 84  Standing BP: 107/77 HR: 81  Site: --  Mode: --  Orthostatic VS  11-09-22 @ 06:34  Lying BP: --/-- HR: --  Sitting BP: 125/77 HR: 80  Standing BP: 109/80 HR: 83  Site: --  Mode: --  Orthostatic VS  11-08-22 @ 19:49  Lying BP: --/-- HR: --  Sitting BP: 117/79 HR: 78  Standing BP: 103/73 HR: 84  Site: upper left arm  Mode: electronic

## 2022-11-10 NOTE — BH INPATIENT PSYCHIATRY PROGRESS NOTE - NSBHFUPINTERVALHXFT_PSY_A_CORE
Patient was seen and evaluated, chart reviewed. Case discussed with nursing team, no interval events reported. No aggressive events reported or PRNs needed for aggression. Patient has been compliant with medications, no adverse effects reported. No sleeping or appetite changes noted. Pt requests multiple PRNs as per nursing staff, thought to be med-seeking and continues to be demanding. Patient received Atarax PRN last night at 8pm and Miralax PRN for constipation. Patient was started on Depakote dose has been increased to 500mg BID. Medication teaching provided and patient is encouraged to remain compliant.  Pt has been discontinued on Lexapro, and Abilify has been increased to 20mg.   Patient is observed in bedroom laying down. Speech is less pressured and tangential, however patient continues to make demands. Patient is less talkative today and mood is less elevated than previous encounters. Discussed having safety plans and protective measures with patient after discharge. Patient states that she will have a "good support system" as she will be with her family and that she can contact one of them or a hotline if she is having any SI or feels unstable. Patient appears to be clinically stable and less symptomatic than she was upon admission. Discharge is set for tomorrow, 11/11. Reports no urges to cut or self-harm. Denies SI/SIB/HI, any plan or intent, AVH. Reports stomach pain since last night, rates it a 6/10, provides no descriptive factors, but states it gets worse after eating. Reports no other medical complaints.

## 2022-11-10 NOTE — BH INPATIENT PSYCHIATRY PROGRESS NOTE - NSBHASSESSSUMMFT_PSY_ALL_CORE
51-year-old single  woman. Patient has history of multiple psychiatric admissions, high utilizer of ED.  Recent discharged from [place] [address] on 10/10/22. Patient has  PPH of polysubstance use, opioid use disorder (on methadone) benzo dependence, anxiety, depression.  Patient is hospitalized with a primary problem of worsening mood and decompensation in context of noncompliance with outpatient follow up and noncompliance with her medications.  Has frequent ED visits with similar complaints, history of numerous psychiatric inpatient hospitalizations.  Patient admitted to [facility] on a 9.13 legal status.  This patient requires inpatient hospitalization due to symptoms of mental illness so severe that they significantly interfere with activities of daily living and presents a potential danger to self as a result of acute decompensation with active SI, thoughts to self-harm . She is requiring inpatient care at this time as a result, for psychiatric stabilization and safety.    Plan:  >Legal: 9.13  >Obs: Routine, no current SI. no need for CO, patient not expected to pose risk to self or others in controlled inpatient setting  >Psychiatric Meds:  Abilify 20mg daily; Depakote 500mg BID, Suboxone 2mg/0.5mg 2 films  TID,  klonopin 1mg daily and 0.5mg BID, dc Lexapro 20mg daily.  Observe for tolerability and efficacy. Patient had been poorly adherent prior to admission.   PRN medications:  Ativan 2mg oral Q6HR PRN for agitation and anxiety.  Haldol 5mg oral Q6HR PRN for agitation.   Benadryl 50mg oral Q6HR PRN for agitation.   Vistaril 50mg oral Q6HR PRN for anxiety.  Desyrel 50mg oral QHS PRN for insomnia.   >Labs:  labs reviewed, no acute findings. Utox +THC. QT/QTc: 410/448ms. Hold antipsychotics if QTc >500  >Medical:   No acute concerns. No consultations needed at this time. Patient with consistently stable VS, no visible physical symptoms of withdrawal.   During the course of treatment, will collaborate with medical team to manage medical issues.  >Diet: Regular  >Social: milieu/structured therapy  >Treatment Interventions: Groups and Individual Therapy/CBT, Motivational counseling for substance abuse related issues. Consider ASHLEY  >Dispo: Collateral and dispo planning pending further symptom and medication optimization. DC 11/11

## 2022-11-10 NOTE — BH INPATIENT PSYCHIATRY PROGRESS NOTE - PRN MEDS
MEDICATIONS  (PRN):  acetaminophen     Tablet .. 650 milliGRAM(s) Oral every 6 hours PRN Moderate Pain (4 - 6)  ALBUTerol    90 MICROgram(s) HFA Inhaler 2 Puff(s) Inhalation every 6 hours PRN Shortness of Breath and/or Wheezing  diphenhydrAMINE Injectable 50 milliGRAM(s) IntraMuscular once PRN Combative behavior  hydrOXYzine hydrochloride 50 milliGRAM(s) Oral every 6 hours PRN Anxiety  nicotine  Polacrilex Gum 2 milliGRAM(s) Oral every 2 hours PRN nicotine withdrawal/craving  OLANZapine Disintegrating Tablet 5 milliGRAM(s) Oral every 6 hours PRN agitation  OLANZapine Injectable 5 milliGRAM(s) IntraMuscular once PRN assaultive behavior  polyethylene glycol 3350 17 Gram(s) Oral daily PRN constipation

## 2022-11-10 NOTE — BH INPATIENT PSYCHIATRY PROGRESS NOTE - CURRENT MEDICATION
MEDICATIONS  (STANDING):  ARIPiprazole 20 milliGRAM(s) Oral daily  buprenorphine 2 mG/naloxone 0.5 mG SL  Tablet 2 Tablet(s) SubLingual <User Schedule>  clonazePAM  Tablet 0.5 milliGRAM(s) Oral <User Schedule>  clonazePAM  Tablet 1 milliGRAM(s) Oral daily  diVALproex  milliGRAM(s) Oral <User Schedule>  influenza   Vaccine 0.5 milliLiter(s) IntraMuscular once    MEDICATIONS  (PRN):  acetaminophen     Tablet .. 650 milliGRAM(s) Oral every 6 hours PRN Moderate Pain (4 - 6)  ALBUTerol    90 MICROgram(s) HFA Inhaler 2 Puff(s) Inhalation every 6 hours PRN Shortness of Breath and/or Wheezing  diphenhydrAMINE Injectable 50 milliGRAM(s) IntraMuscular once PRN Combative behavior  hydrOXYzine hydrochloride 50 milliGRAM(s) Oral every 6 hours PRN Anxiety  nicotine  Polacrilex Gum 2 milliGRAM(s) Oral every 2 hours PRN nicotine withdrawal/craving  OLANZapine Disintegrating Tablet 5 milliGRAM(s) Oral every 6 hours PRN agitation  OLANZapine Injectable 5 milliGRAM(s) IntraMuscular once PRN assaultive behavior  polyethylene glycol 3350 17 Gram(s) Oral daily PRN constipation   MEDICATIONS  (STANDING):  ARIPiprazole 20 milliGRAM(s) Oral daily  buprenorphine 2 mG/naloxone 0.5 mG SL  Tablet 2 Tablet(s) SubLingual <User Schedule>  clonazePAM  Tablet 1 milliGRAM(s) Oral daily  clonazePAM  Tablet 0.5 milliGRAM(s) Oral <User Schedule>  diVALproex  milliGRAM(s) Oral <User Schedule>  influenza   Vaccine 0.5 milliLiter(s) IntraMuscular once    MEDICATIONS  (PRN):  acetaminophen     Tablet .. 650 milliGRAM(s) Oral every 6 hours PRN Moderate Pain (4 - 6)  ALBUTerol    90 MICROgram(s) HFA Inhaler 2 Puff(s) Inhalation every 6 hours PRN Shortness of Breath and/or Wheezing  diphenhydrAMINE Injectable 50 milliGRAM(s) IntraMuscular once PRN Combative behavior  hydrOXYzine hydrochloride 50 milliGRAM(s) Oral every 6 hours PRN Anxiety  nicotine  Polacrilex Gum 2 milliGRAM(s) Oral every 2 hours PRN nicotine withdrawal/craving  OLANZapine Disintegrating Tablet 5 milliGRAM(s) Oral every 6 hours PRN agitation  OLANZapine Injectable 5 milliGRAM(s) IntraMuscular once PRN assaultive behavior  polyethylene glycol 3350 17 Gram(s) Oral daily PRN constipation

## 2022-11-11 VITALS — TEMPERATURE: 99 F

## 2022-11-11 PROCEDURE — 99232 SBSQ HOSP IP/OBS MODERATE 35: CPT

## 2022-11-11 RX ORDER — DIVALPROEX SODIUM 500 MG/1
1 TABLET, DELAYED RELEASE ORAL
Qty: 60 | Refills: 0
Start: 2022-11-11 | End: 2022-12-10

## 2022-11-11 RX ORDER — ARIPIPRAZOLE 15 MG/1
1 TABLET ORAL
Qty: 30 | Refills: 0
Start: 2022-11-11 | End: 2022-12-10

## 2022-11-11 RX ORDER — CLONAZEPAM 1 MG
1 TABLET ORAL
Qty: 14 | Refills: 0
Start: 2022-11-11 | End: 2022-11-24

## 2022-11-11 RX ORDER — ALBUTEROL 90 UG/1
2 AEROSOL, METERED ORAL
Qty: 8 | Refills: 0
Start: 2022-11-11 | End: 2022-11-11

## 2022-11-11 RX ORDER — CLONAZEPAM 1 MG
1 TABLET ORAL
Qty: 28 | Refills: 0
Start: 2022-11-11 | End: 2022-11-24

## 2022-11-11 RX ORDER — BUPRENORPHINE AND NALOXONE 2; .5 MG/1; MG/1
1 TABLET SUBLINGUAL
Qty: 45 | Refills: 0
Start: 2022-11-11 | End: 2022-11-25

## 2022-11-11 RX ORDER — BUPRENORPHINE AND NALOXONE 2; .5 MG/1; MG/1
2 TABLET SUBLINGUAL
Qty: 1 | Refills: 0
Start: 2022-11-11 | End: 2022-11-11

## 2022-11-11 RX ADMIN — BUPRENORPHINE AND NALOXONE 2 TABLET(S): 2; .5 TABLET SUBLINGUAL at 08:13

## 2022-11-11 RX ADMIN — ARIPIPRAZOLE 20 MILLIGRAM(S): 15 TABLET ORAL at 08:12

## 2022-11-11 RX ADMIN — Medication 1 MILLIGRAM(S): at 08:12

## 2022-11-11 RX ADMIN — POLYETHYLENE GLYCOL 3350 17 GRAM(S): 17 POWDER, FOR SOLUTION ORAL at 08:12

## 2022-11-11 RX ADMIN — DIVALPROEX SODIUM 500 MILLIGRAM(S): 500 TABLET, DELAYED RELEASE ORAL at 08:12

## 2022-11-11 NOTE — BH DISCHARGE NOTE NURSING/SOCIAL WORK/PSYCH REHAB - NSDCPETBCESMAN_GEN_ALL_CORE
Patient : Huma Beltran Age: 89 year old Sex: female   MRN: [id number] Encounter Date: 9/20/2020      History     Chief Complaint   Patient presents with   • Cough     This is a very pleasant 89-year-old female who presents to the emergency department for evaluation of cough and shortness of breath.  Patient does reside by herself and states that she does not have any sick contacts.  Patient states that she has not been having any nausea, vomiting, or diarrhea.  Patient states that she has had a productive cough and decided to come to the emergency department for evaluation.  Patient has been eating and drinking without any issues.          Allergies   Allergen Reactions   • Bactrim Ds Other (See Comments)     Developed arf, hyperkalemia in past   • Fosamax NAUSEA   • Iodine   (Environmental Or Med) HIVES       Current Discharge Medication List      Prior to Admission Medications    Details   lisinopril (ZESTRIL) 10 MG tablet Take 1 tablet by mouth 2 times daily.  Qty: 180 tablet, Refills: 1      amLODIPine (NORVASC) 10 MG tablet Take 1 tablet by mouth daily.  Qty: 90 tablet, Refills: 1      metoPROLOL succinate (TOPROL XL) 25 MG 24 hr tablet Take 1 tablet by mouth 2 times daily.  Qty: 180 tablet, Refills: 3      CALCIUM CARBONATE-VITAMIN D PO Take 600 mg by mouth daily. 600/200 (D) - 1 tab daily      mirabegron ER (MYRBETRIQ) 50 MG 24 hr tablet Take 1 tablet by mouth daily.  Qty: 90 tablet, Refills: 1      sodium chloride 1 g tablet Take 1 tablet by mouth 2 (two) times a day.  Qty: 30 tablet, Refills: 1      pravastatin (PRAVACHOL) 20 MG tablet TAKE 1 TABLET EVERY DAY  Qty: 90 tablet, Refills: 3      ferrous sulfate 325 (65 FE) MG EC tablet Take 1 tablet by mouth daily.  Qty: 30 tablet      ibandronate (BONIVA) 150 MG tablet TAKE 1 TABLET  EVERY  30  DAYS  Qty: 3 tablet, Refills: 3      aspirin 81 MG tablet Take 2 tablets by mouth daily.  Qty: 30 tablet, Refills: 2      FISH OIL 1000 MG PO CAPS 2 caps daily  Qty: 0,  Refills: 0      GLUCOSAMINE SULFATE 1000 MG PO CAPS 1 daily  Qty: 0, Refills: 0      MULTIVITAMINS PO TABS 1 daily  Qty: 0, Refills: 0             Past Medical History:   Diagnosis Date   • Acute gastric ulcer with hemorrhage, without mention of obstruction    • AFTERCARE LONG TERM USE MEDICATN 4/12/2004   • Atrial fibrillation (CMS/[facility]) 7/1/98   • Backache, unspecified 9/28/2005   • Bilateral cataracts     Left extracted 3/18/2014, right extracted 4/1/2014   • Closed fracture of patella 6/13/01    comminuted left   • Disorder of eye, unspecified    • DNR (do not resuscitate) 10/25/2019    state form scanned in under media on 10/30/19   • Generalized osteoarthrosis, unspecified site    • Hip fracture, right (CMS/[facility]) 11/26/2013   • Hyperpotassemia 11/21/01   • Kyphosis    • Mitral valve disorders(424.0) 7/2/98    regurgitation   • Osteoarthrosis, unspecified whether generalized or localized, unspecified site 12/26/89    Degenerative joint disease low back   • Osteoporosis, unspecified 9/28/2005   • Other abnormal blood chemistry 11/27/2001   • Other and unspecified hyperlipidemia 4/22/86   • Other closed fractures of distal end of radius (alone) 7/1/98    left, comminuted distal radius and ulna/Dr. Pineda   • Other closed fractures of distal end of radius (alone) 12/15/93    right/Dr. Gupta   • Other seborrheic keratosis    • Pacemaker 11/29/2013 11/28/13: Implanted [facility] Dual chamber pacemaker for asystole and syncope.   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 Leal   • CATARACT EXTRACTION W/ INTRAOCULAR LENS IMPLANT  2014    Left eye 3/18/2014, right eye 4/1/2014   • COLONOSCOPY REMOVE LESION BY SNARE  6/10/2008    Dr. Márquez, Polyps, F/U 5 years    • DEXA BONE DENSITY AXIAL SKELETON  3/26/2014    Osteoporosis   • DEXA BONE DENSITY AXIAL SKELETON  07/07/2017   • ESOPHAGOGASTRODUODENOSCOPY TRANSORAL FLEX W/BX SINGLE OR MULT  6/10/2008    Dr. Márquez, Gastritis   • FRACTURE SURGERY     • HYSTERECTOMY     • LAPAROSCOPIC CHOLECYSTECTOMY  1/1992    gallstones/Dr. Bliss   • ORIF HIP FRACTURE  11/27/2013    right hip proximal femur fracture open reduction internal fixation with intramedullary rodding   • PACEMAKER IMPLANT  11/28/2013    Successful implantation of a dual-chamber pacemaker.   • REMOVAL GALLBLADDER     • REMOVE TONSILS/ADENOIDS,<13 Y/O     • REVISE GASTRODUOD ANASTOMOSIS  1971    80% resection-Billroth II anastomosis   • TONSILLECTOMY     • VAGINAL HYSTERECTOMY  4/7/1986    repair cystourethrocele/Dr. VALENTINO Astorga       Family History   Problem Relation Age of Onset   • Cancer Father         myeloma   • Blood Disorder Brother    • Cancer Brother         brain tumor   • Hypertension Sister    • Dementia/Alzheimers Sister        Social History     Tobacco Use   • Smoking status: Never Smoker   • Smokeless tobacco: Never Used   Substance Use Topics   • Alcohol use: Never     Alcohol/week: 0.0 standard drinks     Frequency: Never     Comment: per her dtr, not really drinking anymore   • Drug use: No       E-cigarette/Vaping   • E-Cigarette/Vaping Use Never Used      E-Cigarette/Vaping Substances & Devices       Review of Systems   Constitutional: Negative for activity change, appetite change, chills, fatigue and fever.   HENT: Negative for congestion, rhinorrhea, sinus pressure and sore  throat.    Eyes: Negative for photophobia and visual disturbance.   Respiratory: Positive for cough and shortness of breath. Negative for chest tightness.    Cardiovascular: Negative for chest pain and palpitations.   Gastrointestinal: Negative for abdominal pain, diarrhea, nausea and vomiting.   Genitourinary: Negative for decreased urine volume, difficulty urinating, dysuria, flank pain and urgency.   Musculoskeletal: Negative for arthralgias, back pain, myalgias, neck pain and neck stiffness.   Skin: Negative for color change, pallor, rash and wound.   Neurological: Negative for dizziness, syncope, weakness, light-headedness, numbness and headaches.   Hematological: Negative for adenopathy. Does not bruise/bleed easily.   Psychiatric/Behavioral: Negative for confusion. The patient is not nervous/anxious.        Physical Exam     ED Triage Vitals [09/20/20 0959]   ED Triage Vitals Group      Temp 98.2 °F (36.8 °C)      Heart Rate 63      Resp 18      /61      SpO2 93 %      EtCO2 mmHg       Height 5' (1.524 m)      Weight 120 lb 9.5 oz (54.7 kg)      Weight Scale Used Scale in bed      BMI (Calculated) 23.55      IBW/kg (Calculated) 45.5       Physical Exam   Constitutional: She is oriented to person, place, and time. She appears well-developed and well-nourished.  Non-toxic appearance. She does not have a sickly appearance. No distress.   HENT:   Head: Normocephalic and atraumatic.   Nose: Nose normal.   Mouth/Throat: Uvula is midline, oropharynx is clear and moist and mucous membranes are normal. Mucous membranes are not dry. No oropharyngeal exudate or posterior oropharyngeal erythema.   Eyes: Pupils are equal, round, and reactive to light. Conjunctivae and EOM are normal. Right eye exhibits no discharge. Left eye exhibits no discharge. No scleral icterus.   Neck: Trachea normal, normal range of motion and full passive range of motion without pain. Neck supple. No JVD present. No tracheal deviation  present.   Cardiovascular: Normal rate, regular rhythm, normal heart sounds, intact distal pulses and normal pulses.   Pulmonary/Chest: Effort normal. No accessory muscle usage or stridor. No tachypnea. No respiratory distress. She has decreased breath sounds in the right upper field and the left upper field. She has wheezes in the right upper field and the left upper field. She has no rales. She exhibits no tenderness.   Abdominal: Soft. Normal appearance and bowel sounds are normal. She exhibits no distension. There is no splenomegaly or hepatomegaly. There is no abdominal tenderness. There is no rigidity, no rebound, no guarding, no CVA tenderness, no tenderness at McBurney's point and negative Joseph's sign.   Musculoskeletal: Normal range of motion.         General: No tenderness or edema.   Neurological: She is alert and oriented to person, place, and time. She is not disoriented. Coordination normal.   Skin: Skin is warm, dry and intact. No ecchymosis and no rash noted. She is not diaphoretic. No cyanosis or erythema. No pallor.   Psychiatric: She has a normal mood and affect. Her behavior is normal. Judgment and thought content normal.   Nursing note and vitals reviewed.      ED Course     Procedures    Lab Results     Results for orders placed or performed during the hospital encounter of 09/20/20   Prothrombin Time   Result Value Ref Range    Prothrombin Time 11.2 9.7 - 11.8 sec    INR 1.1 <=5.0 sec   Partial Thromboplastin Time   Result Value Ref Range    PTT 29 22 - 32 sec   Comprehensive Metabolic Panel   Result Value Ref Range    Fasting Status      Sodium 135 135 - 145 mmol/L    Potassium 4.2 3.4 - 5.1 mmol/L    Chloride 102 98 - 107 mmol/L    Carbon Dioxide 23 21 - 32 mmol/L    Anion Gap 14 10 - 20 mmol/L    Glucose 122 (H) 65 - 99 mg/dL    BUN 35 (H) 6 - 20 mg/dL    Creatinine 0.81 0.51 - 0.95 mg/dL    Glomerular Filtration Rate 62 (L) >90 mL/min/1.73m2    BUN/ Creatinine Ratio 43 (H) 7 - 25     Bilirubin, Total 0.9 0.2 - 1.0 mg/dL    GOT/AST 25 <=37 Units/L    Alkaline Phosphatase 76 45 - 117 Units/L    Albumin 3.3 (L) 3.6 - 5.1 g/dL    Protein, Total 7.5 6.4 - 8.2 g/dL    Globulin 4.2 (H) 2.0 - 4.0 g/dL    A/G Ratio 0.8 (L) 1.0 - 2.4    GPT/ALT 33 <64 Units/L    Calcium 9.1 8.4 - 10.2 mg/dL   Troponin I Ultra Sensitive   Result Value Ref Range    Troponin I, Ultra Sensitive <0.02 <=0.04 ng/mL   NT proBNP   Result Value Ref Range    NT-proBNP 1,573 (H) <=450 pg/mL   CBC with Automated Differential (performable only)   Result Value Ref Range    WBC      RBC 5.01 4.00 - 5.20 mil/mcL    HGB 14.6 12.0 - 15.5 g/dL    HCT 43.5 36.0 - 46.5 %    MCV 86.8 78.0 - 100.0 fl    MCH 29.1 26.0 - 34.0 pg    MCHC 33.6 32.0 - 36.5 g/dL    RDW-CV 14.6 11.0 - 15.0 %     140 - 450 K/mcL    RDW-SD 46.2 39.0 - 50.0 fL   LACTIC ACID VENOUS POINT OF CARE   Result Value Ref Range    LACTIC ACID, VENOUS - POINT OF CARE 2.1 (HH) <2.0 mmol/L       EKG Results     EKG Interpretation  Rate: 59  Rhythm: sinus bradycardia   Abnormality: yes    Per my review, the patient's EKG shows sinus bradycardia. Please see the cardiology final reading/interpretation.      Radiology Results     Imaging Results          XR Chest AP or PA (Final result)  Result time 09/20/20 10:39:57    Final result                 Impression:    IMPRESSION: Left-sided pacemaker with leads in stable position.    Patchy mixed interstitial and alveolar infiltrate in the infrahilar regions  and lung bases bilaterally, unchanged. No new infiltrates. No effusion.    Stable cardiac and mediastinal contours.     No pneumothorax. Pleural thickening at the lung apices.    Diffuse demineralization.                Narrative:    AP PORTABLE VIEW CHEST, DATED 9/20/2020 at 1022 HOURS.    CLINICAL HISTORY:  Shortness of breath.    COMPARISONS: 5/6/2020.                                ED Medication Orders (From admission, onward)    None          [facility]    Patient's lab work has  been reviewed and interpreted by myself.  Patient's lactic acid is 2.1.  Patient's BUN is also elevated.  Patient has received IV fluids.    Patient's chest x-ray has been reviewed by myself.  Patient's chest x-ray shows perihilar fullness but no active infiltrate.  Please see the radiology final report/interpretation.    Due to the patient's hypoxia and cough, the patient will be admitted for observation admission.  Patient is started on IV Rocephin and IV Zithromax.    10:52 a.m.:  Patient's case has been discussed with the hospitalist, Dr. Jenkins.  He is aware and accepts this patient for observation admission.    Clinical Impression     ED Diagnosis   1. Bronchitis     2. Hypoxia     3. Dehydration         Disposition        Admit 9/20/2020 10:52 AM  Telemetry Bed?: Yes  Patient Class: Observation [4]  Level of Care: Acute [1]  Admission Diagnosis: Acute bronchitis [466.0.ICD-9-CM]  Admitting Physician: AMANDA JENKINS [[id number]]  Is this a telephone or verbal order?: This is a telephone order from the admitting physician                     Yosef De La Torre DO  09/20/20 1054     If you are a smoker, it is important for your health to stop smoking. Please be aware that second hand smoke is also harmful.

## 2022-11-11 NOTE — BH INPATIENT PSYCHIATRY PROGRESS NOTE - NSBHCHARTREVIEWVS_PSY_A_CORE FT
Vital Signs Last 24 Hrs  T(C): 37 (11-11-22 @ 06:28), Max: 37.2 (11-10-22 @ 14:38)  T(F): 98.6 (11-11-22 @ 06:28), Max: 98.9 (11-10-22 @ 14:38)  HR: --  BP: --  BP(mean): --  RR: --  SpO2: --    Orthostatic VS  11-10-22 @ 19:32  Lying BP: --/-- HR: --  Sitting BP: 90/60 HR: 89  Standing BP: 96/74 HR: 100  Site: --  Mode: --  Orthostatic VS  11-10-22 @ 08:47  Lying BP: --/-- HR: --  Sitting BP: 106/69 HR: 79  Standing BP: 102/64 HR: 88  Site: --  Mode: --  Orthostatic VS  11-09-22 @ 19:29  Lying BP: --/-- HR: --  Sitting BP: 106/90 HR: 84  Standing BP: 107/77 HR: 81  Site: --  Mode: --   Vital Signs Last 24 Hrs  T(C): 37 (11-11-22 @ 06:28), Max: 37.2 (11-10-22 @ 14:38)  T(F): 98.6 (11-11-22 @ 06:28), Max: 98.9 (11-10-22 @ 14:38)  HR: --  BP: --  BP(mean): --  RR: --  SpO2: --    Orthostatic VS  11-11-22 @ 08:01  Lying BP: --/-- HR: --  Sitting BP: 90/60 HR: 92  Standing BP: 94/64 HR: 96  Site: upper left arm  Mode: electronic  Orthostatic VS  11-10-22 @ 19:32  Lying BP: --/-- HR: --  Sitting BP: 90/60 HR: 89  Standing BP: 96/74 HR: 100  Site: --  Mode: --  Orthostatic VS  11-10-22 @ 08:47  Lying BP: --/-- HR: --  Sitting BP: 106/69 HR: 79  Standing BP: 102/64 HR: 88  Site: --  Mode: --  Orthostatic VS  11-09-22 @ 19:29  Lying BP: --/-- HR: --  Sitting BP: 106/90 HR: 84  Standing BP: 107/77 HR: 81  Site: --  Mode: --

## 2022-11-11 NOTE — BH INPATIENT PSYCHIATRY PROGRESS NOTE - NSTXDCOTHRDATETRGT_PSY_ALL_CORE
04-Nov-2022
16-Nov-2022
04-Nov-2022
04-Nov-2022
16-Nov-2022
04-Nov-2022

## 2022-11-11 NOTE — BH INPATIENT PSYCHIATRY PROGRESS NOTE - NSTXDCOPNODATENEW_PSY_ALL_CORE
07-Nov-2022

## 2022-11-11 NOTE — BH INPATIENT PSYCHIATRY PROGRESS NOTE - NSTXDCOTHRGOAL_PSY_ALL_CORE
Pt will show a reduction of symptoms and engage meaningfully with writer to identify a safe discharge plan. Pt will comply with recommended tx plan and medications for 5 days, identify 2 benefits for adhering to tx
Pt will show a reduction of symptoms and engage meaningfully with writer to identify a safe discharge plan. Pt will comply with recommended tx plan and medications for 5 days, identify 2 benefits for adhering to tx.
Pt will show a reduction of symptoms and engage meaningfully with writer to identify a safe discharge plan. Pt will comply with recommended tx plan and medications for 5 days, identify 2 benefits for adhering to tx
Pt will show a reduction of symptoms and engage meaningfully with writer to identify a safe discharge plan. Pt will comply with recommended tx plan and medications for 5 days, identify 2 benefits for adhering to tx.
Pt will show a reduction of symptoms and engage meaningfully with writer to identify a safe discharge plan. Pt will comply with recommended tx plan and medications for 5 days, identify 2 benefits for adhering to tx

## 2022-11-11 NOTE — BH DISCHARGE NOTE NURSING/SOCIAL WORK/PSYCH REHAB - NSCDUDCCRISIS_PSY_A_CORE
.[facility] 1 [phone]) 621-HOPE (5600) Website: www.safehorizon.org/.National Suicide Prevention Lifeline 3 (459) 524-1403/.  [facility]  1 (233) LIFENET (715-4654)/.  [facility]  (201) 617-2507/.  [facility] Behavioral Health Helpline / General acute hospital Crisis  [phone] 894-XLZB (4306)/.  [facility]  [address] [place] [address] [phone]   Hours:  Monday through Friday from 9 AM to 3 PM/988 Suicide and Crisis Lifeline

## 2022-11-11 NOTE — BH INPATIENT PSYCHIATRY PROGRESS NOTE - NSTXDISORGINTERMD_PSY_ALL_CORE
Patient appears to be slightly less demanding than before. 

## 2022-11-11 NOTE — BH INPATIENT PSYCHIATRY PROGRESS NOTE - NSTXDISORGDATETRGT_PSY_ALL_CORE
04-Nov-2022
04-Nov-2022
16-Nov-2022
04-Nov-2022
16-Nov-2022
16-Nov-2022
04-Nov-2022

## 2022-11-11 NOTE — BH INPATIENT PSYCHIATRY PROGRESS NOTE - NSTXALCDRGDATETRGT_PSY_ALL_CORE
04-Nov-2022
17-Nov-2022
17-Nov-2022
04-Nov-2022
04-Nov-2022
17-Nov-2022
04-Nov-2022

## 2022-11-11 NOTE — BH INPATIENT PSYCHIATRY PROGRESS NOTE - NSTXALCDRGGOAL_PSY_ALL_CORE
Will not display signs of withdrawal
Will not display signs of withdrawal
Will utilize coping/calming strategies to manage with withdrawal symptoms daily
Will not display signs of withdrawal
Will not display signs of withdrawal
Will utilize coping/calming strategies to manage with withdrawal symptoms daily
Will utilize coping/calming strategies to manage with withdrawal symptoms daily
Will not display signs of withdrawal

## 2022-11-11 NOTE — BH INPATIENT PSYCHIATRY PROGRESS NOTE - NSTXANXDATEEST_PSY_ALL_CORE
30-Oct-2022
09-Nov-2022
30-Oct-2022
09-Nov-2022
30-Oct-2022
30-Oct-2022
09-Nov-2022
30-Oct-2022

## 2022-11-11 NOTE — BH INPATIENT PSYCHIATRY PROGRESS NOTE - NSTXSUICIDGOAL_PSY_ALL_CORE
Talk to staff and ask for assistance when having suicidal wishes instead of acting out
Talk to staff and ask for assistance when having suicidal wishes instead of acting out
Be able to state 3 reasons for living
Talk to staff and ask for assistance when having suicidal wishes instead of acting out
Talk to staff and ask for assistance when having suicidal wishes instead of acting out
Be able to state 3 reasons for living
Be able to state 3 reasons for living
Talk to staff and ask for assistance when having suicidal wishes instead of acting out

## 2022-11-11 NOTE — BH INPATIENT PSYCHIATRY PROGRESS NOTE - NSDCCRITERIA_PSY_ALL_CORE
Symptom stabilization  CGI<=3

## 2022-11-11 NOTE — BH INPATIENT PSYCHIATRY PROGRESS NOTE - NSTXSUICIDDATEEST_PSY_ALL_CORE
30-Oct-2022
09-Nov-2022
09-Nov-2022
30-Oct-2022
09-Nov-2022

## 2022-11-11 NOTE — BH INPATIENT PSYCHIATRY PROGRESS NOTE - NSTXSUICIDDATETRGT_PSY_ALL_CORE
04-Nov-2022
16-Nov-2022
16-Nov-2022
04-Nov-2022
04-Nov-2022
16-Nov-2022
04-Nov-2022

## 2022-11-11 NOTE — BH INPATIENT PSYCHIATRY PROGRESS NOTE - NSTXDCOPNODATEEST_PSY_ALL_CORE
31-Oct-2022

## 2022-11-11 NOTE — BH INPATIENT PSYCHIATRY PROGRESS NOTE - NSBHCONSBHPROVDETAILS_PSY_A_CORE  FT
Collateral obtained from TOMÁS Lara at [facility] ext [phone]
Collateral obtained from TOMÁS Lara at [facility] ext [phone]
Collateral obtained from TOMÁS Lara at [facility] ext [phone]
Collateral obtained from TOMÁS Lara at [facility] ext [phone]
Collateral obtained from TOMÁS Lara at [facility] ext [phone]
Collateral obtained from TOMÁS Lara at [facility] ext [phone]
Collateral obtained from TOMÁS Lara at [facility] ext [phone]
Collateral obtained from TOMÁS Lara at [facility] ext [phone]
Collateral obtained from TOMÁS Lara at [facility] ext [phone]
Collateral obtained from TOMÁS Lara at [facility] ext [phone]

## 2022-11-11 NOTE — BH INPATIENT PSYCHIATRY PROGRESS NOTE - NSBHATTESTTYPEVISIT_PSY_A_CORE
On-site Attending supervising BARTOLO (99XXX codes)

## 2022-11-11 NOTE — BH DISCHARGE NOTE NURSING/SOCIAL WORK/PSYCH REHAB - NSDCADDINFO1FT_PSY_ALL_CORE
This is an IN-PERSON appt. Please arrive at least 15 minutes early to your scheduled intake with your insurance card and photo ID.

## 2022-11-11 NOTE — BH DISCHARGE NOTE NURSING/SOCIAL WORK/PSYCH REHAB - PATIENT PORTAL LINK FT
You can access the FollowMyHealth Patient Portal offered by [facility] by registering at the following website: [email or web address]. By joining [facility]’s FollowMyHealth portal, you will also be able to view your health information using other applications (apps) compatible with our system.

## 2022-11-11 NOTE — BH INPATIENT PSYCHIATRY PROGRESS NOTE - NSBHMSEBODY_PSY_A_CORE
Eye Problem(s):negative  ENT Problem(s): Hoarseness   Cardiovascular problem(s):negative  Respiratory problem(s): moderate SOB with activity, hx of asthma and COPD  Gastro-intestinal problem(s): mild constipation  Genito-urinary problem(s):negative  Musculoskeletal problem(s):negative  Integumentary problem(s):negative  Neurological problem(s):negative  Psychiatric problem(s):negative  Endocrine problem(s):negative  Hematologic and/or Lymphatic problem(s): anemia  Severe fatigue       
Average build

## 2022-11-11 NOTE — BH INPATIENT PSYCHIATRY PROGRESS NOTE - NSTXALCDRGDATEEST_PSY_ALL_CORE
09-Nov-2022
30-Oct-2022
30-Oct-2022
09-Nov-2022
30-Oct-2022
30-Oct-2022
09-Nov-2022
30-Oct-2022

## 2022-11-11 NOTE — BH INPATIENT PSYCHIATRY PROGRESS NOTE - NSTXDCOTHRDATEEST_PSY_ALL_CORE
30-Oct-2022
09-Nov-2022
30-Oct-2022
30-Oct-2022
09-Nov-2022
30-Oct-2022

## 2022-11-11 NOTE — BH INPATIENT PSYCHIATRY PROGRESS NOTE - NSTXDISORGDATEEST_PSY_ALL_CORE
30-Oct-2022
09-Nov-2022
30-Oct-2022
09-Nov-2022
30-Oct-2022
30-Oct-2022
09-Nov-2022
30-Oct-2022

## 2022-11-11 NOTE — BH INPATIENT PSYCHIATRY PROGRESS NOTE - NSBHFUPINTERVALHXFT_PSY_A_CORE
Patient seen individually for discharge day management. The patient's case has been reviewed with the treatment team.  Spent performing discharge risk assessment, safety planning, performing clinical eval, planning for dc and providing psychoeducation about meds, sx and aftercare. On exam today the patient was cooperative and makes good eye contact. Patient’s symptoms of depressive process have lessened and she reports feeling better. Her sleep patterns have improved and are better.  She has been in behavioral control no prn’s required during her hospitalization. She denies suicidal thoughts no plan or intent. Denies HI/AVH, delusions, or other symptoms of psychotic process are reported at this time.  She showed some improvement in her symptoms, with a gradual reduction of her anxiety, depression, and suicidal thinking.  Patient has actively engaged in treatment to maximize treatment benefits. She has attended groups, and milieu therapy. She has remained compliant with medications, no adverse effects noted.  Medication teaching, risk/benefits of ASHLEY discussed, however patient did not consent to  [place] ASHLEY. She has remained in good behavioral control and has not required emergent intramuscular medications or seclusion / restraints.  Patient able to identify protective factors: being with family, patient is future-oriented, has strong social and family support, and has a strong sense for family responsibility.  Reports she is looking forward to celebrating her birthday today with her family.  Patient no longer requires inpatient treatment and care. Patient is not judged to be an acute danger to self or others at this time. Appears ready and stable to be discharged to lower level of care.  There are no other acute risk factors that could be mitigated by further inpatient hospitalization.   She will be discharged today to home and outpatient follow up.   Patient left under no distress.

## 2022-11-11 NOTE — BH INPATIENT PSYCHIATRY PROGRESS NOTE - NSBHATTESTBILLONSITE_PSY_A_CORE
No
BARTOLO to bill

## 2022-11-11 NOTE — BH INPATIENT PSYCHIATRY PROGRESS NOTE - NSBHASSESSSUMMFT_PSY_ALL_CORE
51-year-old single  woman. Patient has history of multiple psychiatric admissions, high utilizer of ED.  Recent discharged from [place] [address] on 10/10/22. Patient has  PPH of polysubstance use, opioid use disorder (on methadone) benzo dependence, anxiety, depression.  Patient is hospitalized with a primary problem of worsening mood and decompensation in context of noncompliance with outpatient follow up and noncompliance with her medications.  Has frequent ED visits with similar complaints, history of numerous psychiatric inpatient hospitalizations.  Patient admitted to [facility] on a 9.13 legal status.  This patient requires inpatient hospitalization due to symptoms of mental illness so severe that they significantly interfere with activities of daily living and presents a potential danger to self as a result of acute decompensation with active SI, thoughts to self-harm . She is requiring inpatient care at this time as a result, for psychiatric stabilization and safety.    Plan:  >Legal: 9.13  >Obs: Routine, no current SI. no need for CO, patient not expected to pose risk to self or others in controlled inpatient setting  >Psychiatric Meds:  Abilify 20mg daily; Depakote 500mg BID, Suboxone 2mg/0.5mg 2 films  TID,  klonopin 1mg daily and 0.5mg BID, dc Lexapro 20mg daily.  Observe for tolerability and efficacy. Patient had been poorly adherent prior to admission.   PRN medications:  Ativan 2mg oral Q6HR PRN for agitation and anxiety.  Haldol 5mg oral Q6HR PRN for agitation.   Benadryl 50mg oral Q6HR PRN for agitation.   Vistaril 50mg oral Q6HR PRN for anxiety.  Desyrel 50mg oral QHS PRN for insomnia.   >Labs:  labs reviewed, no acute findings. Utox +THC. QT/QTc: 410/448ms. Hold antipsychotics if QTc >500  >Medical:   No acute concerns. No consultations needed at this time. Patient with consistently stable VS, no visible physical symptoms of withdrawal.   During the course of treatment, will collaborate with medical team to manage medical issues.  >Diet: Regular  >Social: milieu/structured therapy  >Treatment Interventions: Groups and Individual Therapy/CBT, Motivational counseling for substance abuse related issues. Consider ASHLEY  >Dispo: Collateral and dispo planning pending further symptom and medication optimization. DC 11/11

## 2022-11-11 NOTE — BH INPATIENT PSYCHIATRY PROGRESS NOTE - NSTXDCOPNOINTERMD_PSY_ALL_CORE
educated patient on importance of following up with outpatient appointments after discharge, patient states she understands and will follow up. 

## 2022-11-11 NOTE — BH INPATIENT PSYCHIATRY PROGRESS NOTE - NSBHCHARTREVIEWLAB_PSY_A_CORE FT
labs reviewed, Utox +THC, no other findings
Admission labs reviewed, Utox +THC, no other findings
 labs reviewed, Utox +THC, no other findings

## 2022-11-11 NOTE — BH INPATIENT PSYCHIATRY PROGRESS NOTE - NSTXDISORGGOAL_PSY_ALL_CORE
Will demonstrate purposeful and predictable thoughts/behaviors by making a request
Will make at least 3 goal and reality oriented statements during therapy
Will make at least 3 goal and reality oriented statements during therapy
Will demonstrate purposeful and predictable thoughts/behaviors by making a request
Will demonstrate purposeful and predictable thoughts/behaviors by making a request
Will make at least 3 goal and reality oriented statements during therapy

## 2022-11-11 NOTE — BH INPATIENT PSYCHIATRY PROGRESS NOTE - NSTXANXGOAL_PSY_ALL_CORE
Identify and practice 3 coping skills to manage anxiety
Be able to perform ADLs and maintain safety despite anxiety/panic daily
Report that he/she was able to initiate conversations with peers 3 times daily despite panic attacks
Identify and practice 3 coping skills to manage anxiety
Be able to perform ADLs and maintain safety despite anxiety/panic daily
Identify and practice 3 coping skills to manage anxiety
Report that he/she was able to initiate conversations with peers 3 times daily despite panic attacks
Report that he/she was able to initiate conversations with peers 3 times daily despite panic attacks
Be able to perform ADLs and maintain safety despite anxiety/panic daily
Identify and practice 3 coping skills to manage anxiety

## 2022-11-11 NOTE — BH DISCHARGE NOTE NURSING/SOCIAL WORK/PSYCH REHAB - NSDCPRGOAL_PSY_ALL_CORE
Writer met with patient in order to discuss progress towards goal upon discharge. Patient has made no progress, as patient was unable to identify two to three positive coping skills despite psychoeducation attempt from staff.     On the unit, patient was visible, interacting with selected peers. Patient was observed to be med seeking and intrusive on unit. Upon discharge, patient states she is doing well and is looking forward to being with support system. Patient denies SI and is able to contract for safety.  Patient refused to engage in safety plan.     Patient attended approximately 30% of psychiatric rehabilitation goal during current hospitalization despite daily prompting and encouragement from staff. Patient refused press ganey survey.

## 2022-11-11 NOTE — BH INPATIENT PSYCHIATRY PROGRESS NOTE - NSBHMSETHTCONTENT_PSY_A_CORE
Preoccupations/Other
Unremarkable/Other
Unremarkable/Other
Preoccupations/Other
Preoccupations/Other
Unremarkable/Other
Preoccupations/Other

## 2022-11-11 NOTE — BH INPATIENT PSYCHIATRY PROGRESS NOTE - NSTXANXDATETRGT_PSY_ALL_CORE
06-Nov-2022
06-Nov-2022
16-Nov-2022
16-Nov-2022
09-Nov-2022
16-Nov-2022
09-Nov-2022
13-Nov-2022
13-Nov-2022
09-Nov-2022

## 2022-11-11 NOTE — BH DISCHARGE NOTE NURSING/SOCIAL WORK/PSYCH REHAB - DISCHARGE INSTRUCTIONS AFTERCARE APPOINTMENTS
In order to check the location, date, or time of your aftercare appointment, please refer to your Discharge Instructions Document given to you upon leaving the hospital.  If you have lost the instructions please call [phone]

## 2022-11-11 NOTE — BH INPATIENT PSYCHIATRY PROGRESS NOTE - NSBHCONSDANGERSELF_PSY_A_CORE
suicidal ideation with plan and means/unable to care for self

## 2022-11-11 NOTE — BH INPATIENT PSYCHIATRY PROGRESS NOTE - NSTXDEPRESGOAL_PSY_ALL_CORE
Report using a coping skill to overcome sadness and worry in order to socialize with peers daily
Exhibit improvements in self-grooming, hygiene, sleep and appetite

## 2022-11-11 NOTE — BH INPATIENT PSYCHIATRY PROGRESS NOTE - CURRENT MEDICATION
MEDICATIONS  (STANDING):  ARIPiprazole 20 milliGRAM(s) Oral daily  buprenorphine 2 mG/naloxone 0.5 mG SL  Tablet 2 Tablet(s) SubLingual <User Schedule>  clonazePAM  Tablet 1 milliGRAM(s) Oral daily  clonazePAM  Tablet 0.5 milliGRAM(s) Oral <User Schedule>  diVALproex  milliGRAM(s) Oral <User Schedule>  influenza   Vaccine 0.5 milliLiter(s) IntraMuscular once    MEDICATIONS  (PRN):  acetaminophen     Tablet .. 650 milliGRAM(s) Oral every 6 hours PRN Moderate Pain (4 - 6)  ALBUTerol    90 MICROgram(s) HFA Inhaler 2 Puff(s) Inhalation every 6 hours PRN Shortness of Breath and/or Wheezing  diphenhydrAMINE Injectable 50 milliGRAM(s) IntraMuscular once PRN Combative behavior  hydrOXYzine hydrochloride 50 milliGRAM(s) Oral every 6 hours PRN Anxiety  nicotine  Polacrilex Gum 2 milliGRAM(s) Oral every 2 hours PRN nicotine withdrawal/craving  OLANZapine Disintegrating Tablet 5 milliGRAM(s) Oral every 6 hours PRN agitation  OLANZapine Injectable 5 milliGRAM(s) IntraMuscular once PRN assaultive behavior  polyethylene glycol 3350 17 Gram(s) Oral daily PRN constipation   MEDICATIONS  (STANDING):  ARIPiprazole 20 milliGRAM(s) Oral daily  buprenorphine 2 mG/naloxone 0.5 mG SL  Tablet 2 Tablet(s) SubLingual <User Schedule>  clonazePAM  Tablet 0.5 milliGRAM(s) Oral <User Schedule>  clonazePAM  Tablet 1 milliGRAM(s) Oral daily  diVALproex  milliGRAM(s) Oral <User Schedule>  influenza   Vaccine 0.5 milliLiter(s) IntraMuscular once    MEDICATIONS  (PRN):  acetaminophen     Tablet .. 650 milliGRAM(s) Oral every 6 hours PRN Moderate Pain (4 - 6)  ALBUTerol    90 MICROgram(s) HFA Inhaler 2 Puff(s) Inhalation every 6 hours PRN Shortness of Breath and/or Wheezing  diphenhydrAMINE Injectable 50 milliGRAM(s) IntraMuscular once PRN Combative behavior  hydrOXYzine hydrochloride 50 milliGRAM(s) Oral every 6 hours PRN Anxiety  nicotine  Polacrilex Gum 2 milliGRAM(s) Oral every 2 hours PRN nicotine withdrawal/craving  OLANZapine Disintegrating Tablet 5 milliGRAM(s) Oral every 6 hours PRN agitation  OLANZapine Injectable 5 milliGRAM(s) IntraMuscular once PRN assaultive behavior  polyethylene glycol 3350 17 Gram(s) Oral daily PRN constipation

## 2022-11-11 NOTE — BH INPATIENT PSYCHIATRY PROGRESS NOTE - NSBHATTESTBILLINGAW_PSY_A_CORE
[id number]-Ujxndkjywt Inpatient care - moderate complexity - 25 minutes
63718-Ccoxndgrdv Inpatient care - moderate complexity - 25 minutes
09172-Ckolypfwab Inpatient care - moderate complexity - 25 minutes
17644-Akykdlqnwy Inpatient care - moderate complexity - 25 minutes
48478-Gtobdtrjby Inpatient care - moderate complexity - 25 minutes
32251-Lsmjnfirij Inpatient care - moderate complexity - 25 minutes
45374-Mqtcopopqd Inpatient care - moderate complexity - 25 minutes
77669-Fkzfoyhcic Inpatient care - moderate complexity - 25 minutes
96529-Bzgsgvxtnc Inpatient care - moderate complexity - 25 minutes
63624-Qdkwiipimm Inpatient care - moderate complexity - 25 minutes

## 2022-11-28 PROBLEM — F41.9 ANXIETY DISORDER, UNSPECIFIED: Chronic | Status: INACTIVE | Noted: 2021-09-22 | Resolved: 2022-04-06

## 2022-11-28 PROBLEM — F41.9 ANXIETY DISORDER, UNSPECIFIED: Chronic | Status: INACTIVE | Noted: 2021-10-10 | Resolved: 2022-04-06

## 2022-11-29 NOTE — SOCIAL WORK POST DISCHARGE FOLLOW UP NOTE - NSBHSWFOLLOWUP_PSY_ALL_CORE_FT
Pt did not go to her scheduled A[facility] intake. Pt completed some of the paperwork and confirmed the appt however she did not show up. Writer has been unable to reach pt to reschedule. Pt was assessed at time of discharge by the tx team and was determined stable for discharge. Case to be closed.

## 2022-12-07 NOTE — BH PATIENT PROFILE - NSBHATTITUDE_PSY_A_CORE
[FreeTextEntry1] : At the previous visit and today we discussed the next steps involved in management of elevated PSA level.  His recent PSA level is not available to us at this moment and we will try to obtain it.  Last year PSA level was around 4.  Pending insurance approval, MRI of the prostate will be ordered for consideration of fusion prostate biopsy if any significant lesions exist.  We will discuss the results on the next step on the phone.\par \par Puma Beatty MD, FACS\par [facility]\par  of Urology\par \par [address], [address]\[place] [address]\par \par [address], [address]\[place] [address]\par \par Tel: [phone]\par Fax: [phone]
uncooporative

## 2023-01-25 NOTE — PATIENT PROFILE BEHAVIORAL HEALTH - TEACHING/LEARNING LEARNING PREFERENCES
-- DO NOT REPLY / DO NOT REPLY ALL --  -- Message is from Engagement Center Operations (ECO) --    General Patient Message: Patient requesting to speak to nurse about missed appointment and letters received       Caller Information       Type Contact Phone/Fax    01/25/2023 08:32 AM CST Phone (Incoming) Rosa Cho (Self) [phone] (M)        Alternative phone number:     Can a detailed message be left? Yes    Message Turnaround: [place]:    Refer to site's KB page for routing instructions    Please give this turnaround time to the caller:   \"You can expect to receive a response 1-3 business days after your provider's clinical team reviews the message\"              
Pt had a death in the family on 12/30/2022 she stated she had called beforehand to let the clinic know and is calling now to let marisa know not to worry about her missing appts in the future as she will look for another doctor. She does not have one at this time and will keep Marisa listed as PCP until she finds a new one.   
verbal instruction/written material

## 2023-02-13 NOTE — BH INPATIENT PSYCHIATRY PROGRESS NOTE - NSBHATTESTSEENBY_PSY_A_CORE
NP without Attending Psychiatrist Secondary Intention Text (Leave Blank If You Do Not Want): The defect will heal with secondary intention.

## 2023-02-13 NOTE — ED BEHAVIORAL HEALTH ASSESSMENT NOTE - SUBSTANCE USE
Pratibha Jansen (:  1952) is a Established patient, here for evaluation of the following:    Assessment & Plan   Below is the assessment and plan developed based on review of pertinent history, physical exam, labs, studies, and medications. 1. Cough, unspecified type  2. Shortness of breath  3. Lung nodule    Recommendations:    -I advised the patient to proceed with chest x-ray PA and lateral today but he tells me he does not have a ride and he believes he is getting slightly better.  -I will send him a course of antibiotic and steroids. I advised him strongly if he is getting worse he need to proceed to the ER to get checked out.  -I will see him in few days to assess his symptoms and go over the results of bronchoscopy. He cannot go to work today. We will give him a letter that he should rest until his symptoms get better and be evaluated on Thursday. Return in about 3 days (around 2023). Subjective   Worsening symptoms after bronchoscopy. HPI    He underwent bronchoscopy on Friday with biopsy of right upper lobe mass/nodule with BAL. He has been having swelling and pain on his side and his back on the right since then. His post procedure x-ray did not show any pneumothorax. He has been coughing up more and producing clear sputum. Of note, he had a lot of respiratory secretions noted during bronchoscopy and had aspiration of tracheobronchial tree. He had significant mucous plugs. BAL was sent. Differential is mainly neutrophilic. There is no growth so far. Review of Systems     Sweating but no fever. Has more cough and shortness of breath. Denies any chest pain. Rest of review of systems were negative.     Objective   Patient-Reported Vitals  No data recorded         On this date 2023 I have spent 21 minutes reviewing previous notes, test results and face to face (virtual) with the patient discussing the diagnosis and importance of compliance with the treatment plan as well as documenting on the day of the visit. Woo Banegas, was evaluated through a synchronous (real-time) audio-video encounter. The patient (or guardian if applicable) is aware that this is a billable service, which includes applicable co-pays. This Virtual Visit was conducted with patient's (and/or legal guardian's) consent. The visit was conducted pursuant to the emergency declaration under the [facility], [address] [place] [address] authority and the [employer] and [employer] General Act. Patient identification was verified, and a caregiver was present when appropriate. The patient was located at Home: [place] [address]  Provider was located at [place] [address] ([address] [place] [address]): [address]         --Jelani Todd MD Yes

## 2023-02-14 NOTE — ED BEHAVIORAL HEALTH ASSESSMENT NOTE - DESCRIPTION
oriented to person, place and time, Patient was calm and cooperative in the ED and did not exhibit any aggression. Patient did not require any PRN medications or any physical restraints.     ICU Vital Signs Last 24 Hrs  T(C): 36.4 (21 Sep 2021 14:29), Max: 36.4 (21 Sep 2021 14:29)  T(F): 97.6 (21 Sep 2021 14:29), Max: 97.6 (21 Sep 2021 14:29)  HR: 81 (21 Sep 2021 14:29) (81 - 81)  BP: 111/79 (21 Sep 2021 14:29) (111/79 - 111/79)  BP(mean): 90 (21 Sep 2021 14:29) (90 - 90)  ABP: --  ABP(mean): --  RR: 18 (21 Sep 2021 14:29) (18 - 18)  SpO2: 98% (21 Sep 2021 14:29) (98% - 98%) Domiciled; umeployed; ; no children n/a

## 2023-03-13 NOTE — ED PROVIDER NOTE - SKIN NEGATIVE STATEMENT, MLM
Render In Strict Bullet Format?: No Detail Level: Generalized Discontinue Regimen: Humira due to pneumonia reoccurrence Continue Regimen: Hibiclense wash as needed.  doxycycline hyclate 100 mg capsule BID\\nQuantity: 120.0 Capsule  Days Supply: 60\\nSig: Take one capsule twice daily with food as directed\\n\\nclobetasol 0.05 % topical cream \\nQuantity: 45.0 g  Days Supply: 30\\nSig: Apply to affected areas twice daily for flares\\n\\nclindamycin 1 % lotion \\nQuantity: 60.0 ml  Days Supply: 30\\nSig: Apply to affected areas twice daily for flares no abrasions, no jaundice, no lesions, no pruritis, and no rashes.

## 2023-03-20 NOTE — ED CDU PROVIDER DISPOSITION NOTE - PRINCIPAL DIAGNOSIS
MA Communication:   The following orders are received by verbal communication from Charmaine Kirk MD    Orders include:    8 MONTH lUNGS F/U Suicidal ideation Psychiatric/Behavioral:  Negative for agitation and behavioral problems. Vitals:    03/20/23 1010   BP: 114/70   Pulse: 62   Resp: 16   Temp: 98.8 °F (37.1 °C)   TempSrc: Temporal   SpO2: 96%   Weight: 227 lb (103 kg)   Height: 6' 1\" (1.854 m)        Physical Exam  Constitutional:       General: He is not in acute distress. Appearance: He is not toxic-appearing. HENT:      Head: Normocephalic and atraumatic. Nose: Nose normal.      Mouth/Throat:      Pharynx: No oropharyngeal exudate. Eyes:      General: No scleral icterus. Right eye: No discharge. Left eye: No discharge. Cardiovascular:      Rate and Rhythm: Normal rate and regular rhythm. Heart sounds: No murmur heard. No friction rub. No gallop. Pulmonary:      Effort: Pulmonary effort is normal. No respiratory distress. Breath sounds: No wheezing or rales. Abdominal:      General: Abdomen is flat. Bowel sounds are normal.      Palpations: Abdomen is soft. Musculoskeletal:         General: Normal range of motion. Cervical back: Normal range of motion. Skin:     General: Skin is warm and dry. Neurological:      General: No focal deficit present. Mental Status: He is alert and oriented to person, place, and time. Psychiatric:         Mood and Affect: Mood normal.          Data  CT Chest:   3/20/2023  No lymphadenopathy. No pericardial or pleural effusions. Calcified nodule likely from prior granulomatous disease. No consolidations. Normal lung parenchyma. 2013  No pericardial or pleural effusions. No lymphadenopathy. No lung abnormalities. Right lower lobe lobectomy     CXR:   1/12/23  No acute cardiopulmonary abnormality     ECHO: None     PFT:   3/2023  Mild restrictive disease with no significant bronchodilator response. Normal diffusion. 2011  IMPRESSION:  1. Normal spirometry without significant response to bronchodilator  challenge.   No evidence of obstructive pulmonary

## 2023-05-25 NOTE — ED PROVIDER NOTE - CARDIAC, MLM
Normal rate, regular rhythm.  Heart sounds S1, S2.  No murmurs, rubs or gallops. Double O-Z Flap Text: The defect edges were debeveled with a #15 scalpel blade.  Given the location of the defect, shape of the defect and the proximity to free margins a Double O-Z flap was deemed most appropriate.  Using a sterile surgical marker, an appropriate transposition flap was drawn incorporating the defect and placing the expected incisions within the relaxed skin tension lines where possible. The area thus outlined was incised deep to adipose tissue with a #15 scalpel blade.  The skin margins were undermined to an appropriate distance in all directions utilizing iris scissors.

## 2023-06-06 NOTE — PROGRESS NOTE BEHAVIORAL HEALTH - MUSCLE TONE / STRENGTH
Normal muscle tone/strength
Male
Normal muscle tone/strength

## 2023-07-01 NOTE — BH PATIENT PROFILE - NSPROPASSIVESMOKEEXPOSURE_GEN_A_NUR
3 weeks Admits to smoking 1/2 PPD x70  Admits to drinking 1-2 shots   Denies drug use  Lives with significant other Unknown

## 2023-07-04 NOTE — ED BEHAVIORAL HEALTH ASSESSMENT NOTE - NS ED BHA PLAN ADMIT TO PSYCHIATRY BH CONTACT YN
Writer spoke with Mohan.  He called to inform his pharmacy was closed and could go to the pharmacy at [facility].  Writer relayed that information to the provider Dr. Sinclair.  
Yes

## 2023-07-25 ENCOUNTER — EMERGENCY (EMERGENCY)
Facility: HOSPITAL | Age: 53
LOS: 1 days | Discharge: HOME | End: 2023-07-25
Admitting: EMERGENCY MEDICINE
Payer: MEDICAID

## 2023-07-25 VITALS
WEIGHT: 138.89 LBS | TEMPERATURE: 97 F | HEART RATE: 93 BPM | RESPIRATION RATE: 15 BRPM | DIASTOLIC BLOOD PRESSURE: 73 MMHG | OXYGEN SATURATION: 93 % | SYSTOLIC BLOOD PRESSURE: 102 MMHG | HEIGHT: 66 IN

## 2023-07-25 VITALS
OXYGEN SATURATION: 97 % | SYSTOLIC BLOOD PRESSURE: 113 MMHG | RESPIRATION RATE: 16 BRPM | HEART RATE: 74 BPM | DIASTOLIC BLOOD PRESSURE: 70 MMHG | TEMPERATURE: 98 F

## 2023-07-25 PROCEDURE — 99284 EMERGENCY DEPT VISIT MOD MDM: CPT

## 2023-07-25 NOTE — ED PROVIDER NOTE - PATIENT PORTAL LINK FT
You can access the FollowMyHealth Patient Portal offered by [facility] by registering at the following website: [email or web address]. By joining [facility] FollowMyHealth portal, you will also be able to view your health information using other applications (apps) compatible with our system.

## 2023-07-25 NOTE — ED ADULT NURSE NOTE - NSICDXPASTMEDICALHX_GEN_ALL_CORE_FT
PAST MEDICAL HISTORY:  Anxiety     Depression     Heroin abuse     Major depression     Panic attack due to post traumatic stress disorder (PTSD)     Polysubstance abuse     Substance use disorder

## 2023-07-25 NOTE — ED PROVIDER NOTE - OBJECTIVE STATEMENT
51 yo f bibems for AMS, possibly 2/2 substance use, no trauma reported.  limited history 2/2 mental status. Narcan given in the field with response, does not endorse drug use.    I have reviewed available current nursing and previous documentation of past medical, surgical, family, and/or social history.

## 2023-07-25 NOTE — ED ADULT TRIAGE NOTE - CHIEF COMPLAINT QUOTE
Pt BIBEMS s/p overdose in restaurant bathroom. Pt hypoxic w EMS. Given 6mg IN narcan. Pt remains somnolent in triage.

## 2023-07-25 NOTE — ED ADULT NURSE NOTE - OBJECTIVE STATEMENT
pt alert to painful stimuli. Pt BIBEMS s/p overdose in restaurant bathroom. Pt hypoxic w EMS. Given 6mg IN narcan. Pt remains somnolent in triage.

## 2023-07-25 NOTE — ED ADULT NURSE NOTE - CAS DISCH TRANSFER METHOD
Per Roseanne Brennan MA; she states that she called and spoke with the patient to relay information that she has been given the requested order and that they will be making contact to schedule accordingly.    Walking

## 2023-07-25 NOTE — ED ADULT NURSE NOTE - NSFALLUNIVINTERV_ED_ALL_ED
Bed/Stretcher in lowest position, wheels locked, appropriate side rails in place/Call bell, personal items and telephone in reach/Instruct patient to call for assistance before getting out of bed/chair/stretcher/Non-slip footwear applied when patient is off stretcher/[place] to call system/Physically safe environment - no spills, clutter or unnecessary equipment/Purposeful proactive rounding/Room/bathroom lighting operational, light cord in reach

## 2023-07-25 NOTE — ED ADULT NURSE NOTE - NS ED NURSE DC INFO COMPLEXITY
Case Type: OP Block TimeSuite: CASProceduralist: Brenda Holloway  Confirmed Surgery DateTime: 06- - 0:00PAST DateTime: 06- - 7:15Procedure: EXCISION LEFT WRIST DORSAL GANGLION CYST  ERP?: UnavailableLaterality: LeftLength of Procedure: 45 Minutes  Anesthesia Type: General
Simple: Patient demonstrates quick and easy understanding

## 2023-07-26 PROBLEM — F11.10 OPIOID ABUSE, UNCOMPLICATED: Chronic | Status: ACTIVE | Noted: 2022-02-21

## 2023-07-26 PROBLEM — F32.A DEPRESSION, UNSPECIFIED: Chronic | Status: ACTIVE | Noted: 2022-03-27

## 2023-07-26 PROBLEM — F41.0 PANIC DISORDER [EPISODIC PAROXYSMAL ANXIETY]: Chronic | Status: ACTIVE | Noted: 2022-03-27

## 2023-07-26 PROBLEM — F41.9 ANXIETY DISORDER, UNSPECIFIED: Chronic | Status: ACTIVE | Noted: 2022-03-27

## 2023-07-26 PROBLEM — F19.10 OTHER PSYCHOACTIVE SUBSTANCE ABUSE, UNCOMPLICATED: Chronic | Status: ACTIVE | Noted: 2022-03-27

## 2023-07-29 DIAGNOSIS — F41.9 ANXIETY DISORDER, UNSPECIFIED: ICD-10-CM

## 2023-07-29 DIAGNOSIS — F32.1 MAJOR DEPRESSIVE DISORDER, SINGLE EPISODE, MODERATE: ICD-10-CM

## 2023-07-29 DIAGNOSIS — R41.82 ALTERED MENTAL STATUS, UNSPECIFIED: ICD-10-CM

## 2023-07-29 DIAGNOSIS — F43.10 POST-TRAUMATIC STRESS DISORDER, UNSPECIFIED: ICD-10-CM

## 2023-07-29 DIAGNOSIS — F19.90 OTHER PSYCHOACTIVE SUBSTANCE USE, UNSPECIFIED, UNCOMPLICATED: ICD-10-CM

## 2023-07-29 DIAGNOSIS — T40.2X1A POISONING BY OTHER OPIOIDS, ACCIDENTAL (UNINTENTIONAL), INITIAL ENCOUNTER: ICD-10-CM

## 2023-07-29 DIAGNOSIS — F41.0 PANIC DISORDER [EPISODIC PAROXYSMAL ANXIETY]: ICD-10-CM

## 2023-08-03 NOTE — BH INPATIENT PSYCHIATRY PROGRESS NOTE - NSICDXBHSECONDARYDX_PSY_ALL_CORE
Generalized anxiety disorder   F41.1  Borderline personality disorder   F60.3  
Spontaneous, unlabored and symmetrical
Generalized anxiety disorder   F41.1  Borderline personality disorder   F60.3

## 2023-08-04 NOTE — ED ADULT NURSE NOTE - HOW OFTEN DO YOU HAVE A DRINK CONTAINING ALCOHOL?
Never Purse String (Simple) Text: Given the location of the defect and the characteristics of the surrounding skin a purse string simple closure was deemed most appropriate.  Undermining was performed circumferentially around the surgical defect.  A purse string suture was then placed and tightened.

## 2024-01-19 NOTE — ED PROVIDER NOTE - OBJECTIVE STATEMENT
From: Mihaela Lacey  To: Joleen Velazquez  Sent: 1/19/2024 2:50 PM CST  Subject: Next AC1 test    Hello Doctor,    Hope this message find you well.  It has been almost 3-4 months since my last blood sugar test. When do you think I should be getting the blood test next?    Regards/Bogdan Lacey  [phone]   The patient is a 47y Female hx of anxiety, substance abuse evaluated at her methadone clinic and because agitated and threatening staff once confronted about her drug use.  Pt presents to ed in handcuffs but not under arrest, agitated and uncooperative.  Denies physical injuries or trauma during transport to ED.  Denies all medical complaints at present, requesting ativan.  Denies fever or chills, no cp or sob, no GI/ symptoms.  Denies HI, SI, no AVH.  Of note, pt received 10mg IM Versed prior to ED arrival.  Denies respiratory symptoms.

## 2024-02-20 NOTE — ED PROVIDER NOTE - PRINCIPAL DIAGNOSIS
Pt calls stating the Zpak is causing GI upset. Requesting change to amoxicillin. States she has taken it before without reaction despite Augmentin allergy. Rx sent to pharmacy.  
Anxiety

## 2024-02-23 NOTE — BH PATIENT PROFILE - FUNCTIONAL ASSESSMENT - DAILY ACTIVITY SECTION LABEL
Detail Level: Detailed
Quality 130: Documentation Of Current Medications In The Medical Record: Current Medications Documented
.

## 2024-03-14 NOTE — BH SAFETY PLAN - WARNING SIGN 2
A chest X-ray has been ordered for you:  This imaging takes place at the clinic with no appointment necessary, just come to the clinic during normal business hours, tell the reception desk you are here for a chest x-ray and they will perform the imaging.   When do I have my chest x-ray done?     It is usually requested that you have the chest x-ray done 2 weeks prior to your next pulmonary follow up appointment with Dr. Barr or EARL Borrego.  If a Pulmonary Function Test  (PFT) has been ordered with this chest xray, scheduled the PFT and have the chest xray done upon completion of the PFT.    If you have any questions, please call the clinic and ask for the pulmonary department:  [facility] Call Center #[phone]  [facility] Call Center #[phone]   
Not taking meds

## 2024-03-28 NOTE — BH PATIENT PROFILE - NSPROEDALEARNPREF_GEN_A_NUR
PRINCIPAL DISCHARGE DIAGNOSIS  Diagnosis: OA (osteoarthritis) of hip  Assessment and Plan of Treatment:     
verbal instruction

## 2024-04-05 NOTE — ED ADULT TRIAGE NOTE - NSWEIGHTCALCTOOLDRUG_GEN_A_CORE
used
FAMILY HISTORY:  Mother  Still living? Unknown  Family history of CVA, Age at diagnosis: Age Unknown

## 2024-04-23 NOTE — BH SAFETY PLAN - PHONE
[phone] Is This A New Presentation, Or A Follow-Up?: Skin Lesion How Severe Is Your Skin Lesion?: moderate Have Your Skin Lesions Been Treated?: not been treated

## 2024-06-10 NOTE — ED PROVIDER NOTE - EYES, MLM
Pt seen by Dr. Dial. Cysto performed in clinic. Consents signed and obtained by staff. Pt received medication per procedure protocol. Ciprofloxacin HCl tablet 500 mg & LIDOcaine HCl 2% urojet administered and tolerated well. RTC 1 year for cysto. Pt education given both written and verbal.    
Clear bilaterally, pupils equal, round and reactive to light.

## 2024-08-07 NOTE — BH INPATIENT PSYCHIATRY PROGRESS NOTE - NSBHFUPINTERVALCCFT_PSY_A_CORE
Assessment & Plan     Throat irritation  Unclear cause. Has tried flonase for possible PND and pepcid for possible reflux. Could still be those just under treated. Will refer to to ENT for further work up   - Adult ENT  Referral; Future    Primary hypertension  Side effect to lisinopril. Will continue dose of hydrochlorothiazide and start losartan. Stop lisinopril   - losartan (COZAAR) 25 MG tablet; Take 1 tablet (25 mg) by mouth daily  - hydroCHLOROthiazide 12.5 MG tablet; Take 1 tablet (12.5 mg) by mouth daily        The longitudinal plan of care for the diagnosis(es)/condition(s) as documented were addressed during this visit. Due to the added complexity in care, I will continue to support Neida in the subsequent management and with ongoing continuity of care.        No follow-ups on file.    Subjective   Neida is a 71 year old, presenting for the following health issues:  Throat Problem (Ongoing irritation with coughing)    History of Present Illness       Reason for visit:  Throat issue    She eats 2-3 servings of fruits and vegetables daily.She consumes 0 sweetened beverage(s) daily.She exercises with enough effort to increase her heart rate 30 to 60 minutes per day.  She exercises with enough effort to increase her heart rate 5 days per week.   She is taking medications regularly.     Throat irritation  - no pain, on going tickle and irritation  - no issues when swallowing   - tried flonase, taking pepcid  - no changes in symptoms   - when eating then feels a tickle in throat as well  - throat issues going on for a year  - seen 4 months ago for this  - dry cough since starting lisinopril               Review of Systems  Constitutional, HEENT, cardiovascular, pulmonary, gi and gu systems are negative, except as otherwise noted.      Objective    /74 (BP Location: Right arm, Patient Position: Sitting, Cuff Size: Adult Regular)   Pulse 57   Temp 97.9  F (36.6  C) (Tympanic)   Resp 16    "Ht 1.613 m (5' 3.5\")   Wt 64.4 kg (142 lb)   LMP  (LMP Unknown)   SpO2 100%   BMI 24.76 kg/m    Body mass index is 24.76 kg/m .  Physical Exam   GENERAL: alert and no distress  HENT: nose and mouth without ulcers or lesions, oropharynx clear, and oral mucous membranes moist  RESP: lungs clear to auscultation - no rales, rhonchi or wheezes  CV: regular rate and rhythm, normal S1 S2, no S3 or S4, no murmur, click or rub, no peripheral edema             Signed Electronically by: Tor Johnson MD    " MDD/polysubstance abuse

## 2024-08-19 NOTE — BH INPATIENT PSYCHIATRY ASSESSMENT NOTE - NSBHCONTPROVIDER_PSY_ALL_CORE
Patient was fitted for/given ace wrap, 4 inch , which was applied to the right knee without difficulty. Patient reports improvement of symptoms following DME application. Education provided on DME supply received. All patient questions answered.    It was explained to patient/guardian that the DME consent form is a document for your insurance stating that Urgent Care gave you this piece of medical equipment, applied it you, and you left with it. This allows for your insurance to bill appropriately, and if they decide not to pay for the full amount of the product, you will be responsible for the remaining balance of this item.    Patient/guardian agreeable. DME consent was completed and sent to scanning.     No...

## 2024-08-29 NOTE — BH INPATIENT PSYCHIATRY PROGRESS NOTE - CURRENT MEDICATION
Yes MEDICATIONS  (STANDING):  ARIPiprazole 10 milliGRAM(s) Oral daily  buprenorphine 2 mG/naloxone 0.5 mG SL  Tablet 1 Tablet(s) SubLingual <User Schedule>  clonazePAM  Tablet 1 milliGRAM(s) Oral daily  clonazePAM  Tablet 0.75 milliGRAM(s) Oral at bedtime  escitalopram 20 milliGRAM(s) Oral daily    MEDICATIONS  (PRN):  diphenhydrAMINE 50 milliGRAM(s) Oral every 6 hours PRN EPS/agitation/anxiety  diphenhydrAMINE Injectable 50 milliGRAM(s) IntraMuscular once PRN Agitation  hydrOXYzine hydrochloride 25 milliGRAM(s) Oral every 4 hours PRN Anxiety  nicotine  Polacrilex Gum 2 milliGRAM(s) Oral every 2 hours PRN breakthrough cravings  OLANZapine 5 milliGRAM(s) Oral every 6 hours PRN Agitation and Anxiety  OLANZapine Injectable 5 milliGRAM(s) IntraMuscular once PRN Agitation and anxiety   MEDICATIONS  (STANDING):  ARIPiprazole 10 milliGRAM(s) Oral daily  buprenorphine 2 mG/naloxone 0.5 mG SL  Tablet 1 Tablet(s) SubLingual <User Schedule>  clonazePAM  Tablet 1 milliGRAM(s) Oral daily  clonazePAM  Tablet 0.75 milliGRAM(s) Oral at bedtime  escitalopram 20 milliGRAM(s) Oral daily    MEDICATIONS  (PRN):  diphenhydrAMINE 50 milliGRAM(s) Oral every 6 hours PRN EPS/agitation/anxiety  diphenhydrAMINE Injectable 50 milliGRAM(s) IntraMuscular once PRN Agitation  hydrOXYzine hydrochloride 50 milliGRAM(s) Oral every 6 hours PRN Anxiety  nicotine  Polacrilex Gum 2 milliGRAM(s) Oral every 2 hours PRN breakthrough cravings  OLANZapine 5 milliGRAM(s) Oral every 6 hours PRN Agitation and Anxiety  OLANZapine Injectable 5 milliGRAM(s) IntraMuscular once PRN Agitation and anxiety

## 2024-12-23 NOTE — DISCHARGE NOTE BEHAVIORAL HEALTH - PROVIDER RX CONTACT NUMBER
Your discharge plan includes access to our free Care Transitions program.     As your Care Transition Nurse I will contact you via phone within 2 business days after your discharge from the hospital. Please have your discharge instructions and medications ready for review. Over the next 30 days I will call you at least once a week. I am able to assist with arranging follow-up appointments. I have direct contact with your physicians and will alert them with any health or medication concerns or relay your questions.     Your Care Transitions Nurse is Farideh HARRISON RN    If you have any questions or concerns after your discharge and prior to our first call, you can reach me at [phone].      [phone]

## 2025-01-29 NOTE — ED BEHAVIORAL HEALTH ASSESSMENT NOTE - NSBHMSESPEECH_PSY_A_CORE
Report given to Renetta BAUER RN , all questions answered. Pt transported by cart with all belongings     Normal volume, rate, productivity, spontaneity and articulation

## 2025-02-26 NOTE — ED ADULT NURSE NOTE - ED STAT RN HANDOFF DETAILS 2
Pended labs to [facility] for approval   report given to DOMINIQUE Townsend and DOMINIQUE Winkler

## 2025-03-19 NOTE — ED ADULT TRIAGE NOTE - IDEAL BODY WEIGHT(KG)
Diabetes is worsening.   Continue current treatment regimen.    Patient has not been taking medications regularly and has not had Dexcom recently. Sent Dexcom G7 and patient will start this if covered by insurance. He will call if needs help with troubleshooting.    Discussed that we can increase dose of Mounjaro to 15 mg if A1c continues to be elevated at next visit.    Diabetes will be reassessed in 3 months   57

## 2025-03-19 NOTE — ED BEHAVIORAL HEALTH ASSESSMENT NOTE - OTHER PAST PSYCHIATRIC HISTORY (INCLUDE DETAILS REGARDING ONSET, COURSE OF ILLNESS, INPATIENT/OUTPATIENT TREATMENT)
[facility]  Inpatient Services  Wound and Skin Care Brief Evaluation    Admission Date: 3/17/2025     Last order of IP CONSULT TO WOUND CARE was found on 3/17/2025 from Hospital Encounter on 3/16/2025     HPI, PMH, SH: Reviewed    No chief complaint on file.    Diagnosis: GI bleed [K92.2]    Unit where seen by Wound Team: S624/01     Wound consult placed regarding head, sacrum, and right knee. Chart and images reviewed. This discussed with bedside MARELY Durham. This clinician in to assess patient. Patient pleasant and agreeable. Patient head with mild redness r/t to eczema. Patient declined intervention, states he has medicated cream at home that he uses PRN. Patient sacrum intact with blanchable redness. Continue with offloading measures. Small raised nodule with brown discoloration noted to R buttock. Patient states he has an appointment with dermatologist outpatient in a few months. R knee incision intact and well approximated. No drainage noted.     BL heels also assessed during encounter and found to be intact with blanchable redness. Bruising noted to right medial heel. Continue with offloading.     No pressure injuries or advanced wound care needs identified. Wound consult completed. No further follow up unless indicated and consulted.     Wound 03/19/25 Incision Knee Right (Active)   Date First Assessed/Time First Assessed: 03/19/25 1100   Present on Original Admission: Yes  Hand Hygiene Completed: Yes  Primary Wound Type: Incision  Location: Knee  Laterality: Right      Assessments 3/19/2025 11:00 AM   Wound Image     Site Assessment Dry;Intact   Periwound Assessment Clean;Dry;Intact   Margins Attached edges   Closure Approximated   Drainage Amount None   Treatments Site care   Dressing Status Clean;Dry;Intact   Dressing Changed Reapplied   Dressing Cleansing/Solutions Not Applicable   Dressing Options [place] Dressing   Wound Team Following Not following     Head       Sacrum      R Heel      L  Heel      R Medial Heel      R Buttock        PREVENTATIVE INTERVENTIONS:    Q shift Kashif - performed per nursing policy  Q shift pressure point assessments - performed per nursing policy    Surface/Positioning  Standard/trauma mattress - Currently in Place  Reposition q 2 hours - Ordered    Offloading/Redistribution  Sacral offloading dressing (Silicone dressing) - Currently in Place  Heel offloading dressing (Silicone dressing) - Currently in Place  Float Heels off Bed with Pillows - Currently in Place        See HPI

## 2025-05-02 NOTE — PROGRESS NOTE BEHAVIORAL HEALTH - PRN MEDICATIONS SINCE LAST EVAL
Problem: Chronic Conditions and Co-morbidities  Goal: Patient's chronic conditions and co-morbidity symptoms are monitored and maintained or improved  5/2/2025 1020 by Stephanie Cruz RN  Outcome: Completed  5/1/2025 2026 by Swathi Land RN  Outcome: Progressing     Problem: Pain  Goal: Verbalizes/displays adequate comfort level or baseline comfort level  5/2/2025 1020 by Stephanie Cruz RN  Outcome: Completed  5/1/2025 2026 by Swathi Land RN  Outcome: Progressing     Problem: Safety - Adult  Goal: Free from fall injury  5/2/2025 1020 by Stephanie Cruz RN  Outcome: Completed  5/1/2025 2026 by Swathi Land RN  Outcome: Progressing     
no
no